# Patient Record
Sex: FEMALE | Race: BLACK OR AFRICAN AMERICAN | Employment: FULL TIME | URBAN - METROPOLITAN AREA
[De-identification: names, ages, dates, MRNs, and addresses within clinical notes are randomized per-mention and may not be internally consistent; named-entity substitution may affect disease eponyms.]

---

## 2017-06-22 ENCOUNTER — OFFICE VISIT (OUTPATIENT)
Dept: FAMILY MEDICINE CLINIC | Age: 52
End: 2017-06-22

## 2017-06-22 VITALS
OXYGEN SATURATION: 97 % | DIASTOLIC BLOOD PRESSURE: 80 MMHG | BODY MASS INDEX: 33.73 KG/M2 | RESPIRATION RATE: 16 BRPM | HEART RATE: 70 BPM | TEMPERATURE: 98.5 F | HEIGHT: 63 IN | WEIGHT: 190.4 LBS | SYSTOLIC BLOOD PRESSURE: 112 MMHG

## 2017-06-22 DIAGNOSIS — J42 CHRONIC BRONCHITIS, UNSPECIFIED CHRONIC BRONCHITIS TYPE (HCC): Primary | ICD-10-CM

## 2017-06-22 DIAGNOSIS — Z72.0 TOBACCO ABUSE: ICD-10-CM

## 2017-06-22 RX ORDER — IBUPROFEN 800 MG/1
800 TABLET ORAL
Qty: 90 TAB | Refills: 0 | Status: CANCELLED | OUTPATIENT
Start: 2017-06-22

## 2017-06-22 NOTE — PROGRESS NOTES
1. Have you been to the ER, urgent care clinic since your last visit? Hospitalized since your last visit? NO    2. Have you seen or consulted any other health care providers outside of the [facility]"" since your last visit? Include any pap smears or colon screening. NO    3. Vaccines? tdap    4.  Rx updated yes

## 2017-06-22 NOTE — PATIENT INSTRUCTIONS
Stopping Smoking: Care Instructions  Your Care Instructions  Cigarette smokers crave the nicotine in cigarettes. Giving it up is much harder than simply changing a habit. Your body has to stop craving the nicotine. It is hard to quit, but you can do it. There are many tools that people use to quit smoking. You may find that combining tools works best for you. There are several steps to quitting. First you get ready to quit. Then you get support to help you. After that, you learn new skills and behaviors to become a nonsmoker. For many people, a necessary step is getting and using medicine. Your doctor will help you set up the plan that best meets your needs. You may want to attend a smoking cessation program to help you quit smoking. When you choose a program, look for one that has proven success. Ask your doctor for ideas. You will greatly increase your chances of success if you take medicine as well as get counseling or join a cessation program.  Some of the changes you feel when you first quit tobacco are uncomfortable. Your body will miss the nicotine at first, and you may feel short-tempered and grumpy. You may have trouble sleeping or concentrating. Medicine can help you deal with these symptoms. You may struggle with changing your smoking habits and rituals. The last step is the tricky one: Be prepared for the smoking urge to continue for a time. This is a lot to deal with, but keep at it. You will feel better. Follow-up care is a key part of your treatment and safety. Be sure to make and go to all appointments, and call your doctor if you are having problems. Its also a good idea to know your test results and keep a list of the medicines you take. How can you care for yourself at home? · Ask your family, friends, and coworkers for support. You have a better chance of quitting if you have help and support.   · Join a support group, such as Nicotine Anonymous, for people who are trying to quit smoking. · Consider signing up for a smoking cessation program, such as the American Lung Association's Freedom from Smoking program.  · Set a quit date. Pick your date carefully so that it is not right in the middle of a big deadline or stressful time. Once you quit, do not even take a puff. Get rid of all ashtrays and lighters after your last cigarette. Clean your house and your clothes so that they do not smell of smoke. · Learn how to be a nonsmoker. Think about ways you can avoid those things that make you reach for a cigarette. ¨ Avoid situations that put you at greatest risk for smoking. For some people, it is hard to have a drink with friends without smoking. For others, they might skip a coffee break with coworkers who smoke. ¨ Change your daily routine. Take a different route to work or eat a meal in a different place. · Cut down on stress. Calm yourself or release tension by doing an activity you enjoy, such as reading a book, taking a hot bath, or gardening. · Talk to your doctor or pharmacist about nicotine replacement therapy, which replaces the nicotine in your body. You still get nicotine but you do not use tobacco. Nicotine replacement products help you slowly reduce the amount of nicotine you need. These products come in several forms, many of them available over-the-counter:  ¨ Nicotine patches  ¨ Nicotine gum and lozenges  ¨ Nicotine inhaler  · Ask your doctor about bupropion (Wellbutrin) or varenicline (Chantix), which are prescription medicines. They do not contain nicotine. They help you by reducing withdrawal symptoms, such as stress and anxiety. · Some people find hypnosis, acupuncture, and massage helpful for ending the smoking habit. · Eat a healthy diet and get regular exercise. Having healthy habits will help your body move past its craving for nicotine. · Be prepared to keep trying. Most people are not successful the first few times they try to quit.  Do not get mad at yourself if you smoke again. Make a list of things you learned and think about when you want to try again, such as next week, next month, or next year. Where can you learn more? Go to http://sylvie-angela.info/. Enter H457 in the search box to learn more about \"Stopping Smoking: Care Instructions. \"  Current as of: March 20, 2017  Content Version: 11.3  © 8898-0257 [employer]. Care instructions adapted under license by [employer] (which disclaims liability or warranty for this information). If you have questions about a medical condition or this instruction, always ask your healthcare professional. [address] any warranty or liability for your use of this information. Body Mass Index: Care Instructions  Your Care Instructions    Body mass index (BMI) can help you see if your weight is raising your risk for health problems. It uses a formula to compare how much you weigh with how tall you are. · A BMI lower than 18.5 is considered underweight. · A BMI between 18.5 and 24.9 is considered healthy. · A BMI between 25 and 29.9 is considered overweight. A BMI of 30 or higher is considered obese. If your BMI is in the normal range, it means that you have a lower risk for weight-related health problems. If your BMI is in the overweight or obese range, you may be at increased risk for weight-related health problems, such as high blood pressure, heart disease, stroke, arthritis or joint pain, and diabetes. If your BMI is in the underweight range, you may be at increased risk for health problems such as fatigue, lower protection (immunity) against illness, muscle loss, bone loss, hair loss, and hormone problems. BMI is just one measure of your risk for weight-related health problems. You may be at higher risk for health problems if you are not active, you eat an unhealthy diet, or you drink too much alcohol or use tobacco products.   Follow-up care is a key part of your treatment and safety. Be sure to make and go to all appointments, and call your doctor if you are having problems. It's also a good idea to know your test results and keep a list of the medicines you take. How can you care for yourself at home? · Practice healthy eating habits. This includes eating plenty of fruits, vegetables, whole grains, lean protein, and low-fat dairy. · If your doctor recommends it, get more exercise. Walking is a good choice. Bit by bit, increase the amount you walk every day. Try for at least 30 minutes on most days of the week. · Do not smoke. Smoking can increase your risk for health problems. If you need help quitting, talk to your doctor about stop-smoking programs and medicines. These can increase your chances of quitting for good. · Limit alcohol to 2 drinks a day for men and 1 drink a day for women. Too much alcohol can cause health problems. If you have a BMI higher than 25  · Your doctor may do other tests to check your risk for weight-related health problems. This may include measuring the distance around your waist. A waist measurement of more than 40 inches in men or 35 inches in women can increase the risk of weight-related health problems. · Talk with your doctor about steps you can take to stay healthy or improve your health. You may need to make lifestyle changes to lose weight and stay healthy, such as changing your diet and getting regular exercise. If you have a BMI lower than 18.5  · Your doctor may do other tests to check your risk for health problems. · Talk with your doctor about steps you can take to stay healthy or improve your health. You may need to make lifestyle changes to gain or maintain weight and stay healthy, such as getting more healthy foods in your diet and doing exercises to build muscle. Where can you learn more? Go to http://sylvie-angela.info/.   Enter S176 in the search box to learn more about \"Body Mass Index: Care Instructions. \"  Current as of: January 23, 2017  Content Version: 11.3  © 5730-3220 [employer], [place]. Care instructions adapted under license by [employer] (which disclaims liability or warranty for this information). If you have questions about a medical condition or this instruction, always ask your healthcare professional. [place] [address] any warranty or liability for your use of this information.

## 2017-06-22 NOTE — PROGRESS NOTES
Subjective:   Ken Martinez is a 46 y.o. female for refill for albuterol for chronic bronchitis. Patient is a smoker 20 year 1/2 PPD history. tobacco cessation counseling------ patient offers no commitment today  Patient reports that she has dyspnea when weather changes (humidity or cold weather). Requesting refill for albuterol HFA. She reports this is the only time she really uses albuterol. ROS: denies use of inhaled steroids, denies every having a spirometry for diagnosis, denies chest pain, denies coughing  Overdue for health maintenance and will schedule wellness visit. PMH: reviewed medications and allergy lists and medical and family history. OBJECTIVE:  Awake and alert in no acute distress  Lungs clear throughout  S1 S2 RRR without ectopy or murmur auscultated. Visit Vitals    /80 (BP 1 Location: Left arm, BP Patient Position: Sitting)    Pulse 70    Temp 98.5 °F (36.9 °C) (Oral)    Resp 16    Ht 5' 3\" (1.6 m)    Wt 190 lb 6.4 oz (86.4 kg)    SpO2 97%    BMI 33.73 kg/m2     Trevor Robertson was seen today for other. Diagnoses and all orders for this visit:    Chronic bronchitis, unspecified chronic bronchitis type ([facility] [address]  -     albuterol sulfate 90 mcg/actuation aepb; Take 2 Puffs by inhalation every four (4) hours as needed. -     REFERRAL TO PULMONARY DISEASE    Tobacco abuse    Other orders  -     Cancel: ibuprofen (MOTRIN) 800 mg tablet; Take 1 Tab by mouth every eight (8) hours as needed for Pain. Indications: Pain      tobacco cessation counseling------ patient offers no commitment today  Reviewed risks and benefits and common side effects of new medication  Portion control and exercise healthy eating general guidelines reviewed with patient to get closer to normal BMI  Patient verbalizes understanding. I have discussed the diagnosis with the patient and the intended plan as seen in the above orders.   The patient has received an after-visit summary and questions were answered concerning future plans. I have discussed medication side effects and warnings with the patient as well.     Follow-up Disposition:  Return for well woman exam.

## 2017-06-22 NOTE — MR AVS SNAPSHOT
Visit Information Date & Time Provider Department Dept. Phone Encounter #  
 6/22/2017 12:40 PM Yovanny Bartlett  [facility] [phone] [id number] Follow-up Instructions Return for well woman exam. Upcoming Health Maintenance Date Due Hepatitis C Screening 1965 DTaP/Tdap/Td series (1 - Tdap) 10/11/1984 BREAST CANCER SCRN MAMMOGRAM 10/26/2014 FOBT Q 1 YEAR AGE 50-75 8/16/2017 INFLUENZA AGE 9 TO ADULT 8/1/2017 Allergies as of 6/22/2017  Review Complete On: 6/22/2017 By: Yovanny Bartlett NP Severity Noted Reaction Type Reactions Cyclobenzaprine High 11/08/2016    Other (comments)  
 headache Etodolac  11/08/2016    Other (comments)  
 headache Current Immunizations  Reviewed on 8/16/2016 Name Date Pneumococcal Conjugate (PCV-13) 8/16/2016 Not reviewed this visit You Were Diagnosed With   
  
 Codes Comments Chronic bronchitis, unspecified chronic bronchitis type ([facility]    -  Primary ICD-10-CM: Z76 ICD-9-CM: 491.9 Tobacco abuse     ICD-10-CM: Z72.0 ICD-9-CM: 305.1 Vitals BP Pulse Temp Resp Height(growth percentile) Weight(growth percentile) 112/80 (BP 1 Location: Left arm, BP Patient Position: Sitting) 70 98.5 °F (36.9 °C) (Oral) 16 5' 3\" (1.6 m) 190 lb 6.4 oz (86.4 kg) SpO2 BMI OB Status Smoking Status 97% 33.73 kg/m2 Hysterectomy Current Every Day Smoker BMI and BSA Data Body Mass Index Body Surface Area  
 33.73 kg/m 2 1.96 m 2 Preferred Pharmacy Pharmacy Name Phone [facility] [address] [facility], [facility] [phone] Your Updated Medication List  
  
   
This list is accurate as of: 6/22/17  1:07 PM.  Always use your most recent med list.  
  
  
  
  
 albuterol sulfate 90 mcg/actuation Aepb Take 2 Puffs by inhalation every four (4) hours as needed. ibuprofen 800 mg tablet Commonly known as:  MOTRIN Take 1 Tab by mouth every eight (8) hours as needed for Pain. Indications: PAIN  
  
 TEGretol 200 mg tablet Generic drug:  carBAMazepine Take 400 mg by mouth two (2) times a day. triamcinolone acetonide 0.1 % topical cream  
Commonly known as:  KENALOG Apply  to affected area two (2) times a day. use thin layer Prescriptions Sent to Pharmacy Refills  
 albuterol sulfate 90 mcg/actuation aepb 0 Sig: Take 2 Puffs by inhalation every four (4) hours as needed. Class: Normal  
 Pharmacy: [id number] [address], [address] [place] [address] #: [phone] Route: Inhalation We Performed the Following REFERRAL TO PULMONARY DISEASE [RKM08 Custom] Comments:  
 Please evaluate patient for confirm diagnosis of chronic bronchitis. Follow-up Instructions Return for well woman exam.  
  
  
Referral Information Referral ID Referred By Referred To  
  
 [id number] Yohannes Ng, Maciej El MD   
   [address] [place] [address] [address] [facility] [place], [address] [place] [address] [address] Phone: [phone] Fax: [phone] Visits Status Start Date End Date 1 New Request 6/22/17 6/22/18 If your referral has a status of pending review or denied, additional information will be sent to support the outcome of this decision. Patient Instructions Stopping Smoking: Care Instructions Your Care Instructions Cigarette smokers crave the nicotine in cigarettes. Giving it up is much harder than simply changing a habit. Your body has to stop craving the nicotine. It is hard to quit, but you can do it. There are many tools that people use to quit smoking. You may find that combining tools works best for you. There are several steps to quitting. First you get ready to quit. Then you get support to help you.  After that, you learn new skills and behaviors to become a nonsmoker. For many people, a necessary step is getting and using medicine. Your doctor will help you set up the plan that best meets your needs. You may want to attend a smoking cessation program to help you quit smoking. When you choose a program, look for one that has proven success. Ask your doctor for ideas. You will greatly increase your chances of success if you take medicine as well as get counseling or join a cessation program. 
Some of the changes you feel when you first quit tobacco are uncomfortable. Your body will miss the nicotine at first, and you may feel short-tempered and grumpy. You may have trouble sleeping or concentrating. Medicine can help you deal with these symptoms. You may struggle with changing your smoking habits and rituals. The last step is the tricky one: Be prepared for the smoking urge to continue for a time. This is a lot to deal with, but keep at it. You will feel better. Follow-up care is a key part of your treatment and safety. Be sure to make and go to all appointments, and call your doctor if you are having problems. Its also a good idea to know your test results and keep a list of the medicines you take. How can you care for yourself at home? · Ask your family, friends, and coworkers for support. You have a better chance of quitting if you have help and support. · Join a support group, such as Nicotine Anonymous, for people who are trying to quit smoking. · Consider signing up for a smoking cessation program, such as the American Lung Association's Freedom from Smoking program. 
· Set a quit date. Pick your date carefully so that it is not right in the middle of a big deadline or stressful time. Once you quit, do not even take a puff. Get rid of all ashtrays and lighters after your last cigarette. Clean your house and your clothes so that they do not smell of smoke. · Learn how to be a nonsmoker.  Think about ways you can avoid those things that make you reach for a cigarette. ¨ Avoid situations that put you at greatest risk for smoking. For some people, it is hard to have a drink with friends without smoking. For others, they might skip a coffee break with coworkers who smoke. ¨ Change your daily routine. Take a different route to work or eat a meal in a different place. · Cut down on stress. Calm yourself or release tension by doing an activity you enjoy, such as reading a book, taking a hot bath, or gardening. · Talk to your doctor or pharmacist about nicotine replacement therapy, which replaces the nicotine in your body. You still get nicotine but you do not use tobacco. Nicotine replacement products help you slowly reduce the amount of nicotine you need. These products come in several forms, many of them available over-the-counter: ¨ Nicotine patches ¨ Nicotine gum and lozenges ¨ Nicotine inhaler · Ask your doctor about bupropion (Wellbutrin) or varenicline (Chantix), which are prescription medicines. They do not contain nicotine. They help you by reducing withdrawal symptoms, such as stress and anxiety. · Some people find hypnosis, acupuncture, and massage helpful for ending the smoking habit. · Eat a healthy diet and get regular exercise. Having healthy habits will help your body move past its craving for nicotine. · Be prepared to keep trying. Most people are not successful the first few times they try to quit. Do not get mad at yourself if you smoke again. Make a list of things you learned and think about when you want to try again, such as next week, next month, or next year. Where can you learn more? Go to http://sylvie-angela.info/. Enter H545 in the search box to learn more about \"Stopping Smoking: Care Instructions. \" Current as of: March 20, 2017 Content Version: 11.3 © 8258-6635 [employer].  Care instructions adapted under license by [address] (which disclaims liability or warranty for this information). If you have questions about a medical condition or this instruction, always ask your healthcare professional. [address] any warranty or liability for your use of this information. Body Mass Index: Care Instructions Your Care Instructions Body mass index (BMI) can help you see if your weight is raising your risk for health problems. It uses a formula to compare how much you weigh with how tall you are. · A BMI lower than 18.5 is considered underweight. · A BMI between 18.5 and 24.9 is considered healthy. · A BMI between 25 and 29.9 is considered overweight. A BMI of 30 or higher is considered obese. If your BMI is in the normal range, it means that you have a lower risk for weight-related health problems. If your BMI is in the overweight or obese range, you may be at increased risk for weight-related health problems, such as high blood pressure, heart disease, stroke, arthritis or joint pain, and diabetes. If your BMI is in the underweight range, you may be at increased risk for health problems such as fatigue, lower protection (immunity) against illness, muscle loss, bone loss, hair loss, and hormone problems. BMI is just one measure of your risk for weight-related health problems. You may be at higher risk for health problems if you are not active, you eat an unhealthy diet, or you drink too much alcohol or use tobacco products. Follow-up care is a key part of your treatment and safety. Be sure to make and go to all appointments, and call your doctor if you are having problems. It's also a good idea to know your test results and keep a list of the medicines you take. How can you care for yourself at home? · Practice healthy eating habits. This includes eating plenty of fruits, vegetables, whole grains, lean protein, and low-fat dairy. · If your doctor recommends it, get more exercise. Walking is a good choice. Bit by bit, increase the amount you walk every day. Try for at least 30 minutes on most days of the week. · Do not smoke. Smoking can increase your risk for health problems. If you need help quitting, talk to your doctor about stop-smoking programs and medicines. These can increase your chances of quitting for good. · Limit alcohol to 2 drinks a day for men and 1 drink a day for women. Too much alcohol can cause health problems. If you have a BMI higher than 25 · Your doctor may do other tests to check your risk for weight-related health problems. This may include measuring the distance around your waist. A waist measurement of more than 40 inches in men or 35 inches in women can increase the risk of weight-related health problems. · Talk with your doctor about steps you can take to stay healthy or improve your health. You may need to make lifestyle changes to lose weight and stay healthy, such as changing your diet and getting regular exercise. If you have a BMI lower than 18.5 · Your doctor may do other tests to check your risk for health problems. · Talk with your doctor about steps you can take to stay healthy or improve your health. You may need to make lifestyle changes to gain or maintain weight and stay healthy, such as getting more healthy foods in your diet and doing exercises to build muscle. Where can you learn more? Go to http://sylvie-angela.info/. Enter S176 in the search box to learn more about \"Body Mass Index: Care Instructions. \" Current as of: January 23, 2017 Content Version: 11.3 © 3596-1727 [employer]. Care instructions adapted under license by [employer] (which disclaims liability or warranty for this information).  If you have questions about a medical condition or this instruction, always ask your healthcare professional. [employer] disclaims any warranty or liability for your use of this information. Introducing \[facility]\"" & HEALTH SERVICES! Sergio Garrett introduces [employer] patient portal. Now you can access parts of your medical record, email your doctor's office, and request medication refills online. 1. In your internet browser, go to [email or web address] 2. Click on the First Time User? Click Here link in the Sign In box. You will see the New Member Sign Up page. 3. Enter your [employer] Access Code exactly as it appears below. You will not need to use this code after youve completed the sign-up process. If you do not sign up before the expiration date, you must request a new code. · [employer] Access Code: [email or web address] Expires: 9/20/2017  1:07 PM 
 
4. Enter the last four digits of your Social Security Number (xxxx) and Date of Birth (mm/dd/yyyy) as indicated and click Submit. You will be taken to the next sign-up page. 5. Create a [employer] ID. This will be your [employer] login ID and cannot be changed, so think of one that is secure and easy to remember. 6. Create a [employer] password. You can change your password at any time. 7. Enter your Password Reset Question and Answer. This can be used at a later time if you forget your password. 8. Enter your e-mail address. You will receive e-mail notification when new information is available in [address]. 9. Click Sign Up. You can now view and download portions of your medical record. 10. Click the Download Summary menu link to download a portable copy of your medical information. If you have questions, please visit the Frequently Asked Questions section of the [employer] website. Remember, [employer] is NOT to be used for urgent needs. For medical emergencies, dial 911. Now available from your iPhone and Android! Please provide this summary of care documentation to your next provider. Your primary care clinician is listed as [facility]. If you have any questions after today's visit, please call [phone].

## 2017-10-16 ENCOUNTER — HOSPITAL ENCOUNTER (EMERGENCY)
Age: 52
Discharge: HOME | End: 2017-10-16
Attending: EMERGENCY MEDICINE
Payer: COMMERCIAL

## 2017-10-16 ENCOUNTER — APPOINTMENT (OUTPATIENT)
Dept: GENERAL RADIOLOGY | Age: 52
End: 2017-10-16
Attending: EMERGENCY MEDICINE
Payer: COMMERCIAL

## 2017-10-16 ENCOUNTER — APPOINTMENT (OUTPATIENT)
Dept: CT IMAGING | Age: 52
End: 2017-10-16
Attending: EMERGENCY MEDICINE
Payer: COMMERCIAL

## 2017-10-16 VITALS
OXYGEN SATURATION: 97 % | HEIGHT: 64 IN | TEMPERATURE: 97.2 F | RESPIRATION RATE: 16 BRPM | BODY MASS INDEX: 32.44 KG/M2 | DIASTOLIC BLOOD PRESSURE: 60 MMHG | HEART RATE: 60 BPM | SYSTOLIC BLOOD PRESSURE: 108 MMHG | WEIGHT: 190 LBS

## 2017-10-16 DIAGNOSIS — K57.30 DIVERTICULOSIS OF LARGE INTESTINE WITHOUT HEMORRHAGE: Primary | ICD-10-CM

## 2017-10-16 DIAGNOSIS — M54.40 ACUTE RIGHT-SIDED LOW BACK PAIN WITH SCIATICA, SCIATICA LATERALITY UNSPECIFIED: ICD-10-CM

## 2017-10-16 LAB
ALBUMIN SERPL-MCNC: 3.6 G/DL (ref 3.4–5)
ALBUMIN/GLOB SERPL: 0.8 {RATIO} (ref 0.8–1.7)
ALP SERPL-CCNC: 93 U/L (ref 45–117)
ALT SERPL-CCNC: 24 U/L (ref 13–56)
ANION GAP SERPL CALC-SCNC: 6 MMOL/L (ref 3–18)
APPEARANCE UR: CLEAR
AST SERPL-CCNC: 13 U/L (ref 15–37)
BASOPHILS # BLD: 0 K/UL (ref 0–0.06)
BASOPHILS NFR BLD: 0 % (ref 0–2)
BILIRUB DIRECT SERPL-MCNC: <0.1 MG/DL (ref 0–0.2)
BILIRUB SERPL-MCNC: 0.2 MG/DL (ref 0.2–1)
BILIRUB UR QL: NEGATIVE
BUN SERPL-MCNC: 14 MG/DL (ref 7–18)
BUN/CREAT SERPL: 19 (ref 12–20)
CALCIUM SERPL-MCNC: 8.6 MG/DL (ref 8.5–10.1)
CHLORIDE SERPL-SCNC: 107 MMOL/L (ref 100–108)
CO2 SERPL-SCNC: 26 MMOL/L (ref 21–32)
COLOR UR: YELLOW
CREAT SERPL-MCNC: 0.72 MG/DL (ref 0.6–1.3)
DIFFERENTIAL METHOD BLD: NORMAL
EOSINOPHIL # BLD: 0.1 K/UL (ref 0–0.4)
EOSINOPHIL NFR BLD: 2 % (ref 0–5)
ERYTHROCYTE [DISTWIDTH] IN BLOOD BY AUTOMATED COUNT: 14 % (ref 11.6–14.5)
GLOBULIN SER CALC-MCNC: 4.3 G/DL (ref 2–4)
GLUCOSE SERPL-MCNC: 82 MG/DL (ref 74–99)
GLUCOSE UR STRIP.AUTO-MCNC: NEGATIVE MG/DL
HCT VFR BLD AUTO: 36.8 % (ref 35–45)
HGB BLD-MCNC: 12.2 G/DL (ref 12–16)
HGB UR QL STRIP: NEGATIVE
KETONES UR QL STRIP.AUTO: NEGATIVE MG/DL
LACTATE BLD-SCNC: 0.7 MMOL/L (ref 0.4–2)
LEUKOCYTE ESTERASE UR QL STRIP.AUTO: NEGATIVE
LIPASE SERPL-CCNC: 197 U/L (ref 73–393)
LYMPHOCYTES # BLD: 2.6 K/UL (ref 0.9–3.6)
LYMPHOCYTES NFR BLD: 44 % (ref 21–52)
MCH RBC QN AUTO: 27.1 PG (ref 24–34)
MCHC RBC AUTO-ENTMCNC: 33.2 G/DL (ref 31–37)
MCV RBC AUTO: 81.6 FL (ref 74–97)
MONOCYTES # BLD: 0.3 K/UL (ref 0.05–1.2)
MONOCYTES NFR BLD: 5 % (ref 3–10)
NEUTS SEG # BLD: 2.8 K/UL (ref 1.8–8)
NEUTS SEG NFR BLD: 49 % (ref 40–73)
NITRITE UR QL STRIP.AUTO: NEGATIVE
PH UR STRIP: 5 [PH] (ref 5–8)
PLATELET # BLD AUTO: 256 K/UL (ref 135–420)
PMV BLD AUTO: 9.4 FL (ref 9.2–11.8)
POTASSIUM SERPL-SCNC: 3.9 MMOL/L (ref 3.5–5.5)
PROT SERPL-MCNC: 7.9 G/DL (ref 6.4–8.2)
PROT UR STRIP-MCNC: NEGATIVE MG/DL
RBC # BLD AUTO: 4.51 M/UL (ref 4.2–5.3)
SODIUM SERPL-SCNC: 139 MMOL/L (ref 136–145)
SP GR UR REFRACTOMETRY: 1.03 (ref 1–1.03)
UROBILINOGEN UR QL STRIP.AUTO: 0.2 EU/DL (ref 0.2–1)
WBC # BLD AUTO: 5.9 K/UL (ref 4.6–13.2)

## 2017-10-16 PROCEDURE — 96375 TX/PRO/DX INJ NEW DRUG ADDON: CPT

## 2017-10-16 PROCEDURE — 81003 URINALYSIS AUTO W/O SCOPE: CPT | Performed by: EMERGENCY MEDICINE

## 2017-10-16 PROCEDURE — 99283 EMERGENCY DEPT VISIT LOW MDM: CPT

## 2017-10-16 PROCEDURE — 80076 HEPATIC FUNCTION PANEL: CPT | Performed by: EMERGENCY MEDICINE

## 2017-10-16 PROCEDURE — 80048 BASIC METABOLIC PNL TOTAL CA: CPT | Performed by: EMERGENCY MEDICINE

## 2017-10-16 PROCEDURE — 74011636320 HC RX REV CODE- 636/320: Performed by: EMERGENCY MEDICINE

## 2017-10-16 PROCEDURE — 74177 CT ABD & PELVIS W/CONTRAST: CPT

## 2017-10-16 PROCEDURE — 74011250636 HC RX REV CODE- 250/636: Performed by: EMERGENCY MEDICINE

## 2017-10-16 PROCEDURE — 85025 COMPLETE CBC W/AUTO DIFF WBC: CPT | Performed by: EMERGENCY MEDICINE

## 2017-10-16 PROCEDURE — 74022 RADEX COMPL AQT ABD SERIES: CPT

## 2017-10-16 PROCEDURE — 83690 ASSAY OF LIPASE: CPT | Performed by: EMERGENCY MEDICINE

## 2017-10-16 PROCEDURE — 96374 THER/PROPH/DIAG INJ IV PUSH: CPT

## 2017-10-16 PROCEDURE — 83605 ASSAY OF LACTIC ACID: CPT

## 2017-10-16 RX ORDER — MORPHINE SULFATE 4 MG/ML
4 INJECTION, SOLUTION INTRAMUSCULAR; INTRAVENOUS
Status: COMPLETED | OUTPATIENT
Start: 2017-10-16 | End: 2017-10-16

## 2017-10-16 RX ORDER — OXYCODONE AND ACETAMINOPHEN 5; 325 MG/1; MG/1
1 TABLET ORAL
Qty: 12 TAB | Refills: 0 | Status: SHIPPED | OUTPATIENT
Start: 2017-10-16 | End: 2018-02-27

## 2017-10-16 RX ORDER — LIDOCAINE 50 MG/G
PATCH TOPICAL
Qty: 15 EACH | Refills: 0 | Status: SHIPPED | OUTPATIENT
Start: 2017-10-16 | End: 2018-04-13

## 2017-10-16 RX ORDER — ONDANSETRON 2 MG/ML
4 INJECTION INTRAMUSCULAR; INTRAVENOUS
Status: COMPLETED | OUTPATIENT
Start: 2017-10-16 | End: 2017-10-16

## 2017-10-16 RX ORDER — ONDANSETRON 4 MG/1
4 TABLET, ORALLY DISINTEGRATING ORAL
Qty: 12 TAB | Refills: 0 | Status: SHIPPED | OUTPATIENT
Start: 2017-10-16 | End: 2017-10-20

## 2017-10-16 RX ADMIN — IOPAMIDOL 75 ML: 612 INJECTION, SOLUTION INTRAVENOUS at 19:02

## 2017-10-16 RX ADMIN — Medication 4 MG: at 18:56

## 2017-10-16 RX ADMIN — ONDANSETRON 4 MG: 2 INJECTION INTRAMUSCULAR; INTRAVENOUS at 18:57

## 2017-10-16 NOTE — ED PROVIDER NOTES
HPI Comments: 7:01 PM Leeanna Thomson is a 45 y.o. female with a h/o Seizures who presents to the ED c/o right flank pain that radiates to her RLQ x 2 days. She is also complaining of frequency and dark urine. The patient states that her pain is exacerbated w/ movement. 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 The patient additionally denies dysuria, malodorous urine, fever, NV, black or bloody stool, and additional complaints or concerns. PCP:  Lisy Tan NP      The history is provided by the patient.         Past Medical History:   Diagnosis Date    Bronchitis     Carpal tunnel syndrome on right     Chronic bronchitis (HCC)     De Quervain's syndrome (tenosynovitis)     Depression     resolved    Ectopic pregnancy     two times    Epilepsy ([address]     Sinus infection     Stenosing tenosynovitis of thumb     Right side    Trigger thumb of left hand        Past Surgical History:   Procedure Laterality Date    COLONOSCOPY N/A 10/21/2016    COLONOSCOPY w/ biopsy performed by Daron Jerry MD at [address] [place] [address] HX APPENDECTOMY  1/12/15    lysis of adhesions    HX CHOLECYSTECTOMY      HX HYSTERECTOMY      HX OTHER SURGICAL      scar tissue removed from intestines    HX PARTIAL HYSTERECTOMY  2009    HX TONSILLECTOMY           Family History:   Problem Relation Age of Onset    Heart Attack Brother       in 62s    Diabetes Father     Hypertension Father     Cancer Father      colon CA    Heart Disease Sister      stent placement    Hypertension Brother     Other Brother      Stomach problems/ulcers    Cancer Sister      Breast CA    Stroke Sister     Diabetes Mother     Hypertension Mother        Social History     Social History    Marital status: LEGALLY      Spouse name: N/A    Number of children: 1    Years of education: N/A     Occupational History    [employer] 15 years     Social History Main Topics    Smoking status: Current Every Day Smoker     Packs/day: 0.50     Types: Cigarettes    Smokeless tobacco: Never Used    Alcohol use No    Drug use: No    Sexual activity: Not Currently     Other Topics Concern    Not on file     Social History Narrative         ALLERGIES: Cyclobenzaprine and Etodolac    Review of Systems   Gastrointestinal: Positive for abdominal pain (RLQ). Genitourinary: Positive for flank pain (right). All other systems reviewed and are negative. Vitals:    10/16/17 1740   BP: 133/78   Pulse: 61   Resp: 20   Temp: 98 °F (36.7 °C)   SpO2: 98%   Weight: 86.2 kg (190 lb)   Height: 5' 4\" (1.626 m)            Physical Exam   Constitutional: She is oriented to person, place, and time. She appears well-developed. HENT:   Head: Normocephalic and atraumatic. Eyes: Conjunctivae and EOM are normal.   Neck: Normal range of motion. Cardiovascular: Normal heart sounds. Exam reveals no gallop and no friction rub. No murmur heard. Pulmonary/Chest: Effort normal and breath sounds normal. No stridor. Abdominal: Soft. There is no tenderness. There is CVA tenderness (right). Musculoskeletal: Normal range of motion. She exhibits tenderness. Right paraspinal tenderness extending across right flank to groin   Neurological: She is alert and oriented to person, place, and time. Skin: Skin is warm and dry. She is not diaphoretic. Psychiatric: She has a normal mood and affect. Her behavior is normal.   Nursing note and vitals reviewed. MDM  Number of Diagnoses or Management Options  Acute right-sided low back pain with sciatica, sciatica laterality unspecified:   Diverticulosis of large intestine without hemorrhage:   Diagnosis management comments: Pt w/ a h/o COPD and is a smoker here with with right back vs right flank vs right abd pain w/ a h/o partial hysterectomy and SBO. Patient's urine negative for blood so less likely kidney stone. Although the patient could have a complete blockage. I will check for hydro on CT, appendicitis and colitis. Will evaluate for pyelonephritis. Will also check for spinal stenosis and disc bulging. I will medicate, check labs, and reassess. ED Course       Procedures    Vitals:  Patient Vitals for the past 12 hrs:   Temp Pulse Resp BP SpO2   10/16/17 1740 98 °F (36.7 °C) 61 20 133/78 98 %         Medications ordered:   Medications   iopamidol (ISOVUE 300) 61 % contrast injection  mL (not administered)   ondansetron (ZOFRAN) injection 4 mg (4 mg IntraVENous Given 10/16/17 1857)   morphine injection 4 mg (4 mg IntraVENous Given 10/16/17 1856)         Lab findings:  Recent Results (from the past 12 hour(s))   CBC WITH AUTOMATED DIFF    Collection Time: 10/16/17  6:25 PM   Result Value Ref Range    WBC 5.9 4.6 - 13.2 K/uL    RBC 4.51 4.20 - 5.30 M/uL    HGB 12.2 12.0 - 16.0 g/dL    HCT 36.8 35.0 - 45.0 %    MCV 81.6 74.0 - 97.0 FL    MCH 27.1 24.0 - 34.0 PG    MCHC 33.2 31.0 - 37.0 g/dL    RDW 14.0 11.6 - 14.5 %    PLATELET 688 020 - 076 K/uL    MPV 9.4 9.2 - 11.8 FL    NEUTROPHILS 49 40 - 73 %    LYMPHOCYTES 44 21 - 52 %    MONOCYTES 5 3 - 10 %    EOSINOPHILS 2 0 - 5 %    BASOPHILS 0 0 - 2 %    ABS. NEUTROPHILS 2.8 1.8 - 8.0 K/UL    ABS. LYMPHOCYTES 2.6 0.9 - 3.6 K/UL    ABS. MONOCYTES 0.3 0.05 - 1.2 K/UL    ABS. EOSINOPHILS 0.1 0.0 - 0.4 K/UL    ABS.  BASOPHILS 0.0 0.0 - 0.06 K/UL    DF AUTOMATED     METABOLIC PANEL, BASIC    Collection Time: 10/16/17  6:25 PM   Result Value Ref Range    Sodium 139 136 - 145 mmol/L    Potassium 3.9 3.5 - 5.5 mmol/L    Chloride 107 100 - 108 mmol/L    CO2 26 21 - 32 mmol/L    Anion gap 6 3.0 - 18 mmol/L    Glucose 82 74 - 99 mg/dL    BUN 14 7.0 - 18 MG/DL    Creatinine 0.72 0.6 - 1.3 MG/DL    BUN/Creatinine ratio 19 12 - 20      GFR est AA >60 >60 ml/min/1.73m2    GFR est non-AA >60 >60 ml/min/1.73m2    Calcium 8.6 8.5 - 10.1 MG/DL   LIPASE    Collection Time: 10/16/17  6:25 PM Result Value Ref Range    Lipase 197 73 - 393 U/L   HEPATIC FUNCTION PANEL    Collection Time: 10/16/17  6:25 PM   Result Value Ref Range    Protein, total 7.9 6.4 - 8.2 g/dL    Albumin 3.6 3.4 - 5.0 g/dL    Globulin 4.3 (H) 2.0 - 4.0 g/dL    A-G Ratio 0.8 0.8 - 1.7      Bilirubin, total 0.2 0.2 - 1.0 MG/DL    Bilirubin, direct <0.1 0.0 - 0.2 MG/DL    Alk. phosphatase 93 45 - 117 U/L    AST (SGOT) 13 (L) 15 - 37 U/L    ALT (SGPT) 24 13 - 56 U/L   URINALYSIS W/ RFLX MICROSCOPIC    Collection Time: 10/16/17  6:25 PM   Result Value Ref Range    Color YELLOW      Appearance CLEAR      Specific gravity 1.029 1.005 - 1.030      pH (UA) 5.0 5.0 - 8.0      Protein NEGATIVE  NEG mg/dL    Glucose NEGATIVE  NEG mg/dL    Ketone NEGATIVE  NEG mg/dL    Bilirubin NEGATIVE  NEG      Blood NEGATIVE  NEG      Urobilinogen 0.2 0.2 - 1.0 EU/dL    Nitrites NEGATIVE  NEG      Leukocyte Esterase NEGATIVE  NEG         EKG interpretation by ED Physician:      X-Ray, CT or other radiology findings or impressions:  XR ABD ACUTE W 1 V CHEST    (Results Pending)   CT ABD PELV W CONT    (Results Pending)       -Re-evaluted the patient - sx much better controlled after pain meds  -Results including CT with diverticulosis, UA without UTI  were discussed and reviewed with pt who understood the implications. Otherwise reassuring results     -We discussed the diagnosis, treatment, and plan. Next steps in close outpt care include: close PMD follow up    -They verbally convey understanding and agreement of the signs, symptoms, diagnosis, treatment and prognosis and additionally agree to follow up as discussed. All questions were answered, and we reviewed pertinent return precautions as seen in the discharge paperwork. Pt understood follow up instructions, and would return to the ED if any worsening or concerns. Disposition:  Diagnosis:   1. Diverticulosis of large intestine without hemorrhage    2.  Acute right-sided low back pain with sciatica, sciatica laterality unspecified        Disposition: Discharge    Follow-up Information     None           Patient's Medications   Start Taking    No medications on file   Continue Taking    ALBUTEROL SULFATE 90 MCG/ACTUATION AEPB    Take 2 Puffs by inhalation every four (4) hours as needed. CARBAMAZEPINE (TEGRETOL) 200 MG TABLET    Take 400 mg by mouth two (2) times a day. IBUPROFEN (MOTRIN) 800 MG TABLET    Take 1 Tab by mouth every eight (8) hours as needed for Pain. Indications: PAIN    TRIAMCINOLONE ACETONIDE (KENALOG) 0.1 % TOPICAL CREAM    Apply  to affected area two (2) times a day. use thin layer   These Medications have changed    No medications on file   Stop Taking    No medications on file         Lionel Ruiz acting as a scribe for and in the presence of Kala Wood MD      October 16, 2017 at 7:06 PM       Provider Attestation:      I personally performed the services described in the documentation, reviewed the documentation, as recorded by the scribe in my presence, and it accurately and completely records my words and actions.  October 16, 2017 at 7:06 PM - Kala Wood MD

## 2017-10-16 NOTE — ED NOTES
[facility] EMERGENCY DEPT      45 y.o. female with noted past medical history who presents to the emergency department with right flank pain with radiation to RLQ abdomen. I performed a brief evaluation, including history and physical, of the patient here in triage and I have determined that pt will need further treatment and evaluation from the main side ER physician. I have placed initial orders to help in expediting patients care. Stephanie Thurman M.D.

## 2017-10-17 NOTE — DISCHARGE INSTRUCTIONS
Learning About How to Have a Healthy Back  What causes back pain? Back pain is often caused by overuse, strain, or injury. For example, people often hurt their backs playing sports or working in the yard, being jolted in a car accident, or lifting something too heavy. Aging plays a part too. Your bones and muscles tend to lose strength as you age, which makes injury more likely. The spongy discs between the bones of the spine (vertebrae) may suffer from wear and tear and no longer provide enough cushion between the bones. A disc that bulges or breaks open (herniated disc) can press on nerves, causing back pain. In some people, back pain is the result of arthritis, broken vertebrae caused by bone loss (osteoporosis), illness, or a spine problem. Although most people have back pain at one time or another, there are steps you can take to make it less likely. How can you have a healthy back? Reduce stress on your back through good posture  Slumping or slouching alone may not cause low back pain. But after the back has been strained or injured, bad posture can make pain worse. · Sleep in a position that maintains your back's normal curves and on a mattress that feels comfortable. Sleep on your side with a pillow between your knees, or sleep on your back with a pillow under your knees. These positions can reduce strain on your back. · Stand and sit up straight. \"Good posture\" generally means your ears, shoulders, and hips are in a straight line. · If you must stand for a long time, put one foot on a stool, ledge, or box. Switch feet every now and then. · Sit in a chair that is low enough to let you place both feet flat on the floor with both knees nearly level with your hips. If your chair or desk is too high, use a footrest to raise your knees. Place a small pillow, a rolled-up towel, or a lumbar roll in the curve of your back if you need extra support.   · Try a kneeling chair, which helps tilt your hips forward. This takes pressure off your lower back. · Try sitting on an exercise ball. It can rock from side to side, which helps keep your back loose. · When driving, keep your knees nearly level with your hips. Sit straight, and drive with both hands on the steering wheel. Your arms should be in a slightly bent position. Reduce stress on your back through careful lifting  · Squat down, bending at the hips and knees only. If you need to, put one knee to the floor and extend your other knee in front of you, bent at a right angle (half kneeling). · Press your chest straight forward. This helps keep your upper back straight while keeping a slight arch in your low back. · Hold the load as close to your body as possible, at the level of your belly button (navel). · Use your feet to change direction, taking small steps. · Lead with your hips as you change direction. Keep your shoulders in line with your hips as you move. · Set down your load carefully, squatting with your knees and hips only. Exercise and stretch your back  · Do some exercise on most days of the week, if your doctor says it is okay. You can walk, run, swim, or cycle. · Stretch your back muscles. Here are a few exercises to try:  Mark Anthony Maidens on your back, and gently pull one bent knee to your chest. Put that foot back on the floor, and then pull the other knee to your chest.  ¨ Do pelvic tilts. Lie on your back with your knees bent. Tighten your stomach muscles. Pull your belly button (navel) in and up toward your ribs. You should feel like your back is pressing to the floor and your hips and pelvis are slightly lifting off the floor. Hold for 6 seconds while breathing smoothly. ¨ Sit with your back flat against a wall. · Keep your core muscles strong. The muscles of your back, belly (abdomen), and buttocks support your spine. ¨ Pull in your belly and imagine pulling your navel toward your spine. Hold this for 6 seconds, then relax.  Remember to keep breathing normally as you tense your muscles. ¨ Do curl-ups. Always do them with your knees bent. Keep your low back on the floor, and curl your shoulders toward your knees using a smooth, slow motion. Keep your arms folded across your chest. If this bothers your neck, try putting your hands behind your neck (not your head), with your elbows spread apart. ¨ Lie on your back with your knees bent and your feet flat on the floor. Tighten your belly muscles, and then push with your feet and raise your buttocks up a few inches. Hold this position 6 seconds as you continue to breathe normally, then lower yourself slowly to the floor. Repeat 8 to 12 times. ¨ If you like group exercise, try Pilates or yoga. These classes have poses that strengthen the core muscles. Lead a healthy lifestyle  · Stay at a healthy weight to avoid strain on your back. · Do not smoke. Smoking increases the risk of osteoporosis, which weakens the spine. If you need help quitting, talk to your doctor about stop-smoking programs and medicines. These can increase your chances of quitting for good. Where can you learn more? Go to http://sylvie-angela.info/. Enter L315 in the search box to learn more about \"Learning About How to Have a Healthy Back. \"  Current as of: March 21, 2017  Content Version: 11.3  © 5664-6130 [employer]. Care instructions adapted under license by [employer] (which disclaims liability or warranty for this information). If you have questions about a medical condition or this instruction, always ask your healthcare professional. [place] [address] any warranty or liability for your use of this information.

## 2018-01-11 ENCOUNTER — HOSPITAL ENCOUNTER (EMERGENCY)
Age: 53
Discharge: LEFT BEFORE DISCHARGE | End: 2018-01-11
Attending: EMERGENCY MEDICINE
Payer: COMMERCIAL

## 2018-01-11 PROCEDURE — 75810000275 HC EMERGENCY DEPT VISIT NO LEVEL OF CARE

## 2018-01-12 ENCOUNTER — HOSPITAL ENCOUNTER (EMERGENCY)
Age: 53
Discharge: HOME | End: 2018-01-12
Attending: EMERGENCY MEDICINE
Payer: COMMERCIAL

## 2018-01-12 ENCOUNTER — APPOINTMENT (OUTPATIENT)
Dept: GENERAL RADIOLOGY | Age: 53
End: 2018-01-12
Attending: EMERGENCY MEDICINE
Payer: COMMERCIAL

## 2018-01-12 VITALS
HEIGHT: 63 IN | HEART RATE: 82 BPM | SYSTOLIC BLOOD PRESSURE: 171 MMHG | TEMPERATURE: 98.4 F | RESPIRATION RATE: 16 BRPM | BODY MASS INDEX: 31.89 KG/M2 | DIASTOLIC BLOOD PRESSURE: 65 MMHG | OXYGEN SATURATION: 98 % | WEIGHT: 180 LBS

## 2018-01-12 DIAGNOSIS — J06.9 ACUTE UPPER RESPIRATORY INFECTION: Primary | ICD-10-CM

## 2018-01-12 PROCEDURE — 74011250637 HC RX REV CODE- 250/637: Performed by: EMERGENCY MEDICINE

## 2018-01-12 PROCEDURE — 71046 X-RAY EXAM CHEST 2 VIEWS: CPT

## 2018-01-12 PROCEDURE — 99282 EMERGENCY DEPT VISIT SF MDM: CPT

## 2018-01-12 RX ORDER — CODEINE PHOSPHATE AND GUAIFENESIN 10; 100 MG/5ML; MG/5ML
10 SOLUTION ORAL
Status: COMPLETED | OUTPATIENT
Start: 2018-01-12 | End: 2018-01-12

## 2018-01-12 RX ORDER — PROMETHAZINE HYDROCHLORIDE, PHENYLEPHRINE HYDROCHLORIDE AND CODEINE PHOSPHATE 6.25; 5; 1 MG/5ML; MG/5ML; MG/5ML
5 SOLUTION ORAL
Qty: 118 ML | Refills: 0 | Status: SHIPPED | OUTPATIENT
Start: 2018-01-12 | End: 2018-02-27

## 2018-01-12 RX ADMIN — GUAIFENESIN AND CODEINE PHOSPHATE 10 ML: 100; 10 SOLUTION ORAL at 09:31

## 2018-01-12 NOTE — DISCHARGE INSTRUCTIONS
Upper Respiratory Infection (Cold): Care Instructions  Your Care Instructions    An upper respiratory infection, or URI, is an infection of the nose, sinuses, or throat. URIs are spread by coughs, sneezes, and direct contact. The common cold is the most frequent kind of URI. The flu and sinus infections are other kinds of URIs. Almost all URIs are caused by viruses. Antibiotics won't cure them. But you can treat most infections with home care. This may include drinking lots of fluids and taking over-the-counter pain medicine. You will probably feel better in 4 to 10 days. The doctor has checked you carefully, but problems can develop later. If you notice any problems or new symptoms, get medical treatment right away. Follow-up care is a key part of your treatment and safety. Be sure to make and go to all appointments, and call your doctor if you are having problems. It's also a good idea to know your test results and keep a list of the medicines you take. How can you care for yourself at home? · To prevent dehydration, drink plenty of fluids, enough so that your urine is light yellow or clear like water. Choose water and other caffeine-free clear liquids until you feel better. If you have kidney, heart, or liver disease and have to limit fluids, talk with your doctor before you increase the amount of fluids you drink. · Take an over-the-counter pain medicine, such as acetaminophen (Tylenol), ibuprofen (Advil, Motrin), or naproxen (Aleve). Read and follow all instructions on the label. · Before you use cough and cold medicines, check the label. These medicines may not be safe for young children or for people with certain health problems. · Be careful when taking over-the-counter cold or flu medicines and Tylenol at the same time. Many of these medicines have acetaminophen, which is Tylenol. Read the labels to make sure that you are not taking more than the recommended dose.  Too much acetaminophen (Tylenol) can be harmful. · Get plenty of rest.  · Do not smoke or allow others to smoke around you. If you need help quitting, talk to your doctor about stop-smoking programs and medicines. These can increase your chances of quitting for good. When should you call for help? Call 911 anytime you think you may need emergency care. For example, call if:  ? · You have severe trouble breathing. ?Call your doctor now or seek immediate medical care if:  ? · You seem to be getting much sicker. ? · You have new or worse trouble breathing. ? · You have a new or higher fever. ? · You have a new rash. ? Watch closely for changes in your health, and be sure to contact your doctor if:  ? · You have a new symptom, such as a sore throat, an earache, or sinus pain. ? · You cough more deeply or more often, especially if you notice more mucus or a change in the color of your mucus. ? · You do not get better as expected. Where can you learn more? Go to http://sylvie-angela.info/. Enter M562 in the search box to learn more about \"Upper Respiratory Infection (Cold): Care Instructions. \"  Current as of: May 12, 2017  Content Version: 11.4  © 6957-0166 [employer] Incorporated. Care instructions adapted under license by [employer] (which disclaims liability or warranty for this information). If you have questions about a medical condition or this instruction, always ask your healthcare professional. [place] [address] any warranty or liability for your use of this information.

## 2018-01-12 NOTE — ED TRIAGE NOTES
Pt presents to the ED with cough, chest congestion, nasal congestion, generalized body aches, and bilateral rib cage pain onset x2 weeks.

## 2018-01-12 NOTE — ED PROVIDER NOTES
EMERGENCY DEPARTMENT HISTORY AND PHYSICAL EXAM    8:52 AM      Date: 1/12/2018  Patient Name: Gita Wells    History of Presenting Illness     Chief Complaint   Patient presents with    Cough    Chest Congestion    Nasal Congestion    Generalized Body Aches         History Provided By: Patient    Chief Complaint: Cough  Duration:  Weeks  Timing:  Acute  Location: Head, Chest, Sinus  Quality: Pressure  Severity: Moderate  Modifying Factors: None  Associated Symptoms: Headache, Sinus Congestion, Body Aches      Additional History (Context): Gita Wells is a 45 y.o. female with history of chronic bronchitis who presents to the ED c/o Cough. Pt reports that sx began 2 weeks ago. Pt says she works in [address] [place] [address] [employer] and has been around multiple pt's with Pneumonia recently. Pt notes that she has been having productive coughing, body aches, headache and neck pressure. Pt has been taking [place], Benadryl and Mucinex with minimal improvement in sx. Pt is current everyday smoker. Pt denies any other acute sx at this time. PCP: Merlin Kee NP    Current Outpatient Prescriptions   Medication Sig Dispense Refill    albuterol sulfate 90 mcg/actuation aepb Take 2 Puffs by inhalation every four (4) hours as needed. 1 Inhaler 0    carBAMazepine (TEGRETOL) 200 mg tablet Take 400 mg by mouth two (2) times a day.  oxyCODONE-acetaminophen (PERCOCET) 5-325 mg per tablet Take 1 Tab by mouth every four (4) hours as needed for Pain. Max Daily Amount: 6 Tabs. 12 Tab 0    lidocaine (LIDODERM) 5 % Apply patch to the affected area for 12 hours a day and remove for 12 hours a day. 15 Each 0    ibuprofen (MOTRIN) 800 mg tablet Take 1 Tab by mouth every eight (8) hours as needed for Pain. Indications: PAIN 90 Tab 0    triamcinolone acetonide (KENALOG) 0.1 % topical cream Apply  to affected area two (2) times a day.  use thin layer 15 g 0       Past History     Past Medical History:  Past Medical History:   Diagnosis Date    Bronchitis     Carpal tunnel syndrome on right     Chronic bronchitis (HCC)     De Quervain's syndrome (tenosynovitis)     Depression     resolved    Ectopic pregnancy     two times    Epilepsy (HCC)     Seizure ([address]     Sinus infection     Stenosing tenosynovitis of thumb     Right side    Trigger thumb of left hand        Past Surgical History:  Past Surgical History:   Procedure Laterality Date    COLONOSCOPY N/A 10/21/2016    COLONOSCOPY w/ biopsy performed by Joanne Mccullough MD at [address] HX APPENDECTOMY  1/12/15    lysis of adhesions    HX CHOLECYSTECTOMY      HX HYSTERECTOMY      HX OTHER SURGICAL      scar tissue removed from intestines    HX PARTIAL HYSTERECTOMY  2009    HX TONSILLECTOMY         Family History:  Family History   Problem Relation Age of Onset    Heart Attack Brother       in 62s    Diabetes Father     Hypertension Father     Cancer Father      colon CA    Heart Disease Sister      stent placement    Hypertension Brother     Other Brother      Stomach problems/ulcers    Cancer Sister      Breast CA    Stroke Sister     Diabetes Mother     Hypertension Mother        Social History:  Social History   Substance Use Topics    Smoking status: Current Every Day Smoker     Packs/day: 0.50     Types: Cigarettes    Smokeless tobacco: Never Used    Alcohol use No       Allergies: Allergies   Allergen Reactions    Cyclobenzaprine Other (comments)     headache    Etodolac Other (comments)     headache         Review of Systems     Review of Systems   Constitutional: Negative for fever. HENT: Positive for sinus pain and sinus pressure. Negative for congestion. Respiratory: Positive for cough. Negative for shortness of breath. Cardiovascular: Negative for chest pain. Gastrointestinal: Negative for abdominal pain and vomiting. Musculoskeletal: Positive for myalgias. Negative for back pain.    Skin: Negative for rash.   Neurological: Positive for headaches. Negative for light-headedness. All other systems reviewed and are negative. Physical Exam     Visit Vitals    /65 (BP 1 Location: Left arm, BP Patient Position: Sitting)    Pulse 82    Temp 98.4 °F (36.9 °C)    Resp 16    Ht 5' 3\" (1.6 m)    Wt 81.6 kg (180 lb)    SpO2 98%    BMI 31.89 kg/m2       Physical Exam   Constitutional: She is oriented to person, place, and time. She appears well-developed. HENT:   Head: Normocephalic. Mouth/Throat: Oropharynx is clear and moist.   Eyes: Pupils are equal, round, and reactive to light. Neck: Normal range of motion. Cardiovascular: Normal rate and normal heart sounds. No murmur heard. Pulmonary/Chest: Effort normal. She has no wheezes. She has no rales. Abdominal: Soft. There is no tenderness. Musculoskeletal: Normal range of motion. She exhibits no edema. Neurological: She is alert and oriented to person, place, and time. Skin: Skin is warm and dry. Nursing note and vitals reviewed. Diagnostic Study Results     Labs -  No results found for this or any previous visit (from the past 12 hour(s)). Radiologic Studies -   XR CHEST AP LAT    (Results Pending)   Preliminary Reading by Dr. Yudi Matos - No acute changes. Medical Decision Making   I am the first provider for this patient. I reviewed the vital signs, available nursing notes, past medical history, past surgical history, family history and social history. Vital Signs-Reviewed the patient's vital signs. Pulse Oximetry Analysis -  98% on room air (Normal)    Records Reviewed: Nursing Notes and Old Medical Records (Time of Review: 8:52 AM)    ED Course: Progress Notes, Reevaluation, and Consults: N/A      Diagnosis     Clinical Impression: No diagnosis found.     Disposition: DISCHARGE    Follow-up Information     None           Patient's Medications   Start Taking    No medications on file   Continue Taking ALBUTEROL SULFATE 90 MCG/ACTUATION AEPB    Take 2 Puffs by inhalation every four (4) hours as needed. CARBAMAZEPINE (TEGRETOL) 200 MG TABLET    Take 400 mg by mouth two (2) times a day. IBUPROFEN (MOTRIN) 800 MG TABLET    Take 1 Tab by mouth every eight (8) hours as needed for Pain. Indications: PAIN    LIDOCAINE (LIDODERM) 5 %    Apply patch to the affected area for 12 hours a day and remove for 12 hours a day. OXYCODONE-ACETAMINOPHEN (PERCOCET) 5-325 MG PER TABLET    Take 1 Tab by mouth every four (4) hours as needed for Pain. Max Daily Amount: 6 Tabs. TRIAMCINOLONE ACETONIDE (KENALOG) 0.1 % TOPICAL CREAM    Apply  to affected area two (2) times a day. use thin layer   These Medications have changed    No medications on file   Stop Taking    No medications on file     _______________________________    Attestations:  [address] [facility] acting as a scribe for and in the presence of Leonardo Torres MD      January 12, 2018 at 8:52 AM       Provider Attestation:      I personally performed the services described in the documentation, reviewed the documentation, as recorded by the scribe in my presence, and it accurately and completely records my words and actions. January 12, 2018 at 8:52 AM - Leonardo Torres MD    _______________________________  Results reviwed with pt, she agrees with dispo and F/U plan.   Leonardo Torres MD  9:18 AM

## 2018-01-12 NOTE — ED NOTES
Pt states ready for discharge. Pt states she will follow up with PCP as instructed by provider. Pt appears in NOAD. I have reviewed discharge instructions with the patient. Prescriptions were reviewed with patient instructed not to drink alcohol, drive a car, or operate heavy machinery while taking this medicine. The patient verbalized understanding. Patient seen leaving ED ambulatory without difficulty or need for assistance, with S/O in no sign of distress. Patient armband removed and shredded    Current Discharge Medication List      START taking these medications    Details   promethazine-phenyleph-codeine (PHENERGAN VC WITH CODEINE) 6.25-5-10 mg/5 mL syrp Take 5 mL by mouth every six (6) hours as needed. Max Daily Amount: 20 mL.   Qty: 118 mL, Refills: 0    Associated Diagnoses: Acute upper respiratory infection

## 2018-01-12 NOTE — LETTER
NOTIFICATION RETURN TO WORK / SCHOOL 
 
1/12/2018 9:18 AM 
 
Ms. Kristen Pratt [address] [facility] [id number] To Whom It May Concern: 
 
Kristen Pratt is currently under the care of [id number] [facility] EMERGENCY DEPT. She will return to work on: 1/15/2018 If there are questions or concerns please have the patient contact our office.  
 
 
 
Sincerely, 
 
 
Romel Read MD

## 2018-02-27 ENCOUNTER — HOSPITAL ENCOUNTER (EMERGENCY)
Age: 53
Discharge: HOME | End: 2018-02-27
Attending: EMERGENCY MEDICINE | Admitting: EMERGENCY MEDICINE
Payer: COMMERCIAL

## 2018-02-27 ENCOUNTER — APPOINTMENT (OUTPATIENT)
Dept: CT IMAGING | Age: 53
End: 2018-02-27
Attending: PHYSICIAN ASSISTANT
Payer: COMMERCIAL

## 2018-02-27 VITALS
BODY MASS INDEX: 33.66 KG/M2 | DIASTOLIC BLOOD PRESSURE: 79 MMHG | WEIGHT: 190 LBS | HEART RATE: 89 BPM | TEMPERATURE: 99.2 F | SYSTOLIC BLOOD PRESSURE: 137 MMHG | OXYGEN SATURATION: 98 % | RESPIRATION RATE: 19 BRPM

## 2018-02-27 DIAGNOSIS — S10.93XA CONTUSION OF NECK, INITIAL ENCOUNTER: ICD-10-CM

## 2018-02-27 DIAGNOSIS — G40.909 EPILEPSY (HCC): ICD-10-CM

## 2018-02-27 DIAGNOSIS — G40.909 RECURRENT SEIZURES (HCC): Primary | ICD-10-CM

## 2018-02-27 DIAGNOSIS — S00.83XA CONTUSION OF FACE, INITIAL ENCOUNTER: ICD-10-CM

## 2018-02-27 LAB
ANION GAP SERPL CALC-SCNC: 7 MMOL/L (ref 3–18)
APPEARANCE UR: CLEAR
ATRIAL RATE: 78 BPM
BASOPHILS # BLD: 0 K/UL (ref 0–0.06)
BASOPHILS NFR BLD: 0 % (ref 0–2)
BILIRUB UR QL: NEGATIVE
BUN SERPL-MCNC: 13 MG/DL (ref 7–18)
BUN/CREAT SERPL: 19 (ref 12–20)
CALCIUM SERPL-MCNC: 8.8 MG/DL (ref 8.5–10.1)
CALCULATED P AXIS, ECG09: 62 DEGREES
CALCULATED R AXIS, ECG10: 32 DEGREES
CALCULATED T AXIS, ECG11: 36 DEGREES
CARBAMAZEPINE SERPL-MCNC: 7.1 UG/ML (ref 4–12)
CHLORIDE SERPL-SCNC: 108 MMOL/L (ref 100–108)
CO2 SERPL-SCNC: 23 MMOL/L (ref 21–32)
COLOR UR: YELLOW
CREAT SERPL-MCNC: 0.67 MG/DL (ref 0.6–1.3)
DIAGNOSIS, 93000: NORMAL
DIFFERENTIAL METHOD BLD: ABNORMAL
EOSINOPHIL # BLD: 0 K/UL (ref 0–0.4)
EOSINOPHIL NFR BLD: 0 % (ref 0–5)
ERYTHROCYTE [DISTWIDTH] IN BLOOD BY AUTOMATED COUNT: 14.5 % (ref 11.6–14.5)
GLUCOSE SERPL-MCNC: 94 MG/DL (ref 74–99)
GLUCOSE UR STRIP.AUTO-MCNC: NEGATIVE MG/DL
HCT VFR BLD AUTO: 35.3 % (ref 35–45)
HGB BLD-MCNC: 12.2 G/DL (ref 12–16)
HGB UR QL STRIP: NEGATIVE
KETONES UR QL STRIP.AUTO: NEGATIVE MG/DL
LEUKOCYTE ESTERASE UR QL STRIP.AUTO: NEGATIVE
LYMPHOCYTES # BLD: 1.4 K/UL (ref 0.9–3.6)
LYMPHOCYTES NFR BLD: 13 % (ref 21–52)
MAGNESIUM SERPL-MCNC: 2.4 MG/DL (ref 1.6–2.6)
MCH RBC QN AUTO: 27.4 PG (ref 24–34)
MCHC RBC AUTO-ENTMCNC: 34.6 G/DL (ref 31–37)
MCV RBC AUTO: 79.3 FL (ref 74–97)
MONOCYTES # BLD: 0.6 K/UL (ref 0.05–1.2)
MONOCYTES NFR BLD: 5 % (ref 3–10)
NEUTS SEG # BLD: 8.7 K/UL (ref 1.8–8)
NEUTS SEG NFR BLD: 82 % (ref 40–73)
NITRITE UR QL STRIP.AUTO: NEGATIVE
P-R INTERVAL, ECG05: 138 MS
PH UR STRIP: 8 [PH] (ref 5–8)
PLATELET # BLD AUTO: 262 K/UL (ref 135–420)
PMV BLD AUTO: 9.4 FL (ref 9.2–11.8)
POTASSIUM SERPL-SCNC: 3.8 MMOL/L (ref 3.5–5.5)
PROT UR STRIP-MCNC: NEGATIVE MG/DL
Q-T INTERVAL, ECG07: 382 MS
QRS DURATION, ECG06: 76 MS
QTC CALCULATION (BEZET), ECG08: 435 MS
RBC # BLD AUTO: 4.45 M/UL (ref 4.2–5.3)
SODIUM SERPL-SCNC: 138 MMOL/L (ref 136–145)
SP GR UR REFRACTOMETRY: 1.02 (ref 1–1.03)
UROBILINOGEN UR QL STRIP.AUTO: 0.2 EU/DL (ref 0.2–1)
VENTRICULAR RATE, ECG03: 78 BPM
WBC # BLD AUTO: 10.7 K/UL (ref 4.6–13.2)

## 2018-02-27 PROCEDURE — 83735 ASSAY OF MAGNESIUM: CPT | Performed by: EMERGENCY MEDICINE

## 2018-02-27 PROCEDURE — 80156 ASSAY CARBAMAZEPINE TOTAL: CPT | Performed by: PHYSICIAN ASSISTANT

## 2018-02-27 PROCEDURE — 81003 URINALYSIS AUTO W/O SCOPE: CPT | Performed by: PHYSICIAN ASSISTANT

## 2018-02-27 PROCEDURE — 80048 BASIC METABOLIC PNL TOTAL CA: CPT | Performed by: EMERGENCY MEDICINE

## 2018-02-27 PROCEDURE — 99284 EMERGENCY DEPT VISIT MOD MDM: CPT

## 2018-02-27 PROCEDURE — 93005 ELECTROCARDIOGRAM TRACING: CPT

## 2018-02-27 PROCEDURE — 70450 CT HEAD/BRAIN W/O DYE: CPT

## 2018-02-27 PROCEDURE — 72125 CT NECK SPINE W/O DYE: CPT

## 2018-02-27 PROCEDURE — 85025 COMPLETE CBC W/AUTO DIFF WBC: CPT | Performed by: PHYSICIAN ASSISTANT

## 2018-02-27 PROCEDURE — 70486 CT MAXILLOFACIAL W/O DYE: CPT

## 2018-02-27 PROCEDURE — 74011250637 HC RX REV CODE- 250/637: Performed by: EMERGENCY MEDICINE

## 2018-02-27 RX ORDER — CARBAMAZEPINE 200 MG/1
TABLET ORAL
Qty: 90 TAB | Refills: 0 | Status: SHIPPED
Start: 2018-02-27

## 2018-02-27 RX ORDER — ACETAMINOPHEN AND CODEINE PHOSPHATE 300; 30 MG/1; MG/1
1 TABLET ORAL
Status: COMPLETED | OUTPATIENT
Start: 2018-02-27 | End: 2018-02-27

## 2018-02-27 RX ORDER — ACETAMINOPHEN AND CODEINE PHOSPHATE 300; 30 MG/1; MG/1
1 TABLET ORAL
Qty: 6 TAB | Refills: 0 | Status: SHIPPED | OUTPATIENT
Start: 2018-02-27 | End: 2018-04-13

## 2018-02-27 RX ADMIN — ACETAMINOPHEN AND CODEINE PHOSPHATE 1 TABLET: 300; 30 TABLET ORAL at 12:46

## 2018-02-27 NOTE — DISCHARGE INSTRUCTIONS
Epilepsy: Care Instructions  Your Care Instructions    Epilepsy is a common condition that causes repeated seizures. The seizures are caused by bursts of electrical activity in the brain that aren't normal. Seizures may cause problems with muscle control, movement, speech, vision, or awareness. They can be scary. Epilepsy affects each person differently. Some people have only a few seizures. Others get them more often. If you know what triggers a seizure, you may be able to avoid having one. You can take medicines to control and reduce seizures. You and your doctor will need to find the right combination, schedule, and dose of medicine. This may take time and careful changes. Seizures may get worse and happen more often over time. Follow-up care is a key part of your treatment and safety. Be sure to make and go to all appointments, and call your doctor if you are having problems. It's also a good idea to know your test results and keep a list of the medicines you take. How can you care for yourself at home? · Be safe with medicines. Take your medicines exactly as prescribed. Call your doctor if you think you are having a problem with your medicine. · Make a treatment plan with your doctor. Be sure to follow your plan. · Try to identify and avoid things that may make you more likely to have a seizure. These may include:  ¨ Not getting enough sleep. ¨ Using drugs or alcohol. ¨ Being emotionally stressed. ¨ Skipping meals. · Keep a record of any seizures you have. Note the date, time of day, and any details about the seizure that you can remember. Your doctor can use this information to plan or adjust your medicine or other treatment. · Be sure that any doctor treating you for another condition knows that you have epilepsy. Each doctor should know what medicines you are taking, if any. · Wear a medical ID bracelet. You can buy this at most [employer].  If you have a seizure that leaves you unconscious or unable to speak for yourself, this bracelet will let those who are treating you know that you have epilepsy. · Talk to your doctor about whether it is safe for you to do certain activities, such as drive or swim. When should you call for help? Call 911 anytime you think you may need emergency care. For example, call if:  ? · A seizure does not stop as it normally does. ? · You have new symptoms such as:  ¨ Numbness, tingling, or weakness on one side of your body or face. ¨ Vision changes. ¨ Trouble speaking or thinking clearly. ?Call your doctor now or seek immediate medical care if:  ? · You have a fever. ? · You have a severe headache. ? Watch closely for changes in your health, and be sure to contact your doctor if:  ? · The normal pattern or features of your seizures change. Where can you learn more? Go to http://sylvie-angela.info/. Jordan Curling in the search box to learn more about \"Epilepsy: Care Instructions. \"  Current as of: October 14, 2016  Content Version: 11.4  © 1322-6811 [employer]wise, Incorporated. Care instructions adapted under license by [employer] (which disclaims liability or warranty for this information). If you have questions about a medical condition or this instruction, always ask your healthcare professional. [place] [address] any warranty or liability for your use of this information.

## 2018-02-27 NOTE — ED NOTES
46 yo F with hx of epilepsy who presents due to seizure that occurred this morning around 9am. Pt notes she was asleep when the seizure occurred. She fell off of the bed, hitting her head on the night stand. Pt denies blood thinner use or emesis. Notes she missed 2 doses of her Tergetol. I performed a brief evaluation, including history and physical, of the patient here in triage and I have determined that pt will need further treatment and evaluation from the main side ER physician. I have placed initial orders to help in expediting patients care.      February 27, 2018 at 11:31 AM - Jimmy Granados        Visit Vitals    /79    Pulse 89    Temp 99.2 °F (37.3 °C)    Resp 19    Wt 86.2 kg (190 lb)    SpO2 98%    BMI 33.66 kg/m2

## 2018-02-27 NOTE — LETTER
NOTIFICATION RETURN TO WORK / SCHOOL 
 
2/27/2018 5:21 PM 
 
Ms. Gita Wells [address] [facility] [id number] To Whom It May Concern: 
 
Gita Wells is currently under the care of [facility] EMERGENCY DEPT. She will return to work/school on: 3/2/2018 If there are questions or concerns please have the patient contact our office.  
 
 
 
Sincerely, 
 
 
Shameka Amor MD

## 2018-02-27 NOTE — ED PROVIDER NOTES
EMERGENCY DEPARTMENT HISTORY AND PHYSICAL EXAM    11:34 AM      Date: 2/27/2018  Patient Name: Ayaka Mason    History of Presenting Illness     Chief Complaint   Patient presents with    Seizure         History Provided By: Patient    Chief Complaint: Seizure   Duration: Earlier this morning  Location: Head   Quality: N/A  Severity: Moderate  Modifying Factors: None   Associated Symptoms: headache, left eye swelling, neck pain, generalized body aches and tongue pain      Additional History (Context): Ayaka Mason is a 45 y.o. female with hx of epilepsy presenting to the ED with c/o an acute seizure episode that occurred earlier this morning. Pt reports she woke up on floor after she had a seizure in her sleep. States she has been told, her seizures are triggered when she has not had enough sleep. Pt works graveyards shifts, however denies any changes in her work schedule, she has been working last shifts for some time now. Last seizure was about a year ago. Pt takes Tegretol as instructed, last dose was this morning. Pt is c/o neck pain, generalized body aches, headache, left eye swelling and tongue pain. States she fell off her bed, hit her head and left side on a night stand as well as bit her tongue. Denies any CP, SOB, fever, chills, cough, congestion, rhinorrhea, double vision, abdominal pain, nausea, vomiting, diarrhea or urinary sx. Severity is moderate. Admits to smoking and denies any ETOH or drug use. Pt is followed by Dr. Zackary Javed, neurology. No other sx or complaints given at this time. PCP: Vinny Hare NP    Current Outpatient Prescriptions   Medication Sig Dispense Refill    carBAMazepine (TEGRETOL) 200 mg tablet One tab in AM, one tab at lunch, and two tabs before bed 90 Tab 0    lidocaine (LIDODERM) 5 % Apply patch to the affected area for 12 hours a day and remove for 12 hours a day.  15 Each 0    albuterol sulfate 90 mcg/actuation aepb Take 2 Puffs by inhalation every four (4) hours as needed. 1 Inhaler 0    ibuprofen (MOTRIN) 800 mg tablet Take 1 Tab by mouth every eight (8) hours as needed for Pain. Indications: PAIN 90 Tab 0    triamcinolone acetonide (KENALOG) 0.1 % topical cream Apply  to affected area two (2) times a day. use thin layer 15 g 0       Past History     Past Medical History:  Past Medical History:   Diagnosis Date    Bronchitis     Carpal tunnel syndrome on right     Chronic bronchitis (HCC)     De Quervain's syndrome (tenosynovitis)     Depression     resolved    Ectopic pregnancy     two times    Epilepsy ([address]     Se[facility] ([facility]     Sinus infection     Stenosing tenosynovitis of thumb     Right side    Trigger thumb of left hand        Past Surgical History:  Past Surgical History:   Procedure Laterality Date    COLONOSCOPY N/A 10/21/2016    COLONOSCOPY w/ biopsy performed by Tree Mcfarlane MD at [address] HX APPENDECTOMY  1/12/15    lysis of adhesions    HX CHOLECYSTECTOMY      HX HYSTERECTOMY      HX OTHER SURGICAL      scar tissue removed from intestines    HX PARTIAL HYSTERECTOMY  2009    HX TONSILLECTOMY         Family History:  Family History   Problem Relation Age of Onset    Heart Attack Brother       in 62s    Diabetes Father     Hypertension Father     Cancer Father      colon CA    Heart Disease Sister      stent placement    Hypertension Brother     Other Brother      Stomach problems/ulcers    Cancer Sister      Breast CA    Stroke Sister     Diabetes Mother     Hypertension Mother        Social History:  Social History   Substance Use Topics    Smoking status: Current Every Day Smoker     Packs/day: 0.50     Types: Cigarettes    Smokeless tobacco: Never Used    Alcohol use No       Allergies:   Allergies   Allergen Reactions    Cyclobenzaprine Other (comments)     headache    Etodolac Other (comments)     headache         Review of Systems       Review of Systems   Constitutional: Negative for activity change, fatigue and fever. Generalized body aches   HENT: Negative for congestion and rhinorrhea. Left eye swelling  Tongue pain   Eyes: Negative for visual disturbance. Respiratory: Negative for shortness of breath. Cardiovascular: Negative for chest pain and palpitations. Gastrointestinal: Negative for abdominal pain, diarrhea, nausea and vomiting. Genitourinary: Negative for dysuria and hematuria. Musculoskeletal: Positive for neck pain. Negative for back pain. Skin: Negative for rash. Neurological: Positive for seizures and headaches. Negative for dizziness, weakness and light-headedness. All other systems reviewed and are negative. Physical Exam     Visit Vitals    /79    Pulse 89    Temp 99.2 °F (37.3 °C)    Resp 19    Wt 86.2 kg (190 lb)    SpO2 98%    BMI 33.66 kg/m2         Physical Exam   Constitutional: She is oriented to person, place, and time. She appears well-developed and well-nourished. No distress. HENT:   Head: Normocephalic. Right Ear: External ear normal.   Left Ear: External ear normal.   Nose: Nose normal.   Mouth/Throat: Oropharynx is clear and moist.   L periorbital swelling, EOM intact, L mandibular and neck pain   Eyes: Conjunctivae and EOM are normal. Pupils are equal, round, and reactive to light. No scleral icterus. Conjunctival injection noted    Neck: Neck supple. No JVD present. No tracheal deviation present. No thyromegaly present. Poorly localized pain, L lateral neck hematoma    Cardiovascular: Normal rate, regular rhythm, normal heart sounds and intact distal pulses. Exam reveals no gallop and no friction rub. No murmur heard. Pulmonary/Chest: Effort normal and breath sounds normal. She exhibits no tenderness. Abdominal: Soft. Bowel sounds are normal. She exhibits no distension. There is no tenderness. There is no rebound and no guarding. Musculoskeletal: Normal range of motion.  She exhibits no edema or tenderness. Diffuse myalgia, no arm or leg deformity    Lymphadenopathy:     She has no cervical adenopathy. Neurological: She is alert and oriented to person, place, and time. No cranial nerve deficit. Coordination normal.   No sensory loss, Gait normal, Motor 5/5   Skin: Skin is warm and dry. Psychiatric: She has a normal mood and affect. Her behavior is normal. Judgment and thought content normal.   Supportive family at the bedside    Nursing note and vitals reviewed. Diagnostic Study Results     Labs -  Recent Results (from the past 12 hour(s))   CBC WITH AUTOMATED DIFF    Collection Time: 02/27/18 12:50 PM   Result Value Ref Range    WBC 10.7 4.6 - 13.2 K/uL    RBC 4.45 4.20 - 5.30 M/uL    HGB 12.2 12.0 - 16.0 g/dL    HCT 35.3 35.0 - 45.0 %    MCV 79.3 74.0 - 97.0 FL    MCH 27.4 24.0 - 34.0 PG    MCHC 34.6 31.0 - 37.0 g/dL    RDW 14.5 11.6 - 14.5 %    PLATELET 869 874 - 200 K/uL    MPV 9.4 9.2 - 11.8 FL    NEUTROPHILS 82 (H) 40 - 73 %    LYMPHOCYTES 13 (L) 21 - 52 %    MONOCYTES 5 3 - 10 %    EOSINOPHILS 0 0 - 5 %    BASOPHILS 0 0 - 2 %    ABS. NEUTROPHILS 8.7 (H) 1.8 - 8.0 K/UL    ABS. LYMPHOCYTES 1.4 0.9 - 3.6 K/UL    ABS. MONOCYTES 0.6 0.05 - 1.2 K/UL    ABS. EOSINOPHILS 0.0 0.0 - 0.4 K/UL    ABS.  BASOPHILS 0.0 0.0 - 0.06 K/UL    DF AUTOMATED     CARBAMAZEPINE    Collection Time: 02/27/18 12:50 PM   Result Value Ref Range    Carbamazepine 7.1 4.0 - 12.0 ug/mL   EKG, 12 LEAD, INITIAL    Collection Time: 02/27/18 12:56 PM   Result Value Ref Range    Ventricular Rate 78 BPM    Atrial Rate 78 BPM    P-R Interval 138 ms    QRS Duration 76 ms    Q-T Interval 382 ms    QTC Calculation (Bezet) 435 ms    Calculated P Axis 62 degrees    Calculated R Axis 32 degrees    Calculated T Axis 36 degrees    Diagnosis       Normal sinus rhythm  Septal infarct (cited on or before 27-FEB-2018)  Abnormal ECG  When compared with ECG of 11-JUL-2016 10:47,  No significant change was found  Confirmed by Lorena, MD, [place] ([id number]) on 2/27/2018 2:08:16 PM     URINALYSIS W/ RFLX MICROSCOPIC    Collection Time: 02/27/18  3:45 PM   Result Value Ref Range    Color YELLOW      Appearance CLEAR      Specific gravity 1.022 1.005 - 1.030      pH (UA) 8.0 5.0 - 8.0      Protein NEGATIVE  NEG mg/dL    Glucose NEGATIVE  NEG mg/dL    Ketone NEGATIVE  NEG mg/dL    Bilirubin NEGATIVE  NEG      Blood NEGATIVE  NEG      Urobilinogen 0.2 0.2 - 1.0 EU/dL    Nitrites NEGATIVE  NEG      Leukocyte Esterase NEGATIVE  NEG     METABOLIC PANEL, BASIC    Collection Time: 02/27/18  3:45 PM   Result Value Ref Range    Sodium 138 136 - 145 mmol/L    Potassium 3.8 3.5 - 5.5 mmol/L    Chloride 108 100 - 108 mmol/L    CO2 23 21 - 32 mmol/L    Anion gap 7 3.0 - 18 mmol/L    Glucose 94 74 - 99 mg/dL    BUN 13 7.0 - 18 MG/DL    Creatinine 0.67 0.6 - 1.3 MG/DL    BUN/Creatinine ratio 19 12 - 20      GFR est AA >60 >60 ml/min/1.73m2    GFR est non-AA >60 >60 ml/min/1.73m2    Calcium 8.8 8.5 - 10.1 MG/DL   MAGNESIUM    Collection Time: 02/27/18  3:45 PM   Result Value Ref Range    Magnesium 2.4 1.6 - 2.6 mg/dL       Radiologic Studies -   CT SPINE CERV WO CONT   IMPRESSIONS:  No evidence of acute fracture or malalignment of bones in cervical spine. Multilevel moderate-to-severe spondylosis in cervical spine, as described. Straightening of cervical spine may be due to muscle spasm, and/or positional.      CT MAXILLOFACIAL WO CONT   IMPRESSION:  Soft tissue swelling with soft tissue edema and probable hemorrhage involving left eyelids and small area of left infraorbital soft tissues, and over left Zygoma. In rest of left orbit there is no other abnormality. Normal right orbit. Small displaced fracture at the tip of  nasal bones. No other fracture in maxillofacial bones, orbital bones or in mandible. Bilateral all paranasal sinuses are clear except for minimal retention cyst on the floor of right maxillary sinus.       CT HEAD WO CONT   IMPRESSION:  No evidence of intracranial hemorrhage. Old or subacute lacunar infarction in the upper posterior portion of left lentiform nucleus, and encroaching part of left internal capsule. No evidence of fracture in calvarium or in basal scarring. Soft tissue swelling involving left lower eyelid and infraorbital soft tissues.                   Medical Decision Making   I am the first provider for this patient. I reviewed the vital signs, available nursing notes, past medical history, past surgical history, family history and social history. Vital Signs-Reviewed the patient's vital signs. Pulse Oximetry Analysis -  98% on room air, stable     Cardiac Monitor:  Rate: 89  Rhythm:  Normal Sinus Rhythm     EKG: Interpreted by the EP. Time Interpreted: 13:00   Rate: 78   Rhythm: Normal Sinus Rhythm    Interpretation: No STEMI. Records Reviewed: Nursing Notes and Old Medical Records (Time of Review: 11:34 AM)    ED Course: Progress Notes, Reevaluation, and Consults:    5:35 PM Consult: I discussed care with Dr. Pastor Jack (Neurology). It was a standard discussion including patient history, chief complaint, available diagnostic results, and predicted treatment course. Would like to have pt increase Tegretol to 200 mg in the morning, 200 mg at lunchtime and 400 mg at bedtime and for pt to f/u with her neurologist within the next week. Discussed medication changes and f/u with pt and she states that she has enough Tegretol left for the new doses. Instructed pt to f/u with PCP if left eye swelling worsened, currently pt's vision is intact and EOMI. Provider Notes (Medical Decision Making): Pt is a 46yo female with a hx of seizure disorder, recurrent bronchitis, depression, presents after a seizure in her sleep. Pt fell out of bed and hit the L side of her head. Pt has tongue, neck, L eye trauma. Pt has been seizure free for about a year.   Will follow her tegretol level, CT head, neck, MF, VA, pain control, lytes, UA then reevaluate. Nayana Abdul  12:06 PM      Diagnosis     Clinical Impression:   1. Recurrent seizures ([address]    2. Epilepsy ([address]        Disposition: Discharge     Follow-up Information     Follow up With Details Comments Contact Jamas Bloch, MD Call in 2 days  [address] [facility] EMERGENCY DEPT  As needed, If symptoms worsen [address] [place] [id number]  [phone]           Patient's Medications   Start Taking    No medications on file   Continue Taking    ALBUTEROL SULFATE 90 MCG/ACTUATION AEPB    Take 2 Puffs by inhalation every four (4) hours as needed. IBUPROFEN (MOTRIN) 800 MG TABLET    Take 1 Tab by mouth every eight (8) hours as needed for Pain. Indications: PAIN    LIDOCAINE (LIDODERM) 5 %    Apply patch to the affected area for 12 hours a day and remove for 12 hours a day. TRIAMCINOLONE ACETONIDE (KENALOG) 0.1 % TOPICAL CREAM    Apply  to affected area two (2) times a day. use thin layer   These Medications have changed    Modified Medication Previous Medication    CARBAMAZEPINE (TEGRETOL) 200 MG TABLET carBAMazepine (TEGRETOL) 200 mg tablet       One tab in AM, one tab at lunch, and two tabs before bed    Take 200 mg by mouth three (3) times daily. Stop Taking    OXYCODONE-ACETAMINOPHEN (PERCOCET) 5-325 MG PER TABLET    Take 1 Tab by mouth every four (4) hours as needed for Pain. Max Daily Amount: 6 Tabs. PROMETHAZINE-PHENYLEPH-CODEINE (PHENERGAN VC WITH CODEINE) 6.25-5-10 MG/5 ML SYRP    Take 5 mL by mouth every six (6) hours as needed.  Max Daily Amount: 20 mL.     _______________________________    Attestations:  Srinivasa [address] [place] [address] acting as a scribe for and in the presence of Fermin Avila MD      February 27, 2018 at 11:34 AM       Provider Attestation:      I personally performed the services described in the documentation, reviewed the documentation, as recorded by the scribe in my presence, and it accurately and completely records my words and actions.  February 27, 2018 at 11:34 AM - Bev Arora MD    _______________________________

## 2018-04-13 ENCOUNTER — OFFICE VISIT (OUTPATIENT)
Dept: FAMILY MEDICINE CLINIC | Age: 53
End: 2018-04-13

## 2018-04-13 ENCOUNTER — HOSPITAL ENCOUNTER (OUTPATIENT)
Dept: LAB | Age: 53
Discharge: HOME | End: 2018-04-13
Payer: COMMERCIAL

## 2018-04-13 VITALS
OXYGEN SATURATION: 98 % | TEMPERATURE: 98.2 F | RESPIRATION RATE: 18 BRPM | BODY MASS INDEX: 35.97 KG/M2 | HEART RATE: 76 BPM | WEIGHT: 203 LBS | DIASTOLIC BLOOD PRESSURE: 76 MMHG | HEIGHT: 63 IN | SYSTOLIC BLOOD PRESSURE: 128 MMHG

## 2018-04-13 DIAGNOSIS — Z01.419 WELL WOMAN EXAM WITH ROUTINE GYNECOLOGICAL EXAM: Primary | ICD-10-CM

## 2018-04-13 DIAGNOSIS — Z13.6 SCREENING FOR CARDIOVASCULAR CONDITION: ICD-10-CM

## 2018-04-13 DIAGNOSIS — B37.31 YEAST VAGINITIS: ICD-10-CM

## 2018-04-13 DIAGNOSIS — Z12.39 SCREENING FOR BREAST CANCER: ICD-10-CM

## 2018-04-13 DIAGNOSIS — G40.919 INTRACTABLE EPILEPSY WITHOUT STATUS EPILEPTICUS, UNSPECIFIED EPILEPSY TYPE (HCC): ICD-10-CM

## 2018-04-13 LAB
ALBUMIN SERPL-MCNC: 4.1 G/DL (ref 3.4–5)
ALBUMIN/GLOB SERPL: 1.2 {RATIO} (ref 0.8–1.7)
ALP SERPL-CCNC: 107 U/L (ref 45–117)
ALT SERPL-CCNC: 21 U/L (ref 13–56)
ANION GAP SERPL CALC-SCNC: 9 MMOL/L (ref 3–18)
AST SERPL-CCNC: 14 U/L (ref 15–37)
BILIRUB SERPL-MCNC: 0.3 MG/DL (ref 0.2–1)
BUN SERPL-MCNC: 12 MG/DL (ref 7–18)
BUN/CREAT SERPL: 15 (ref 12–20)
CALCIUM SERPL-MCNC: 8.7 MG/DL (ref 8.5–10.1)
CHLORIDE SERPL-SCNC: 106 MMOL/L (ref 100–108)
CHOLEST SERPL-MCNC: 207 MG/DL
CO2 SERPL-SCNC: 25 MMOL/L (ref 21–32)
CREAT SERPL-MCNC: 0.82 MG/DL (ref 0.6–1.3)
ERYTHROCYTE [DISTWIDTH] IN BLOOD BY AUTOMATED COUNT: 14.7 % (ref 11.6–14.5)
GLOBULIN SER CALC-MCNC: 3.5 G/DL (ref 2–4)
GLUCOSE SERPL-MCNC: 87 MG/DL (ref 74–99)
HCT VFR BLD AUTO: 38.3 % (ref 35–45)
HDLC SERPL-MCNC: 64 MG/DL (ref 40–60)
HDLC SERPL: 3.2 {RATIO} (ref 0–5)
HGB BLD-MCNC: 12.5 G/DL (ref 12–16)
LDLC SERPL CALC-MCNC: 123.8 MG/DL (ref 0–100)
LIPID PROFILE,FLP: ABNORMAL
MCH RBC QN AUTO: 27.3 PG (ref 24–34)
MCHC RBC AUTO-ENTMCNC: 32.6 G/DL (ref 31–37)
MCV RBC AUTO: 83.6 FL (ref 74–97)
PLATELET # BLD AUTO: 275 K/UL (ref 135–420)
PMV BLD AUTO: 10 FL (ref 9.2–11.8)
POTASSIUM SERPL-SCNC: 3.7 MMOL/L (ref 3.5–5.5)
PROT SERPL-MCNC: 7.6 G/DL (ref 6.4–8.2)
RBC # BLD AUTO: 4.58 M/UL (ref 4.2–5.3)
SODIUM SERPL-SCNC: 140 MMOL/L (ref 136–145)
TRIGL SERPL-MCNC: 96 MG/DL (ref ?–150)
VLDLC SERPL CALC-MCNC: 19.2 MG/DL
WBC # BLD AUTO: 6.5 K/UL (ref 4.6–13.2)

## 2018-04-13 PROCEDURE — 80053 COMPREHEN METABOLIC PANEL: CPT | Performed by: NURSE PRACTITIONER

## 2018-04-13 PROCEDURE — 85027 COMPLETE CBC AUTOMATED: CPT | Performed by: NURSE PRACTITIONER

## 2018-04-13 PROCEDURE — 36415 COLL VENOUS BLD VENIPUNCTURE: CPT | Performed by: NURSE PRACTITIONER

## 2018-04-13 PROCEDURE — 80061 LIPID PANEL: CPT | Performed by: NURSE PRACTITIONER

## 2018-04-13 RX ORDER — TERCONAZOLE 4 MG/G
1 CREAM VAGINAL
Qty: 45 G | Refills: 0 | Status: SHIPPED | OUTPATIENT
Start: 2018-04-13 | End: 2018-04-13

## 2018-04-13 RX ORDER — TERCONAZOLE 4 MG/G
1 CREAM VAGINAL
Qty: 45 G | Refills: 0 | Status: SHIPPED | OUTPATIENT
Start: 2018-04-13 | End: 2018-04-18

## 2018-04-13 NOTE — MR AVS SNAPSHOT
[facility] 
 
 
 [address] [address] [address] [address] 
[phone] Patient: Rebekah Bernabe MRN: [id number] :1965 Visit Information Date & Time Provider Department Dept. Phone Encounter #  
 2018 11:20 AM Fawn Morgan NP [place] [place] [facility] [id number] Follow-up Instructions Return in about 2 months (around 2018) for follow up bmi. Upcoming Health Maintenance Date Due Influenza Age 5 to Adult 2017 MEDICARE YEARLY EXAM 2018 BREAST CANCER SCRN MAMMOGRAM 2019 COLONOSCOPY 10/21/2026 DTaP/Tdap/Td series (2 - Td) 2027 Allergies as of 2018  Review Complete On: 2018 By: Fawn Morgan NP Severity Noted Reaction Type Reactions Cyclobenzaprine High 2016    Other (comments)  
 headache Etodolac  2016    Other (comments)  
 headache Current Immunizations  Reviewed on 2016 Name Date Pneumococcal Conjugate (PCV-13) 2016 Not reviewed this visit You Were Diagnosed With   
  
 Codes Comments Well woman exam with routine gynecological exam    -  Primary ICD-10-CM: S22.033 ICD-9-CM: V72.31 [V72.31] Screening for cardiovascular condition     ICD-10-CM: Z13.6 ICD-9-CM: V81.2 Screening for breast cancer     ICD-10-CM: Z12.31 
ICD-9-CM: V76.10 Yeast vaginitis     ICD-10-CM: B37.3 ICD-9-CM: 112.1 BMI 35.0-35.9,adult     ICD-10-CM: U68.53 ICD-9-CM: V85.35 Vitals BP Pulse Temp Resp Height(growth percentile) Weight(growth percentile) 128/76 (BP 1 Location: Left arm, BP Patient Position: Sitting) 76 98.2 °F (36.8 °C) (Oral) 18 5' 3\" (1.6 m) 203 lb (92.1 kg) SpO2 BMI OB Status Smoking Status 98% 35.96 kg/m2 Hysterectomy Current Every Day Smoker BMI and BSA Data Body Mass Index Body Surface Area 35.96 kg/m 2 2.02 m 2 Preferred Pharmacy Pharmacy Name Phone [facility] [address], [address] [phone] Your Updated Medication List  
  
   
This list is accurate as of 4/13/18 12:04 PM.  Always use your most recent med list.  
  
  
  
  
 carBAMazepine 200 mg tablet Commonly known as:  TEGretol One tab in AM, one tab at lunch, and two tabs before bed  
  
 terconazole 0.4 % vaginal cream  
Commonly known as:  TERAZOL 7 Insert 1 Applicator into vagina nightly for 5 days. Prescriptions Sent to Pharmacy Refills  
 terconazole (TERAZOL 7) 0.4 % vaginal cream 0 Sig: Insert 1 Applicator into vagina nightly for 5 days. Class: Normal  
 Pharm[place] [facility] [place] [id number] [facility], [address]  #: [phone] Route: Vaginal  
  
Follow-up Instructions Return in about 2 months (around 6/13/2018) for follow up bmi. To-Do List   
 04/13/2018 Lab:  CBC W/O DIFF   
  
 04/13/2018 Lab:  LIPID PANEL   
  
 04/13/2018 Imaging:  POLA MAMMO BI SCREENING INCL CAD   
  
 04/13/2018 Lab:  METABOLIC PANEL, COMPREHENSIVE Patient Instructions Well Visit, Women 48 to 72: Care Instructions Your Care Instructions Physical exams can help you stay healthy. Your doctor has checked your overall health and may have suggested ways to take good care of yourself. He or she also may have recommended tests. At home, you can help prevent illness with healthy eating, regular exercise, and other steps. Follow-up care is a key part of your treatment and safety. Be sure to make and go to all appointments, and call your doctor if you are having problems. It's also a good idea to know your test results and keep a list of the medicines you take. How can you care for yourself at home? · Reach and stay at a healthy weight. This will lower your risk for many problems, such as obesity, diabetes, heart disease, and high blood pressure. · Get at least 30 minutes of exercise on most days of the week. Walking is a good choice. You also may want to do other activities, such as running, swimming, cycling, or playing tennis or team sports. · Do not smoke. Smoking can make health problems worse. If you need help quitting, talk to your doctor about stop-smoking programs and medicines. These can increase your chances of quitting for good. · Protect your skin from too much sun. When you're outdoors from 10 a.m. to 4 p.m., stay in the shade or cover up with clothing and a hat with a wide brim. Wear sunglasses that block UV rays. Even when it's cloudy, put broad-spectrum sunscreen (SPF 30 or higher) on any exposed skin. · See a dentist one or two times a year for checkups and to have your teeth cleaned. · Wear a seat belt in the car. · Limit alcohol to 1 drink a day. Too much alcohol can cause health problems. Follow your doctor's advice about when to have certain tests. These tests can spot problems early. · Cholesterol. Your doctor will tell you how often to have this done based on your age, family history, or other things that can increase your risk for heart attack and stroke. · Blood pressure. Have your blood pressure checked during a routine doctor visit. Your doctor will tell you how often to check your blood pressure based on your age, your blood pressure results, and other factors. · Mammogram. Ask your doctor how often you should have a mammogram, which is an X-ray of your breasts. A mammogram can spot breast cancer before it can be felt and when it is easiest to treat. · Pap test and pelvic exam. Ask your doctor how often you should have a Pap test. You may not need to have a Pap test as often as you used to. · Vision. Have your eyes checked every year or two or as often as your doctor suggests. Some experts recommend that you have yearly exams for glaucoma and other age-related eye problems starting at age 48. · Hearing. Tell your doctor if you notice any change in your hearing. You can have tests to find out how well you hear. · Diabetes. Ask your doctor whether you should have tests for diabetes. · Colon cancer. You should begin tests for colon cancer at age 48. You may have one of several tests. Your doctor will tell you how often to have tests based on your age and risk. Risks include whether you already had a precancerous polyp removed from your colon or whether your parents, sisters and brothers, or children have had colon cancer. · Thyroid disease. Talk to your doctor about whether to have your thyroid checked as part of a regular physical exam. Women have an increased chance of a thyroid problem. · Osteoporosis. You should begin tests for bone density at age 72. If you are younger than 72, ask your doctor whether you have factors that may increase your risk for this disease. You may want to have this test before age 72. · Heart attack and stroke risk. At least every 4 to 6 years, you should have your risk for heart attack and stroke assessed. Your doctor uses factors such as your age, blood pressure, cholesterol, and whether you smoke or have diabetes to show what your risk for a heart attack or stroke is over the next 10 years. When should you call for help? Watch closely for changes in your health, and be sure to contact your doctor if you have any problems or symptoms that concern you. Where can you learn more? Go to http://sylvie-angela.info/. Enter Q358 in the search box to learn more about \"Well Visit, Women 50 to 72: Care Instructions. \" Current as of: May 12, 2017 Content Version: 11.4 © 3657-6583 [employer]. Care instructions adapted under license by [employer] (which disclaims liability or warranty for this information).  If you have questions about a medical condition or this instruction, always ask your healthcare professional. Makayla Thomas[employer] disclaims any warranty or liability for your use of this information. Body Mass Index: Care Instructions Your Care Instructions Body mass index (BMI) can help you see if your weight is raising your risk for health problems. It uses a formula to compare how much you weigh with how tall you are. · A BMI lower than 18.5 is considered underweight. · A BMI between 18.5 and 24.9 is considered healthy. · A BMI between 25 and 29.9 is considered overweight. A BMI of 30 or higher is considered obese. If your BMI is in the normal range, it means that you have a lower risk for weight-related health problems. If your BMI is in the overweight or obese range, you may be at increased risk for weight-related health problems, such as high blood pressure, heart disease, stroke, arthritis or joint pain, and diabetes. If your BMI is in the underweight range, you may be at increased risk for health problems such as fatigue, lower protection (immunity) against illness, muscle loss, bone loss, hair loss, and hormone problems. BMI is just one measure of your risk for weight-related health problems. You may be at higher risk for health problems if you are not active, you eat an unhealthy diet, or you drink too much alcohol or use tobacco products. Follow-up care is a key part of your treatment and safety. Be sure to make and go to all appointments, and call your doctor if you are having problems. It's also a good idea to know your test results and keep a list of the medicines you take. How can you care for yourself at home? · Practice healthy eating habits. This includes eating plenty of fruits, vegetables, whole grains, lean protein, and low-fat dairy. · If your doctor recommends it, get more exercise. Walking is a good choice. Bit by bit, increase the amount you walk every day. Try for at least 30 minutes on most days of the week. · Do not smoke. Smoking can increase your risk for health problems.  If you need help quitting, talk to your doctor about stop-smoking programs and medicines. These can increase your chances of quitting for good. · Limit alcohol to 2 drinks a day for men and 1 drink a day for women. Too much alcohol can cause health problems. If you have a BMI higher than 25 · Your doctor may do other tests to check your risk for weight-related health problems. This may include measuring the distance around your waist. A waist measurement of more than 40 inches in men or 35 inches in women can increase the risk of weight-related health problems. · Talk with your doctor about steps you can take to stay healthy or improve your health. You may need to make lifestyle changes to lose weight and stay healthy, such as changing your diet and getting regular exercise. If you have a BMI lower than 18.5 · Your doctor may do other tests to check your risk for health problems. · Talk with your doctor about steps you can take to stay healthy or improve your health. You may need to make lifestyle changes to gain or maintain weight and stay healthy, such as getting more healthy foods in your diet and doing exercises to build muscle. Where can you learn more? Go to http://sylvie-angela.info/. Enter S176 in the search box to learn more about \"Body Mass Index: Care Instructions. \" Current as of: October 13, 2016 Content Version: 11.4 © 3519-9276 [employer], [employer]orporated. Care instructions adapted under license by [employer] (which disclaims liability or warranty for this information). If you have questions about a medical condition or this instruction, always ask your healthcare professional. [place] [address] any warranty or liability for your use of this information. Learning About the [address] Diet What is the Mediterranean diet? The Mediterranean diet is a style of eating rather than a diet plan.  It features foods eaten in [place], [place], [place], and other countries along the [facility]. It emphasizes eating foods like fish, fruits, vegetables, beans, high-fiber breads and whole grains, nuts, and olive oil. This style of eating includes limited red meat, cheese, and sweets. Why choose the Mediterranean diet? A Mediterranean-style diet may improve heart health. It contains more fat than other heart-healthy diets. But the fats are mainly from nuts, unsaturated oils (such as fish oils and olive oil), and certain nut or seed oils (such as canola, soybean, or flaxseed oil). These fats may help protect the heart and blood vessels. How can you get started on the Mediterranean diet? Here are some things you can do to switch to a more Mediterranean way of eating. What to eat · Eat a variety of fruits and vegetables each day, such as grapes, blueberries, tomatoes, broccoli, peppers, figs, olives, spinach, eggplant, beans, lentils, and chickpeas. · Eat a variety of whole-grain foods each day, such as oats, brown rice, and whole wheat bread, pasta, and couscous. · Eat fish at least 2 times a week. Try tuna, salmon, mackerel, lake trout, herring, or sardines. · Eat moderate amounts of low-fat dairy products, such as milk, cheese, or yogurt. · Eat moderate amounts of poultry and eggs. · Choose healthy (unsaturated) fats, such as nuts, olive oil, and certain nut or seed oils like canola, soybean, and flaxseed. · Limit unhealthy (saturated) fats, such as butter, palm oil, and coconut oil. And limit fats found in animal products, such as meat and dairy products made with whole milk. Try to eat red meat only a few times a month in very small amounts. · Limit sweets and desserts to only a few times a week. This includes sugar-sweetened drinks like soda. The Mediterranean diet may also include red wine with your meal-1 glass each day for women and up to 2 glasses a day for men. Tips for eating at home · Use herbs, spices, garlic, lemon zest, and citrus juice instead of salt to add flavor to foods. · Add avocado slices to your sandwich instead of west. · Have fish for lunch or dinner instead of red meat. Brush the fish with olive oil, and broil or grill it. · Sprinkle your salad with seeds or nuts instead of cheese. · Cook with olive or canola oil instead of butter or oils that are high in saturated fat. · Switch from 2% milk or whole milk to 1% or fat-free milk. · Dip raw vegetables in a vinaigrette dressing or hummus instead of dips made from mayonnaise or sour cream. 
· Have a piece of fruit for dessert instead of a piece of cake. Try baked apples, or have some dried fruit. Tips for eating out · Try broiled, grilled, baked, or poached fish instead of having it fried or breaded. · Ask your  to have your meals prepared with olive oil instead of butter. · Order dishes made with marinara sauce or sauces made from olive oil. Avoid sauces made from cream or mayonnaise. · Choose whole-grain breads, whole wheat pasta and pizza crust, brown rice, beans, and lentils. · Cut back on butter or margarine on bread. Instead, you can dip your bread in a small amount of olive oil. · Ask for a side salad or grilled vegetables instead of french fries or chips. Where can you learn more? Go to http://sylvie-angela.info/. Enter [phone] in the search box to learn more about \"Learning About the Mediterranean Diet. \" Current as of: May 12, 2017 Content Version: 11.4 © 6143-6206 Healthwise, [employer]. Care instructions adapted under license by [employer] (which disclaims liability or warranty for this information). If you have questions about a medical condition or this instruction, always ask your healthcare professional. Micah Lofton any warranty or liability for your use of this information. Introducing [facility]! [facility] introduces Newtricious patient portal. Now you can access parts of your medical record, email your doctor's office, and request medication refills online. 1. In your internet browser, go to [email or web address] [email or web address] [email or web address] 2. Click on the First Time User? Click Here link in the Sign In box. You will see the New Member Sign Up page. 3. Enter your Newtricious Access Code exactly as it appears below. You will not need to use this code after youve completed the sign-up process. If you do not sign up before the expiration date, you must request a new code. · Newtricious Access Code: [email or web address] Expires: 5/5/2018  2:53 PM 
 
4. Enter the last four digits of your Social Security Number (xxxx) and Date of Birth (mm/dd/yyyy) as indicated and click Submit. You will be taken to the next sign-up page. 5. Create a Newtricious ID. This will be your Newtricious login ID and cannot be changed, so think of one that is secure and easy to remember. 6. Create a Newtricious password. You can change your password at any time. 7. Enter your Password Reset Question and Answer. This can be used at a later time if you forget your password. 8. Enter your e-mail address. You will receive e-mail notification when new information is available in [address] 9. Click Sign Up. You can now view and download portions of your medical record. 10. Click the Download Summary menu link to download a portable copy of your medical information. If you have questions, please visit the Frequently Asked Questions section of the Newtricious website. Remember, Newtricious is NOT to be used for urgent needs. For medical emergencies, dial 911. Now available from your iPhone and Android! Please provide this summary of care documentation to your next provider. Your primary care clinician is listed as Sena Ng. If you have any questions after today's visit, please call [phone].

## 2018-04-13 NOTE — PATIENT INSTRUCTIONS
Well Visit, Women 48 to 72: Care Instructions  Your Care Instructions    Physical exams can help you stay healthy. Your doctor has checked your overall health and may have suggested ways to take good care of yourself. He or she also may have recommended tests. At home, you can help prevent illness with healthy eating, regular exercise, and other steps. Follow-up care is a key part of your treatment and safety. Be sure to make and go to all appointments, and call your doctor if you are having problems. It's also a good idea to know your test results and keep a list of the medicines you take. How can you care for yourself at home? · Reach and stay at a healthy weight. This will lower your risk for many problems, such as obesity, diabetes, heart disease, and high blood pressure. · Get at least 30 minutes of exercise on most days of the week. Walking is a good choice. You also may want to do other activities, such as running, swimming, cycling, or playing tennis or team sports. · Do not smoke. Smoking can make health problems worse. If you need help quitting, talk to your doctor about stop-smoking programs and medicines. These can increase your chances of quitting for good. · Protect your skin from too much sun. When you're outdoors from 10 a.m. to 4 p.m., stay in the shade or cover up with clothing and a hat with a wide brim. Wear sunglasses that block UV rays. Even when it's cloudy, put broad-spectrum sunscreen (SPF 30 or higher) on any exposed skin. · See a dentist one or two times a year for checkups and to have your teeth cleaned. · Wear a seat belt in the car. · Limit alcohol to 1 drink a day. Too much alcohol can cause health problems. Follow your doctor's advice about when to have certain tests. These tests can spot problems early. · Cholesterol.  Your doctor will tell you how often to have this done based on your age, family history, or other things that can increase your risk for heart attack and stroke. · Blood pressure. Have your blood pressure checked during a routine doctor visit. Your doctor will tell you how often to check your blood pressure based on your age, your blood pressure results, and other factors. · Mammogram. Ask your doctor how often you should have a mammogram, which is an X-ray of your breasts. A mammogram can spot breast cancer before it can be felt and when it is easiest to treat. · Pap test and pelvic exam. Ask your doctor how often you should have a Pap test. You may not need to have a Pap test as often as you used to. · Vision. Have your eyes checked every year or two or as often as your doctor suggests. Some experts recommend that you have yearly exams for glaucoma and other age-related eye problems starting at age 48. · Hearing. Tell your doctor if you notice any change in your hearing. You can have tests to find out how well you hear. · Diabetes. Ask your doctor whether you should have tests for diabetes. · Colon cancer. You should begin tests for colon cancer at age 48. You may have one of several tests. Your doctor will tell you how often to have tests based on your age and risk. Risks include whether you already had a precancerous polyp removed from your colon or whether your parents, sisters and brothers, or children have had colon cancer. · Thyroid disease. Talk to your doctor about whether to have your thyroid checked as part of a regular physical exam. Women have an increased chance of a thyroid problem. · Osteoporosis. You should begin tests for bone density at age 72. If you are younger than 72, ask your doctor whether you have factors that may increase your risk for this disease. You may want to have this test before age 72. · Heart attack and stroke risk. At least every 4 to 6 years, you should have your risk for heart attack and stroke assessed.  Your doctor uses factors such as your age, blood pressure, cholesterol, and whether you smoke or have diabetes to show what your risk for a heart attack or stroke is over the next 10 years. When should you call for help? Watch closely for changes in your health, and be sure to contact your doctor if you have any problems or symptoms that concern you. Where can you learn more? Go to http://sylvie-angela.info/. Enter T157 in the search box to learn more about \"Well Visit, Women 50 to 72: Care Instructions. \"  Current as of: May 12, 2017  Content Version: 11.4  © 1421-0090 [facility]. Care instructions adapted under license by [employer] (which disclaims liability or warranty for this information). If you have questions about a medical condition or this instruction, always ask your healthcare professional. [address] any warranty or liability for your use of this information. Body Mass Index: Care Instructions  Your Care Instructions    Body mass index (BMI) can help you see if your weight is raising your risk for health problems. It uses a formula to compare how much you weigh with how tall you are. · A BMI lower than 18.5 is considered underweight. · A BMI between 18.5 and 24.9 is considered healthy. · A BMI between 25 and 29.9 is considered overweight. A BMI of 30 or higher is considered obese. If your BMI is in the normal range, it means that you have a lower risk for weight-related health problems. If your BMI is in the overweight or obese range, you may be at increased risk for weight-related health problems, such as high blood pressure, heart disease, stroke, arthritis or joint pain, and diabetes. If your BMI is in the underweight range, you may be at increased risk for health problems such as fatigue, lower protection (immunity) against illness, muscle loss, bone loss, hair loss, and hormone problems. BMI is just one measure of your risk for weight-related health problems.  You may be at higher risk for health problems if you are not active, you eat an unhealthy diet, or you drink too much alcohol or use tobacco products. Follow-up care is a key part of your treatment and safety. Be sure to make and go to all appointments, and call your doctor if you are having problems. It's also a good idea to know your test results and keep a list of the medicines you take. How can you care for yourself at home? · Practice healthy eating habits. This includes eating plenty of fruits, vegetables, whole grains, lean protein, and low-fat dairy. · If your doctor recommends it, get more exercise. Walking is a good choice. Bit by bit, increase the amount you walk every day. Try for at least 30 minutes on most days of the week. · Do not smoke. Smoking can increase your risk for health problems. If you need help quitting, talk to your doctor about stop-smoking programs and medicines. These can increase your chances of quitting for good. · Limit alcohol to 2 drinks a day for men and 1 drink a day for women. Too much alcohol can cause health problems. If you have a BMI higher than 25  · Your doctor may do other tests to check your risk for weight-related health problems. This may include measuring the distance around your waist. A waist measurement of more than 40 inches in men or 35 inches in women can increase the risk of weight-related health problems. · Talk with your doctor about steps you can take to stay healthy or improve your health. You may need to make lifestyle changes to lose weight and stay healthy, such as changing your diet and getting regular exercise. If you have a BMI lower than 18.5  · Your doctor may do other tests to check your risk for health problems. · Talk with your doctor about steps you can take to stay healthy or improve your health. You may need to make lifestyle changes to gain or maintain weight and stay healthy, such as getting more healthy foods in your diet and doing exercises to build muscle. Where can you learn more?   Go to http://sylvie-angela.info/. Enter S176 in the search box to learn more about \"Body Mass Index: Care Instructions. \"  Current as of: October 13, 2016  Content Version: 11.4  © 9392-5826 [employer]. Care instructions adapted under license by [employer] (which disclaims liability or warranty for this information). If you have questions about a medical condition or this instruction, always ask your healthcare professional. [address] any warranty or liability for your use of this information. Learning About the [address] Diet  What is the Mediterranean diet? The Mediterranean diet is a style of eating rather than a diet plan. It features foods eaten in [place], [place], [place], and other countries along the [facility]. It emphasizes eating foods like fish, fruits, vegetables, beans, high-fiber breads and whole grains, nuts, and olive oil. This style of eating includes limited red meat, cheese, and sweets. Why choose the Mediterranean diet? A Mediterranean-style diet may improve heart health. It contains more fat than other heart-healthy diets. But the fats are mainly from nuts, unsaturated oils (such as fish oils and olive oil), and certain nut or seed oils (such as canola, soybean, or flaxseed oil). These fats may help protect the heart and blood vessels. How can you get started on the Mediterranean diet? Here are some things you can do to switch to a more Mediterranean way of eating. What to eat  · Eat a variety of fruits and vegetables each day, such as grapes, blueberries, tomatoes, broccoli, peppers, figs, olives, spinach, eggplant, beans, lentils, and chickpeas. · Eat a variety of whole-grain foods each day, such as oats, brown rice, and whole wheat bread, pasta, and couscous. · Eat fish at least 2 times a week. Try tuna, salmon, mackerel, lake trout, herring, or sardines.   · Eat moderate amounts of low-fat dairy products, such as milk, cheese, or yogurt. · Eat moderate amounts of poultry and eggs. · Choose healthy (unsaturated) fats, such as nuts, olive oil, and certain nut or seed oils like canola, soybean, and flaxseed. · Limit unhealthy (saturated) fats, such as butter, palm oil, and coconut oil. And limit fats found in animal products, such as meat and dairy products made with whole milk. Try to eat red meat only a few times a month in very small amounts. · Limit sweets and desserts to only a few times a week. This includes sugar-sweetened drinks like soda. The Mediterranean diet may also include red wine with your meal-1 glass each day for women and up to 2 glasses a day for men. Tips for eating at home  · Use herbs, spices, garlic, lemon zest, and citrus juice instead of salt to add flavor to foods. · Add avocado slices to your sandwich instead of west. · Have fish for lunch or dinner instead of red meat. Brush the fish with olive oil, and broil or grill it. · Sprinkle your salad with seeds or nuts instead of cheese. · Cook with olive or canola oil instead of butter or oils that are high in saturated fat. · Switch from 2% milk or whole milk to 1% or fat-free milk. · Dip raw vegetables in a vinaigrette dressing or hummus instead of dips made from mayonnaise or sour cream.  · Have a piece of fruit for dessert instead of a piece of cake. Try baked apples, or have some dried fruit. Tips for eating out  · Try broiled, grilled, baked, or poached fish instead of having it fried or breaded. · Ask your  to have your meals prepared with olive oil instead of butter. · Order dishes made with marinara sauce or sauces made from olive oil. Avoid sauces made from cream or mayonnaise. · Choose whole-grain breads, whole wheat pasta and pizza crust, brown rice, beans, and lentils. · Cut back on butter or margarine on bread. Instead, you can dip your bread in a small amount of olive oil.   · Ask for a side salad or grilled vegetables instead of french fries or chips. Where can you learn more? Go to http://sylvie-angela.info/. Enter [phone] in the search box to learn more about \"Learning About the Mediterranean Diet. \"  Current as of: May 12, 2017  Content Version: 11.4  © 2538-3671 [employer]. Care instructions adapted under license by [employer] (which disclaims liability or warranty for this information). If you have questions about a medical condition or this instruction, always ask your healthcare professional. [address] any warranty or liability for your use of this information.

## 2018-04-13 NOTE — PROGRESS NOTES
1. Have you been to the ER, urgent care clinic since your last visit? Hospitalized since your last visit yes for seizure is followed by neurology    2. Have you seen or consulted any other health care providers outside of the [address] [place] [address] since your last visit? Include any pap smears or colon screening.  No

## 2018-04-13 NOTE — ACP (ADVANCE CARE PLANNING)
Advance Care Planning (ACP) Provider Conversation Snapshot    Date of ACP Conversation: 04/13/18  Persons included in Conversation:  patient  Length of ACP Conversation in minutes:  <16 minutes (Non-Billable)    Authorized Decision Maker (if patient is incapable of making informed decisions): This person is:   Healthcare Agent/Medical Power of  under Advance Directive          For Patients with Decision Making Capacity:   Values/Goals: Exploration of values, goals, and preferences if recovery is not expected, even with continued medical treatment in the event of:  Imminent death  Severe, permanent brain injury  \"In these circumstances, what matters most to you? \" patient will discuss with family   Conversation Outcomes / Follow-Up Plan:   Recommended completion of Advance Directive form after review of ACP materials and conversation with prospective healthcare agent

## 2018-04-13 NOTE — PROGRESS NOTES
Subjective:   45 y.o. female for Well Woman Check. She is postmenopausal.  Social History: single partner, contraception - post menopausal status.   .    Patient Active Problem List   Diagnosis Code    Epilepsy ([address] G40.909    Depression F32.9    Chronic bronchitis ([address] J42    SBO (small bowel obstruction) ([address] K56.609    Accidental overdose T50.901A    De Quervain's tenosynovitis, right M65.4     Patient Active Problem List    Diagnosis Date Noted    De Quervain's tenosynovitis, right 2016    Accidental overdose 2015    SBO (small bowel obstruction) ([address] 2015    Chronic bronchitis ([address] 2013    Epilepsy ([address] 10/12/2012    Depression 10/12/2012     Current Outpatient Prescriptions   Medication Sig Dispense Refill    carBAMazepine (TEGRETOL) 200 mg tablet One tab in AM, one tab at lunch, and two tabs before bed 90 Tab 0     Allergies   Allergen Reactions    Cyclobenzaprine Other (comments)     headache    Etodolac Other (comments)     headache     Past Medical History:   Diagnosis Date    Bronchitis     Carpal tunnel syndrome on right     Chronic bronchitis (HCC)     De Quervain's syndrome (tenosynovitis)     Depression     resolved    Ectopic pregnancy     two times    Epilepsy ([address]     Seizure ([address]     Sinus infection     Stenosing tenosynovitis of thumb     Right side    Trigger thumb of left hand      Past Surgical History:   Procedure Laterality Date    COLONOSCOPY N/A 10/21/2016    COLONOSCOPY w/ biopsy performed by Esha Estrella MD at [facility]    HX APPENDECTOMY  1/12/15    lysis of adhesions    HX CHOLECYSTECTOMY      HX HYSTERECTOMY      HX OTHER SURGICAL      scar tissue removed from intestines    HX PARTIAL HYSTERECTOMY  2009    HX TONSILLECTOMY       Family History   Problem Relation Age of Onset    Heart Attack Brother       in 62s    Diabetes Father     Hypertension Father     Cancer Father      colon CA    Heart Disease Sister      stent placement    Hypertension Brother     Other Brother      Stomach problems/ulcers    Cancer Sister      Breast CA    Stroke Sister     Diabetes Mother     Hypertension Mother      Social History   Substance Use Topics    Smoking status: Current Every Day Smoker     Packs/day: 0.50     Types: Cigarettes    Smokeless tobacco: Never Used    Alcohol use No        ROS:  Feeling well. No dyspnea or chest pain on exertion. No abdominal pain, change in bowel habits, black or bloody stools. No urinary tract symptoms. GYN ROS: no breast pain or new or enlarging lumps on self exam, no vaginal bleeding, no discharge or pelvic pain. Menopausal symptoms: none. No neurological complaints. Epilepsy controlled with use of carbamazepine. which patient reports that she is taking no seizure activity. Objective:     Visit Vitals    /76 (BP 1 Location: Left arm, BP Patient Position: Sitting)    Pulse 76    Temp 98.2 °F (36.8 °C) (Oral)    Resp 18    Ht 5' 3\" (1.6 m)    Wt 203 lb (92.1 kg)    SpO2 98%    BMI 35.96 kg/m2     The patient appears well, alert, oriented x 3, in no distress. ENT normal.  Neck supple. No adenopathy or thyromegaly. LORIE. Lungs are clear, good air entry, no wheezes, rhonchi or rales. S1 and S2 normal, no murmurs, regular rate and rhythm. Abdomen soft without tenderness, guarding, mass or organomegaly. Extremities show no edema, normal peripheral pulses. Neurological is normal, no focal findings. BREAST EXAM: breasts appear normal, no suspicious masses, no skin or nipple changes or axillary nodes, risk and benefit of breast self-exam was discussed    PELVIC EXAM: normal external genitalia, vulva, vagina with moderate amount of white curd like vaginal discharge wet mount with KOH negative numerous hyphae cervix and uterus surgically absent no palpable ovaries bilaterally and no adnexal TTP        ,Diagnoses and all orders for this visit:    1.  Well woman exam with routine gynecological exam  Comments:  [V72.31]    2. Screening for cardiovascular condition  -     CBC W/O DIFF; Future  -     METABOLIC PANEL, COMPREHENSIVE; Future  -     LIPID PANEL; Future    3. Screening for breast cancer  -     POLA SCREENING DIGITAL BILATERAL; Future    4. Yeast vaginitis  -     terconazole (TERAZOL 7) 0.4 % vaginal cream; Insert 1 Applicator into vagina nightly for 5 days. 5. BMI 35.0-35.9,adult        Anticipatory guidance regarding health promotion for this age range and patient verbalizes understanding. mediterranean diet introduced patient verbalizes understanding  Portion control and exercise healthy eating general guidelines reviewed with patient to get closer to normal BMI  Reviewed risks and benefits and common side effects of new medication  Patient verbalizes understanding. I have discussed the diagnosis with the patient and the intended plan as seen in the above orders. The patient has received an after-visit summary and questions were answered concerning future plans. I have discussed medication side effects and warnings with the patient as well. Follow-up Disposition:  Return in about 2 months (around 6/13/2018) for follow up bmi.       Patient does not have Medicare insurance  Patient denies all immunizations today at this visit

## 2018-06-12 ENCOUNTER — OFFICE VISIT (OUTPATIENT)
Dept: FAMILY MEDICINE CLINIC | Age: 53
End: 2018-06-12

## 2018-06-12 VITALS
RESPIRATION RATE: 18 BRPM | OXYGEN SATURATION: 98 % | BODY MASS INDEX: 33.66 KG/M2 | HEART RATE: 68 BPM | DIASTOLIC BLOOD PRESSURE: 71 MMHG | HEIGHT: 63 IN | TEMPERATURE: 98.6 F | WEIGHT: 190 LBS | SYSTOLIC BLOOD PRESSURE: 137 MMHG

## 2018-06-12 DIAGNOSIS — R63.2 APPETITE INCREASE: Primary | ICD-10-CM

## 2018-06-12 DIAGNOSIS — M25.512 CHRONIC LEFT SHOULDER PAIN: ICD-10-CM

## 2018-06-12 DIAGNOSIS — G89.29 CHRONIC LEFT SHOULDER PAIN: ICD-10-CM

## 2018-06-12 RX ORDER — NAPROXEN 500 MG/1
500 TABLET ORAL
Qty: 60 TAB | Refills: 0 | Status: SHIPPED | OUTPATIENT
Start: 2018-06-12 | End: 2018-09-12

## 2018-06-12 RX ORDER — PHENTERMINE HYDROCHLORIDE 37.5 MG/1
TABLET ORAL
Qty: 30 TAB | Refills: 0 | Status: SHIPPED | OUTPATIENT
Start: 2018-06-12 | End: 2018-08-16 | Stop reason: SDUPTHER

## 2018-06-12 NOTE — MR AVS SNAPSHOT
[facility] 
 
 
 [address] [address] [address] [facility] [id number] [phone] Patient: Lulú Mallory MRN: [id number] :1965 Visit Information Date & Time Provider Department Dept. Phone Encounter #  
 2018 11:20 AM Dinesh Vargas NP Denis Rahman [address] [id number] Follow-up Instructions Return in about 2 months (around 2018) for follow up BMI. Upcoming Health Maintenance Date Due Influenza Age 5 to Adult 2018 BREAST CANCER SCRN MAMMOGRAM 2019 COLONOSCOPY 10/21/2026 DTaP/Tdap/Td series (2 - Td) 2027 Allergies as of 2018  Review Complete On: 2018 By: Dinesh Vargas NP Severity Noted Reaction Type Reactions Cyclobenzaprine High 2016    Other (comments)  
 headache Etodolac  2016    Other (comments)  
 headache Current Immunizations  Reviewed on 2016 Name Date Pneumococcal Conjugate (PCV-13) 2016 Not reviewed this visit You Were Diagnosed With   
  
 Codes Comments Appetite increase    -  Primary ICD-10-CM: R63.2 ICD-9-CM: 442. 6 Chronic left shoulder pain     ICD-10-CM: M25.512, G89.29 ICD-9-CM: 719.41, 338.29 Vitals BP Pulse Temp Resp Height(growth percentile) Weight(growth percentile)  
 137/71 (BP 1 Location: Left arm, BP Patient Position: Sitting) 68 98.6 °F (37 °C) (Oral) 18 5' 3\" (1.6 m) 190 lb (86.2 kg) SpO2 BMI OB Status Smoking Status 98% 33.66 kg/m2 Hysterectomy Current Every Day Smoker BMI and BSA Data Body Mass Index Body Surface Area  
 33.66 kg/m 2 1.96 m 2 Preferred Pharmacy Pharmacy Name Phone [facility] located within [facility], [address] [facility] [phone] Your Updated Medication List  
  
   
This list is accurate as of 18 12:01 PM.  Always use your most recent med list.  
  
  
  
  
 carBAMazepine 200 mg tablet Commonly known as:  TEGretol One tab in AM, one tab at lunch, and two tabs before bed  
  
 naproxen 500 mg tablet Commonly known as:  NAPROSYN Take 1 Tab by mouth two (2) times daily as needed. phentermine 37.5 mg tablet Commonly known as:  ADIPEX-P May take 1/2 to 1 po daily prn appetite difficulties Prescriptions Printed Refills  
 phentermine (ADIPEX-P) 37.5 mg tablet 0 Sig: May take 1/2 to 1 po daily prn appetite difficulties Class: Print Prescriptions Sent to Pharmacy Refills  
 naproxen (NAPROSYN) 500 mg tablet 0 Sig: Take 1 Tab by mouth two (2) times daily as needed. Class: Normal  
 Pharmacy: [id number] [place] [address], [address] [place] [address] #: [phone] Route: Oral  
  
We Performed the Following REFERRAL TO ORTHOPEDICS [VGN802 Custom] Comments:  
 Left shoulder pain chronic with impingement Follow-up Instructions Return in about 2 months (around 8/13/2018) for follow up BMI. Referral Information Referral ID Referred By Referred To  
  
 [id number] [address], Cash Miguel MD   
   [address] [facility] [address] [facility] [place], [address] Phone: [phone] Fax: [phone] Visits Status Start Date End Date 1 New Request 6/12/18 6/12/19 If your referral has a status of pending review or denied, additional information will be sent to support the outcome of this decision. Patient Instructions Shoulder Pain: Care Instructions Your Care Instructions You can hurt your shoulder by using it too much during an activity, such as fishing or baseball. It can also happen as part of the everyday wear and tear of getting older. Shoulder injuries can be slow to heal, but your shoulder should get better with time. Your doctor may recommend a sling to rest your shoulder. If you have injured your shoulder, you may need testing and treatment. Follow-up care is a key part of your treatment and safety. Be sure to make and go to all appointments, and call your doctor if you are having problems. It's also a good idea to know your test results and keep a list of the medicines you take. How can you care for yourself at home? · Take pain medicines exactly as directed. ¨ If the doctor gave you a prescription medicine for pain, take it as prescribed. ¨ If you are not taking a prescription pain medicine, ask your doctor if you can take an over-the-counter medicine. ¨ Do not take two or more pain medicines at the same time unless the doctor told you to. Many pain medicines contain acetaminophen, which is Tylenol. Too much acetaminophen (Tylenol) can be harmful. · If your doctor recommends that you wear a sling, use it as directed. Do not take it off before your doctor tells you to. · Put ice or a cold pack on the sore area for 10 to 20 minutes at a time. Put a thin cloth between the ice and your skin. · If there is no swelling, you can put moist heat, a heating pad, or a warm cloth on your shoulder. Some doctors suggest alternating between hot and cold. · Rest your shoulder for a few days. If your doctor recommends it, you can then begin gentle exercise of the shoulder, but do not lift anything heavy. When should you call for help? Call 911 anytime you think you may need emergency care. For example, call if: 
? · You have chest pain or pressure. This may occur with: ¨ Sweating. ¨ Shortness of breath. ¨ Nausea or vomiting. ¨ Pain that spreads from the chest to the neck, jaw, or one or both shoulders or arms. ¨ Dizziness or lightheadedness. ¨ A fast or uneven pulse. After calling 911, chew 1 adult-strength aspirin. Wait for an ambulance. Do not try to drive yourself. ? · Your arm or hand is cool or pale or changes color. ?Call your doctor now or seek immediate medical care if: 
? · You have signs of infection, such as: 
¨ Increased pain, swelling, warmth, or redness in your shoulder. ¨ Red streaks leading from a place on your shoulder. ¨ Pus draining from an area of your shoulder. ¨ Swollen lymph nodes in your neck, armpits, or groin. ¨ A fever. ? Watch closely for changes in your health, and be sure to contact your doctor if: 
? · You cannot use your shoulder. ? · Your shoulder does not get better as expected. Where can you learn more? Go to http://sylvie-angela.info/. Enter R184 in the search box to learn more about \"Shoulder Pain: Care Instructions. \" Current as of: March 21, 2017 Content Version: 11.4 © 0682-8366 [employer]. Care instructions adapted under license by [employer] (which disclaims liability or warranty for this information). If you have questions about a medical condition or this instruction, always ask your healthcare professional. [address] [place] [address] any warranty or liability for your use of this information. Phentermine (By mouth) Phentermine (FEN-ter-meen) Helps you lose weight when used for a short time. Brand Name(s): Will Head There may be other brand names for this medicine. When This Medicine Should Not Be Used: This medicine is not right for everyone. Do not use it if you had an allergic reaction to phentermine or similar medicines, or if you are pregnant. How to Use This Medicine:  
Dissolving Tablet, Capsule, Long Acting Capsule, Tablet, Dissolving Tablet · Your doctor will tell you how much medicine to use. Do not use more than directed. · This medicine is not for long-term use. · To avoid trouble sleeping, always take this medicine in the morning and never at bedtime or late in the evening.   
¨ Take the capsule 2 hours after breakfast. 
 ¨ Take the extended-release capsule before breakfast. 
¨ Take the disintegrating tablet in the morning, with or without food. ¨ Take the phentermine tablet before breakfast or 1 to 2 hours after breakfast. 
¨ Take Lomaira tablet 30 minutes before meals. · Swallow the extended-release capsule whole. Do not crush, break, or chew it. · If you are using the disintegrating tablet, make sure your hands are dry before you handle the tablet. Place the tablet on your tongue. It should melt quickly. After the tablet has melted, swallow or take a drink of water. · Tablet: Swallow whole. Do not crush, break, or chew it. · Carefully follow your doctor's instructions about any special diet. · Missed dose: Take a dose as soon as you remember. If it is almost time for your next dose, wait until then and take a regular dose. Do not take extra medicine to make up for a missed dose. · Store the medicine in a closed container at room temperature, away from heat, moisture, and direct light. Drugs and Foods to Avoid: Ask your doctor or pharmacist before using any other medicine, including over-the-counter medicines, vitamins, and herbal products. · Do not use this medicine and an MAO inhibitor (MAOI) within 14 days of each other. · Some medicines can affect how phentermine works. Tell your doctor if you are using any of the following: ¨ Amphetamine medicine (including dextroamphetamine, methamphetamine) ¨ Diet pills ¨ Insulin or diabetes medicine ¨ Medicine to treat depression (including fluoxetine, fluvoxamine, paroxetine, sertraline) · Do not drink alcohol while you are using this medicine. Warnings While Using This Medicine: · It is not safe to take this medicine during pregnancy. It could harm an unborn baby. Tell your doctor right away if you become pregnant.  
· Tell your doctor if you are breastfeeding, or if you have kidney disease, diabetes, glaucoma, congestive heart failure, heart or blood vessel disease, high blood pressure, overactive thyroid, or a history of stroke or drug abuse. Tell your doctor if have allergies to aspirin or tartrazine. · This medicine may cause the following problems: 
¨ Primary pulmonary hypertension (a serious lung problem) ¨ Heart valve disease ¨ Changes in blood sugar levels · This medicine may make you dizzy or drowsy. Do not drive or do anything else that could be dangerous until you know how this medicine affects you. · This medicine can be habit-forming. Do not use more than your prescribed dose. Call your doctor if you think your medicine is not working. · Do not stop using this medicine suddenly. Your doctor will need to slowly decrease your dose before you stop it completely. · Your doctor will check your progress and the effects of this medicine at regular visits. Keep all appointments. · Keep all medicine out of the reach of children. Never share your medicine with anyone. Possible Side Effects While Using This Medicine:  
Call your doctor right away if you notice any of these side effects: · Allergic reaction: Itching or hives, swelling in your face or hands, swelling or tingling in your mouth or throat, chest tightness, trouble breathing · Chest pain, fainting, trouble breathing · Fast, slow, pounding, or uneven heartbeat · Seizures or tremors · Severe headache · Swelling of your feet or lower legs If you notice these less serious side effects, talk with your doctor: · Changes in sex drive · Dizziness, drowsiness, mild headache · Dry mouth or a bad taste in your mouth · Nausea, vomiting, diarrhea, constipation, stomach cramps · Restlessness or nervousness, trouble sleeping If you notice other side effects that you think are caused by this medicine, tell your doctor. Call your doctor for medical advice about side effects. You may report side effects to FDA at 4-491-XOI-3305 © 2017 [facility] Information is for End User's use only and may not be sold, redistributed or otherwise used for commercial purposes. The above information is an  only. It is not intended as medical advice for individual conditions or treatments. Talk to your doctor, nurse or pharmacist before following any medical regimen to see if it is safe and effective for you. Body Mass Index: Care Instructions Your Care Instructions Body mass index (BMI) can help you see if your weight is raising your risk for health problems. It uses a formula to compare how much you weigh with how tall you are. · A BMI lower than 18.5 is considered underweight. · A BMI between 18.5 and 24.9 is considered healthy. · A BMI between 25 and 29.9 is considered overweight. A BMI of 30 or higher is considered obese. If your BMI is in the normal range, it means that you have a lower risk for weight-related health problems. If your BMI is in the overweight or obese range, you may be at increased risk for weight-related health problems, such as high blood pressure, heart disease, stroke, arthritis or joint pain, and diabetes. If your BMI is in the underweight range, you may be at increased risk for health problems such as fatigue, lower protection (immunity) against illness, muscle loss, bone loss, hair loss, and hormone problems. BMI is just one measure of your risk for weight-related health problems. You may be at higher risk for health problems if you are not active, you eat an unhealthy diet, or you drink too much alcohol or use tobacco products. Follow-up care is a key part of your treatment and safety. Be sure to make and go to all appointments, and call your doctor if you are having problems. It's also a good idea to know your test results and keep a list of the medicines you take. How can you care for yourself at home? · Practice healthy eating habits.  This includes eating plenty of fruits, vegetables, whole grains, lean protein, and low-fat dairy. · If your doctor recommends it, get more exercise. Walking is a good choice. Bit by bit, increase the amount you walk every day. Try for at least 30 minutes on most days of the week. · Do not smoke. Smoking can increase your risk for health problems. If you need help quitting, talk to your doctor about stop-smoking programs and medicines. These can increase your chances of quitting for good. · Limit alcohol to 2 drinks a day for men and 1 drink a day for women. Too much alcohol can cause health problems. If you have a BMI higher than 25 · Your doctor may do other tests to check your risk for weight-related health problems. This may include measuring the distance around your waist. A waist measurement of more than 40 inches in men or 35 inches in women can increase the risk of weight-related health problems. · Talk with your doctor about steps you can take to stay healthy or improve your health. You may need to make lifestyle changes to lose weight and stay healthy, such as changing your diet and getting regular exercise. If you have a BMI lower than 18.5 · Your doctor may do other tests to check your risk for health problems. · Talk with your doctor about steps you can take to stay healthy or improve your health. You may need to make lifestyle changes to gain or maintain weight and stay healthy, such as getting more healthy foods in your diet and doing exercises to build muscle. Where can you learn more? Go to http://sylvie-angela.info/. Enter S176 in the search box to learn more about \"Body Mass Index: Care Instructions. \" Current as of: October 13, 2016 Content Version: 11.4 © 3963-9356 [employer]. Care instructions adapted under license by [employer] (which disclaims liability or warranty for this information).  If you have questions about a medical condition or this instruction, always ask your healthcare professional. [address] [address] any warranty or liability for your use of this information. Introducing [facility]! [employer] introduces [employer] patient portal. Now you can access parts of your medical record, email your doctor's office, and request medication refills online. 1. In your internet browser, go to [email or web address] 2. Click on the First Time User? Click Here link in the Sign In box. You will see the New Member Sign Up page. 3. Enter your [employer] Access Code exactly as it appears below. You will not need to use this code after youve completed the sign-up process. If you do not sign up before the expiration date, you must request a new code. · [employer] Access Code: [address] Expires: 9/10/2018 11:29 AM 
 
4. Enter the last four digits of your Social Security Number (xxxx) and Date of Birth (mm/dd/yyyy) as indicated and click Submit. You will be taken to the next sign-up page. 5. Create a [employer] ID. This will be your [employer] login ID and cannot be changed, so think of one that is secure and easy to remember. 6. Create a [employer] password. You can change your password at any time. 7. Enter your Password Reset Question and Answer. This can be used at a later time if you forget your password. 8. Enter your e-mail address. You will receive e-mail notification when new information is available in [address] 9. Click Sign Up. You can now view and download portions of your medical record. 10. Click the Download Summary menu link to download a portable copy of your medical information. If you have questions, please visit the Frequently Asked Questions section of the [employer] website. Remember, [employer] is NOT to be used for urgent needs. For medical emergencies, dial 911. Now available from your iPhone and Android! Please provide this summary of care documentation to your next provider. Your primary care clinician is listed as Lokesh Robertson. If you have any questions after today's visit, please call [phone].

## 2018-06-12 NOTE — PROGRESS NOTES
1. Have you been to the ER, urgent care clinic since your last visit? Hospitalized since your last visit? No    2. Have you seen or consulted any other health care providers outside of the [facility] since your last visit?   Include any pap smears or colon screening No

## 2018-06-12 NOTE — PROGRESS NOTES
Subjective:   Arnoldo Braun is a 45 y.o. female here today to follow up with weight gain. She has been adhering to a mediterranean diet. Wt Readings from Last 3 Encounters:   06/12/18 190 lb (86.2 kg)   04/13/18 203 lb (92.1 kg)   02/27/18 190 lb (86.2 kg)     She reports that she trying very hard but having a difficult time with appetite and she is requesting a appetite suppressant   New concern:   Left shoulder pain chronic over three months  Anterior aspect   Noticed limited movement   Works in a nursing home  Pain with movement up to 10/10  Has taken intermittent Aleve with only minimal relief. ROS: denies recent or remote injury,Denies associated neck or back pain, denies numbness or tingling to LUE   OBJECTIVE:  Awake and alert in no acute distress  Lungs clear throughout  S1 S2 RRR without ectopy or murmur auscultated. Shoulder exam:  Left shoulder: No erythema, edema, or bruising. Tender to acromioclavicular joint Tender to subacromial bursa. POS impingment signs   Visit Vitals    /71 (BP 1 Location: Left arm, BP Patient Position: Sitting)    Pulse 68    Temp 98.6 °F (37 °C) (Oral)    Resp 18    Ht 5' 3\" (1.6 m)    Wt 190 lb (86.2 kg)    SpO2 98%    BMI 33.66 kg/m2     Diagnoses and all orders for this visit:    1. Appetite increase  -     phentermine (ADIPEX-P) 37.5 mg tablet; May take 1/2 to 1 po daily prn appetite difficulties    2. Chronic left shoulder pain  -     [facility] [place] [facility]  -     naproxen (NAPROSYN) 500 mg tablet; Take 1 Tab by mouth two (2) times daily as needed. 3. BMI 33.0-33.9,adult      Praised patient for diet and exercise behavior changes and encouraged to continue.   Portion control and exercise healthy eating general guidelines reviewed with patient to get closer to normal BMI   Discussed with patient that phentermine is intended only as a short term adjunct for weight loss coupled with long term sound nutrition portion control and exercise. Reviewed risks and benefits and common side effects of new medication  General comfort measures    Patient agrees and verbalizes understanding. I have discussed the diagnosis with the patient and the intended plan as seen in the above orders. The patient has received an after-visit summary and questions were answered concerning future plans. I have discussed medication side effects and warnings with the patient as well. Follow-up Disposition:  Return in about 2 months (around 8/13/2018), or if symptoms worsen or fail to improve, for follow up BMI.

## 2018-06-12 NOTE — PATIENT INSTRUCTIONS
Shoulder Pain: Care Instructions  Your Care Instructions    You can hurt your shoulder by using it too much during an activity, such as fishing or baseball. It can also happen as part of the everyday wear and tear of getting older. Shoulder injuries can be slow to heal, but your shoulder should get better with time. Your doctor may recommend a sling to rest your shoulder. If you have injured your shoulder, you may need testing and treatment. Follow-up care is a key part of your treatment and safety. Be sure to make and go to all appointments, and call your doctor if you are having problems. It's also a good idea to know your test results and keep a list of the medicines you take. How can you care for yourself at home? · Take pain medicines exactly as directed. ¨ If the doctor gave you a prescription medicine for pain, take it as prescribed. ¨ If you are not taking a prescription pain medicine, ask your doctor if you can take an over-the-counter medicine. ¨ Do not take two or more pain medicines at the same time unless the doctor told you to. Many pain medicines contain acetaminophen, which is Tylenol. Too much acetaminophen (Tylenol) can be harmful. · If your doctor recommends that you wear a sling, use it as directed. Do not take it off before your doctor tells you to. · Put ice or a cold pack on the sore area for 10 to 20 minutes at a time. Put a thin cloth between the ice and your skin. · If there is no swelling, you can put moist heat, a heating pad, or a warm cloth on your shoulder. Some doctors suggest alternating between hot and cold. · Rest your shoulder for a few days. If your doctor recommends it, you can then begin gentle exercise of the shoulder, but do not lift anything heavy. When should you call for help? Call 911 anytime you think you may need emergency care. For example, call if:  ? · You have chest pain or pressure. This may occur with:  ¨ Sweating.   ¨ Shortness of breath. ¨ Nausea or vomiting. ¨ Pain that spreads from the chest to the neck, jaw, or one or both shoulders or arms. ¨ Dizziness or lightheadedness. ¨ A fast or uneven pulse. After calling 911, chew 1 adult-strength aspirin. Wait for an ambulance. Do not try to drive yourself. ? · Your arm or hand is cool or pale or changes color. ?Call your doctor now or seek immediate medical care if:  ? · You have signs of infection, such as:  ¨ Increased pain, swelling, warmth, or redness in your shoulder. ¨ Red streaks leading from a place on your shoulder. ¨ Pus draining from an area of your shoulder. ¨ Swollen lymph nodes in your neck, armpits, or groin. ¨ A fever. ? Watch closely for changes in your health, and be sure to contact your doctor if:  ? · You cannot use your shoulder. ? · Your shoulder does not get better as expected. Where can you learn more? Go to http://sylvie-angela.info/. Enter L444 in the search box to learn more about \"Shoulder Pain: Care Instructions. \"  Current as of: March 21, 2017  Content Version: 11.4  © 0153-9167 [employer]. Care instructions adapted under license by [employer] (which disclaims liability or warranty for this information). If you have questions about a medical condition or this instruction, always ask your healthcare professional. [place] [address] any warranty or liability for your use of this information. Phentermine (By mouth)   Phentermine (FEN-ter-meen)  Helps you lose weight when used for a short time. Brand Name(s): Adipex-P, Lomaira   There may be other brand names for this medicine. When This Medicine Should Not Be Used: This medicine is not right for everyone. Do not use it if you had an allergic reaction to phentermine or similar medicines, or if you are pregnant.   How to Use This Medicine:   Dissolving Tablet, Capsule, Long Acting Capsule, Tablet, Dissolving Tablet  · Your doctor will tell you how much medicine to use. Do not use more than directed. · This medicine is not for long-term use. · To avoid trouble sleeping, always take this medicine in the morning and never at bedtime or late in the evening. ¨ Take the capsule 2 hours after breakfast.  ¨ Take the extended-release capsule before breakfast.  ¨ Take the disintegrating tablet in the morning, with or without food. ¨ Take the phentermine tablet before breakfast or 1 to 2 hours after breakfast.  ¨ Take Lomaira tablet 30 minutes before meals. · Swallow the extended-release capsule whole. Do not crush, break, or chew it. · If you are using the disintegrating tablet, make sure your hands are dry before you handle the tablet. Place the tablet on your tongue. It should melt quickly. After the tablet has melted, swallow or take a drink of water. · Tablet: Swallow whole. Do not crush, break, or chew it. · Carefully follow your doctor's instructions about any special diet. · Missed dose: Take a dose as soon as you remember. If it is almost time for your next dose, wait until then and take a regular dose. Do not take extra medicine to make up for a missed dose. · Store the medicine in a closed container at room temperature, away from heat, moisture, and direct light. Drugs and Foods to Avoid:   Ask your doctor or pharmacist before using any other medicine, including over-the-counter medicines, vitamins, and herbal products. · Do not use this medicine and an MAO inhibitor (MAOI) within 14 days of each other. · Some medicines can affect how phentermine works. Tell your doctor if you are using any of the following:  ¨ Amphetamine medicine (including dextroamphetamine, methamphetamine)  ¨ Diet pills  ¨ Insulin or diabetes medicine  ¨ Medicine to treat depression (including fluoxetine, fluvoxamine, paroxetine, sertraline)  · Do not drink alcohol while you are using this medicine.   Warnings While Using This Medicine:   · It is not safe to take this medicine during pregnancy. It could harm an unborn baby. Tell your doctor right away if you become pregnant. · Tell your doctor if you are breastfeeding, or if you have kidney disease, diabetes, glaucoma, congestive heart failure, heart or blood vessel disease, high blood pressure, overactive thyroid, or a history of stroke or drug abuse. Tell your doctor if have allergies to aspirin or tartrazine. · This medicine may cause the following problems:  ¨ Primary pulmonary hypertension (a serious lung problem)  ¨ Heart valve disease  ¨ Changes in blood sugar levels  · This medicine may make you dizzy or drowsy. Do not drive or do anything else that could be dangerous until you know how this medicine affects you. · This medicine can be habit-forming. Do not use more than your prescribed dose. Call your doctor if you think your medicine is not working. · Do not stop using this medicine suddenly. Your doctor will need to slowly decrease your dose before you stop it completely. · Your doctor will check your progress and the effects of this medicine at regular visits. Keep all appointments. · Keep all medicine out of the reach of children. Never share your medicine with anyone.   Possible Side Effects While Using This Medicine:   Call your doctor right away if you notice any of these side effects:  · Allergic reaction: Itching or hives, swelling in your face or hands, swelling or tingling in your mouth or throat, chest tightness, trouble breathing  · Chest pain, fainting, trouble breathing  · Fast, slow, pounding, or uneven heartbeat  · Seizures or tremors  · Severe headache  · Swelling of your feet or lower legs  If you notice these less serious side effects, talk with your doctor:   · Changes in sex drive  · Dizziness, drowsiness, mild headache  · Dry mouth or a bad taste in your mouth  · Nausea, vomiting, diarrhea, constipation, stomach cramps  · Restlessness or nervousness, trouble sleeping  If you notice other side effects that you think are caused by this medicine, tell your doctor. Call your doctor for medical advice about side effects. You may report side effects to FDA at 4-218-OKL-3851  © 2017 [address] Information is for End User's use only and may not be sold, redistributed or otherwise used for commercial purposes. The above information is an  only. It is not intended as medical advice for individual conditions or treatments. Talk to your doctor, nurse or pharmacist before following any medical regimen to see if it is safe and effective for you. Body Mass Index: Care Instructions  Your Care Instructions    Body mass index (BMI) can help you see if your weight is raising your risk for health problems. It uses a formula to compare how much you weigh with how tall you are. · A BMI lower than 18.5 is considered underweight. · A BMI between 18.5 and 24.9 is considered healthy. · A BMI between 25 and 29.9 is considered overweight. A BMI of 30 or higher is considered obese. If your BMI is in the normal range, it means that you have a lower risk for weight-related health problems. If your BMI is in the overweight or obese range, you may be at increased risk for weight-related health problems, such as high blood pressure, heart disease, stroke, arthritis or joint pain, and diabetes. If your BMI is in the underweight range, you may be at increased risk for health problems such as fatigue, lower protection (immunity) against illness, muscle loss, bone loss, hair loss, and hormone problems. BMI is just one measure of your risk for weight-related health problems. You may be at higher risk for health problems if you are not active, you eat an unhealthy diet, or you drink too much alcohol or use tobacco products. Follow-up care is a key part of your treatment and safety. Be sure to make and go to all appointments, and call your doctor if you are having problems.  It's also a good idea to know your test results and keep a list of the medicines you take. How can you care for yourself at home? · Practice healthy eating habits. This includes eating plenty of fruits, vegetables, whole grains, lean protein, and low-fat dairy. · If your doctor recommends it, get more exercise. Walking is a good choice. Bit by bit, increase the amount you walk every day. Try for at least 30 minutes on most days of the week. · Do not smoke. Smoking can increase your risk for health problems. If you need help quitting, talk to your doctor about stop-smoking programs and medicines. These can increase your chances of quitting for good. · Limit alcohol to 2 drinks a day for men and 1 drink a day for women. Too much alcohol can cause health problems. If you have a BMI higher than 25  · Your doctor may do other tests to check your risk for weight-related health problems. This may include measuring the distance around your waist. A waist measurement of more than 40 inches in men or 35 inches in women can increase the risk of weight-related health problems. · Talk with your doctor about steps you can take to stay healthy or improve your health. You may need to make lifestyle changes to lose weight and stay healthy, such as changing your diet and getting regular exercise. If you have a BMI lower than 18.5  · Your doctor may do other tests to check your risk for health problems. · Talk with your doctor about steps you can take to stay healthy or improve your health. You may need to make lifestyle changes to gain or maintain weight and stay healthy, such as getting more healthy foods in your diet and doing exercises to build muscle. Where can you learn more? Go to http://sylvie-angela.info/. Enter S176 in the search box to learn more about \"Body Mass Index: Care Instructions. \"  Current as of: October 13, 2016  Content Version: 11.4  © 8117-2324 [employer].  Care instructions adapted under license by [employer] (which disclaims liability or warranty for this information). If you have questions about a medical condition or this instruction, always ask your healthcare professional. [address] any warranty or liability for your use of this information.

## 2018-06-22 ENCOUNTER — OFFICE VISIT (OUTPATIENT)
Dept: ORTHOPEDIC SURGERY | Facility: CLINIC | Age: 53
End: 2018-06-22

## 2018-06-22 VITALS
HEIGHT: 63 IN | DIASTOLIC BLOOD PRESSURE: 76 MMHG | OXYGEN SATURATION: 97 % | RESPIRATION RATE: 16 BRPM | HEART RATE: 64 BPM | SYSTOLIC BLOOD PRESSURE: 132 MMHG | TEMPERATURE: 98.3 F | WEIGHT: 180 LBS | BODY MASS INDEX: 31.89 KG/M2

## 2018-06-22 DIAGNOSIS — M25.512 CHRONIC LEFT SHOULDER PAIN: ICD-10-CM

## 2018-06-22 DIAGNOSIS — M19.012 PRIMARY OSTEOARTHRITIS OF LEFT SHOULDER: Primary | ICD-10-CM

## 2018-06-22 DIAGNOSIS — G89.29 CHRONIC LEFT SHOULDER PAIN: ICD-10-CM

## 2018-06-22 DIAGNOSIS — M75.52 BURSITIS OF LEFT SHOULDER: ICD-10-CM

## 2018-06-22 RX ORDER — BETAMETHASONE SODIUM PHOSPHATE AND BETAMETHASONE ACETATE 3; 3 MG/ML; MG/ML
6 INJECTION, SUSPENSION INTRA-ARTICULAR; INTRALESIONAL; INTRAMUSCULAR; SOFT TISSUE ONCE
Qty: 0.5 ML | Refills: 0
Start: 2018-06-22 | End: 2018-06-22

## 2018-06-22 RX ORDER — BUPIVACAINE HYDROCHLORIDE 2.5 MG/ML
4 INJECTION, SOLUTION EPIDURAL; INFILTRATION; INTRACAUDAL ONCE
Qty: 4 ML | Refills: 0
Start: 2018-06-22 | End: 2018-06-22

## 2018-06-22 RX ORDER — IBUPROFEN 600 MG/1
TABLET ORAL
COMMUNITY
End: 2018-09-21 | Stop reason: SDUPTHER

## 2018-06-22 NOTE — PROGRESS NOTES
Patient: Brittany Peguero                MRN: [id number]       SSN: [id number]  YOB: 1965        AGE: 45 y.o. SEX: female    PCP: Renetta Suarez NP  06/22/18    Chief Complaint   Patient presents with    Shoulder Pain     Left     HISTORY:  Brittany Peguero is a 45 y.o. female who sustained a work related injury to her left shoulder 4 years ago. 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 She reports increased pain x1 month. She previously was seen for her left shoulder pain 4 years ago. 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 She changed from the 11PM-7AM shift to the 3PM-11PM shift which was a lot calmer. She reports pain with extension and internal rotation in her left shoulder. She reports difficulty sleeping on her shoulder. 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 She lives alone in [place]. She has a 26 yo daughter. She has 3 grandchildren, two of which are fraternal twins. .  Current weight is 180 pounds. She is 5'3\" tall.   She is right handed    No results found for: HBA1C, HGBE8, JDL1EFYV, QYG6ROEO, JVB6SXFT  Weight Metrics 6/22/2018 6/12/2018 4/13/2018 2/27/2018 1/12/2018 10/16/2017 6/22/2017   Weight 180 lb 190 lb 203 lb 190 lb 180 lb 190 lb 190 lb 6.4 oz   BMI 31.89 kg/m2 33.66 kg/m2 35.96 kg/m2 33.66 kg/m2 31.89 kg/m2 32.61 kg/m2 33.73 kg/m2       Patient Active Problem List   Diagnosis Code    Chronic bronchitis ([facility] [address] J42    SBO (small bowel obstruction) ([facility] [address] K56.609    Intractable epilepsy without status epilepticus ([facility] [address] G40.919 REVIEW OF SYSTEMS: All Below are Negative except: See HPI   Constitutional: negative for fever, chills, and weight loss. Cardiovascular: negative for chest pain, claudication, leg swelling, SOB, BYRD   Gastrointestinal: Negative for pain, N/V/C/D, Blood in stool or urine, dysuria,  hematuria, incontinence, pelvic pain. Musculoskeletal: See HPI   Neurological: Negative for dizziness and weakness. Negative for headaches, Visual changes, confusion, seizures   Phychiatric/Behavioral: Negative for depression, memory loss, substance  abuse. Extremities: Negative for hair changes, rash, or skin lesion changes. Hematologic: Negative for bleeding problems, bruising, pallor or swollen lymph  nodes   Peripheral Vascular: No calf pain, no circulation deficits. Social History     Social History    Marital status: LEGALLY      Spouse name: N/A    Number of children: 1    Years of education: N/A     Occupational History    [employer]     15 years     Social History Main Topics    Smoking status: Current Every Day Smoker     Packs/day: 0.50     Types: Cigarettes    Smokeless tobacco: Never Used    Alcohol use No    Drug use: No    Sexual activity: Not Currently     Other Topics Concern    Not on file     Social History Narrative      Allergies   Allergen Reactions    Cyclobenzaprine Other (comments)     headache    Etodolac Other (comments)     headache      Current Outpatient Prescriptions   Medication Sig    ibuprofen (MOTRIN) 600 mg tablet Take  by mouth every six (6) hours as needed for Pain.  phentermine (ADIPEX-P) 37.5 mg tablet May take 1/2 to 1 po daily prn appetite difficulties    carBAMazepine (TEGRETOL) 200 mg tablet One tab in AM, one tab at lunch, and two tabs before bed    naproxen (NAPROSYN) 500 mg tablet Take 1 Tab by mouth two (2) times daily as needed. No current facility-administered medications for this visit.        PHYSICAL EXAMINATION:  Visit Vitals    /76 (BP 1 Location: Left arm, BP Patient Position: Sitting)    Pulse 64    Temp 98.3 °F (36.8 °C) (Oral)    Resp 16    Ht 5' 3\" (1.6 m)    Wt 180 lb (81.6 kg)    SpO2 97%    BMI 31.89 kg/m2      ORTHO EXAMINATION:  Examination Right shoulder Left shoulder   Skin Intact Intact   Effusion - -   Biceps deformity - -   Atrophy - -   AC joint tenderness - +   Acromial tenderness - +   Biceps tenderness - -   Forward flexion/Elevation  155   Active abduction  155   External rotation ROM 30 50   Internal rotation ROM 90 95   Apprehension - -   Impingement - -   Drop Arm Test - -   Neurovascular Intact Intact     PROCEDURE:  After discussing treatment options, patient's left shoulder was injected with 4 cc Marcaine and 1/2 cc Celestone. Chart reviewed for the following:   Titus Barkley MD, have reviewed the History, Physical and updated the Allergic reactions for Devanriri Soliznds     TIME OUT performed immediately prior to start of procedure:  Titus Barkley MD, have performed the following reviews on Devanriri Soliznds prior to the start of the procedure:            * Patient was identified by name and date of birth   * Agreement on procedure being performed was verified  * Risks and Benefits explained to the patient  * Procedure site verified and marked as necessary  * Patient was positioned for comfort  * Consent was obtained     Time: 12:10 PM     Date of procedure: 6/22/2018    Procedure performed by:  Gavi Singleton MD    Ms. Hopson tolerated the procedure well with no complications. RADIOGRAPHS:  XR SHOULDER 6/22/2108  IMPRESSION:  Three views - No fractures, mild acromioclavicular narrowing, mild glenohumeral narrowing, no calcific densities. IMPRESSION:      ICD-10-CM ICD-9-CM    1.  Primary osteoarthritis of left shoulder M19.012 715.11 betamethasone (CELESTONE SOLUSPAN) 6 mg/mL injection      BETAMETHASONE ACETATE & SODIUM PHOSPHATE INJECTION 3 MG EA.      DRAIN/INJECT LARGE JOINT/BURSA      bupivacaine, PF, (MARCAINE, PF,) 0.25 % (2.5 mg/mL) injection   2. Chronic left shoulder pain M25.512 719.41 AMB POC XRAY, SHOULDER; COMPLETE, 2+    G89.29 338.29 betamethasone (CELESTONE SOLUSPAN) 6 mg/mL injection      BETAMETHASONE ACETATE & SODIUM PHOSPHATE INJECTION 3 MG EA.      DRAIN/INJECT LARGE JOINT/BURSA      bupivacaine, PF, (MARCAINE, PF,) 0.25 % (2.5 mg/mL) injection   3. Bursitis of left shoulder M75.52 726.10 betamethasone (CELESTONE SOLUSPAN) 6 mg/mL injection      BETAMETHASONE ACETATE & SODIUM PHOSPHATE INJECTION 3 MG EA.      DRAIN/INJECT LARGE JOINT/BURSA      bupivacaine, PF, (MARCAINE, PF,) 0.25 % (2.5 mg/mL) injection     PLAN:  After discussing treatment options, patient's left shoulder was injected with 4 cc Marcaine and 1/2 cc Celestone. We discussed a possible left shoulder MRI or outpatient PT in the future if pain continues. She will follow up as needed.       Scribed by Janet Lyon[address]) as dictated by Deep Cho MD

## 2018-06-22 NOTE — MR AVS SNAPSHOT
[address] [facility] 
 
 
 [address] [facility], [address] [facility] [address] 
[phone] Patient: Christina Herrera MRN: [id number] :1965 Visit Information Date & Time Provider Department Dept. Phone Encounter #  
 2018 10:50 AM Anne Sheomaker MD [facility] [phone] Your Appointments 2018 11:20 AM  
Office Visit with Elmer Goss NP [facility] ([facility]) Appt Note: fu  on bmi  
 [address], [place] [address] [address]  
[phone]  
  
   
 [address] Upcoming Health Maintenance Date Due Influenza Age 5 to Adult 2018 BREAST CANCER SCRN MAMMOGRAM 2019 COLONOSCOPY 10/21/2026 DTaP/Tdap/Td series (2 - Td) 2027 Allergies as of 2018  Review Complete On: 2018 By: Ricarda Hopkins Severity Noted Reaction Type Reactions Cyclobenzaprine High 2016    Other (comments)  
 headache Etodolac  2016    Other (comments)  
 headache Current Immunizations  Reviewed on 2016 Name Date Pneumococcal Conjugate (PCV-13) 2016 Not reviewed this visit You Were Diagnosed With   
  
 Codes Comments Primary osteoarthritis of left shoulder    -  Primary ICD-10-CM: M19.012 
ICD-9-CM: 715.11 Chronic left shoulder pain     ICD-10-CM: M25.512, G89.29 ICD-9-CM: 719.41, 338.29 Bursitis of left shoulder     ICD-10-CM: M75.52 
ICD-9-CM: 726.10 Vitals BP Pulse Temp Resp Height(growth percentile) Weight(growth percentile) 132/76 (BP 1 Location: Left arm, BP Patient Position: Sitting) 64 98.3 °F (36.8 °C) (Oral) 16 5' 3\" (1.6 m) 180 lb (81.6 kg) SpO2 BMI OB Status Smoking Status 97% 31.89 kg/m2 Hysterectomy Current Every Day Smoker BMI and BSA Data  Body Mass Index Body Surface Area  
 31.89 kg/m 2 1.9 m 2  
  
 Preferred Pharmacy Pharmacy Name Phone [facility] [address] [facility] [phone] Your Updated Medication List  
  
   
This list is accurate as of 6/22/18 12:13 PM.  Always use your most recent med list.  
  
  
  
  
 carBAMazepine 200 mg tablet Commonly known as:  TEGretol One tab in AM, one tab at lunch, and two tabs before bed  
  
 ibuprofen 600 mg tablet Commonly known as:  MOTRIN Take  by mouth every six (6) hours as needed for Pain. naproxen 500 mg tablet Commonly known as:  NAPROSYN Take 1 Tab by mouth two (2) times daily as needed. phentermine 37.5 mg tablet Commonly known as:  ADIPEX-P May take 1/2 to 1 po daily prn appetite difficulties We Performed the Following AMB POC XRAY, SHOULDER; COMPLETE, 2+ [75746 CPT(R)] Patient Instructions Shoulder Bursitis: Exercises Your Care Instructions Here are some examples of typical rehabilitation exercises for your condition. Start each exercise slowly. Ease off the exercise if you start to have pain. Your doctor or physical therapist will tell you when you can start these exercises and which ones will work best for you. How to do the exercises Posterior stretching exercise 1. Hold the elbow of your injured arm with your other hand. 2. Use your hand to pull your injured arm gently up and across your body. You will feel a gentle stretch across the back of your injured shoulder. 3. Hold for at least 15 to 30 seconds. Then slowly lower your arm. 4. Repeat 2 to 4 times. Up-the-back stretch Your doctor or physical therapist may want you to wait to do this stretch until you have regained most of your range of motion and strength. You can do this stretch in different ways. Hold any of these stretches for at least 15 to 30 seconds. Repeat them 2 to 4 times. 1. Put your hand in your back pocket. Let it rest there to stretch your shoulder. 2. With your other hand, hold your injured arm (palm outward) behind your back by the wrist. Pull your arm up gently to stretch your shoulder. 3. Next, put a towel over your other shoulder. Put the hand of your injured arm behind your back. Now hold the back end of the towel. With the other hand, hold the front end of the towel in front of your body. Pull gently on the front end of the towel. This will bring your hand farther up your back to stretch your shoulder. Overhead stretch 1. Standing about an arm's length away, grasp onto a solid surface. You could use a countertop, a doorknob, or the back of a sturdy chair. 2. With your knees slightly bent, bend forward with your arms straight. Lower your upper body, and let your shoulders stretch. 3. As your shoulders are able to stretch farther, you may need to take a step or two backward. 4. Hold for at least 15 to 30 seconds. Then stand up and relax. If you had stepped back during your stretch, step forward so you can keep your hands on the solid surface. 5. Repeat 2 to 4 times. Shoulder flexion (lying down) To make a wand for this exercise, use a piece of PVC pipe or a broom handle with the broom removed. Make the wand about a foot wider than your shoulders. 1. Lie on your back, holding a wand with both hands. Your palms should face down as you hold the wand. 2. Keeping your elbows straight, slowly raise your arms over your head. Raise them until you feel a stretch in your shoulders, upper back, and chest. 
3. Hold for 15 to 30 seconds. 4. Repeat 2 to 4 times. Shoulder rotation (lying down) To make a wand for this exercise, use a piece of PVC pipe or a broom handle with the broom removed. Make the wand about a foot wider than your shoulders. 1. Lie on your back. Hold a wand with both hands with your elbows bent and palms up. 2. Keep your elbows close to your body, and move the wand across your body toward the sore arm. 3. Hold for 8 to 12 seconds. 4. Repeat 2 to 4 times. Shoulder blade squeeze 1. Stand with your arms at your sides, and squeeze your shoulder blades together. Do not raise your shoulders up as you squeeze. 2. Hold 6 seconds. 3. Repeat 8 to 12 times. Shoulder flexor and extensor exercise These are isometric exercises. That means you contract your muscles without actually moving. 1. Push forward (flex): Stand facing a wall or doorjamb, about 6 inches or less back. Hold your injured arm against your body. Make a closed fist with your thumb on top. Then gently push your hand forward into the wall with about 25% to 50% of your strength. Don't let your body move backward as you push. Hold for about 6 seconds. Relax for a few seconds. Repeat 8 to 12 times. 2. Push backward (extend): Stand with your back flat against a wall. Your upper arm should be against the wall, with your elbow bent 90 degrees (your hand straight ahead). Push your elbow gently back against the wall with about 25% to 50% of your strength. Don't let your body move forward as you push. Hold for about 6 seconds. Relax for a few seconds. Repeat 8 to 12 times. Scapular exercise: Wall push-ups This exercise is best done with your fingers somewhat turned out, rather than straight up and down. 1. Stand facing a wall, about 12 inches to 18 inches away. 2. Place your hands on the wall at shoulder height. 3. Slowly bend your elbows and bring your face to the wall. Keep your back and hips straight. 4. Push back to where you started. 5. Repeat 8 to 12 times. 6. When you can do this exercise against a wall comfortably, you can try it against a counter. You can then slowly progress to the end of a couch, then to a sturdy chair, and finally to the floor. Scapular exercise: Retraction For this exercise, you will need elastic exercise material, such as surgical tubing or Thera-Band. 1. Put the band around a solid object at about waist level. (A bedpost will work well.) Each hand should hold an end of the band. 2. With your elbows at your sides and bent to 90 degrees, pull the band back. Your shoulder blades should move toward each other. Then move your arms back where you started. 3. Repeat 8 to 12 times. 4. If you have good range of motion in your shoulders, try this exercise with your arms lifted out to the sides. Keep your elbows at a 90-degree angle. Raise the elastic band up to about shoulder level. Pull the band back to move your shoulder blades toward each other. Then move your arms back where you started. Internal rotator strengthening exercise 1. Start by tying a piece of elastic exercise material to a doorknob. You can use surgical tubing or Thera-Band. 2. Stand or sit with your shoulder relaxed and your elbow bent 90 degrees. Your upper arm should rest comfortably against your side. Squeeze a rolled towel between your elbow and your body for comfort. This will help keep your arm at your side. 3. Hold one end of the elastic band in the hand of the painful arm. 4. Slowly rotate your forearm toward your body until it touches your belly. Slowly move it back to where you started. 5. Keep your elbow and upper arm firmly tucked against the towel roll or at your side. 6. Repeat 8 to 12 times. External rotator strengthening exercise 1. Start by tying a piece of elastic exercise material to a doorknob. You can use surgical tubing or Thera-Band. (You may also hold one end of the band in each hand.) 2. Stand or sit with your shoulder relaxed and your elbow bent 90 degrees. Your upper arm should rest comfortably against your side. Squeeze a rolled towel between your elbow and your body for comfort. This will help keep your arm at your side. 3. Hold one end of the elastic band with the hand of the painful arm. 4. Start with your forearm across your belly. Slowly rotate the forearm out away from your body. Keep your elbow and upper arm tucked against the towel roll or the side of your body until you begin to feel tightness in your shoulder. Slowly move your arm back to where you started. 5. Repeat 8 to 12 times. Follow-up care is a key part of your treatment and safety. Be sure to make and go to all appointments, and call your doctor if you are having problems. It's also a good idea to know your test results and keep a list of the medicines you take. Where can you learn more? Go to http://sylvie-angela.info/. Enter O723 in the search box to learn more about \"Shoulder Bursitis: Exercises. \" Current as of: March 21, 2017 Content Version: 11.4 © 3771-0385 [employer]. Care instructions adapted under license by [employer] (which disclaims liability or warranty for this information). If you have questions about a medical condition or this instruction, always ask your healthcare professional. [place] [address] any warranty or liability for your use of this information. Shoulder Arthritis: Exercises Your Care Instructions Here are some examples of typical rehabilitation exercises for your condition. Start each exercise slowly. Ease off the exercise if you start to have pain. Your doctor or physical therapist will tell you when you can start these exercises and which ones will work best for you. How to do the exercises Shoulder flexion (lying down) To make a wand for this exercise, use a piece of PVC pipe or a broom handle with the broom removed. Make the wand about a foot wider than your shoulders. 5. Lie on your back, holding a wand with both hands. Your palms should face down as you hold the wand. 6. Keeping your elbows straight, slowly raise your arms over your head.  Raise them until you feel a stretch in your shoulders, upper back, and chest. 
 7. Hold for 15 to 30 seconds. 8. Repeat 2 to 4 times. Shoulder rotation (lying down) To make a wand for this exercise, use a piece of PVC pipe or a broom handle with the broom removed. Make the wand about a foot wider than your shoulders. 4. Lie on your back. Hold a wand with both hands with your elbows bent and palms up. 5. Keep your elbows close to your body, and move the wand across your body toward the sore arm. 6. Hold for 8 to 12 seconds. 7. Repeat 2 to 4 times. Shoulder internal rotation with towel 6. Hold a towel above and behind your head with the arm that is not sore. 7. With your sore arm, reach behind your back and grasp the towel. 8. With the arm above your head, pull the towel upward. Do this until you feel a stretch on the front and outside of your sore shoulder. 9. Hold 15 to 30 seconds. 10. Repeat 2 to 4 times. Shoulder blade squeeze 5. Stand with your arms at your sides, and squeeze your shoulder blades together. Do not raise your shoulders up as you squeeze. 6. Hold 6 seconds. 7. Repeat 8 to 12 times. Resisted rows For this exercise, you will need elastic exercise material, such as surgical tubing or Thera-Band. 5. Put the band around a solid object at about waist level. (A bedpost will work well.) Each hand should hold an end of the band. 6. With your elbows at your sides and bent to 90 degrees, pull the band back. Your shoulder blades should move toward each other. Return to the starting position. 7. Repeat 8 to 12 times. External rotator strengthening exercise 4. Start by tying a piece of elastic exercise material to a doorknob. You can use surgical tubing or Thera-Band. (You may also hold one end of the band in each hand.) 5. Stand or sit with your shoulder relaxed and your elbow bent 90 degrees. Your upper arm should rest comfortably against your side. Squeeze a rolled towel between your elbow and your body for comfort.  This will help keep your arm at your side. 6. Hold one end of the elastic band with the hand of the painful arm. 7. Start with your forearm across your belly. Slowly rotate the forearm out away from your body. Keep your elbow and upper arm tucked against the towel roll or the side of your body until you begin to feel tightness in your shoulder. Slowly move your arm back to where you started. 8. Repeat 8 to 12 times. Internal rotator strengthening exercise 3. Start by tying a piece of elastic exercise material to a doorknob. You can use surgical tubing or Thera-Band. 4. Stand or sit with your shoulder relaxed and your elbow bent 90 degrees. Your upper arm should rest comfortably against your side. Squeeze a rolled towel between your elbow and your body for comfort. This will help keep your arm at your side. 5. Hold one end of the elastic band in the hand of the painful arm. 6. Slowly rotate your forearm toward your body until it touches your belly. Slowly move it back to where you started. 7. Keep your elbow and upper arm firmly tucked against the towel roll or at your side. 8. Repeat 8 to 12 times. Pendulum swing If you have pain in your back, do not do this exercise. 7. Hold on to a table or the back of a chair with your good arm. Then bend forward a little and let your sore arm hang straight down. This exercise does not use the arm muscles. Rather, use your legs and your hips to create movement that makes your arm swing freely. 8. Use the movement from your hips and legs to guide the slightly swinging arm back and forth like a pendulum (or elephant trunk). Then guide it in circles that start small (about the size of a dinner plate). Make the circles a bit larger each day, as your pain allows. 9. Do this exercise for 5 minutes, 5 to 7 times each day. 10. As you have less pain, try bending over a little farther to do this exercise. This will increase the amount of movement at your shoulder. Follow-up care is a key part of your treatment and safety. Be sure to make and go to all appointments, and call your doctor if you are having problems. It's also a good idea to know your test results and keep a list of the medicines you take. Where can you learn more? Go to http://sylvie-angela.info/. Enter H562 in the search box to learn more about \"Shoulder Arthritis: Exercises. \" Current as of: March 21, 2017 Content Version: 11.4 © 0376-5731 [employer]. Care instructions adapted under license by [employer] (which disclaims liability or warranty for this information). If you have questions about a medical condition or this instruction, always ask your healthcare professional. [address] any warranty or liability for your use of this information. Introducing [facility]! John De La Cruz introduces [employer] patient portal. Now you can access parts of your medical record, email your doctor's office, and request medication refills online. 1. In your internet browser, go to [email or web address] 2. Click on the First Time User? Click Here link in the Sign In box. You will see the New Member Sign Up page. 3. Enter your [employer] Access Code exactly as it appears below. You will not need to use this code after youve completed the sign-up process. If you do not sign up before the expiration date, you must request a new code. · [employer] Access Code: [address] Expires: 9/10/2018 11:29 AM 
 
4. Enter the last four digits of your Social Security Number (xxxx) and Date of Birth (mm/dd/yyyy) as indicated and click Submit. You will be taken to the next sign-up page. 5. Create a [employer] ID. This will be your [employer] login ID and cannot be changed, so think of one that is secure and easy to remember. 6. Create a [employer] password. You can change your password at any time. 7. Enter your Password Reset Question and Answer. This can be used at a later time if you forget your password. 8. Enter your e-mail address. You will receive e-mail notification when new information is available in [address] 9. Click Sign Up. You can now view and download portions of your medical record. 10. Click the Download Summary menu link to download a portable copy of your medical information. If you have questions, please visit the Frequently Asked Questions section of the [email or web address] website. Remember, [email or web address] is NOT to be used for urgent needs. For medical emergencies, dial 911. Now available from your iPhone and Android! Please provide this summary of care documentation to your next provider. Your primary care clinician is listed as Radha Ng. If you have any questions after today's visit, please call [phone].

## 2018-06-22 NOTE — PATIENT INSTRUCTIONS
Shoulder Bursitis: Exercises  Your Care Instructions  Here are some examples of typical rehabilitation exercises for your condition. Start each exercise slowly. Ease off the exercise if you start to have pain. Your doctor or physical therapist will tell you when you can start these exercises and which ones will work best for you. How to do the exercises  Posterior stretching exercise    1. Hold the elbow of your injured arm with your other hand. 2. Use your hand to pull your injured arm gently up and across your body. You will feel a gentle stretch across the back of your injured shoulder. 3. Hold for at least 15 to 30 seconds. Then slowly lower your arm. 4. Repeat 2 to 4 times. Up-the-back stretch    Your doctor or physical therapist may want you to wait to do this stretch until you have regained most of your range of motion and strength. You can do this stretch in different ways. Hold any of these stretches for at least 15 to 30 seconds. Repeat them 2 to 4 times. 1. Put your hand in your back pocket. Let it rest there to stretch your shoulder. 2. With your other hand, hold your injured arm (palm outward) behind your back by the wrist. Pull your arm up gently to stretch your shoulder. 3. Next, put a towel over your other shoulder. Put the hand of your injured arm behind your back. Now hold the back end of the towel. With the other hand, hold the front end of the towel in front of your body. Pull gently on the front end of the towel. This will bring your hand farther up your back to stretch your shoulder. Overhead stretch    1. Standing about an arm's length away, grasp onto a solid surface. You could use a countertop, a doorknob, or the back of a sturdy chair. 2. With your knees slightly bent, bend forward with your arms straight. Lower your upper body, and let your shoulders stretch. 3. As your shoulders are able to stretch farther, you may need to take a step or two backward.   4. Hold for at least 15 to 30 seconds. Then stand up and relax. If you had stepped back during your stretch, step forward so you can keep your hands on the solid surface. 5. Repeat 2 to 4 times. Shoulder flexion (lying down)    To make a wand for this exercise, use a piece of PVC pipe or a broom handle with the broom removed. Make the wand about a foot wider than your shoulders. 1. Lie on your back, holding a wand with both hands. Your palms should face down as you hold the wand. 2. Keeping your elbows straight, slowly raise your arms over your head. Raise them until you feel a stretch in your shoulders, upper back, and chest.  3. Hold for 15 to 30 seconds. 4. Repeat 2 to 4 times. Shoulder rotation (lying down)    To make a wand for this exercise, use a piece of PVC pipe or a broom handle with the broom removed. Make the wand about a foot wider than your shoulders. 1. Lie on your back. Hold a wand with both hands with your elbows bent and palms up. 2. Keep your elbows close to your body, and move the wand across your body toward the sore arm. 3. Hold for 8 to 12 seconds. 4. Repeat 2 to 4 times. Shoulder blade squeeze    1. Stand with your arms at your sides, and squeeze your shoulder blades together. Do not raise your shoulders up as you squeeze. 2. Hold 6 seconds. 3. Repeat 8 to 12 times. Shoulder flexor and extensor exercise    These are isometric exercises. That means you contract your muscles without actually moving. 1. Push forward (flex): Stand facing a wall or doorjamb, about 6 inches or less back. Hold your injured arm against your body. Make a closed fist with your thumb on top. Then gently push your hand forward into the wall with about 25% to 50% of your strength. Don't let your body move backward as you push. Hold for about 6 seconds. Relax for a few seconds. Repeat 8 to 12 times. 2. Push backward (extend): Stand with your back flat against a wall.  Your upper arm should be against the wall, with your elbow bent 90 degrees (your hand straight ahead). Push your elbow gently back against the wall with about 25% to 50% of your strength. Don't let your body move forward as you push. Hold for about 6 seconds. Relax for a few seconds. Repeat 8 to 12 times. Scapular exercise: Wall push-ups    This exercise is best done with your fingers somewhat turned out, rather than straight up and down. 1. Stand facing a wall, about 12 inches to 18 inches away. 2. Place your hands on the wall at shoulder height. 3. Slowly bend your elbows and bring your face to the wall. Keep your back and hips straight. 4. Push back to where you started. 5. Repeat 8 to 12 times. 6. When you can do this exercise against a wall comfortably, you can try it against a counter. You can then slowly progress to the end of a couch, then to a sturdy chair, and finally to the floor. Scapular exercise: Retraction    For this exercise, you will need elastic exercise material, such as surgical tubing or Thera-Band. 1. Put the band around a solid object at about waist level. (A bedpost will work well.) Each hand should hold an end of the band. 2. With your elbows at your sides and bent to 90 degrees, pull the band back. Your shoulder blades should move toward each other. Then move your arms back where you started. 3. Repeat 8 to 12 times. 4. If you have good range of motion in your shoulders, try this exercise with your arms lifted out to the sides. Keep your elbows at a 90-degree angle. Raise the elastic band up to about shoulder level. Pull the band back to move your shoulder blades toward each other. Then move your arms back where you started. Internal rotator strengthening exercise    1. Start by tying a piece of elastic exercise material to a doorknob. You can use surgical tubing or Thera-Band. 2. Stand or sit with your shoulder relaxed and your elbow bent 90 degrees. Your upper arm should rest comfortably against your side.  Squeeze a rolled towel between your elbow and your body for comfort. This will help keep your arm at your side. 3. Hold one end of the elastic band in the hand of the painful arm. 4. Slowly rotate your forearm toward your body until it touches your belly. Slowly move it back to where you started. 5. Keep your elbow and upper arm firmly tucked against the towel roll or at your side. 6. Repeat 8 to 12 times. External rotator strengthening exercise    1. Start by tying a piece of elastic exercise material to a doorknob. You can use surgical tubing or Thera-Band. (You may also hold one end of the band in each hand.)  2. Stand or sit with your shoulder relaxed and your elbow bent 90 degrees. Your upper arm should rest comfortably against your side. Squeeze a rolled towel between your elbow and your body for comfort. This will help keep your arm at your side. 3. Hold one end of the elastic band with the hand of the painful arm. 4. Start with your forearm across your belly. Slowly rotate the forearm out away from your body. Keep your elbow and upper arm tucked against the towel roll or the side of your body until you begin to feel tightness in your shoulder. Slowly move your arm back to where you started. 5. Repeat 8 to 12 times. Follow-up care is a key part of your treatment and safety. Be sure to make and go to all appointments, and call your doctor if you are having problems. It's also a good idea to know your test results and keep a list of the medicines you take. Where can you learn more? Go to http://sylvie-angela.info/. Enter H018 in the search box to learn more about \"Shoulder Bursitis: Exercises. \"  Current as of: March 21, 2017  Content Version: 11.4  © 9226-5659 [employer]. Care instructions adapted under license by [employer] (which disclaims liability or warranty for this information).  If you have questions about a medical condition or this instruction, always ask your healthcare professional. [address] any warranty or liability for your use of this information. Shoulder Arthritis: Exercises  Your Care Instructions  Here are some examples of typical rehabilitation exercises for your condition. Start each exercise slowly. Ease off the exercise if you start to have pain. Your doctor or physical therapist will tell you when you can start these exercises and which ones will work best for you. How to do the exercises  Shoulder flexion (lying down)    To make a wand for this exercise, use a piece of PVC pipe or a broom handle with the broom removed. Make the wand about a foot wider than your shoulders. 5. Lie on your back, holding a wand with both hands. Your palms should face down as you hold the wand. 6. Keeping your elbows straight, slowly raise your arms over your head. Raise them until you feel a stretch in your shoulders, upper back, and chest.  7. Hold for 15 to 30 seconds. 8. Repeat 2 to 4 times. Shoulder rotation (lying down)    To make a wand for this exercise, use a piece of PVC pipe or a broom handle with the broom removed. Make the wand about a foot wider than your shoulders. 4. Lie on your back. Hold a wand with both hands with your elbows bent and palms up. 5. Keep your elbows close to your body, and move the wand across your body toward the sore arm. 6. Hold for 8 to 12 seconds. 7. Repeat 2 to 4 times. Shoulder internal rotation with towel    6. Hold a towel above and behind your head with the arm that is not sore. 7. With your sore arm, reach behind your back and grasp the towel. 8. With the arm above your head, pull the towel upward. Do this until you feel a stretch on the front and outside of your sore shoulder. 9. Hold 15 to 30 seconds. 10. Repeat 2 to 4 times. Shoulder blade squeeze    5. Stand with your arms at your sides, and squeeze your shoulder blades together.  Do not raise your shoulders up as you squeeze. 6. Hold 6 seconds. 7. Repeat 8 to 12 times. Resisted rows    For this exercise, you will need elastic exercise material, such as surgical tubing or Thera-Band. 5. Put the band around a solid object at about waist level. (A bedpost will work well.) Each hand should hold an end of the band. 6. With your elbows at your sides and bent to 90 degrees, pull the band back. Your shoulder blades should move toward each other. Return to the starting position. 7. Repeat 8 to 12 times. External rotator strengthening exercise    4. Start by tying a piece of elastic exercise material to a doorknob. You can use surgical tubing or Thera-Band. (You may also hold one end of the band in each hand.)  5. Stand or sit with your shoulder relaxed and your elbow bent 90 degrees. Your upper arm should rest comfortably against your side. Squeeze a rolled towel between your elbow and your body for comfort. This will help keep your arm at your side. 6. Hold one end of the elastic band with the hand of the painful arm. 7. Start with your forearm across your belly. Slowly rotate the forearm out away from your body. Keep your elbow and upper arm tucked against the towel roll or the side of your body until you begin to feel tightness in your shoulder. Slowly move your arm back to where you started. 8. Repeat 8 to 12 times. Internal rotator strengthening exercise    3. Start by tying a piece of elastic exercise material to a doorknob. You can use surgical tubing or Thera-Band. 4. Stand or sit with your shoulder relaxed and your elbow bent 90 degrees. Your upper arm should rest comfortably against your side. Squeeze a rolled towel between your elbow and your body for comfort. This will help keep your arm at your side. 5. Hold one end of the elastic band in the hand of the painful arm. 6. Slowly rotate your forearm toward your body until it touches your belly. Slowly move it back to where you started.   7. Keep your elbow and upper arm firmly tucked against the towel roll or at your side. 8. Repeat 8 to 12 times. Pendulum swing    If you have pain in your back, do not do this exercise. 7. Hold on to a table or the back of a chair with your good arm. Then bend forward a little and let your sore arm hang straight down. This exercise does not use the arm muscles. Rather, use your legs and your hips to create movement that makes your arm swing freely. 8. Use the movement from your hips and legs to guide the slightly swinging arm back and forth like a pendulum (or elephant trunk). Then guide it in circles that start small (about the size of a dinner plate). Make the circles a bit larger each day, as your pain allows. 9. Do this exercise for 5 minutes, 5 to 7 times each day. 10. As you have less pain, try bending over a little farther to do this exercise. This will increase the amount of movement at your shoulder. Follow-up care is a key part of your treatment and safety. Be sure to make and go to all appointments, and call your doctor if you are having problems. It's also a good idea to know your test results and keep a list of the medicines you take. Where can you learn more? Go to http://sylvie-angela.info/. Enter H562 in the search box to learn more about \"Shoulder Arthritis: Exercises. \"  Current as of: March 21, 2017  Content Version: 11.4  © 1083-3369 [employer]. Care instructions adapted under license by [employer] (which disclaims liability or warranty for this information). If you have questions about a medical condition or this instruction, always ask your healthcare professional. [place] [address] any warranty or liability for your use of this information.

## 2018-08-16 ENCOUNTER — OFFICE VISIT (OUTPATIENT)
Dept: FAMILY MEDICINE CLINIC | Age: 53
End: 2018-08-16

## 2018-08-16 VITALS
WEIGHT: 178 LBS | HEIGHT: 63 IN | OXYGEN SATURATION: 98 % | HEART RATE: 79 BPM | DIASTOLIC BLOOD PRESSURE: 67 MMHG | SYSTOLIC BLOOD PRESSURE: 134 MMHG | BODY MASS INDEX: 31.54 KG/M2 | RESPIRATION RATE: 20 BRPM | TEMPERATURE: 98.6 F

## 2018-08-16 DIAGNOSIS — R63.2 APPETITE INCREASE: Primary | ICD-10-CM

## 2018-08-16 RX ORDER — PHENTERMINE HYDROCHLORIDE 37.5 MG/1
TABLET ORAL
Qty: 30 TAB | Refills: 0 | Status: SHIPPED | OUTPATIENT
Start: 2018-08-16 | End: 2018-10-16 | Stop reason: ALTCHOICE

## 2018-08-16 NOTE — MR AVS SNAPSHOT
[address] [facility] 
 
 
 [address], [phone] Kathia Horner [id number] [phone] Patient: Eugene  MRN: [id number] :1965 Visit Information Date & Time Provider Department Dept. Phone Encounter #  
 2018 11:20 AM Demetria Gr NP [employer] [address] [id number] Follow-up Instructions Return in about 8 weeks (around 10/11/2018) for follow up appetite difficulties/MBI. Upcoming Health Maintenance Date Due Influenza Age 5 to Adult 10/29/2018* BREAST CANCER SCRN MAMMOGRAM 2019 COLONOSCOPY 10/21/2026 DTaP/Tdap/Td series (2 - Td) 2027 *Topic was postponed. The date shown is not the original due date. Allergies as of 2018  Review Complete On: 2018 By: Demetria Gr NP Severity Noted Reaction Type Reactions Cyclobenzaprine High 2016    Other (comments)  
 headache Etodolac  2016    Other (comments)  
 headache Current Immunizations  Reviewed on 2016 Name Date Pneumococcal Conjugate (PCV-13) 2016 Not reviewed this visit You Were Diagnosed With   
  
 Codes Comments Appetite increase    -  Primary ICD-10-CM: R63.2 ICD-9-CM: 783.6 BMI 31.0-31.9,adult     ICD-10-CM: Z68.31 
ICD-9-CM: V85.31 Vitals BP Pulse Temp Resp Height(growth percentile) Weight(growth percentile) 134/67 (BP 1 Location: Left arm, BP Patient Position: Sitting) 79 98.6 °F (37 °C) (Oral) 20 5' 3\" (1.6 m) 178 lb (80.7 kg) SpO2 BMI OB Status Smoking Status 98% 31.53 kg/m2 Hysterectomy Current Every Day Smoker BMI and BSA Data Body Mass Index Body Surface Area  
 31.53 kg/m 2 1.89 m 2 Preferred Pharmacy Pharmacy Name Phone [facility], [facility] [phone] Your Updated Medication List  
  
   
 This list is accurate as of 8/16/18 12:04 PM.  Always use your most recent med list.  
  
  
  
  
 carBAMazepine 200 mg tablet Commonly known as:  TEGretol One tab in AM, one tab at lunch, and two tabs before bed  
  
 ibuprofen 600 mg tablet Commonly known as:  MOTRIN Take  by mouth every six (6) hours as needed for Pain. naproxen 500 mg tablet Commonly known as:  NAPROSYN Take 1 Tab by mouth two (2) times daily as needed. phentermine 37.5 mg tablet Commonly known as:  ADIPEX-P May take 1/2 to 1 po daily prn appetite difficulties Prescriptions Printed Refills  
 phentermine (ADIPEX-P) 37.5 mg tablet 0 Sig: May take 1/2 to 1 po daily prn appetite difficulties Class: Print Follow-up Instructions Return in about 8 weeks (around 10/11/2018) for follow up appetite difficulties/MBI. Patient Instructions Body Mass Index: Care Instructions Your Care Instructions Body mass index (BMI) can help you see if your weight is raising your risk for health problems. It uses a formula to compare how much you weigh with how tall you are. · A BMI lower than 18.5 is considered underweight. · A BMI between 18.5 and 24.9 is considered healthy. · A BMI between 25 and 29.9 is considered overweight. A BMI of 30 or higher is considered obese. If your BMI is in the normal range, it means that you have a lower risk for weight-related health problems. If your BMI is in the overweight or obese range, you may be at increased risk for weight-related health problems, such as high blood pressure, heart disease, stroke, arthritis or joint pain, and diabetes. If your BMI is in the underweight range, you may be at increased risk for health problems such as fatigue, lower protection (immunity) against illness, muscle loss, bone loss, hair loss, and hormone problems. BMI is just one measure of your risk for weight-related health problems. You may be at higher risk for health problems if you are not active, you eat an unhealthy diet, or you drink too much alcohol or use tobacco products. Follow-up care is a key part of your treatment and safety. Be sure to make and go to all appointments, and call your doctor if you are having problems. It's also a good idea to know your test results and keep a list of the medicines you take. How can you care for yourself at home? · Practice healthy eating habits. This includes eating plenty of fruits, vegetables, whole grains, lean protein, and low-fat dairy. · If your doctor recommends it, get more exercise. Walking is a good choice. Bit by bit, increase the amount you walk every day. Try for at least 30 minutes on most days of the week. · Do not smoke. Smoking can increase your risk for health problems. If you need help quitting, talk to your doctor about stop-smoking programs and medicines. These can increase your chances of quitting for good. · Limit alcohol to 2 drinks a day for men and 1 drink a day for women. Too much alcohol can cause health problems. If you have a BMI higher than 25 · Your doctor may do other tests to check your risk for weight-related health problems. This may include measuring the distance around your waist. A waist measurement of more than 40 inches in men or 35 inches in women can increase the risk of weight-related health problems. · Talk with your doctor about steps you can take to stay healthy or improve your health. You may need to make lifestyle changes to lose weight and stay healthy, such as changing your diet and getting regular exercise. If you have a BMI lower than 18.5 · Your doctor may do other tests to check your risk for health problems. · Talk with your doctor about steps you can take to stay healthy or improve your health.  You may need to make lifestyle changes to gain or maintain weight and stay healthy, such as getting more healthy foods in your diet and doing exercises to build muscle. Where can you learn more? Go to http://sylvie-angela.info/. Enter S176 in the search box to learn more about \"Body Mass Index: Care Instructions. \" Current as of: October 9, 2017 Content Version: 11.7 © 8072-3859 [employer]. Care instructions adapted under license by [employer] (which disclaims liability or warranty for this information). If you have questions about a medical condition or this instruction, always ask your healthcare professional. [address] any warranty or liability for your use of this information. Introducing [facility]! [employer] introduces Storyful patient portal. Now you can access parts of your medical record, email your doctor's office, and request medication refills online. 1. In your internet browser, go to [email or web address] 2. Click on the First Time User? Click Here link in the Sign In box. You will see the New Member Sign Up page. 3. Enter your Storyful Access Code exactly as it appears below. You will not need to use this code after youve completed the sign-up process. If you do not sign up before the expiration date, you must request a new code. · Storyful Access Code: [address] Expires: 9/10/2018 11:29 AM 
 
4. Enter the last four digits of your Social Security Number (xxxx) and Date of Birth (mm/dd/yyyy) as indicated and click Submit. You will be taken to the next sign-up page. 5. Create a Storyful ID. This will be your Storyful login ID and cannot be changed, so think of one that is secure and easy to remember. 6. Create a Storyful password. You can change your password at any time. 7. Enter your Password Reset Question and Answer. This can be used at a later time if you forget your password. 8. Enter your e-mail address. You will receive e-mail notification when new information is available in [address] 9. Click Sign Up. You can now view and download portions of your medical record. 10. Click the Download Summary menu link to download a portable copy of your medical information. If you have questions, please visit the Frequently Asked Questions section of the TriviaPad website. Remember, TriviaPad is NOT to be used for urgent needs. For medical emergencies, dial 911. Now available from your iPhone and Android! Please provide this summary of care documentation to your next provider. Your primary care clinician is listed as Cr Ng. If you have any questions after today's visit, please call [phone].

## 2018-08-16 NOTE — PROGRESS NOTES
Subjective:   Ky Solis is a 45 y.o. female with appetite difficulties BMI over 30. She has been taking phentermine prn appetite control adhering to low carbohydrate/mediterreanean diet. She has been walking daily. ROS: denies depression, denies anxiety, denies binge eating +weight loss purposeful. Current Outpatient Prescriptions   Medication Sig Dispense Refill    ibuprofen (MOTRIN) 600 mg tablet Take  by mouth every six (6) hours as needed for Pain.  phentermine (ADIPEX-P) 37.5 mg tablet May take 1/2 to 1 po daily prn appetite difficulties 30 Tab 0    naproxen (NAPROSYN) 500 mg tablet Take 1 Tab by mouth two (2) times daily as needed. 60 Tab 0    carBAMazepine (TEGRETOL) 200 mg tablet One tab in AM, one tab at lunch, and two tabs before bed 90 Tab 0      Wt Readings from Last 3 Encounters:   08/16/18 178 lb (80.7 kg)   06/22/18 180 lb (81.6 kg)   06/12/18 190 lb (86.2 kg)     OBJECTIVE:  Awake and alert in no acute distress  Lungs clear throughout  S1 S2 RRR without ectopy or murmur auscultated. Extremities: no clubbing, cyanosis, peripheral edema    Visit Vitals    /67 (BP 1 Location: Left arm, BP Patient Position: Sitting)    Pulse 79    Temp 98.6 °F (37 °C) (Oral)    Resp 20    Ht 5' 3\" (1.6 m)    Wt 178 lb (80.7 kg)    SpO2 98%    BMI 31.53 kg/m2   Diagnoses and all orders for this visit:    1. Appetite increase  -     phentermine (ADIPEX-P) 37.5 mg tablet; May take 1/2 to 1 po daily prn appetite difficulties    2. BMI 31.0-31.9,adult      Reviewed risks and benefits and common side effects of new medication  Portion control and exercise healthy eating general guidelines reviewed with patient to get closer to normal BMI  I have discussed the diagnosis with the patient and the intended plan as seen in the above orders. The patient has received an after-visit summary and questions were answered concerning future plans.   I have discussed medication side effects and warnings with the patient as well. Follow-up Disposition:  Return in about 8 weeks (around 10/11/2018), or if symptoms worsen or fail to improve, for follow up appetite difficulties/BMI.

## 2018-08-16 NOTE — PATIENT INSTRUCTIONS
Body Mass Index: Care Instructions  Your Care Instructions    Body mass index (BMI) can help you see if your weight is raising your risk for health problems. It uses a formula to compare how much you weigh with how tall you are. · A BMI lower than 18.5 is considered underweight. · A BMI between 18.5 and 24.9 is considered healthy. · A BMI between 25 and 29.9 is considered overweight. A BMI of 30 or higher is considered obese. If your BMI is in the normal range, it means that you have a lower risk for weight-related health problems. If your BMI is in the overweight or obese range, you may be at increased risk for weight-related health problems, such as high blood pressure, heart disease, stroke, arthritis or joint pain, and diabetes. If your BMI is in the underweight range, you may be at increased risk for health problems such as fatigue, lower protection (immunity) against illness, muscle loss, bone loss, hair loss, and hormone problems. BMI is just one measure of your risk for weight-related health problems. You may be at higher risk for health problems if you are not active, you eat an unhealthy diet, or you drink too much alcohol or use tobacco products. Follow-up care is a key part of your treatment and safety. Be sure to make and go to all appointments, and call your doctor if you are having problems. It's also a good idea to know your test results and keep a list of the medicines you take. How can you care for yourself at home? · Practice healthy eating habits. This includes eating plenty of fruits, vegetables, whole grains, lean protein, and low-fat dairy. · If your doctor recommends it, get more exercise. Walking is a good choice. Bit by bit, increase the amount you walk every day. Try for at least 30 minutes on most days of the week. · Do not smoke. Smoking can increase your risk for health problems. If you need help quitting, talk to your doctor about stop-smoking programs and medicines. These can increase your chances of quitting for good. · Limit alcohol to 2 drinks a day for men and 1 drink a day for women. Too much alcohol can cause health problems. If you have a BMI higher than 25  · Your doctor may do other tests to check your risk for weight-related health problems. This may include measuring the distance around your waist. A waist measurement of more than 40 inches in men or 35 inches in women can increase the risk of weight-related health problems. · Talk with your doctor about steps you can take to stay healthy or improve your health. You may need to make lifestyle changes to lose weight and stay healthy, such as changing your diet and getting regular exercise. If you have a BMI lower than 18.5  · Your doctor may do other tests to check your risk for health problems. · Talk with your doctor about steps you can take to stay healthy or improve your health. You may need to make lifestyle changes to gain or maintain weight and stay healthy, such as getting more healthy foods in your diet and doing exercises to build muscle. Where can you learn more? Go to http://sylvie-angela.info/. Enter S176 in the search box to learn more about \"Body Mass Index: Care Instructions. \"  Current as of: October 9, 2017  Content Version: 11.7  © 8116-0595 [employer]. Care instructions adapted under license by [employer] (which disclaims liability or warranty for this information). If you have questions about a medical condition or this instruction, always ask your healthcare professional. [address] any warranty or liability for your use of this information.

## 2018-08-16 NOTE — PROGRESS NOTES
1. Have you been to the ER, urgent care clinic since your last visit? Hospitalized since your last visit? no    2. Have you seen or consulted any other health care providers outside of the [facility] since your last visit? Include any pap smears or colon screening.  no

## 2018-09-08 ENCOUNTER — APPOINTMENT (OUTPATIENT)
Dept: CT IMAGING | Age: 53
End: 2018-09-08
Attending: EMERGENCY MEDICINE
Payer: COMMERCIAL

## 2018-09-08 ENCOUNTER — HOSPITAL ENCOUNTER (EMERGENCY)
Age: 53
Discharge: HOME | End: 2018-09-08
Attending: EMERGENCY MEDICINE
Payer: COMMERCIAL

## 2018-09-08 VITALS
HEART RATE: 70 BPM | SYSTOLIC BLOOD PRESSURE: 125 MMHG | RESPIRATION RATE: 16 BRPM | DIASTOLIC BLOOD PRESSURE: 78 MMHG | TEMPERATURE: 97.9 F | WEIGHT: 175 LBS | HEIGHT: 64 IN | BODY MASS INDEX: 29.88 KG/M2 | OXYGEN SATURATION: 100 %

## 2018-09-08 DIAGNOSIS — S29.019A THORACIC MYOFASCIAL STRAIN, INITIAL ENCOUNTER: Primary | ICD-10-CM

## 2018-09-08 DIAGNOSIS — R30.0 DYSURIA: ICD-10-CM

## 2018-09-08 LAB
ANION GAP SERPL CALC-SCNC: 11 MMOL/L (ref 3–18)
APPEARANCE UR: CLEAR
BASOPHILS # BLD: 0 K/UL (ref 0–0.1)
BASOPHILS NFR BLD: 0 % (ref 0–2)
BILIRUB UR QL: NEGATIVE
BUN SERPL-MCNC: 18 MG/DL (ref 7–18)
BUN/CREAT SERPL: 24 (ref 12–20)
CALCIUM SERPL-MCNC: 8.5 MG/DL (ref 8.5–10.1)
CHLORIDE SERPL-SCNC: 100 MMOL/L (ref 100–108)
CO2 SERPL-SCNC: 28 MMOL/L (ref 21–32)
COLOR UR: YELLOW
CREAT SERPL-MCNC: 0.76 MG/DL (ref 0.6–1.3)
DIFFERENTIAL METHOD BLD: ABNORMAL
EOSINOPHIL # BLD: 0.2 K/UL (ref 0–0.4)
EOSINOPHIL NFR BLD: 3 % (ref 0–5)
EPITH CASTS URNS QL MICRO: ABNORMAL /LPF (ref 0–5)
ERYTHROCYTE [DISTWIDTH] IN BLOOD BY AUTOMATED COUNT: 14.8 % (ref 11.6–14.5)
GLUCOSE SERPL-MCNC: 77 MG/DL (ref 74–99)
GLUCOSE UR STRIP.AUTO-MCNC: NEGATIVE MG/DL
HCT VFR BLD AUTO: 37 % (ref 35–45)
HGB BLD-MCNC: 12.3 G/DL (ref 12–16)
HGB UR QL STRIP: NEGATIVE
KETONES UR QL STRIP.AUTO: NEGATIVE MG/DL
LEUKOCYTE ESTERASE UR QL STRIP.AUTO: ABNORMAL
LYMPHOCYTES # BLD: 2.2 K/UL (ref 0.9–3.6)
LYMPHOCYTES NFR BLD: 36 % (ref 21–52)
MCH RBC QN AUTO: 27.6 PG (ref 24–34)
MCHC RBC AUTO-ENTMCNC: 33.2 G/DL (ref 31–37)
MCV RBC AUTO: 83.1 FL (ref 74–97)
MONOCYTES # BLD: 0.3 K/UL (ref 0.05–1.2)
MONOCYTES NFR BLD: 5 % (ref 3–10)
MUCOUS THREADS URNS QL MICRO: ABNORMAL /LPF
NEUTS SEG # BLD: 3.4 K/UL (ref 1.8–8)
NEUTS SEG NFR BLD: 56 % (ref 40–73)
NITRITE UR QL STRIP.AUTO: NEGATIVE
PH UR STRIP: 5 [PH] (ref 5–8)
PLATELET # BLD AUTO: 257 K/UL (ref 135–420)
PMV BLD AUTO: 9.8 FL (ref 9.2–11.8)
POTASSIUM SERPL-SCNC: 3.4 MMOL/L (ref 3.5–5.5)
PROT UR STRIP-MCNC: NEGATIVE MG/DL
RBC # BLD AUTO: 4.45 M/UL (ref 4.2–5.3)
RBC #/AREA URNS HPF: ABNORMAL /HPF (ref 0–5)
SODIUM SERPL-SCNC: 139 MMOL/L (ref 136–145)
SP GR UR REFRACTOMETRY: 1.03 (ref 1–1.03)
UROBILINOGEN UR QL STRIP.AUTO: 1 EU/DL (ref 0.2–1)
WBC # BLD AUTO: 6.1 K/UL (ref 4.6–13.2)
WBC URNS QL MICRO: ABNORMAL /HPF (ref 0–4)

## 2018-09-08 PROCEDURE — 99283 EMERGENCY DEPT VISIT LOW MDM: CPT

## 2018-09-08 PROCEDURE — 87086 URINE CULTURE/COLONY COUNT: CPT | Performed by: EMERGENCY MEDICINE

## 2018-09-08 PROCEDURE — 96361 HYDRATE IV INFUSION ADD-ON: CPT

## 2018-09-08 PROCEDURE — 96375 TX/PRO/DX INJ NEW DRUG ADDON: CPT

## 2018-09-08 PROCEDURE — 80048 BASIC METABOLIC PNL TOTAL CA: CPT | Performed by: EMERGENCY MEDICINE

## 2018-09-08 PROCEDURE — 74011250636 HC RX REV CODE- 250/636: Performed by: EMERGENCY MEDICINE

## 2018-09-08 PROCEDURE — 81001 URINALYSIS AUTO W/SCOPE: CPT | Performed by: EMERGENCY MEDICINE

## 2018-09-08 PROCEDURE — 74011250637 HC RX REV CODE- 250/637: Performed by: EMERGENCY MEDICINE

## 2018-09-08 PROCEDURE — 85025 COMPLETE CBC W/AUTO DIFF WBC: CPT | Performed by: EMERGENCY MEDICINE

## 2018-09-08 PROCEDURE — 74176 CT ABD & PELVIS W/O CONTRAST: CPT

## 2018-09-08 PROCEDURE — 96374 THER/PROPH/DIAG INJ IV PUSH: CPT

## 2018-09-08 RX ORDER — POTASSIUM CHLORIDE 20 MEQ/1
20 TABLET, EXTENDED RELEASE ORAL
Status: COMPLETED | OUTPATIENT
Start: 2018-09-08 | End: 2018-09-08

## 2018-09-08 RX ORDER — MORPHINE SULFATE 4 MG/ML
4 INJECTION, SOLUTION INTRAMUSCULAR; INTRAVENOUS
Status: COMPLETED | OUTPATIENT
Start: 2018-09-08 | End: 2018-09-08

## 2018-09-08 RX ORDER — ACETAMINOPHEN AND CODEINE PHOSPHATE 300; 30 MG/1; MG/1
1 TABLET ORAL
Qty: 12 TAB | Refills: 0 | Status: SHIPPED | OUTPATIENT
Start: 2018-09-08 | End: 2018-10-16 | Stop reason: ALTCHOICE

## 2018-09-08 RX ORDER — ONDANSETRON 2 MG/ML
4 INJECTION INTRAMUSCULAR; INTRAVENOUS
Status: COMPLETED | OUTPATIENT
Start: 2018-09-08 | End: 2018-09-08

## 2018-09-08 RX ORDER — NITROFURANTOIN 25; 75 MG/1; MG/1
100 CAPSULE ORAL 2 TIMES DAILY
Qty: 10 CAP | Refills: 0 | Status: SHIPPED | OUTPATIENT
Start: 2018-09-08 | End: 2018-09-13

## 2018-09-08 RX ADMIN — SODIUM CHLORIDE 1000 ML: 900 INJECTION, SOLUTION INTRAVENOUS at 08:40

## 2018-09-08 RX ADMIN — ONDANSETRON 4 MG: 2 INJECTION INTRAMUSCULAR; INTRAVENOUS at 08:43

## 2018-09-08 RX ADMIN — POTASSIUM CHLORIDE 20 MEQ: 20 TABLET, EXTENDED RELEASE ORAL at 09:57

## 2018-09-08 RX ADMIN — MORPHINE SULFATE 4 MG: 4 INJECTION, SOLUTION INTRAMUSCULAR; INTRAVENOUS at 08:43

## 2018-09-08 NOTE — DISCHARGE INSTRUCTIONS
Painful Urination (Dysuria): Care Instructions  Your Care Instructions  Burning pain with urination (dysuria) is a common symptom of a urinary tract infection or other urinary problems. The bladder may become inflamed. This can cause pain when the bladder fills and empties. You may also feel pain if the tube that carries urine from the bladder to the outside of the body (urethra) gets irritated or infected. Sexually transmitted infections (STIs) also may cause pain when you urinate. Sometimes the pain can be caused by things other than an infection. The urethra can be irritated by soaps, perfumes, or foreign objects in the urethra. Kidney stones can cause pain when they pass through the urethra. The cause may be hard to find. You may need tests. Treatment for painful urination depends on the cause. Follow-up care is a key part of your treatment and safety. Be sure to make and go to all appointments, and call your doctor if you are having problems. It's also a good idea to know your test results and keep a list of the medicines you take. How can you care for yourself at home? · Drink extra water for the next day or two. This will help make the urine less concentrated. (If you have kidney, heart, or liver disease and have to limit fluids, talk with your doctor before you increase the amount of fluids you drink.)  · Avoid drinks that are carbonated or have caffeine. They can irritate the bladder. · Urinate often. Try to empty your bladder each time. For women:  · Urinate right after you have sex. · After going to the bathroom, wipe from front to back. · Avoid douches, bubble baths, and feminine hygiene sprays. And avoid other feminine hygiene products that have deodorants. When should you call for help? Call your doctor now or seek immediate medical care if:    · You have new symptoms, such as fever, nausea, or vomiting.     · You have new or worse symptoms of a urinary problem.  For example:  Lala Cifuentes have blood or pus in your urine. ¨ You have chills or body aches. ¨ It hurts worse to urinate. ¨ You have groin or belly pain. ¨ You have pain in your back just below your rib cage (the flank area).    Watch closely for changes in your health, and be sure to contact your doctor if you have any problems. Where can you learn more? Go to http://sylvie-angela.info/. Enter Z537 in the search box to learn more about \"Painful Urination (Dysuria): Care Instructions. \"  Current as of: May 12, 2017  Content Version: 11.7  © 8293-4771 [employer]. Care instructions adapted under license by "[employer]" (which disclaims liability or warranty for this information). If you have questions about a medical condition or this instruction, always ask your healthcare professional. [address] any warranty or liability for your use of this information.

## 2018-09-08 NOTE — ED PROVIDER NOTES
EMERGENCY DEPARTMENT HISTORY AND PHYSICAL EXAM 
 
7:52 AM 
 
 
Date: 2018 Patient Name: Ky Solis History of Presenting Illness Chief Complaint Patient presents with  Urinary Pain  Flank Pain History Provided By: Patient Chief Complaint: Flank Pain Duration:  Days Timing:  Constant Location: Left flank Quality: N/A Severity: 10 out of 10 Modifying Factors: None Associated Symptoms: Dysuria Additional History (Context): Ky Solis is a 45 y.o. female presenting to the ED c/o constant left flank pain for the past 3 days. States pain wraps around under her rib cage. Notes pain is 10/10 in severity. Associated symptoms include dysuria, states that she just urinated and it \"took some of the pressure off. \" Reports no modifying factors for her symptoms. Denies injury and any other symptoms or complaints. PCP: Arne Canavan, NP Past History Past Medical History: 
Past Medical History:  
Diagnosis Date  Bronchitis  Carpal tunnel syndrome on right  Chronic bronchitis ([address]  De Quervain's syndrome (tenosynovitis)  Depression   
 resolved  Ectopic pregnancy   
 two times  Epilepsy ([address]  Seizure ([address]  Sinus infection  Stenosing tenosynovitis of thumb Right side  Trigger thumb of left hand Past Surgical History: 
Past Surgical History:  
Procedure Laterality Date  COLONOSCOPY N/A 10/21/2016 COLONOSCOPY w/ biopsy performed by Clarke Hubbard MD at [address] HX APPENDECTOMY  1/12/15  
 lysis of adhesions  HX CHOLECYSTECTOMY  HX HYSTERECTOMY  HX OTHER SURGICAL    
 scar tissue removed from intestines  HX PARTIAL HYSTERECTOMY  2009  HX TONSILLECTOMY Family History: 
Family History Problem Relation Age of Onset  Heart Attack Brother   
   in 62s  Diabetes Father  Hypertension Father  Cancer Father   
  colon CA  
  Heart Disease Sister   
  stent placement  Hypertension Brother  Other Brother Stomach problems/ulcers  Cancer Sister Breast CA  Stroke Sister  Diabetes Mother  Hypertension Mother Social History: 
Social History Substance Use Topics  Smoking status: Current Every Day Smoker Packs/day: 0.50 Types: Cigarettes  Smokeless tobacco: Never Used  Alcohol use No  
 
 
Allergies: Allergies Allergen Reactions  Cyclobenzaprine Other (comments)  
  headache  Etodolac Other (comments)  
  headache Review of Systems Review of Systems Constitutional: Negative for fever. HENT: Negative for sore throat. Eyes: Negative for redness. Respiratory: Negative for shortness of breath and wheezing. Gastrointestinal: Negative for abdominal pain and nausea. Genitourinary: Positive for dysuria and flank pain. Negative for pelvic pain, vaginal discharge and vaginal pain. Musculoskeletal: Negative for neck stiffness. Skin: Negative for pallor. Neurological: Negative for headaches. Hematological: Does not bruise/bleed easily. All other systems reviewed and are negative. Physical Exam  
 
Visit Vitals  /78 (BP 1 Location: Left arm)  Pulse 70  Temp 97.9 °F (36.6 °C)  Resp 16  
 Ht 5' 4\" (1.626 m)  Wt 79.4 kg (175 lb)  SpO2 100%  BMI 30.04 kg/m2 Physical Exam  
Constitutional: She is oriented to person, place, and time. She appears well-developed and well-nourished. No distress. HENT:  
Head: Normocephalic and atraumatic. Mouth/Throat: Oropharynx is clear and moist.  
Eyes: Conjunctivae are normal. Pupils are equal, round, and reactive to light. No scleral icterus. Neck: Normal range of motion. Neck supple. Cardiovascular: Intact distal pulses. Capillary refill < 3 seconds Pulmonary/Chest: Effort normal and breath sounds normal. No respiratory distress. She has no wheezes. Abdominal: Soft. Bowel sounds are normal. She exhibits no distension and no mass. There is CVA tenderness (left). There is no rebound and no guarding. Left flank tenderness. Tenderness in left side of abdomen. Musculoskeletal: Normal range of motion. She exhibits tenderness. She exhibits no edema. Left latissimus dorsi and paraspinal left mid thoracic tenderness Lymphadenopathy:  
  She has no cervical adenopathy. Neurological: She is alert and oriented to person, place, and time. No cranial nerve deficit. She exhibits normal muscle tone. Coordination normal.  
Skin: Skin is warm and dry. No rash noted. She is not diaphoretic. Psychiatric: She has a normal mood and affect. Her behavior is normal.  
Nursing note and vitals reviewed. Diagnostic Study Results Labs - Recent Results (from the past 12 hour(s)) URINALYSIS W/ RFLX MICROSCOPIC Collection Time: 09/08/18  7:55 AM  
Result Value Ref Range Color YELLOW Appearance CLEAR Specific gravity 1.029 1.005 - 1.030    
 pH (UA) 5.0 5.0 - 8.0 Protein NEGATIVE  NEG mg/dL Glucose NEGATIVE  NEG mg/dL Ketone NEGATIVE  NEG mg/dL Bilirubin NEGATIVE  NEG Blood NEGATIVE  NEG Urobilinogen 1.0 0.2 - 1.0 EU/dL Nitrites NEGATIVE  NEG Leukocyte Esterase TRACE (A) NEG URINE MICROSCOPIC ONLY Collection Time: 09/08/18  7:55 AM  
Result Value Ref Range WBC 0 to 3 0 - 4 /hpf  
 RBC 0 to 3 0 - 5 /hpf Epithelial cells 2+ 0 - 5 /lpf Mucus 4+ (A) NEG /lpf  
CBC WITH AUTOMATED DIFF Collection Time: 09/08/18  8:35 AM  
Result Value Ref Range WBC 6.1 4.6 - 13.2 K/uL  
 RBC 4.45 4.20 - 5.30 M/uL  
 HGB 12.3 12.0 - 16.0 g/dL HCT 37.0 35.0 - 45.0 % MCV 83.1 74.0 - 97.0 FL  
 MCH 27.6 24.0 - 34.0 PG  
 MCHC 33.2 31.0 - 37.0 g/dL  
 RDW 14.8 (H) 11.6 - 14.5 % PLATELET 523 624 - 483 K/uL MPV 9.8 9.2 - 11.8 FL  
 NEUTROPHILS 56 40 - 73 % LYMPHOCYTES 36 21 - 52 % MONOCYTES 5 3 - 10 % EOSINOPHILS 3 0 - 5 % BASOPHILS 0 0 - 2 %  
 ABS. NEUTROPHILS 3.4 1.8 - 8.0 K/UL  
 ABS. LYMPHOCYTES 2.2 0.9 - 3.6 K/UL  
 ABS. MONOCYTES 0.3 0.05 - 1.2 K/UL  
 ABS. EOSINOPHILS 0.2 0.0 - 0.4 K/UL  
 ABS. BASOPHILS 0.0 0.0 - 0.1 K/UL  
 DF AUTOMATED METABOLIC PANEL, BASIC Collection Time: 09/08/18  8:35 AM  
Result Value Ref Range Sodium 139 136 - 145 mmol/L Potassium 3.4 (L) 3.5 - 5.5 mmol/L Chloride 100 100 - 108 mmol/L  
 CO2 28 21 - 32 mmol/L Anion gap 11 3.0 - 18 mmol/L Glucose 77 74 - 99 mg/dL BUN 18 7.0 - 18 MG/DL Creatinine 0.76 0.6 - 1.3 MG/DL  
 BUN/Creatinine ratio 24 (H) 12 - 20 GFR est AA >60 >60 ml/min/1.73m2 GFR est non-AA >60 >60 ml/min/1.73m2 Calcium 8.5 8.5 - 10.1 MG/DL Radiologic Studies -  
CT ABD PELV WO CONT Final Result IMPRESSION:  
  
No evidence of acute process in the abdomen or pelvis. 
  
No renal calculi or hydronephrosis. 
  
 Prior appendectomy, hysterectomy and cholecystectomy. Medical Decision Making I am the first provider for this patient. I reviewed the vital signs, available nursing notes, past medical history, past surgical history, family history and social history. Vital Signs-Reviewed the patient's vital signs. Pulse Oximetry Analysis -  100% on room air, normal 
 
Records Reviewed: Nursing Notes (Time of Review: 7:52 AM) Provider Notes (Medical Decision Making): ddx kidney stone, musculoskeletal, AAA, UTI, pyelonephritis Neuro grossly intact, no rash CT abd pelvis, IVF, analgesia, UA 
MDM Medications  
sodium chloride 0.9 % bolus infusion 1,000 mL (1,000 mL IntraVENous New Bag 9/8/18 0840) morphine injection 4 mg (4 mg IntraVENous Given 9/8/18 0843) ondansetron [facility]) injection 4 mg (4 mg IntraVENous Given 9/8/18 0843) potassium chloride (K-DUR, KLOR-CON) SR tablet 20 mEq (20 mEq Oral Given 9/8/18 0957) ED Course: Progress Notes, Reevaluation, and Consults: 
Feeling better Likely muscular but will give course abx, culture urine. I have reassessed the patient. I have discussed the workup, results and plan with the patient and patient is in agreement. Patient is feeling much better. Patient will be prescribed tylenol 3, macrobid. Patient was discharge in stable condition. Patient was given outpatient follow up. Patient is to return to emergency department if any new or worsening condition. Diagnosis Clinical Impression: 1. Thoracic myofascial strain, initial encounter 2. Dysuria Disposition: Discharged Follow-up Information Follow up With Details Comments Contact Info Lisy Tan NP Schedule an appointment as soon as possible for a visit in 2 days  [address] [facility] [address] [id number] [phone] [address] [facility] EMERGENCY DEPT  As needed, If symptoms worsen [address] [id number] [phone] Patient's Medications Start Taking ACETAMINOPHEN-CODEINE (TYLENOL-CODEINE #3) 300-30 MG PER TABLET    Take 1 Tab by mouth every four (4) hours as needed for Pain. Max Daily Amount: 6 Tabs. NITROFURANTOIN, MACROCRYSTAL-MONOHYDRATE, (MACROBID) 100 MG CAPSULE    Take 1 Cap by mouth two (2) times a day for 5 days. Continue Taking CARBAMAZEPINE (TEGRETOL) 200 MG TABLET    One tab in AM, one tab at lunch, and two tabs before bed IBUPROFEN (MOTRIN) 600 MG TABLET    Take  by mouth every six (6) hours as needed for Pain. NAPROXEN (NAPROSYN) 500 MG TABLET    Take 1 Tab by mouth two (2) times daily as needed. PHENTERMINE (ADIPEX-P) 37.5 MG TABLET    May take 1/2 to 1 po daily prn appetite difficulties These Medications have changed No medications on file Stop Taking No medications on file  
 
_______________________________ Attestations: 
Scribe Attestation Miri Salcedo acting as a scribe for and in the presence of Jil Delong DO September 08, 2018 at 7:52 AM 
    
 Provider Attestation:     
I personally performed the services described in the documentation, reviewed the documentation, as recorded by the scribe in my presence, and it accurately and completely records my words and actions. September 08, 2018 at 7:52 AM - Charly Juan DO   
_______________________________

## 2018-09-10 LAB
BACTERIA SPEC CULT: ABNORMAL
SERVICE CMNT-IMP: ABNORMAL

## 2018-09-12 ENCOUNTER — HOSPITAL ENCOUNTER (EMERGENCY)
Age: 53
Discharge: HOME | End: 2018-09-12
Attending: EMERGENCY MEDICINE | Admitting: EMERGENCY MEDICINE
Payer: COMMERCIAL

## 2018-09-12 VITALS
BODY MASS INDEX: 31.65 KG/M2 | SYSTOLIC BLOOD PRESSURE: 126 MMHG | HEIGHT: 65 IN | TEMPERATURE: 99.1 F | WEIGHT: 190 LBS | DIASTOLIC BLOOD PRESSURE: 59 MMHG | OXYGEN SATURATION: 99 % | RESPIRATION RATE: 18 BRPM | HEART RATE: 77 BPM

## 2018-09-12 DIAGNOSIS — M62.838 MUSCLE SPASM: ICD-10-CM

## 2018-09-12 DIAGNOSIS — S39.012A STRAIN OF LUMBAR REGION, INITIAL ENCOUNTER: Primary | ICD-10-CM

## 2018-09-12 DIAGNOSIS — G89.29 CHRONIC LEFT SHOULDER PAIN: ICD-10-CM

## 2018-09-12 DIAGNOSIS — M25.512 CHRONIC LEFT SHOULDER PAIN: ICD-10-CM

## 2018-09-12 PROCEDURE — 96372 THER/PROPH/DIAG INJ SC/IM: CPT

## 2018-09-12 PROCEDURE — 99282 EMERGENCY DEPT VISIT SF MDM: CPT

## 2018-09-12 PROCEDURE — 74011250636 HC RX REV CODE- 250/636: Performed by: PHYSICIAN ASSISTANT

## 2018-09-12 RX ORDER — METHOCARBAMOL 750 MG/1
750 TABLET, FILM COATED ORAL 3 TIMES DAILY
Qty: 30 TAB | Refills: 0 | Status: SHIPPED | OUTPATIENT
Start: 2018-09-12 | End: 2018-09-21 | Stop reason: SDUPTHER

## 2018-09-12 RX ORDER — KETOROLAC TROMETHAMINE 30 MG/ML
15 INJECTION, SOLUTION INTRAMUSCULAR; INTRAVENOUS
Status: COMPLETED | OUTPATIENT
Start: 2018-09-12 | End: 2018-09-12

## 2018-09-12 RX ORDER — NAPROXEN 500 MG/1
500 TABLET ORAL 2 TIMES DAILY WITH MEALS
Qty: 20 TAB | Refills: 0 | Status: SHIPPED | OUTPATIENT
Start: 2018-09-12 | End: 2018-09-21 | Stop reason: SDUPTHER

## 2018-09-12 RX ADMIN — KETOROLAC TROMETHAMINE 15 MG: 30 INJECTION, SOLUTION INTRAMUSCULAR; INTRAVENOUS at 18:04

## 2018-09-12 NOTE — LETTER
NOTIFICATION RETURN TO WORK / SCHOOL 
 
9/12/2018 6:07 PM 
 
Ms. Irina Paredes [address] [place] [id number] To Whom It May Concern: 
 
Irina Paredes is currently under the care of [address] [facility] EMERGENCY DEPT. She will return to work/school on: 9/13/18 No lifting over 5 lbs or repetitive bending for the next week. If there are questions or concerns please have the patient contact our office.  
 
 
 
Sincerely, 
 
 
 
 
SHANAE Braden

## 2018-09-12 NOTE — ED NOTES
Patient c/o left lower back pain. Patient given heat pack for discomfort. Advised patient that she is on \"shot-time\" and is slated to be discharged at [address]. She voiced understanding

## 2018-09-12 NOTE — ED PROVIDER NOTES
EMERGENCY DEPARTMENT HISTORY AND PHYSICAL EXAM 
 
5:58 PM 
 
 
Date: 9/12/2018 Patient Name: Bayron Manzano History of Presenting Illness Chief Complaint Patient presents with  Back Pain History Provided By: Patient Chief Complaint: back pain Duration:  Days Timing:  Intermittent Location: lumbar Quality: Stabbing and Yamileth Rist Severity: Moderate Modifying Factors: Tylenol #3 with no relief Associated Symptoms: denies any other associated signs or symptoms Additional History (Context): Bayron Manzano is a 45 y.o. female with seizure and smoker who presents with low back pain since last Saturday.  was seen here  And told had a UTI as well as back pain. Given Tylenol #3.  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 States back spasm up at work today. Denies changes in bowel or urination. Denies radicular sx. Denies fever or N/V 
 
PCP: Ace Joseph NP Current Outpatient Prescriptions Medication Sig Dispense Refill  methocarbamol (ROBAXIN-750) 750 mg tablet Take 1 Tab by mouth three (3) times daily. 30 Tab 0  
 naproxen (NAPROSYN) 500 mg tablet Take 1 Tab by mouth two (2) times daily (with meals) for 10 days. 20 Tab 0  
 nitrofurantoin, macrocrystal-monohydrate, (MACROBID) 100 mg capsule Take 1 Cap by mouth two (2) times a day for 5 days. 10 Cap 0  
 acetaminophen-codeine (TYLENOL-CODEINE #3) 300-30 mg per tablet Take 1 Tab by mouth every four (4) hours as needed for Pain. Max Daily Amount: 6 Tabs. 12 Tab 0  phentermine (ADIPEX-P) 37.5 mg tablet May take 1/2 to 1 po daily prn appetite difficulties 30 Tab 0  ibuprofen (MOTRIN) 600 mg tablet Take  by mouth every six (6) hours as needed for Pain.  carBAMazepine (TEGRETOL) 200 mg tablet One tab in AM, one tab at lunch, and two tabs before bed 90 Tab 0 Past History Past Medical History: 
Past Medical History:  
Diagnosis Date  Bronchitis  Carpal tunnel syndrome on right  Chronic bronchitis ([address]  De Quervain's syndrome (tenosynovitis)  Depression   
 resolved  Ectopic pregnancy   
 two times  Epilepsy ([address]  Seizure ([address]  Sinus infection  Stenosing tenosynovitis of thumb Right side  Trigger thumb of left hand Past Surgical History: 
Past Surgical History:  
Procedure Laterality Date  COLONOSCOPY N/A 10/21/2016 COLONOSCOPY w/ biopsy performed by Mildred Sherman MD at [address] HX APPENDECTOMY  1/12/15  
 lysis of adhesions  HX CHOLECYSTECTOMY  HX HYSTERECTOMY  HX OTHER SURGICAL    
 scar tissue removed from intestines  HX PARTIAL HYSTERECTOMY  2009  HX TONSILLECTOMY Family History: 
Family History Problem Relation Age of Onset  Heart Attack Brother   
   in 62s  Diabetes Father  Hypertension Father  Cancer Father   
  colon CA  
 Heart Disease Sister   
  stent placement  Hypertension Brother  Other Brother Stomach problems/ulcers  Cancer Sister Breast CA  Stroke Sister  Diabetes Mother  Hypertension Mother Social History: 
Social History Substance Use Topics  Smoking status: Current Every Day Smoker Packs/day: 0.50 Types: Cigarettes  Smokeless tobacco: Never Used  Alcohol use No  
 
 
Allergies: Allergies Allergen Reactions  Cyclobenzaprine Other (comments)  
  headache  Etodolac Other (comments)  
  headache Review of Systems Constitutional:  Denies malaise, fever, chills. Head:  Denies injury. Face:  Denies injury or pain. ENMT:  Denies sore throat. Neck:  Denies injury or pain. Chest:  Denies injury. Cardiac:  Denies chest pain or palpitations. Respiratory:  Denies cough, wheezing, difficulty breathing, shortness of breath. GI/ABD:  Denies injury, pain, distention, nausea, vomiting, diarrhea. :  Denies injury, pain, dysuria or urgency. Back:  Pain Pelvis:  Denies injury or pain. Extremity/MS:  Denies injury or pain. Neuro:  Denies headache, LOC, dizziness, neurologic symptoms/deficits/paresthesias. Skin: Denies injury, rash, itching or skin changes. Physical Exam  
 
Visit Vitals  /59 (BP 1 Location: Left arm, BP Patient Position: At rest)  Pulse 77  Temp 99.1 °F (37.3 °C)  Resp 18  Ht 5' 5\" (1.651 m)  Wt 86.2 kg (190 lb)  SpO2 99%  BMI 31.62 kg/m2 CONSTITUTIONAL: Alert, in no apparent distress; well-developed; well-nourished. HEAD:  Normocephalic, atraumatic. RESP: Chest clear, equal breath sounds. CV: S1 and S2 WNL; No murmurs, gallops or rubs. GI: Abdomen soft and non-tender. No masses or organomegaly. BACK: FROM, 5/5 strength, 2+DTR's, Left lumbar paraspinal tenderness no erythema, no edema, no ecchymosis,(-) deformity, swelling, skin changes, midline tenderness or crepitus. (-) step offs. Neg SLR bilaterlly. Full sensation to deep and light palpation bilaterally. (-) foot drop UPPER EXT:  Normal inspection. LOWER EXT: Normal inspection NEURO:  strength 5/5 and sym, sensation intact. SKIN: No rashes; Normal for age and stage. PSYCH:  Alert and oriented, normal affect. Diagnostic Study Results Labs - No results found for this or any previous visit (from the past 12 hour(s)). Radiologic Studies - No orders to display Medical Decision Making I am the first provider for this patient. I reviewed the vital signs, available nursing notes, past medical history, past surgical history, family history and social history. Vital Signs-Reviewed the patient's vital signs. Pulse Oximetry Analysis -  99 on room air (Interpretation)wnl Records Reviewed: Nursing Notes, Old Medical Records, Previous Radiology Studies and Previous Laboratory Studies (Time of Review: 5:58 PM) ED Course: Progress Notes, Reevaluation, and Consults: Provider Notes (Medical Decision Making): DDx: sciatica, spinal stenosis, arthritis, DJD, spondylitis, muscular pain/spasm, Fx (traumatic, compression, etc.), cauda equina,  UTI, pyelo, STD, kidney stones, preg, ectopic, ovarian cyst, ovarian torsion, GI etiology (constipation, pancreatitis, hepatitis, gastritis, diverticulitis, colitis, etc), acute abdomen (appendicitis, SBO, etc.), AAA, IMPRESSION AND MEDICAL DECISION MAKING: 
Based upon the patient's presentation with noted HPI and PE, along with the work up done in the emergency department, I believe that the patient has a lumbar strain and spasm. Will treat and have her follow up with her PCP and Ortho. The patient will be discharged home. Warning signs of worsening condition were discussed and understood by the patient. Based on patient's age, coexisting illness, exam, and the results of this ED evaluation, the decision to treat as an outpatient was made. Based on the information available at time of discharge, acute pathology requiring immediate intervention was deemed relative unlikely. While it is impossible to completely exclude the possibility of underlying serious disease or worsening of condition, I feel the relative likelihood is extremely low. I discussed this uncertainty with the patient, who understood ED evaluation and treatment and felt comfortable with the outpatient treatment plan. All questions regarding care, test results, and follow up were answered. The patient is stable and appropriate to discharge. They understand that they should return to the emergency department for any new or worsening symptoms. I stressed the importance of follow up for repeat assessment and possibly further evaluation/treatment. Diagnosis Clinical Impression: 1. Strain of lumbar region, initial encounter 2. Muscle spasm 3. Chronic left shoulder pain   
 
_______________________________

## 2018-09-12 NOTE — ED TRIAGE NOTES
Left sided back pain since Saturday. Was seen here and Dx with UTI and back spasms. Sent home with Tylenol #3 and Macrobid and one day off work. States went back to work on Monday and back \"locked up on me\" so she was sent home.  Medication not improving Sx

## 2018-09-12 NOTE — DISCHARGE INSTRUCTIONS

## 2018-09-21 ENCOUNTER — OFFICE VISIT (OUTPATIENT)
Dept: FAMILY MEDICINE CLINIC | Age: 53
End: 2018-09-21

## 2018-09-21 VITALS
DIASTOLIC BLOOD PRESSURE: 68 MMHG | RESPIRATION RATE: 19 BRPM | HEART RATE: 71 BPM | HEIGHT: 65 IN | SYSTOLIC BLOOD PRESSURE: 120 MMHG | WEIGHT: 174 LBS | TEMPERATURE: 98.8 F | BODY MASS INDEX: 28.99 KG/M2 | OXYGEN SATURATION: 99 %

## 2018-09-21 DIAGNOSIS — M62.830 BACK MUSCLE SPASM: Primary | ICD-10-CM

## 2018-09-21 DIAGNOSIS — Z87.440 HISTORY OF UTI: ICD-10-CM

## 2018-09-21 LAB
BILIRUB UR QL STRIP: NEGATIVE
GLUCOSE UR-MCNC: NEGATIVE MG/DL
KETONES P FAST UR STRIP-MCNC: NEGATIVE MG/DL
PH UR STRIP: 7 [PH] (ref 4.6–8)
PROT UR QL STRIP: NORMAL
SP GR UR STRIP: 1.02 (ref 1–1.03)
UA UROBILINOGEN AMB POC: NORMAL (ref 0.2–1)
URINALYSIS CLARITY POC: NORMAL
URINALYSIS COLOR POC: NORMAL
URINE BLOOD POC: NEGATIVE
URINE LEUKOCYTES POC: NEGATIVE
URINE NITRITES POC: NEGATIVE

## 2018-09-21 RX ORDER — NAPROXEN 500 MG/1
500 TABLET ORAL 2 TIMES DAILY WITH MEALS
Qty: 60 TAB | Refills: 0 | Status: SHIPPED | OUTPATIENT
Start: 2018-09-21 | End: 2018-10-01

## 2018-09-21 RX ORDER — METHOCARBAMOL 750 MG/1
750 TABLET, FILM COATED ORAL 3 TIMES DAILY
Qty: 60 TAB | Refills: 0 | Status: SHIPPED | OUTPATIENT
Start: 2018-09-21 | End: 2018-10-16 | Stop reason: SDUPTHER

## 2018-09-21 NOTE — MR AVS SNAPSHOT
[address] [facility] 
 
 
 [address], [place] [address] [address] [facility] [address] 
[phone] Patient: Damián Arias MRN: [id number] :1965 Visit Information Date & Time Provider Department Dept. Phone Encounter #  
 2018  8:20 AM Gurvinder Elizondo NP Chinyere Frey [address] [place] [address] [phone] [id number] Follow-up Instructions Return in about 2 weeks (around 10/5/2018) for patient has appointment with provider on 10-16-18. Your Appointments 10/16/2018 11:20 AM  
Office Visit with Gurvinder Elizondo NP [facility] ([facility]) Appt Note: 8 wk fu  appitite [address] [facility] [address] [facility] [address]  
[phone]  
  
   
 [address] [facility]  
  
    
 2018 10:30 AM  
New Patient with Marget Apgar, MD  
[facility]) Appt Note: left sided lower pain nx bcbs self refd,advs m/o loc arrv 30 min early w/all ids/cp$  
 [address] [facility] [address]  
[phone]  
  
   
 [address] [facility] [address] Upcoming Health Maintenance Date Due Influenza Age 5 to Adult 10/29/2018* BREAST CANCER SCRN MAMMOGRAM 2019 COLONOSCOPY 10/21/2026 DTaP/Tdap/Td series (2 - Td) 2027 *Topic was postponed. The date shown is not the original due date. Allergies as of 2018  Review Complete On: 2018 By: Gurvinder Elizondo NP Severity Noted Reaction Type Reactions Cyclobenzaprine High 2016    Other (comments)  
 headache Etodolac  2016    Other (comments)  
 headache Current Immunizations  Reviewed on 2016 Name Date Pneumococcal Conjugate (PCV-13) 2016 Not reviewed this visit You Were Diagnosed With   
  
 Codes Comments Back muscle spasm    -  Primary ICD-10-CM: Y02.958 ICD-9-CM: 724.8 History of UTI     ICD-10-CM: Z87.440 ICD-9-CM: V13.02 Vitals BP Pulse Temp Resp Height(growth percentile) Weight(growth percentile) 120/68 (BP 1 Location: Left arm, BP Patient Position: Sitting) 71 98.8 °F (37.1 °C) (Oral) 19 5' 5\" (1.651 m) 174 lb (78.9 kg) SpO2 BMI OB Status Smoking Status 99% 28.96 kg/m2 Hysterectomy Current Every Day Smoker BMI and BSA Data Body Mass Index Body Surface Area  
 28.96 kg/m 2 1.9 m 2 Preferred Pharmacy Pharmacy Name Phone [facility] [address] [facility], [facility] [phone] Your Updated Medication List  
  
   
This list is accurate as of 9/21/18 10:02 AM.  Always use your most recent med list.  
  
  
  
  
 acetaminophen-codeine 300-30 mg per tablet Commonly known as:  TYLENOL-CODEINE #3 Take 1 Tab by mouth every four (4) hours as needed for Pain. Max Daily Amount: 6 Tabs. carBAMazepine 200 mg tablet Commonly known as:  TEGretol One tab in AM, one tab at lunch, and two tabs before bed  
  
 methocarbamol 750 mg tablet Commonly known as:  [id number] Take 1 Tab by mouth three (3) times daily. naproxen 500 mg tablet Commonly known as:  NAPROSYN Take 1 Tab by mouth two (2) times daily (with meals) for 10 days. phentermine 37.5 mg tablet Commonly known as:  ADIPEX-P May take 1/2 to 1 po daily prn appetite difficulties Prescriptions Sent to Pharmacy Refills  
 methocarbamol (ROBAXIN-750) 750 mg tablet 0 Sig: Take 1 Tab by mouth three (3) times daily. Class: Normal  
 Pharmacy: [id number] [address] [facility] [facility] [employer], [address] [place] [address] Ph #: [phone] Route: Oral  
 naproxen (NAPROSYN) 500 mg tablet 0 Sig: Take 1 Tab by mouth two (2) times daily (with meals) for 10 days. Class: Normal  
 Pharmacy: [id number] [address] [facility], [address] [facility] Ph #: [phone]  Route: Oral  
  
 We Performed the Following AMB POC URINALYSIS DIP STICK AUTO W/ MICRO [41150 CPT(R)] AMB POC URINE PREGNANCY TEST, VISUAL COLOR COMPARISON [69162 CPT(R)] Follow-up Instructions Return in about 2 weeks (around 10/5/2018) for patient has appointment with provider on 10-16-18. Introducing [facility]! [facility] introduces [employer] patient portal. Now you can access parts of your medical record, email your doctor's office, and request medication refills online. 1. In your internet browser, go to [email or web address] 2. Click on the First Time User? Click Here link in the Sign In box. You will see the New Member Sign Up page. 3. Enter your [employer] Access Code exactly as it appears below. You will not need to use this code after youve completed the sign-up process. If you do not sign up before the expiration date, you must request a new code. · [employer] Access Code: [email or web address] Expires: 12/11/2018  5:22 PM 
 
4. Enter the last four digits of your Social Security Number (xxxx) and Date of Birth (mm/dd/yyyy) as indicated and click Submit. You will be taken to the next sign-up page. 5. Create a [employer] ID. This will be your [employer] login ID and cannot be changed, so think of one that is secure and easy to remember. 6. Create a [employer] password. You can change your password at any time. 7. Enter your Password Reset Question and Answer. This can be used at a later time if you forget your password. 8. Enter your e-mail address. You will receive e-mail notification when new information is available in [address] 9. Click Sign Up. You can now view and download portions of your medical record. 10. Click the Download Summary menu link to download a portable copy of your medical information. If you have questions, please visit the Frequently Asked Questions section of the [employer] website.  Remember, [employer] is NOT to be used for urgent needs. For medical emergencies, dial 911. Now available from your iPhone and Android! Please provide this summary of care documentation to your next provider. Your primary care clinician is listed as Vianney Ng. If you have any questions after today's visit, please call [phone].

## 2018-09-21 NOTE — PROGRESS NOTES
Subjective:   Genevieve Bass is a 45 y.o. female with follow up from two ED visits 9-8-18 UTI and treated with nitrofurantoin  And 9-12-18 strain of lumbar spine. She reports no symptoms of acute cystitis today. She does however, continue to complain of lumbar back pain greater on right than left. She was prescribed methocarbamol and naproxen. She reports that her pain is 6/10 burning pain. She works overnights as a medical assistant. She reports that she needs to get back to work and she has another assistant that will help her with care and transfers of patient. Review of Systems   Constitutional: Negative. Genitourinary: Negative. Musculoskeletal: Negative for falls, joint pain and neck pain. Neurological: Negative. Current Outpatient Prescriptions   Medication Sig Dispense Refill    methocarbamol (ROBAXIN-750) 750 mg tablet Take 1 Tab by mouth three (3) times daily. 30 Tab 0    naproxen (NAPROSYN) 500 mg tablet Take 1 Tab by mouth two (2) times daily (with meals) for 10 days. 20 Tab 0    phentermine (ADIPEX-P) 37.5 mg tablet May take 1/2 to 1 po daily prn appetite difficulties 30 Tab 0    carBAMazepine (TEGRETOL) 200 mg tablet One tab in AM, one tab at lunch, and two tabs before bed 90 Tab 0    acetaminophen-codeine (TYLENOL-CODEINE #3) 300-30 mg per tablet Take 1 Tab by mouth every four (4) hours as needed for Pain. Max Daily Amount: 6 Tabs. 12 Tab 0    ibuprofen (MOTRIN) 600 mg tablet Take  by mouth every six (6) hours as needed for Pain. Patient Active Problem List   Diagnosis Code    Chronic bronchitis ([facility] J42    SBO (small bowel obstruction) ([facility] K56.609    Intractable epilepsy without status epilepticus ([facility] G40.919     PMH: reviewed medications and allergy lists and medical and family history. OBJECTIVE:  Awake and alert in no acute distress  Lungs clear throughout  S1 S2 RRR without ectopy or murmur auscultated.   Musculoskeletal: TTP paraspinal right lumbar, palpable spasm    No limited ROM   Gait without difficulty  Visit Vitals    /68 (BP 1 Location: Left arm, BP Patient Position: Sitting)    Pulse 71    Temp 98.8 °F (37.1 °C) (Oral)    Resp 19    Ht 5' 5\" (1.651 m)    Wt 174 lb (78.9 kg)    SpO2 99%    BMI 28.96 kg/m2     Results for orders placed or performed in visit on 09/21/18   AMB POC URINALYSIS DIP STICK AUTO W/ MICRO   Result Value Ref Range    Color (UA POC)      Clarity (UA POC)      Glucose (UA POC) Negative Negative    Bilirubin (UA POC) Negative Negative    Ketones (UA POC) Negative Negative    Specific gravity (UA POC) 1.025 1.001 - 1.035    Blood (UA POC) Negative Negative    pH (UA POC) 7.0 4.6 - 8.0    Protein (UA POC) 1+ Negative    Urobilinogen (UA POC) 0.2 mg/dL 0.2 - 1    Nitrites (UA POC) Negative Negative    Leukocyte esterase (UA POC) Negative Negative       Diagnoses and all orders for this visit:    1. Back muscle spasm  -     methocarbamol (ROBAXIN-750) 750 mg tablet; Take 1 Tab by mouth three (3) times daily.  -     naproxen (NAPROSYN) 500 mg tablet; Take 1 Tab by mouth two (2) times daily (with meals) for 10 days. 2. History of UTI  -     AMB POC URINALYSIS DIP STICK AUTO W/ MICRO    Letter for work excuse  General comfort measures  Reviewed risks and benefits and common side effects of new medication  Patient agrees with plan and verbalizes understanding. I have discussed the diagnosis with the patient and the intended plan as seen in the above orders. The patient has received an after-visit summary and questions were answered concerning future plans. I have discussed medication side effects and warnings with the patient as well. Follow-up Disposition:  Return in about 2 weeks (around 10/5/2018) for patient has appointment with provider on 10-16-18.

## 2018-09-21 NOTE — LETTER
NOTIFICATION RETURN TO WORK / SCHOOL 
 
9/21/2018 10:25 AM 
 
Ms. Alex Sequeira [address] [facility] [address] To Whom It May Concern: 
 
Alex Sequeira [facility] the [facility] She will return to work/school on 09/21/18. If there are questions or concerns please have the patient contact our office. Sincerely, Jj Farrell NP

## 2018-09-21 NOTE — PROGRESS NOTES
Chief Complaint   Patient presents with    Muscle Problem     ED follow up form 9/12     patient complains of pressure in her left side that radiates all throughout her back, patient describes it as a back spasm. Patient was also treated for a UTI and has completed antibiotic course. 1. Have you been to the ER, urgent care clinic since your last visit? Hospitalized since your last visit? ED [facility] for back spasm     2. Have you seen or consulted any other health care providers outside of the [address] [place] [address] since your last visit? Include any pap smears or colon screening.   No

## 2018-10-16 ENCOUNTER — OFFICE VISIT (OUTPATIENT)
Dept: FAMILY MEDICINE CLINIC | Age: 53
End: 2018-10-16

## 2018-10-16 VITALS
SYSTOLIC BLOOD PRESSURE: 134 MMHG | BODY MASS INDEX: 29.12 KG/M2 | DIASTOLIC BLOOD PRESSURE: 86 MMHG | WEIGHT: 174.8 LBS | TEMPERATURE: 98.4 F | RESPIRATION RATE: 20 BRPM | OXYGEN SATURATION: 99 % | HEART RATE: 74 BPM | HEIGHT: 65 IN

## 2018-10-16 DIAGNOSIS — M62.830 BACK MUSCLE SPASM: ICD-10-CM

## 2018-10-16 DIAGNOSIS — M47.14 OSTEOARTHRITIS OF THORACIC SPINE WITH MYELOPATHY: Primary | ICD-10-CM

## 2018-10-16 RX ORDER — METHOCARBAMOL 750 MG/1
750 TABLET, FILM COATED ORAL 3 TIMES DAILY
Qty: 60 TAB | Refills: 0 | Status: SHIPPED | OUTPATIENT
Start: 2018-10-16 | End: 2019-02-05 | Stop reason: SDUPTHER

## 2018-10-16 NOTE — PROGRESS NOTES
Subjective:  
Paty Henson is a 52 y.o. female follow up for thoracic back pain and muscle spasm. Patient reports that she the carbamazepine prn helps with the spasms. She is requesting a refill today. She has an upcoming appointment with spine center on 11-05-18 and encouraged to keep appointment. Review of Systems Musculoskeletal: Negative for falls, joint pain and neck pain. Neurological: Negative for tingling and focal weakness. Diagnostics: CXR January 2018 showed thoracic spondylosis Wt Readings from Last 3 Encounters:  
10/16/18 174 lb 12.8 oz (79.3 kg) 09/21/18 174 lb (78.9 kg) 09/12/18 190 lb (86.2 kg) BP Readings from Last 3 Encounters:  
10/16/18 134/86  
09/21/18 120/68  
09/12/18 126/59 PMH: reviewed medications and allergy lists and medical and family history. OBJECTIVE: 
Awake and alert in no acute distress Lungs clear throughout S1 S2 RRR without ectopy or murmur auscultated. Musculoskeletal: TTP muscle spasm left mid thoracic region mid scapular line No warmth to palpation No edema Visit Vitals  /86 (BP 1 Location: Left arm, BP Patient Position: Sitting)  Pulse 74  Temp 98.4 °F (36.9 °C) (Oral)  Resp 20  
 Ht 5' 5\" (1.651 m)  Wt 174 lb 12.8 oz (79.3 kg)  SpO2 99%  BMI 29.09 kg/m2 Diagnoses and all orders for this visit: 
 
1. Osteoarthritis of thoracic spine with myelopathy 
-     methocarbamol (ROBAXIN-750) 750 mg tablet; Take 1 Tab by mouth three (3) times daily. 2. Back muscle spasm 
-     methocarbamol (ROBAXIN-750) 750 mg tablet; Take 1 Tab by mouth three (3) times daily. Reviewed risks and benefits and common side effects of  Medication General comfort measures Keep appointment with spine center Patient agrees with plan and verbalizes understanding. I have discussed the diagnosis with the patient and the intended plan as seen in the above orders.   The patient has received an after-visit summary and questions were answered concerning future plans. I have discussed medication side effects and warnings with the patient as well. Follow-up Disposition: 
Return if symptoms worsen or fail to improve.

## 2018-10-16 NOTE — PATIENT INSTRUCTIONS
Back Spasm: Care Instructions Your Care Instructions A back spasm is sudden tightness and pain in your back muscles. It may happen from overuse or an injury. Things like sleeping in an awkward way, bending, lifting, standing, or sitting can sometimes cause a spasm. But the cause isn't always clear. Home treatment includes using heat or ice, taking over-the-counter (OTC) pain medicines, and avoiding activities that may cause back pain. For a back spasm that doesn't get better with home care, your doctor may prescribe medicine. Treatments such as massage or manipulation may also help ease a back spasm. Your doctor may also suggest exercise or physical therapy to help improve strength and flexibility in your back muscles. In most cases, getting back to your normal activities is good for your back. Just make sure to avoid doing things that make your pain worse. Follow-up care is a key part of your treatment and safety. Be sure to make and go to all appointments, and call your doctor if you are having problems. It's also a good idea to know your test results and keep a list of the medicines you take. How can you care for yourself at home? 
 Heat, ice, and medicines 
  · To relieve pain, use heat or ice (whichever feels better) on the affected area. ¨ Put a warm water bottle, a heating pad set on low, or a warm cloth on your back. Put a thin cloth between the heating pad and your skin. Do not go to sleep with a heating pad on your skin. ¨ Try ice or a cold pack on the area for 10 to 20 minutes at a time. Put a thin cloth between the ice and your skin.  
  · For most back pain you can take over-the-counter pain medicine. Nonsteroidal anti-inflammatory drugs (NSAIDs) such as ibuprofen or naproxen seem to work best. But if you can't take NSAIDs you can try acetaminophen. Your doctor can prescribe stronger medicines if needed. Be safe with medicines. Read and follow all instructions on the label.  Body positions and posture 
  · Sit or lie in positions that are most comfortable for you and that reduce pain. Try one of these positions when you lie down: ¨ Lie on your back with your knees bent and supported by large pillows. ¨ Lie on the floor with your legs on the seat of a sofa or chair. Brandon Reef on your side with your knees and hips bent and a pillow between your legs. ¨ Lie on your stomach if it does not make pain worse.  
  · Do not sit up in bed. Avoid soft couches and twisted positions.  
  · Avoid bed rest after the first day of back pain. Bed rest can help relieve pain at first, but it delays healing. Continued rest without activity is usually not good for your back.  
  · If you must sit for long periods of time, take breaks from sitting. Change positions every 30 minutes. Get up and walk around, or lie in a comfortable position. Activity 
  · Take short walks several times a day. You can start with 5 to 10 minutes, 3 or 4 times a day, and work up to longer walks. Walk on level surfaces and avoid hills and stairs until your back starts to feel better.  
  · After your back spasm starts to feel better, try to stretch your muscles every day, especially before and after exercise and at bedtime. Regular stretching can help relax your muscles.  
  · To prevent future back pain, do exercises to stretch and strengthen your back and stomach. Learn to use good posture, safe lifting techniques, and other ways to move to help you avoid back pain. When should you call for help? Call 911 anytime you think you may need emergency care. For example, call if: 
  · You are unable to move an arm or a leg at all.  
[facility] your doctor now or seek immediate medical care if: 
  · You have new or worse symptoms in your legs, belly, or buttocks. Symptoms may include: ¨ Numbness or tingling. ¨ Weakness. ¨ Pain.  
  · You lose bladder or bowel control.  Watch closely for changes in your health, and be sure to contact your doctor if: 
  · You have a fever, lose weight, or don't feel well.  
  · You do not get better as expected. Where can you learn more? Go to http://sylvie-angela.info/. Enter E232 in the search box to learn more about \"Back Spasm: Care Instructions. \" Current as of: November 29, 2017 Content Version: 11.8 © 4008-6041 LocaMap. Care instructions adapted under license by [employer] (which disclaims liability or warranty for this information). If you have questions about a medical condition or this instruction, always ask your healthcare professional. [place] [address] any warranty or liability for your use of this information. Arthritis: Care Instructions Your Care Instructions Arthritis, also called osteoarthritis, is a breakdown of the cartilage that cushions your joints. When the cartilage wears down, your bones rub against each other. This causes pain and stiffness. Many people have some arthritis as they age. Arthritis most often affects the joints of the spine, hands, hips, knees, or feet. You can take simple measures to protect your joints, ease your pain, and help you stay active. Follow-up care is a key part of your treatment and safety. Be sure to make and go to all appointments, and call your doctor if you are having problems. It's also a good idea to know your test results and keep a list of the medicines you take. How can you care for yourself at home? · Stay at a healthy weight. Being overweight puts extra strain on your joints. · Talk to your doctor or physical therapist about exercises that will help ease joint pain. ¨ Stretch. You may enjoy gentle forms of yoga to help keep your joints and muscles flexible. ¨ Walk instead of jog.  Other types of exercise that are less stressful on the joints include riding a bicycle, swimming, zoya chi, or water exercise. ¨ Lift weights. Strong muscles help reduce stress on your joints. Stronger thigh muscles, for example, take some of the stress off of the knees and hips. Learn the right way to lift weights so you do not make joint pain worse. · Take your medicines exactly as prescribed. Call your doctor if you think you are having a problem with your medicine. · Take pain medicines exactly as directed. ¨ If the doctor gave you a prescription medicine for pain, take it as prescribed. ¨ If you are not taking a prescription pain medicine, ask your doctor if you can take an over-the-counter medicine. · Use a cane, crutch, walker, or another device if you need help to get around. These can help rest your joints. You also can use other things to make life easier, such as a higher toilet seat and padded handles on kitchen utensils. · Do not sit in low chairs, which can make it hard to get up. · Put heat or cold on your sore joints as needed. Use whichever helps you most. You also can take turns with hot and cold packs. ¨ Apply heat 2 or 3 times a day for 20 to 30 minutesusing a heating pad, hot shower, or hot packto relieve pain and stiffness. ¨ Put ice or a cold pack on your sore joint for 10 to 20 minutes at a time. Put a thin cloth between the ice and your skin. When should you call for help? Call your doctor now or seek immediate medical care if: 
  · You have sudden swelling, warmth, or pain in any joint.  
  · You have joint pain and a fever or rash.  
  · You have such bad pain that you cannot use a joint.  
 Watch closely for changes in your health, and be sure to contact your doctor if: 
  · You have mild joint symptoms that continue even with more than 6 weeks of care at home.  
  · You have stomach pain or other problems with your medicine. Where can you learn more? Go to http://sylvie-angela.info/. Enter T639 in the search box to learn more about \"Arthritis: Care Instructions. \" Current as of: June 11, 2018 Content Version: 11.8 © 1406-5338 Healthwise, Incorporated. Care instructions adapted under license by [employer] (which disclaims liability or warranty for this information). If you have questions about a medical condition or this instruction, always ask your healthcare professional. [place] [address] any warranty or liability for your use of this information.

## 2018-10-16 NOTE — MR AVS SNAPSHOT
[facility] 
 
 
 [address] [address] [address] [facility] [address] 
[phone] Patient: Angy Amaro MRN: [id number] :1965 Visit Information Date & Time Provider Department Dept. Phone Encounter #  
 10/16/2018 11:20 AM [facility]real [id number] Follow-up Instructions Return if symptoms worsen or fail to improve. Your Appointments 2018 10:30 AM  
New Patient with Sudhir Escalante MD  
[facility] Appt Note: left sided lower pain nx bcbs self refd,advs m/o loc arrv 30 min early w/all ids/cp$  
 [address] [facility] [address]  
[phone]  
  
   
 [address] [facility] [address] Upcoming Health Maintenance Date Due Shingrix Vaccine Age 50> (1 of 2) 10/11/2013 Influenza Age 5 to Adult 10/29/2018* BREAST CANCER SCRN MAMMOGRAM 2019 COLONOSCOPY 10/21/2026 DTaP/Tdap/Td series (2 - Td) 2027 *Topic was postponed. The date shown is not the original due date. Allergies as of 10/16/2018  Review Complete On: 10/16/2018 By: Martínez Chinchilla Severity Noted Reaction Type Reactions Cyclobenzaprine High 2016    Other (comments)  
 headache Etodolac  2016    Other (comments)  
 headache Current Immunizations  Reviewed on 2016 Name Date Pneumococcal Conjugate (PCV-13) 2016 Not reviewed this visit You Were Diagnosed With   
  
 Codes Comments Osteoarthritis of thoracic spine with myelopathy    -  Primary ICD-10-CM: M47.14 
ICD-9-CM: 721.41 Back muscle spasm     ICD-10-CM: U16.230 ICD-9-CM: 724.8 Vitals BP Pulse Temp Resp Height(growth percentile) Weight(growth percentile)  134/86 (BP 1 Location: Left arm, BP Patient Position: Sitting) 74 98.4 °F (36.9 °C) (Oral) 20 5' 5\" (1.651 m) 174 lb 12.8 oz (79.3 kg) SpO2 BMI OB Status Smoking Status 99% 29.09 kg/m2 Hysterectomy Current Every Day Smoker BMI and BSA Data Body Mass Index Body Surface Area  
 29.09 kg/m 2 1.91 m 2 Preferred Pharmacy Pharmacy Name Phone [facility] [address] [facility] [phone] Your Updated Medication List  
  
   
This list is accurate as of 10/16/18 11:55 AM.  Always use your most recent med list.  
  
  
  
  
 acetaminophen-codeine 300-30 mg per tablet Commonly known as:  TYLENOL-CODEINE #3 Take 1 Tab by mouth every four (4) hours as needed for Pain. Max Daily Amount: 6 Tabs. carBAMazepine 200 mg tablet Commonly known as:  TEGretol One tab in AM, one tab at lunch, and two tabs before bed  
  
 methocarbamol 750 mg tablet Commonly known as:  BCHAGQY-357 Take 1 Tab by mouth three (3) times daily. phentermine 37.5 mg tablet Commonly known as:  ADIPEX-P May take 1/2 to 1 po daily prn appetite difficulties Prescriptions Sent to Pharmacy Refills  
 methocarbamol (ROBAXIN-750) 750 mg tablet 0 Sig: Take 1 Tab by mouth three (3) times daily. Class: Normal  
 Pharmacy: [id number] [address] [facility], [address] [place] [address] Ph #: [phone] Route: Oral  
  
Follow-up Instructions Return if symptoms worsen or fail to improve. Patient Instructions Back Spasm: Care Instructions Your Care Instructions A back spasm is sudden tightness and pain in your back muscles. It may happen from overuse or an injury. Things like sleeping in an awkward way, bending, lifting, standing, or sitting can sometimes cause a spasm. But the cause isn't always clear. Home treatment includes using heat or ice, taking over-the-counter (OTC) pain medicines, and avoiding activities that may cause back pain. For a back spasm that doesn't get better with home care, your doctor may prescribe medicine. Treatments such as massage or manipulation may also help ease a back spasm. Your doctor may also suggest exercise or physical therapy to help improve strength and flexibility in your back muscles. In most cases, getting back to your normal activities is good for your back. Just make sure to avoid doing things that make your pain worse. Follow-up care is a key part of your treatment and safety. Be sure to make and go to all appointments, and call your doctor if you are having problems. It's also a good idea to know your test results and keep a list of the medicines you take. How can you care for yourself at home? 
 Heat, ice, and medicines 
  · To relieve pain, use heat or ice (whichever feels better) on the affected area. ¨ Put a warm water bottle, a heating pad set on low, or a warm cloth on your back. Put a thin cloth between the heating pad and your skin. Do not go to sleep with a heating pad on your skin. ¨ Try ice or a cold pack on the area for 10 to 20 minutes at a time. Put a thin cloth between the ice and your skin.  
  · For most back pain you can take over-the-counter pain medicine. Nonsteroidal anti-inflammatory drugs (NSAIDs) such as ibuprofen or naproxen seem to work best. But if you can't take NSAIDs you can try acetaminophen. Your doctor can prescribe stronger medicines if needed. Be safe with medicines. Read and follow all instructions on the label.  
 Body positions and posture 
  · Sit or lie in positions that are most comfortable for you and that reduce pain. Try one of these positions when you lie down: ¨ Lie on your back with your knees bent and supported by large pillows. ¨ Lie on the floor with your legs on the seat of a sofa or chair. Jinx Oppenheim on your side with your knees and hips bent and a pillow between your legs. ¨ Lie on your stomach if it does not make pain worse.   · Do not sit up in bed. Avoid soft couches and twisted positions.  
  · Avoid bed rest after the first day of back pain. Bed rest can help relieve pain at first, but it delays healing. Continued rest without activity is usually not good for your back.  
  · If you must sit for long periods of time, take breaks from sitting. Change positions every 30 minutes. Get up and walk around, or lie in a comfortable position. Activity 
  · Take short walks several times a day. You can start with 5 to 10 minutes, 3 or 4 times a day, and work up to longer walks. Walk on level surfaces and avoid hills and stairs until your back starts to feel better.  
  · After your back spasm starts to feel better, try to stretch your muscles every day, especially before and after exercise and at bedtime. Regular stretching can help relax your muscles.  
  · To prevent future back pain, do exercises to stretch and strengthen your back and stomach. Learn to use good posture, safe lifting techniques, and other ways to move to help you avoid back pain. When should you call for help? Call 911 anytime you think you may need emergency care. For example, call if: 
  · You are unable to move an arm or a leg at all.  
[facility] your doctor now or seek immediate medical care if: 
  · You have new or worse symptoms in your legs, belly, or buttocks. Symptoms may include: ¨ Numbness or tingling. ¨ Weakness. ¨ Pain.  
  · You lose bladder or bowel control.  
 Watch closely for changes in your health, and be sure to contact your doctor if: 
  · You have a fever, lose weight, or don't feel well.  
  · You do not get better as expected. Where can you learn more? Go to http://sylvie-angela.info/. Enter E232 in the search box to learn more about \"Back Spasm: Care Instructions. \" Current as of: November 29, 2017 Content Version: 11.8 © 0706-3808 Healthwise, Incorporated.  Care instructions adapted under license by [address] (which disclaims liability or warranty for this information). If you have questions about a medical condition or this instruction, always ask your healthcare professional. [address] any warranty or liability for your use of this information. Arthritis: Care Instructions Your Care Instructions Arthritis, also called osteoarthritis, is a breakdown of the cartilage that cushions your joints. When the cartilage wears down, your bones rub against each other. This causes pain and stiffness. Many people have some arthritis as they age. Arthritis most often affects the joints of the spine, hands, hips, knees, or feet. You can take simple measures to protect your joints, ease your pain, and help you stay active. Follow-up care is a key part of your treatment and safety. Be sure to make and go to all appointments, and call your doctor if you are having problems. It's also a good idea to know your test results and keep a list of the medicines you take. How can you care for yourself at home? · Stay at a healthy weight. Being overweight puts extra strain on your joints. · Talk to your doctor or physical therapist about exercises that will help ease joint pain. ¨ Stretch. You may enjoy gentle forms of yoga to help keep your joints and muscles flexible. ¨ Walk instead of jog. Other types of exercise that are less stressful on the joints include riding a bicycle, swimming, zoya chi, or water exercise. ¨ Lift weights. Strong muscles help reduce stress on your joints. Stronger thigh muscles, for example, take some of the stress off of the knees and hips. Learn the right way to lift weights so you do not make joint pain worse. · Take your medicines exactly as prescribed. Call your doctor if you think you are having a problem with your medicine. · Take pain medicines exactly as directed.  
¨ If the doctor gave you a prescription medicine for pain, take it as prescribed. ¨ If you are not taking a prescription pain medicine, ask your doctor if you can take an over-the-counter medicine. · Use a cane, crutch, walker, or another device if you need help to get around. These can help rest your joints. You also can use other things to make life easier, such as a higher toilet seat and padded handles on kitchen utensils. · Do not sit in low chairs, which can make it hard to get up. · Put heat or cold on your sore joints as needed. Use whichever helps you most. You also can take turns with hot and cold packs. ¨ Apply heat 2 or 3 times a day for 20 to 30 minutesusing a heating pad, hot shower, or hot packto relieve pain and stiffness. ¨ Put ice or a cold pack on your sore joint for 10 to 20 minutes at a time. Put a thin cloth between the ice and your skin. When should you call for help? Call your doctor now or seek immediate medical care if: 
  · You have sudden swelling, warmth, or pain in any joint.  
  · You have joint pain and a fever or rash.  
  · You have such bad pain that you cannot use a joint.  
 Watch closely for changes in your health, and be sure to contact your doctor if: 
  · You have mild joint symptoms that continue even with more than 6 weeks of care at home.  
  · You have stomach pain or other problems with your medicine. Where can you learn more? Go to http://sylvie-angela.info/. Enter D591 in the search box to learn more about \"Arthritis: Care Instructions. \" Current as of: June 11, 2018 Content Version: 11.8 © 0692-8203 Nimbus Cloud Apps. Care instructions adapted under license by [employer] (which disclaims liability or warranty for this information). If you have questions about a medical condition or this instruction, always ask your healthcare professional. [address] any warranty or liability for your use of this information. Introducing [facility]! Cira Sanchez introduces Napartner patient portal. Now you can access parts of your medical record, email your doctor's office, and request medication refills online. 1. In your internet browser, go to [email or web address] 2. Click on the First Time User? Click Here link in the Sign In box. You will see the New Member Sign Up page. 3. Enter your Napartner Access Code exactly as it appears below. You will not need to use this code after youve completed the sign-up process. If you do not sign up before the expiration date, you must request a new code. · Nap[employer]ner Access Code: [email or web address] Expires: 12/11/2018  5:22 PM 
 
4. Enter the last four digits of your Social Security Number (xxxx) and Date of Birth (mm/dd/yyyy) as indicated and click Submit. You will be taken to the next sign-up page. 5. Create a Napartner ID. This will be your Napartner login ID and cannot be changed, so think of one that is secure and easy to remember. 6. Create a Napartner password. You can change your password at any time. 7. Enter your Password Reset Question and Answer. This can be used at a later time if you forget your password. 8. Enter your e-mail address. You will receive e-mail notification when new information is available in [address] 9. Click Sign Up. You can now view and download portions of your medical record. 10. Click the Download Summary menu link to download a portable copy of your medical information. If you have questions, please visit the Frequently Asked Questions section of the Napartner website. Remember, Napartner is NOT to be used for urgent needs. For medical emergencies, dial 911. Now available from your iPhone and Android! Please provide this summary of care documentation to your next provider. Your primary care clinician is listed as Elizabeth Ng. If you have any questions after today's visit, please call [phone].

## 2018-10-16 NOTE — PROGRESS NOTES
Chief Complaint Patient presents with  
 Other Appetite control and medication review  Back Pain 1. Have you been to the ER, urgent care clinic since your last visit? Hospitalized since your last visit? No 
2. Have you seen or consulted any other health care providers outside of the [address] [place] [address] since your last visit? Include any pap smears or colon screening.   No

## 2018-10-21 ENCOUNTER — HOSPITAL ENCOUNTER (EMERGENCY)
Age: 53
Discharge: HOME | End: 2018-10-21
Attending: EMERGENCY MEDICINE
Payer: COMMERCIAL

## 2018-10-21 ENCOUNTER — APPOINTMENT (OUTPATIENT)
Dept: GENERAL RADIOLOGY | Age: 53
End: 2018-10-21
Attending: PHYSICIAN ASSISTANT
Payer: COMMERCIAL

## 2018-10-21 VITALS
WEIGHT: 174 LBS | HEART RATE: 66 BPM | RESPIRATION RATE: 20 BRPM | OXYGEN SATURATION: 100 % | TEMPERATURE: 99.3 F | DIASTOLIC BLOOD PRESSURE: 62 MMHG | SYSTOLIC BLOOD PRESSURE: 131 MMHG | BODY MASS INDEX: 30.83 KG/M2 | HEIGHT: 63 IN

## 2018-10-21 DIAGNOSIS — J01.90 ACUTE NON-RECURRENT SINUSITIS, UNSPECIFIED LOCATION: Primary | ICD-10-CM

## 2018-10-21 DIAGNOSIS — R05.9 COUGH: ICD-10-CM

## 2018-10-21 PROCEDURE — 99282 EMERGENCY DEPT VISIT SF MDM: CPT

## 2018-10-21 PROCEDURE — 71046 X-RAY EXAM CHEST 2 VIEWS: CPT

## 2018-10-21 RX ORDER — NAPROXEN 500 MG/1
500 TABLET ORAL 2 TIMES DAILY WITH MEALS
COMMUNITY
End: 2019-02-04

## 2018-10-21 RX ORDER — BENZONATATE 100 MG/1
100 CAPSULE ORAL
Qty: 30 CAP | Refills: 0 | Status: SHIPPED | OUTPATIENT
Start: 2018-10-21 | End: 2018-10-28

## 2018-10-21 RX ORDER — AMOXICILLIN AND CLAVULANATE POTASSIUM 875; 125 MG/1; MG/1
1 TABLET, FILM COATED ORAL 2 TIMES DAILY
Qty: 20 TAB | Refills: 0 | Status: SHIPPED | OUTPATIENT
Start: 2018-10-21 | End: 2019-02-04

## 2018-10-21 NOTE — ED PROVIDER NOTES
EMERGENCY DEPARTMENT HISTORY AND PHYSICAL EXAM 
 
Date: 10/21/2018 Patient Name: Heriberto Miller History of Presenting Illness Chief Complaint Patient presents with  Cough  Sinus Pain History Provided By:patient Chief Complaint: cough Duration:few days Timing:acute Location: chest 
Quality:productive Severity: moderate Modifying Factors: sudafed has not alleviated the sx Associated Symptoms:  
 
 
Additional History (Context): Heriberto Miller is a 52 y.o. female with PMH seizures and carpal tunnel who presents with complaints of a productive cough, sinus pressure/pain, and congestion x a few days. States tx with sudafed has not alleviated the sx. No other complaints. PCP: Vanesa Francisco NP Current Outpatient Medications Medication Sig Dispense Refill  naproxen (NAPROSYN) 500 mg tablet Take 500 mg by mouth two (2) times daily (with meals).  methocarbamol (ROBAXIN-750) 750 mg tablet Take 1 Tab by mouth three (3) times daily. 60 Tab 0  carBAMazepine (TEGRETOL) 200 mg tablet One tab in AM, one tab at lunch, and two tabs before bed 90 Tab 0 Past History Past Medical History: 
Past Medical History:  
Diagnosis Date  Bronchitis  Carpal tunnel syndrome on right  Chronic bronchitis ([address]  De Quervain's syndrome (tenosynovitis)  Depression   
 resolved  Ectopic pregnancy   
 two times  Epilepsy ([address]  Seizure ([address]  Sinus infection  Stenosing tenosynovitis of thumb Right side  Trigger thumb of left hand Past Surgical History: 
Past Surgical History:  
Procedure Laterality Date  HX APPENDECTOMY  1/12/15  
 lysis of adhesions  HX CHOLECYSTECTOMY  HX HYSTERECTOMY  HX OTHER SURGICAL    
 scar tissue removed from intestines  HX PARTIAL HYSTERECTOMY  11/2009  HX TONSILLECTOMY Family History: 
Family History Problem Relation Age of Onset  Heart Attack Brother  in 62s  Diabetes Father  Hypertension Father  Cancer Father   
     colon CA  
 Heart Disease Sister   
     stent placement  Hypertension Brother  Other Brother Stomach problems/ulcers  Cancer Sister Breast CA  Stroke Sister  Diabetes Mother  Hypertension Mother Social History: 
Social History Tobacco Use  Smoking status: Current Every Day Smoker Packs/day: 0.50 Types: Cigarettes  Smokeless tobacco: Never Used Substance Use Topics  Alcohol use: No  
 Drug use: No  
 
 
Allergies: Allergies Allergen Reactions  Cyclobenzaprine Other (comments)  
  headache  Etodolac Other (comments)  
  headache Review of Systems Review of Systems Constitutional: Positive for chills. Negative for fever. HENT: Positive for congestion, sinus pressure and sinus pain. Negative for ear pain and rhinorrhea. Eyes: Negative. Negative for pain and redness. Respiratory: Positive for cough. Negative for shortness of breath, wheezing and stridor. Cardiovascular: Negative. Negative for chest pain and leg swelling. Gastrointestinal: Negative. Negative for abdominal pain, constipation, diarrhea, nausea and vomiting. Genitourinary: Negative. Negative for dysuria and frequency. Musculoskeletal: Positive for myalgias. Negative for back pain and neck pain. Skin: Negative. Negative for rash and wound. Neurological: Negative. Negative for dizziness, seizures, syncope and headaches. All other systems reviewed and are negative. All Other Systems Negative Physical Exam  
 
Vitals:  
 10/21/18 6510 BP: 131/62 Pulse: 66 Resp: 20 Temp: 99.3 °F (37.4 °C) SpO2: 100% Weight: 78.9 kg (174 lb) Height: 5' 3\" (1.6 m) Physical Exam  
Constitutional: She is oriented to person, place, and time. She appears well-developed and well-nourished. She appears distressed.   
HENT:  
 Head: Normocephalic and atraumatic. Bilateral nasal congestion and TTP noted to the bilateral maxillary sinuses Eyes: Conjunctivae are normal. Right eye exhibits no discharge. Left eye exhibits no discharge. No scleral icterus. Neck: Normal range of motion. Neck supple. Cardiovascular: Normal rate, regular rhythm and normal heart sounds. Exam reveals no gallop and no friction rub. No murmur heard. Pulmonary/Chest: Effort normal and breath sounds normal. No stridor. No respiratory distress. She has no wheezes. She has no rales. Musculoskeletal: Normal range of motion. Lymphadenopathy:  
  She has no cervical adenopathy. Neurological: She is alert and oriented to person, place, and time. Coordination normal.  
Gait is steady. Able to ambulate without difficulty. Skin: Skin is warm and dry. No rash noted. She is not diaphoretic. No erythema. Psychiatric: She has a normal mood and affect. Her behavior is normal. Thought content normal.  
Nursing note and vitals reviewed. Diagnostic Study Results Labs - No results found for this or any previous visit (from the past 12 hour(s)). Radiologic Studies -  
XR CHEST PA LAT    (Results Pending) CT Results  (Last 48 hours) None CXR Results  (Last 48 hours) None Medical Decision Making I am the first provider for this patient. I reviewed the vital signs, available nursing notes, past medical history, past surgical history, family history and social history. Vital Signs-Reviewed the patient's vital signs. Records Reviewed:  
Betty Yang PA-C 10:47 AM  
 
Procedures: 
Procedures Provider Notes (Medical Decision Making): Impression:  Cough, sinusitis MED RECONCILIATION: 
No current facility-administered medications for this encounter. Current Outpatient Medications Medication Sig  
 naproxen (NAPROSYN) 500 mg tablet Take 500 mg by mouth two (2) times daily (with meals).  methocarbamol (ROBAXIN-750) 750 mg tablet Take 1 Tab by mouth three (3) times daily.  carBAMazepine (TEGRETOL) 200 mg tablet One tab in AM, one tab at lunch, and two tabs before bed Disposition: D/c 
 
DISCHARGE NOTE:  
 
Pt has been reexamined. Patient has no new complaints, changes, or physical findings. Care plan outlined and precautions discussed. Results of the x-ray were reviewed with the patient. All medications were reviewed with the patient; will d/c home with augmentin and tessalon. All of pt's questions and concerns were addressed. Patient was instructed and agrees to follow up with PCP, as well as to return to the ED upon further deterioration. Patient is ready to go home. Betty Yang PA-C 10:48 AM  
 
 
Diagnosis Clinical Impression: sinusitis, cough

## 2018-10-21 NOTE — DISCHARGE INSTRUCTIONS
Cough: Care Instructions  Your Care Instructions    A cough is your body's response to something that bothers your throat or airways. Many things can cause a cough. You might cough because of a cold or the flu, bronchitis, or asthma. Smoking, postnasal drip, allergies, and stomach acid that backs up into your throat also can cause coughs. A cough is a symptom, not a disease. Most coughs stop when the cause, such as a cold, goes away. You can take a few steps at home to cough less and feel better. Follow-up care is a key part of your treatment and safety. Be sure to make and go to all appointments, and call your doctor if you are having problems. It's also a good idea to know your test results and keep a list of the medicines you take. How can you care for yourself at home? · Drink lots of water and other fluids. This helps thin the mucus and soothes a dry or sore throat. Honey or lemon juice in hot water or tea may ease a dry cough. · Take cough medicine as directed by your doctor. · Prop up your head on pillows to help you breathe and ease a dry cough. · Try cough drops to soothe a dry or sore throat. Cough drops don't stop a cough. Medicine-flavored cough drops are no better than candy-flavored drops or hard candy. · Do not smoke. Avoid secondhand smoke. If you need help quitting, talk to your doctor about stop-smoking programs and medicines. These can increase your chances of quitting for good. When should you call for help? Call 911 anytime you think you may need emergency care.  For example, call if:    · You have severe trouble breathing.    Call your doctor now or seek immediate medical care if:    · You cough up blood.     · You have new or worse trouble breathing.     · You have a new or higher fever.     · You have a new rash.    Watch closely for changes in your health, and be sure to contact your doctor if:    · You cough more deeply or more often, especially if you notice more mucus or a change in the color of your mucus.     · You have new symptoms, such as a sore throat, an earache, or sinus pain.     · You do not get better as expected. Where can you learn more? Go to http://sylvie-angela.info/. Enter D279 in the search box to learn more about \"Cough: Care Instructions. \"  Current as of: 2017  Content Version: 11.8  © 3119-4282 [employer]. Care instructions adapted under license by [employer] (which disclaims liability or warranty for this information). If you have questions about a medical condition or this instruction, always ask your healthcare professional. [address] any warranty or liability for your use of this information. [employer] Activation    Thank you for requesting access to [employer]. Please follow the instructions below to securely access and download your online medical record. [employer] allows you to send messages to your doctor, view your test results, renew your prescriptions, schedule appointments, and more. How Do I Sign Up? 1. In your internet browser, go to [email or web address]  2. Click on the First Time User? Click Here link in the Sign In box. You will be redirect to the New Member Sign Up page. 3. Enter your [employer] Access Code exactly as it appears below. You will not need to use this code after youve completed the sign-up process. If you do not sign up before the expiration date, you must request a new code. [employer] Access Code: [email or web address]  Expires: 2018  5:22 PM (This is the date your [employer] access code will )    4. Enter the last four digits of your Social Security Number (xxxx) and Date of Birth (mm/dd/yyyy) as indicated and click Submit. You will be taken to the next sign-up page. 5. Create a [employer] ID. This will be your [employer] login ID and cannot be changed, so think of one that is secure and easy to remember. 6. Create a [employer] password.  You can change your password at any time. 7. Enter your Password Reset Question and Answer. This can be used at a later time if you forget your password. 8. Enter your e-mail address. You will receive e-mail notification when new information is available in [address] 9. Click Sign Up. You can now view and download portions of your medical record. 10. Click the Download Summary menu link to download a portable copy of your medical information. Additional Information    If you have questions, please visit the Frequently Asked Questions section of the [employer] website at [email or web address] Remember, [employer] is NOT to be used for urgent needs. For medical emergencies, dial 911.

## 2018-10-21 NOTE — LETTER
NOTIFICATION OF RETURN TO WORK / SCHOOL 
10/21/2018 Ms. Minal Mccallum [address] [facility] [id number] To Whom It May Concern: 
 
Minal Mccallum was seen in the ED on 10/21/18 and may be excused from work for 2 days. Sincerely, Betty Ayala [facility]

## 2018-10-21 NOTE — ED NOTES
I have reviewed discharge instructions with the patient. The patient verbalized understanding. Current Discharge Medication List  
  
START taking these medications Details  
benzonatate (TESSALON PERLES) 100 mg capsule Take 1 Cap by mouth three (3) times daily as needed for Cough for up to 7 days. Qty: 30 Cap, Refills: 0  
  
amoxicillin-clavulanate (AUGMENTIN) 875-125 mg per tablet Take 1 Tab by mouth two (2) times a day. Qty: 20 Tab, Refills: 0 Patient armband removed and shredded

## 2018-10-31 ENCOUNTER — HOSPITAL ENCOUNTER (EMERGENCY)
Age: 53
Discharge: HOME | End: 2018-10-31
Attending: EMERGENCY MEDICINE
Payer: COMMERCIAL

## 2018-10-31 VITALS
RESPIRATION RATE: 16 BRPM | DIASTOLIC BLOOD PRESSURE: 83 MMHG | HEART RATE: 70 BPM | OXYGEN SATURATION: 99 % | TEMPERATURE: 98.3 F | SYSTOLIC BLOOD PRESSURE: 152 MMHG

## 2018-10-31 DIAGNOSIS — R05.9 COUGH: Primary | ICD-10-CM

## 2018-10-31 PROCEDURE — 99282 EMERGENCY DEPT VISIT SF MDM: CPT

## 2018-10-31 RX ORDER — CODEINE PHOSPHATE AND GUAIFENESIN 10; 100 MG/5ML; MG/5ML
SOLUTION ORAL
Qty: 120 ML | Refills: 0 | Status: SHIPPED | OUTPATIENT
Start: 2018-10-31 | End: 2019-02-04

## 2018-10-31 NOTE — LETTER
[address] [place] [address] [facility] EMERGENCY DEPT 
[address] [facility] [address] 
[phone] Work/School Note Date: 10/31/2018 To Whom It May concern: 
 
Petar Palacio was seen and treated today in the emergency room by the following provider(s): 
Attending Provider: Tiffanie Prajapati MD 
Physician Assistant: Will Wong. Petar Palacio may return to work on 11/01/2018.  
 
Sincerely, 
 
 
 
 
Naty Pringle RN

## 2018-11-01 NOTE — ED TRIAGE NOTES
Pt. States \"I have a chest cold; it started off as a sinus infection. I got a lot of cold in my chest\" reports symptoms started about a week ago.

## 2018-11-01 NOTE — ED PROVIDER NOTES
EMERGENCY DEPARTMENT HISTORY AND PHYSICAL EXAM 
 
Date: 10/31/2018 Patient Name: Lyndon Cole History of Presenting Illness Chief Complaint Patient presents with  Cold Symptoms  Cough History Provided By: Patient Chief Complaint: cough Duration: 1 Weeks Timing:  Constant Location: chest 
Quality: n/a Severity: Moderate Modifying Factors: finished abx for sinus infection; taking tessalon perles w/o relief of cough Associated Symptoms: denies any other associated signs or symptoms Additional History (Context): Lyndon Cole is a 52 y.o. female with No significant past medical history who presents with cough. Seen at [facility] 10 days ago for sinus infection and is on last dose of Augmentin today. States has a constant cough which is now clear (initially was yellow sputum). No fever, chills, N/V, CP, SOB, sinus pain or any other symptoms. Pt states she has not had anything else for cough other than tessalon perles which are not working. PCP: Harrison Bernard NP Current Outpatient Medications Medication Sig Dispense Refill  guaiFENesin-codeine (ROBITUSSIN AC) 100-10 mg/5 mL solution 5mL every 4 hours prn 120 mL 0  
 naproxen (NAPROSYN) 500 mg tablet Take 500 mg by mouth two (2) times daily (with meals).  amoxicillin-clavulanate (AUGMENTIN) 875-125 mg per tablet Take 1 Tab by mouth two (2) times a day. 20 Tab 0  
 methocarbamol (ROBAXIN-750) 750 mg tablet Take 1 Tab by mouth three (3) times daily. 60 Tab 0  carBAMazepine (TEGRETOL) 200 mg tablet One tab in AM, one tab at lunch, and two tabs before bed 90 Tab 0 Past History Past Medical History: 
Past Medical History:  
Diagnosis Date  Bronchitis  Carpal tunnel syndrome on right  Chronic bronchitis ([address]  De Quervain's syndrome (tenosynovitis)  Depression   
 resolved  Ectopic pregnancy   
 two times  Epilepsy ([address]  Seizure ([address]  Sinus infection  Stenosing tenosynovitis of thumb Right side  Trigger thumb of left hand Past Surgical History: 
Past Surgical History:  
Procedure Laterality Date  HX APPENDECTOMY  1/12/15  
 lysis of adhesions  HX CHOLECYSTECTOMY  HX HYSTERECTOMY  HX OTHER SURGICAL    
 scar tissue removed from intestines  HX PARTIAL HYSTERECTOMY  2009  HX TONSILLECTOMY Family History: 
Family History Problem Relation Age of Onset  Heart Attack Brother   
      in 62s  Diabetes Father  Hypertension Father  Cancer Father   
     colon CA  
 Heart Disease Sister   
     stent placement  Hypertension Brother  Other Brother Stomach problems/ulcers  Cancer Sister Breast CA  Stroke Sister  Diabetes Mother  Hypertension Mother Social History: 
Social History Tobacco Use  Smoking status: Current Every Day Smoker Packs/day: 0.50 Types: Cigarettes  Smokeless tobacco: Never Used Substance Use Topics  Alcohol use: No  
 Drug use: No  
 
 
Allergies: Allergies Allergen Reactions  Cyclobenzaprine Other (comments)  
  headache  Etodolac Other (comments)  
  headache Review of Systems Review of Systems Constitutional: Negative for chills and fever. HENT: Negative. Respiratory: Positive for cough. Negative for shortness of breath and wheezing. Cardiovascular: Negative. Neurological: Negative. All other systems reviewed and are negative. All Other Systems Negative Physical Exam  
 
Vitals:  
 10/31/18 2027 BP: 152/83 Pulse: 70 Resp: 16 Temp: 98.3 °F (36.8 °C) SpO2: 99% Physical Exam  
Constitutional: She appears well-developed and well-nourished. No distress. HENT:  
Head: Normocephalic and atraumatic.   
Right Ear: External ear normal.  
Left Ear: External ear normal.  
Mouth/Throat: Oropharynx is clear and moist.  
Eyes: Conjunctivae are normal.  
 Neck: Normal range of motion. Neck supple. Cardiovascular: Normal rate and regular rhythm. Pulmonary/Chest: Effort normal and breath sounds normal. She has no wheezes. Musculoskeletal: Normal range of motion. Skin: Skin is warm and dry. She is not diaphoretic. Psychiatric: She has a normal mood and affect. Diagnostic Study Results Labs - No results found for this or any previous visit (from the past 12 hour(s)). Radiologic Studies - No orders to display CT Results  (Last 48 hours) None CXR Results  (Last 48 hours) None Medical Decision Making I am the first provider for this patient. I reviewed the vital signs, available nursing notes, past medical history, past surgical history, family history and social history. Vital Signs-Reviewed the patient's vital signs. Pulse Oximetry Analysis - 99% on RA Records Reviewed: Nursing Notes Procedures: 
Procedures Provider Notes (Medical Decision Making): pt c/o cough x 1 week initially yellow sputum but this has cleared up after taking augmentin. Pt afebrile, lungs cta bilat. Will give robitussin ac for cough. Advised to drink plenty of fluids, pcp f/u. MED RECONCILIATION: 
No current facility-administered medications for this encounter. Current Outpatient Medications Medication Sig  
 guaiFENesin-codeine (ROBITUSSIN AC) 100-10 mg/5 mL solution 5mL every 4 hours prn  
 naproxen (NAPROSYN) 500 mg tablet Take 500 mg by mouth two (2) times daily (with meals).  amoxicillin-clavulanate (AUGMENTIN) 875-125 mg per tablet Take 1 Tab by mouth two (2) times a day.  methocarbamol (ROBAXIN-750) 750 mg tablet Take 1 Tab by mouth three (3) times daily.  carBAMazepine (TEGRETOL) 200 mg tablet One tab in AM, one tab at lunch, and two tabs before bed Disposition: D/c to home DISCHARGE NOTE:  
 
Pt has been reexamined.   Patient has no new complaints, changes, or physical findings. Care plan outlined and precautions discussed. All medications were reviewed with the patient; will d/c home with robitussin ac. All of pt's questions and concerns were addressed. Patient was instructed and agrees to follow up with pcp, as well as to return to the ED upon further deterioration. Patient is ready to go home. Follow-up Information Follow up With Specialties Details Why Contact Info Kashif Ricardo NP Family [address] [place] [id number] [phone] Current Discharge Medication List  
  
START taking these medications Details  
guaiFENesin-codeine (ROBITUSSIN AC) 100-10 mg/5 mL solution 5mL every 4 hours prn 
Qty: 120 mL, Refills: 0 Associated Diagnoses: Cough CONTINUE these medications which have NOT CHANGED Details  
naproxen (NAPROSYN) 500 mg tablet Take 500 mg by mouth two (2) times daily (with meals). amoxicillin-clavulanate (AUGMENTIN) 875-125 mg per tablet Take 1 Tab by mouth two (2) times a day. Qty: 20 Tab, Refills: 0  
  
methocarbamol (ROBAXIN-750) 750 mg tablet Take 1 Tab by mouth three (3) times daily. Qty: 60 Tab, Refills: 0 Associated Diagnoses: Back muscle spasm; Osteoarthritis of thoracic spine with myelopathy  
  
carBAMazepine (TEGRETOL) 200 mg tablet One tab in AM, one tab at lunch, and two tabs before bed 
Qty: 90 Tab, Refills: 0 Associated Diagnoses: Recurrent seizures ([facility] Diagnosis Clinical Impression: 1. Cough

## 2018-11-01 NOTE — DISCHARGE INSTRUCTIONS
Cough: Care Instructions  Your Care Instructions    A cough is your body's response to something that bothers your throat or airways. Many things can cause a cough. You might cough because of a cold or the flu, bronchitis, or asthma. Smoking, postnasal drip, allergies, and stomach acid that backs up into your throat also can cause coughs. A cough is a symptom, not a disease. Most coughs stop when the cause, such as a cold, goes away. You can take a few steps at home to cough less and feel better. Follow-up care is a key part of your treatment and safety. Be sure to make and go to all appointments, and call your doctor if you are having problems. It's also a good idea to know your test results and keep a list of the medicines you take. How can you care for yourself at home? · Drink lots of water and other fluids. This helps thin the mucus and soothes a dry or sore throat. Honey or lemon juice in hot water or tea may ease a dry cough. · Take cough medicine as directed by your doctor. · Prop up your head on pillows to help you breathe and ease a dry cough. · Try cough drops to soothe a dry or sore throat. Cough drops don't stop a cough. Medicine-flavored cough drops are no better than candy-flavored drops or hard candy. · Do not smoke. Avoid secondhand smoke. If you need help quitting, talk to your doctor about stop-smoking programs and medicines. These can increase your chances of quitting for good. When should you call for help? Call 911 anytime you think you may need emergency care.  For example, call if:    · You have severe trouble breathing.    Call your doctor now or seek immediate medical care if:    · You cough up blood.     · You have new or worse trouble breathing.     · You have a new or higher fever.     · You have a new rash.    Watch closely for changes in your health, and be sure to contact your doctor if:    · You cough more deeply or more often, especially if you notice more mucus or a change in the color of your mucus.     · You have new symptoms, such as a sore throat, an earache, or sinus pain.     · You do not get better as expected. Where can you learn more? Go to http://sylvie-angela.info/. Enter D279 in the search box to learn more about \"Cough: Care Instructions. \"  Current as of: December 6, 2017  Content Version: 11.8  © 6320-3540 [employer]. Care instructions adapted under license by [employer] (which disclaims liability or warranty for this information). If you have questions about a medical condition or this instruction, always ask your healthcare professional. [address] any warranty or liability for your use of this information.

## 2019-02-04 ENCOUNTER — HOSPITAL ENCOUNTER (EMERGENCY)
Age: 54
Discharge: HOME | End: 2019-02-04
Attending: EMERGENCY MEDICINE
Payer: COMMERCIAL

## 2019-02-04 VITALS
BODY MASS INDEX: 31.24 KG/M2 | OXYGEN SATURATION: 100 % | HEIGHT: 64 IN | SYSTOLIC BLOOD PRESSURE: 154 MMHG | RESPIRATION RATE: 27 BRPM | HEART RATE: 59 BPM | TEMPERATURE: 97.5 F | WEIGHT: 183 LBS | DIASTOLIC BLOOD PRESSURE: 89 MMHG

## 2019-02-04 DIAGNOSIS — S16.1XXA STRAIN OF NECK MUSCLE, INITIAL ENCOUNTER: Primary | ICD-10-CM

## 2019-02-04 DIAGNOSIS — M54.2 ACUTE NECK PAIN: ICD-10-CM

## 2019-02-04 LAB
ALBUMIN SERPL-MCNC: 3.9 G/DL (ref 3.4–5)
ALBUMIN/GLOB SERPL: 1 {RATIO} (ref 0.8–1.7)
ALP SERPL-CCNC: 95 U/L (ref 45–117)
ALT SERPL-CCNC: 24 U/L (ref 13–56)
ANION GAP SERPL CALC-SCNC: 3 MMOL/L (ref 3–18)
AST SERPL-CCNC: 15 U/L (ref 15–37)
ATRIAL RATE: 60 BPM
BASOPHILS # BLD: 0 K/UL (ref 0–0.1)
BASOPHILS NFR BLD: 0 % (ref 0–2)
BILIRUB SERPL-MCNC: 0.2 MG/DL (ref 0.2–1)
BUN SERPL-MCNC: 16 MG/DL (ref 7–18)
BUN/CREAT SERPL: 21 (ref 12–20)
CALCIUM SERPL-MCNC: 8.6 MG/DL (ref 8.5–10.1)
CALCULATED P AXIS, ECG09: 60 DEGREES
CALCULATED R AXIS, ECG10: 39 DEGREES
CALCULATED T AXIS, ECG11: 57 DEGREES
CHLORIDE SERPL-SCNC: 104 MMOL/L (ref 100–108)
CK MB CFR SERPL CALC: NORMAL % (ref 0–4)
CK MB SERPL-MCNC: <1 NG/ML (ref 5–25)
CK SERPL-CCNC: 125 U/L (ref 26–192)
CO2 SERPL-SCNC: 28 MMOL/L (ref 21–32)
CREAT SERPL-MCNC: 0.75 MG/DL (ref 0.6–1.3)
DIAGNOSIS, 93000: NORMAL
DIFFERENTIAL METHOD BLD: NORMAL
EOSINOPHIL # BLD: 0.1 K/UL (ref 0–0.4)
EOSINOPHIL NFR BLD: 3 % (ref 0–5)
ERYTHROCYTE [DISTWIDTH] IN BLOOD BY AUTOMATED COUNT: 13.5 % (ref 11.6–14.5)
GLOBULIN SER CALC-MCNC: 4 G/DL (ref 2–4)
GLUCOSE SERPL-MCNC: 84 MG/DL (ref 74–99)
HCT VFR BLD AUTO: 36.3 % (ref 35–45)
HGB BLD-MCNC: 12.5 G/DL (ref 12–16)
LYMPHOCYTES # BLD: 2 K/UL (ref 0.9–3.6)
LYMPHOCYTES NFR BLD: 38 % (ref 21–52)
MAGNESIUM SERPL-MCNC: 2.3 MG/DL (ref 1.6–2.6)
MCH RBC QN AUTO: 28.2 PG (ref 24–34)
MCHC RBC AUTO-ENTMCNC: 34.4 G/DL (ref 31–37)
MCV RBC AUTO: 81.8 FL (ref 74–97)
MONOCYTES # BLD: 0.3 K/UL (ref 0.05–1.2)
MONOCYTES NFR BLD: 5 % (ref 3–10)
NEUTS SEG # BLD: 2.8 K/UL (ref 1.8–8)
NEUTS SEG NFR BLD: 54 % (ref 40–73)
P-R INTERVAL, ECG05: 138 MS
PLATELET # BLD AUTO: 247 K/UL (ref 135–420)
PMV BLD AUTO: 9.2 FL (ref 9.2–11.8)
POTASSIUM SERPL-SCNC: 4.1 MMOL/L (ref 3.5–5.5)
PROT SERPL-MCNC: 7.9 G/DL (ref 6.4–8.2)
Q-T INTERVAL, ECG07: 460 MS
QRS DURATION, ECG06: 72 MS
QTC CALCULATION (BEZET), ECG08: 460 MS
RBC # BLD AUTO: 4.44 M/UL (ref 4.2–5.3)
SODIUM SERPL-SCNC: 135 MMOL/L (ref 136–145)
TROPONIN I SERPL-MCNC: <0.02 NG/ML (ref 0–0.04)
VENTRICULAR RATE, ECG03: 60 BPM
WBC # BLD AUTO: 5.2 K/UL (ref 4.6–13.2)

## 2019-02-04 PROCEDURE — 80053 COMPREHEN METABOLIC PANEL: CPT

## 2019-02-04 PROCEDURE — 83735 ASSAY OF MAGNESIUM: CPT

## 2019-02-04 PROCEDURE — 82553 CREATINE MB FRACTION: CPT

## 2019-02-04 PROCEDURE — 93005 ELECTROCARDIOGRAM TRACING: CPT

## 2019-02-04 PROCEDURE — 74011250637 HC RX REV CODE- 250/637: Performed by: EMERGENCY MEDICINE

## 2019-02-04 PROCEDURE — 85025 COMPLETE CBC W/AUTO DIFF WBC: CPT

## 2019-02-04 PROCEDURE — 99284 EMERGENCY DEPT VISIT MOD MDM: CPT

## 2019-02-04 RX ORDER — NAPROXEN 500 MG/1
500 TABLET ORAL 2 TIMES DAILY WITH MEALS
Qty: 20 TAB | Refills: 0 | Status: SHIPPED | OUTPATIENT
Start: 2019-02-04 | End: 2019-02-14

## 2019-02-04 RX ORDER — ACETAMINOPHEN AND CODEINE PHOSPHATE 300; 30 MG/1; MG/1
1 TABLET ORAL
Status: COMPLETED | OUTPATIENT
Start: 2019-02-04 | End: 2019-02-04

## 2019-02-04 RX ORDER — ACETAMINOPHEN AND CODEINE PHOSPHATE 300; 30 MG/1; MG/1
1 TABLET ORAL
Qty: 5 TAB | Refills: 0 | Status: SHIPPED | OUTPATIENT
Start: 2019-02-04 | End: 2019-02-05 | Stop reason: SINTOL

## 2019-02-04 RX ORDER — METHOCARBAMOL 500 MG/1
750 TABLET, FILM COATED ORAL
Status: COMPLETED | OUTPATIENT
Start: 2019-02-04 | End: 2019-02-04

## 2019-02-04 RX ADMIN — ACETAMINOPHEN AND CODEINE PHOSPHATE 1 TABLET: 300; 30 TABLET ORAL at 08:35

## 2019-02-04 RX ADMIN — METHOCARBAMOL 750 MG: 500 TABLET, FILM COATED ORAL at 08:35

## 2019-02-04 NOTE — DISCHARGE INSTRUCTIONS
Patient Education        Neck Strain: Care Instructions  Your Care Instructions    You have strained the muscles and ligaments in your neck. A sudden, awkward movement can strain the neck. This often occurs with falls or car accidents or during certain sports. Everyday activities like working on a computer or sleeping can also cause neck strain if they force you to hold your neck in an awkward position for a long time. It is common for neck pain to get worse for a day or two after an injury, but it should start to feel better after that. You may have more pain and stiffness for several days before it gets better. This is expected. It may take a few weeks or longer for it to heal completely. Good home treatment can help you get better faster and avoid future neck problems. Follow-up care is a key part of your treatment and safety. Be sure to make and go to all appointments, and call your doctor if you are having problems. It's also a good idea to know your test results and keep a list of the medicines you take. How can you care for yourself at home? · If you were given a neck brace (cervical collar) to limit neck motion, wear it as instructed for as many days as your doctor tells you to. Do not wear it longer than you were told to. Wearing a brace for too long can make neck stiffness worse and weaken the neck muscles. · You can try using heat or ice to see if it helps. ? Try using a heating pad on a low or medium setting for 15 to 20 minutes every 2 to 3 hours. Try a warm shower in place of one session with the heating pad. You can also buy single-use heat wraps that last up to 8 hours. ? You can also try an ice pack for 10 to 15 minutes every 2 to 3 hours. · Take pain medicines exactly as directed. ? If the doctor gave you a prescription medicine for pain, take it as prescribed. ? If you are not taking a prescription pain medicine, ask your doctor if you can take an over-the-counter medicine.   · Gently rub the area to relieve pain and help with blood flow. Do not massage the area if it hurts to do so. · Do not do anything that makes the pain worse. Take it easy for a couple of days. You can do your usual activities if they do not hurt your neck or put it at risk for more stress or injury. · Try sleeping on a special neck pillow. Place it under your neck, not under your head. Placing a tightly rolled-up towel under your neck while you sleep will also work. If you use a neck pillow or rolled towel, do not use your regular pillow at the same time. · To prevent future neck pain, do exercises to stretch and strengthen your neck and back. Learn how to use good posture, safe lifting techniques, and proper body mechanics. When should you call for help? Call 911 anytime you think you may need emergency care. For example, call if:    · You are unable to move an arm or a leg at all.   [facility] your doctor now or seek immediate medical care if:    · You have new or worse symptoms in your arms, legs, chest, belly, or buttocks. Symptoms may include:  ? Numbness or tingling. ? Weakness. ? Pain.     · You lose bladder or bowel control.    Watch closely for changes in your health, and be sure to contact your doctor if:    · You are not getting better as expected. Where can you learn more? Go to http://sylvie-angela.info/. Enter M253 in the search box to learn more about \"Neck Strain: Care Instructions. \"  Current as of: September 20, 2018  Content Version: 11.9  © 2542-4169 [employer], [employer]orporated. Care instructions adapted under license by [employer] (which disclaims liability or warranty for this information). If you have questions about a medical condition or this instruction, always ask your healthcare professional. [place] [address] any warranty or liability for your use of this information.          Patient Education        Neck Strain or Sprain: Rehab Exercises  Your Care Instructions  Here are some examples of typical rehabilitation exercises for your condition. Start each exercise slowly. Ease off the exercise if you start to have pain. Your doctor or physical therapist will tell you when you can start these exercises and which ones will work best for you. How to do the exercises  Neck rotation    1. Sit in a firm chair, or stand up straight. 2. Keeping your chin level, turn your head to the right, and hold for 15 to 30 seconds. 3. Turn your head to the left and hold for 15 to 30 seconds. 4. Repeat 2 to 4 times to each side. Neck stretches    1. Look straight ahead, and tip your right ear to your right shoulder. Do not let your left shoulder rise up as you tip your head to the right. 2. Hold for 15 to 30 seconds. 3. Tilt your head to the left. Do not let your right shoulder rise up as you tip your head to the left. 4. Hold for 15 to 30 seconds. 5. Repeat 2 to 4 times to each side. Forward neck flexion    1. Sit in a firm chair, or stand up straight. 2. Bend your head forward. 3. Hold for 15 to 30 seconds. 4. Repeat 2 to 4 times. Lateral (side) bend strengthening    1. With your right hand, place your first two fingers on your right temple. 2. Start to bend your head to the side while using gentle pressure from your fingers to keep your head from bending. 3. Hold for about 6 seconds. 4. Repeat 8 to 12 times. 5. Switch hands and repeat the same exercise on your left side. Forward bend strengthening    1. Place your first two fingers of either hand on your forehead. 2. Start to bend your head forward while using gentle pressure from your fingers to keep your head from bending. 3. Hold for about 6 seconds. 4. Repeat 8 to 12 times. Neutral position strengthening    1. Using one hand, place your fingertips on the back of your head at the top of your neck.   2. Start to bend your head backward while using gentle pressure from your fingers to keep your head from bending. 3. Hold for about 6 seconds. 4. Repeat 8 to 12 times. Chin tuck    1. Lie on the floor with a rolled-up towel under your neck. Your head should be touching the floor. 2. Slowly bring your chin toward your chest.  3. Hold for a count of 6, and then relax for up to 10 seconds. 4. Repeat 8 to 12 times. Follow-up care is a key part of your treatment and safety. Be sure to make and go to all appointments, and call your doctor if you are having problems. It's also a good idea to know your test results and keep a list of the medicines you take. Where can you learn more? Go to http://sylvie-angela.info/. Enter M679 in the search box to learn more about \"Neck Strain or Sprain: Rehab Exercises. \"  Current as of: September 20, 2018  Content Version: 11.9  © 5544-8912 [employer]. Care instructions adapted under license by [employer] (which disclaims liability or warranty for this information). If you have questions about a medical condition or this instruction, always ask your healthcare professional. [address] any warranty or liability for your use of this information.

## 2019-02-04 NOTE — LETTER
NOTIFICATION RETURN TO WORK / SCHOOL 
 
2/4/2019 10:56 AM 
 
Ms. Arnoldo Braun [address] [facility] [address] To Whom It May Concern: 
 
Arnoldo Braun is currently under the care of [facility] EM[facility]Y DEPT. She will return to work/school on: 2/05/2019 If there are questions or concerns please have the patient contact our office.  
 
 
 
Sincerely, 
 
 
America Sylvester, DO

## 2019-02-04 NOTE — ED TRIAGE NOTES
Pt c/o neck pain, believes it might be spasms in her neck. Pt states that her neck is throbbing and hurts worse after work. 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 Pt has hx of back spasms. 11/10 neck pain located at the center of her neck and stretches out to both shoulders. PT took advil for the pain, did not find any relief. Pt took flexeril as well with the pain pill. States it only worked when pt was relaxing at home laying down with her head elevated on neck pillow. When she gets up and is moving the medication does not work.

## 2019-02-04 NOTE — ED PROVIDER NOTES
EMERGENCY DEPARTMENT HISTORY AND PHYSICAL EXAM 
 
7:34 AM 
 
 
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 Relieved on its own when going to sleep. Associated Symptoms: back pain. no numbness or tingling in extremities. Additional History (Context): Agusto Casillas is a 52 y.o. female with a history of epilepsy presenting to the ED for the evaluation of intermittent neck spasms that she has had for the last few weeks. Patient states that the pain is sharp or throbbing in nature and is mostly located on the right posterior side of her neck. He notes that last night was when the pain was at its worst so she had to leave work early and came to the ED for further evaluation. 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 She also explains that the pain is exacerbated by turning her head to the left. It is relieved on its own when she lays in bed to go to sleep. Patient notes that her neck spasms started when she initially had back spasms 6 weeks ago. Patient was seen for it and given Flexeril and has also been taking Advil for pain control.  notes that the back spasms then radiated up to her neck 2-3 weeks afterwards. Patient notes that she has been taking the Flexeril and Advil but only with mild temporary relief of her neck pain/spasms. When sitting up, patient notes that she has to use a brace pillow due to the pain and also has to have her neck turned a certain way.  Denies any numbness or tingling to her extremities. No chest pain or shortness of breath. Patient does have a history of epilepsy and notes that she Tegretol for it. Has been compliant. Denies EtOH or drug usage but does admit to tobacco use. Reports family history of HTN, MI, stroke, diabetes. PCP: Rolando Fajardo NP Current Outpatient Medications Medication Sig Dispense Refill  acetaminophen-codeine (TYLENOL-CODEINE #3) 300-30 mg per tablet Take 1 Tab by mouth every four (4) hours as needed for Pain for up to 5 doses. Max Daily Amount: 6 Tabs. 5 Tab 0  
 naproxen (NAPROSYN) 500 mg tablet Take 1 Tab by mouth two (2) times daily (with meals) for 10 days. 20 Tab 0  
 methocarbamol (ROBAXIN-750) 750 mg tablet Take 1 Tab by mouth three (3) times daily. 60 Tab 0  carBAMazepine (TEGRETOL) 200 mg tablet One tab in AM, one tab at lunch, and two tabs before bed 90 Tab 0 Past History Past Medical History: 
Past Medical History:  
Diagnosis Date  Bronchitis  Carpal tunnel syndrome on right  Chronic bronchitis ([address]  De Quervain's syndrome (tenosynovitis)  Depression   
 resolved  Ectopic pregnancy   
 two times  Epilepsy ([address]  Seizure ([address]  Sinus infection  Stenosing tenosynovitis of thumb Right side  Trigger thumb of left hand Past Surgical History: 
Past Surgical History:  
Procedure Laterality Date  COLONOSCOPY N/A 10/21/2016 COLONOSCOPY w/ biopsy performed by Prosper Manzano MD at [address] HX APPENDECTOMY  1/12/15  
 lysis of adhesions  HX CHOLECYSTECTOMY  HX HYSTERECTOMY  HX OTHER SURGICAL    
 scar tissue removed from intestines  HX PARTIAL HYSTERECTOMY  2009  HX TONSILLECTOMY Family History: 
Family History Problem Relation Age of Onset  Heart Attack Brother   
      in 62s  Diabetes Father  Hypertension Father  Cancer Father   
     colon CA  
 Heart Disease Sister   
     stent placement  Hypertension Brother  Other Brother Stomach problems/ulcers  Cancer Sister Breast CA  Stroke Sister  Diabetes Mother  Hypertension Mother Social History: 
Social History Tobacco Use  Smoking status: Current Every Day Smoker Packs/day: 0.50 Types: Cigarettes  Smokeless tobacco: Never Used Substance Use Topics  Alcohol use: No  
 Drug use: No  
 
 
Allergies: Allergies Allergen Reactions  Cyclobenzaprine Other (comments)  
  headache  Etodolac Other (comments)  
  headache Review of Systems Review of Systems Constitutional: Negative for activity change, fatigue and fever. HENT: Negative for congestion and rhinorrhea. Eyes: Negative for visual disturbance. Respiratory: Negative for shortness of breath. Cardiovascular: Negative for chest pain and palpitations. Gastrointestinal: Negative for abdominal pain, diarrhea, nausea and vomiting. Genitourinary: Negative for dysuria and hematuria. Musculoskeletal: Positive for back pain and neck pain. Skin: Negative for rash. Neurological: Negative for dizziness, weakness, light-headedness and numbness. All other systems reviewed and are negative. Physical Exam  
 
Visit Vitals /57 (BP 1 Location: Left arm, BP Patient Position: At rest) Pulse 62 Temp 97.5 °F (36.4 °C) Resp 14 Ht 5' 4\" (1.626 m) Wt 83 kg (183 lb) SpO2 100% BMI 31.41 kg/m² Physical Exam  
Constitutional: She is oriented to person, place, and time. She appears well-developed and well-nourished. No distress. HENT:  
Head: Normocephalic and atraumatic. Right Ear: External ear normal.  
Left Ear: External ear normal.  
Nose: Nose normal.  
Mouth/Throat: Oropharynx is clear and moist.  
Eyes: Conjunctivae and EOM are normal. Pupils are equal, round, and reactive to light. No scleral icterus. Neck: Neck supple. No JVD present. No tracheal deviation present.  No thyromegaly present. Full active ROM with pain, B paraspinal muscle tenderness noted, no step off, no midline pain   
Cardiovascular: Normal rate, regular rhythm, normal heart sounds and intact distal pulses. Exam reveals no gallop and no friction rub. No murmur heard. Pulmonary/Chest: Effort normal and breath sounds normal. She exhibits no tenderness. Abdominal: Soft. Bowel sounds are normal. She exhibits no distension. There is no tenderness. There is no rebound and no guarding. Musculoskeletal: Normal range of motion. She exhibits no edema or tenderness. B upper thoracic paraspinal tenderness noted R>L Lymphadenopathy:  
  She has no cervical adenopathy. Neurological: She is alert and oriented to person, place, and time. No cranial nerve deficit. Coordination normal.  
No sensory loss, Gait normal, Motor 5/5 Skin: Skin is warm and dry. Psychiatric: She has a normal mood and affect. Her behavior is normal. Judgment and thought content normal.  
Supportive  at the bedside Nursing note and vitals reviewed. Diagnostic Study Results Labs - Recent Results (from the past 12 hour(s)) CBC WITH AUTOMATED DIFF Collection Time: 02/04/19  8:10 AM  
Result Value Ref Range WBC 5.2 4.6 - 13.2 K/uL  
 RBC 4.44 4.20 - 5.30 M/uL  
 HGB 12.5 12.0 - 16.0 g/dL HCT 36.3 35.0 - 45.0 % MCV 81.8 74.0 - 97.0 FL  
 MCH 28.2 24.0 - 34.0 PG  
 MCHC 34.4 31.0 - 37.0 g/dL  
 RDW 13.5 11.6 - 14.5 % PLATELET 810 189 - 700 K/uL MPV 9.2 9.2 - 11.8 FL  
 NEUTROPHILS 54 40 - 73 % LYMPHOCYTES 38 21 - 52 % MONOCYTES 5 3 - 10 % EOSINOPHILS 3 0 - 5 % BASOPHILS 0 0 - 2 %  
 ABS. NEUTROPHILS 2.8 1.8 - 8.0 K/UL  
 ABS. LYMPHOCYTES 2.0 0.9 - 3.6 K/UL  
 ABS. MONOCYTES 0.3 0.05 - 1.2 K/UL  
 ABS. EOSINOPHILS 0.1 0.0 - 0.4 K/UL  
 ABS. BASOPHILS 0.0 0.0 - 0.1 K/UL  
 DF AUTOMATED METABOLIC PANEL, COMPREHENSIVE Collection Time: 02/04/19  8:10 AM  
Result Value Ref Range Sodium 135 (L) 136 - 145 mmol/L Potassium 4.1 3.5 - 5.5 mmol/L Chloride 104 100 - 108 mmol/L  
 CO2 28 21 - 32 mmol/L Anion gap 3 3.0 - 18 mmol/L Glucose 84 74 - 99 mg/dL BUN 16 7.0 - 18 MG/DL Creatinine 0.75 0.6 - 1.3 MG/DL  
 BUN/Creatinine ratio 21 (H) 12 - 20 GFR est AA >60 >60 ml/min/1.73m2 GFR est non-AA >60 >60 ml/min/1.73m2 Calcium 8.6 8.5 - 10.1 MG/DL Bilirubin, total 0.2 0.2 - 1.0 MG/DL  
 ALT (SGPT) 24 13 - 56 U/L  
 AST (SGOT) 15 15 - 37 U/L Alk. phosphatase 95 45 - 117 U/L Protein, total 7.9 6.4 - 8.2 g/dL Albumin 3.9 3.4 - 5.0 g/dL Globulin 4.0 2.0 - 4.0 g/dL A-G Ratio 1.0 0.8 - 1.7 MAGNESIUM Collection Time: 02/04/19  8:10 AM  
Result Value Ref Range Magnesium 2.3 1.6 - 2.6 mg/dL CARDIAC PANEL,(CK, CKMB & TROPONIN) Collection Time: 02/04/19  8:10 AM  
Result Value Ref Range  26 - 192 U/L  
 CK - MB <1.0 <3.6 ng/ml CK-MB Index  0.0 - 4.0 % CALCULATION NOT PERFORMED WHEN RESULT IS BELOW LINEAR LIMIT Troponin-I, QT <0.02 0.0 - 0.045 NG/ML  
EKG, 12 LEAD, INITIAL Collection Time: 02/04/19  8:39 AM  
Result Value Ref Range Ventricular Rate 60 BPM  
 Atrial Rate 60 BPM  
 P-R Interval 138 ms QRS Duration 72 ms Q-T Interval 460 ms QTC Calculation (Bezet) 460 ms Calculated P Axis 60 degrees Calculated R Axis 39 degrees Calculated T Axis 57 degrees Diagnosis Normal sinus rhythm Normal ECG When compared with ECG of 27-FEB-2018 12:56, No significant change was found Confirmed by Vince Conti MD, --- ([id number]) on 2/4/2019 10:04:55 AM 
  
 
 
Radiologic Studies - No orders to display Medical Decision Making It should be noted that I, Rafael Nunez MD will be the provider of record for this patient. I reviewed the vital signs, available nursing notes, past medical history, past surgical history, family history and social history. Vital Signs-Reviewed the patient's vital signs. Pulse Oximetry Analysis -  99% RA (Interpretation) non-hypoxic Cardiac Monitor: 
Rate: 68 bpm 
Rhythm: NSR  
 
EKG: Interpreted by the EP. Time Interpreted: 8086 Rate: 60 bpm  
 Rhythm: NSR Interpretation: No STEMI. Records Reviewed: Nursing Notes (Time of Review: 7:34 AM) Provider Notes (Medical Decision Making): MDM Number of Diagnoses or Management Options Diagnosis management comments: Pt is a 49yo female with a hx of recurrent thoracic back pain followed by PMD, depression, seizure disorder on tegretol, CTS presents to the ED with increasing neck pain that worsens at work. Pt has full but painful ROM. Suspect this is muscle strain but will screen with EKG, one set of cardiac markers but pain has been constant for over a week, lytes, renal function, pain control then reevaluate. Consuelo Rodriguez DO 8:53 AM 
 
 
 
 
ED Course: Progress Notes, Reevaluation, and Consults: 
Pt is feeling much better and was able to sleep. Labs and EKG are reassuring. Suspect musculoskeletal pain and will refer to the spine center and increase pain control and return if at all worsened or concerned. Will give flexeril as she has robaxin at home without relief. Consuelo Rodriguez DO 10:39 AM 
 
 
Diagnosis Clinical Impression: 1. Strain of neck muscle, initial encounter 2. Acute neck pain Disposition: Discharge Follow-up Information Follow up With Specialties Details Why Contact Info Camelia Gill NP Nurse Practitioner In 2 days  [address] [facility] [address] [facility] [address] [phone] Lisandro Aguirre MD Orthopedic Surgery Schedule an appointment as soon as possible for a visit in 1 week  [place] [address] [address] [facility] [address] [phone] Medication List  
  
START taking these medications   
acetaminophen-codeine 300-30 mg per tablet Commonly known as:  TYLENOL-CODEINE #3 
 Take 1 Tab by mouth every four (4) hours as needed for Pain for up to 5 doses. Max Daily Amount: 6 Tabs. CONTINUE taking these medications   
carBAMazepine 200 mg tablet Commonly known as:  TEGretol One tab in AM, one tab at lunch, and two tabs before bed 
  
methocarbamol 750 mg tablet Commonly known as:  UELMFRO-162 Take 1 Tab by mouth three (3) times daily. naproxen 500 mg tablet Commonly known as:  NAPROSYN Take 1 Tab by mouth two (2) times daily (with meals) for 10 days. STOP taking these medications   
amoxicillin-clavulanate 875-125 mg per tablet Commonly known as:  AUGMENTIN 
  
guaiFENesin-codeine 100-10 mg/5 mL solution Commonly known as:  ROBITUSSIN AC Where to Get Your Medications Information about where to get these medications is not yet available Ask your nurse or doctor about these medications · acetaminophen-codeine 300-30 mg per tablet · naproxen 500 mg tablet 
  
 
_______________________________ Attestations: 
Scribe Attestation Ethan Gutierrez acting as a scribe for and in the presence of Chiara Farmer DO February 04, 2019 at 7:34 AM 
    
Provider Attestation:     
I personally performed the services described in the documentation, reviewed the documentation, as recorded by the scribe in my presence, and it accurately and completely records my words and actions. February 04, 2019 at 7:34 AM - Chiara Farmer DO  
_______________________________

## 2019-02-05 ENCOUNTER — OFFICE VISIT (OUTPATIENT)
Dept: FAMILY MEDICINE CLINIC | Age: 54
End: 2019-02-05

## 2019-02-05 VITALS
HEIGHT: 64 IN | RESPIRATION RATE: 20 BRPM | HEART RATE: 72 BPM | TEMPERATURE: 98.1 F | WEIGHT: 180 LBS | BODY MASS INDEX: 30.73 KG/M2 | SYSTOLIC BLOOD PRESSURE: 132 MMHG | OXYGEN SATURATION: 99 % | DIASTOLIC BLOOD PRESSURE: 80 MMHG

## 2019-02-05 DIAGNOSIS — T50.905A ADVERSE EFFECT OF DRUG, INITIAL ENCOUNTER: ICD-10-CM

## 2019-02-05 DIAGNOSIS — M62.838 NECK MUSCLE SPASM: Primary | ICD-10-CM

## 2019-02-05 RX ORDER — METHOCARBAMOL 750 MG/1
750 TABLET, FILM COATED ORAL
Qty: 90 TAB | Refills: 0 | Status: SHIPPED | OUTPATIENT
Start: 2019-02-05 | End: 2019-04-04 | Stop reason: SDUPTHER

## 2019-02-05 NOTE — PROGRESS NOTES
Subjective:   Stephanie Shannon is a 52 y.o. female for follow up ED visit for right sided severe neck pain. She has been experiencing this pain for months. She has changed her pillows and tried robaxin but ran out. She reports that the robaxin helped some but the tight feeling on her right neck will not respond to anything anymore. 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 She works over night and usually just has to help turn patients from side to side. She rates the pain as moderate now and it radiates toward right shoulder. She was prescribed Tylenol #3 and naprosyn 500 mg for prn use. She reports that she does not like the way the Tylenol #3 makes her feel (dizziness) and she has not taken any more doses. Review of Systems   Musculoskeletal: Negative for back pain. Neurological: Negative for tingling, focal weakness and headaches. PMH: reviewed medications and allergy lists and medical and family history. OBJECTIVE:  Awake and alert in no acute distress  Neck supple without lymphadenopathy, no carotid artery bruits auscultated bilaterally. No thyromegaly  Lungs clear throughout  S1 S2 RRR without ectopy or murmur auscultated. Musculoskeletal: +TTP paraspinals right cervical, +TTP trapezius right   Pain with neck ROM lateral bending, turning, flexing and attempted hyperextension   Neuro no focal changes   Visit Vitals  /80 (BP 1 Location: Left arm, BP Patient Position: Sitting)   Pulse 72   Temp 98.1 °F (36.7 °C) (Oral)   Resp 20   Ht 5' 4\" (1.626 m)   Wt 180 lb (81.6 kg)   SpO2 99%   BMI 30.90 kg/m²     Diagnoses and all orders for this visit:    Neck muscle spasm  -     REFERRAL TO PHYSICAL THERAPY  -     methocarbamol (ROBAXIN-750) 750 mg tablet;  Take 1 Tab by mouth three (3) times daily as needed., Normal, Disp-90 Tab, R-0    Adverse effect of drug, initial encounter    stop acetaminophen with codeine  Take only Naprosyn prn and methocarbamol prn   Patient agrees with plan and verbalizes understanding. I have discussed the diagnosis with the patient and the intended plan as seen in the above orders. The patient has received an after-visit summary and questions were answered concerning future plans. I have discussed medication side effects and warnings with the patient as well. Follow-up Disposition:  Return in about 5 weeks (around 3/12/2019), or if symptoms worsen or fail to improve, for follow up neck spasm .

## 2019-02-05 NOTE — PROGRESS NOTES
Chief Complaint   Patient presents with   Anastasiya Mckeon ED Follow-up     Seen at [facility] on 2/4/2018 for Neck pain. 1. Have you been to the ER, urgent care clinic since your last visit? Hospitalized since your last visit? yes here today to follow up    2. Have you seen or consulted any other health care providers outside of the [address] [place] [address] since your last visit? Include any pap smears or colon screening.   no

## 2019-03-05 ENCOUNTER — HOSPITAL ENCOUNTER (OUTPATIENT)
Dept: LAB | Age: 54
Discharge: HOME | End: 2019-03-05
Payer: COMMERCIAL

## 2019-03-05 LAB
ALBUMIN SERPL-MCNC: 4 G/DL (ref 3.4–5)
ALBUMIN/GLOB SERPL: 1.1 {RATIO} (ref 0.8–1.7)
ALP SERPL-CCNC: 91 U/L (ref 45–117)
ALT SERPL-CCNC: 22 U/L (ref 13–56)
ANION GAP SERPL CALC-SCNC: 7 MMOL/L (ref 3–18)
AST SERPL-CCNC: 13 U/L (ref 15–37)
BILIRUB DIRECT SERPL-MCNC: <0.1 MG/DL (ref 0–0.2)
BILIRUB SERPL-MCNC: 0.2 MG/DL (ref 0.2–1)
BUN SERPL-MCNC: 16 MG/DL (ref 7–18)
BUN/CREAT SERPL: 22 (ref 12–20)
CALCIUM SERPL-MCNC: 8.6 MG/DL (ref 8.5–10.1)
CARBAMAZEPINE SERPL-MCNC: 7.1 UG/ML (ref 4–12)
CHLORIDE SERPL-SCNC: 105 MMOL/L (ref 100–108)
CO2 SERPL-SCNC: 29 MMOL/L (ref 21–32)
CREAT SERPL-MCNC: 0.74 MG/DL (ref 0.6–1.3)
ERYTHROCYTE [DISTWIDTH] IN BLOOD BY AUTOMATED COUNT: 13.7 % (ref 11.6–14.5)
GLOBULIN SER CALC-MCNC: 3.8 G/DL (ref 2–4)
GLUCOSE SERPL-MCNC: 78 MG/DL (ref 74–99)
HCT VFR BLD AUTO: 36 % (ref 35–45)
HGB BLD-MCNC: 12.2 G/DL (ref 12–16)
MCH RBC QN AUTO: 27.7 PG (ref 24–34)
MCHC RBC AUTO-ENTMCNC: 33.9 G/DL (ref 31–37)
MCV RBC AUTO: 81.8 FL (ref 74–97)
PLATELET # BLD AUTO: 256 K/UL (ref 135–420)
PMV BLD AUTO: 9.6 FL (ref 9.2–11.8)
POTASSIUM SERPL-SCNC: 4.5 MMOL/L (ref 3.5–5.5)
PROT SERPL-MCNC: 7.8 G/DL (ref 6.4–8.2)
RBC # BLD AUTO: 4.4 M/UL (ref 4.2–5.3)
SODIUM SERPL-SCNC: 141 MMOL/L (ref 136–145)
WBC # BLD AUTO: 5.6 K/UL (ref 4.6–13.2)

## 2019-03-05 PROCEDURE — 80048 BASIC METABOLIC PNL TOTAL CA: CPT

## 2019-03-05 PROCEDURE — 80156 ASSAY CARBAMAZEPINE TOTAL: CPT

## 2019-03-05 PROCEDURE — 36415 COLL VENOUS BLD VENIPUNCTURE: CPT

## 2019-03-05 PROCEDURE — 85027 COMPLETE CBC AUTOMATED: CPT

## 2019-03-05 PROCEDURE — 80076 HEPATIC FUNCTION PANEL: CPT

## 2019-03-06 ENCOUNTER — HOSPITAL ENCOUNTER (OUTPATIENT)
Dept: PHYSICAL THERAPY | Age: 54
Discharge: HOME | End: 2019-03-06
Payer: COMMERCIAL

## 2019-03-06 PROCEDURE — 97014 ELECTRIC STIMULATION THERAPY: CPT | Performed by: PHYSICAL THERAPIST

## 2019-03-06 PROCEDURE — 97530 THERAPEUTIC ACTIVITIES: CPT | Performed by: PHYSICAL THERAPIST

## 2019-03-06 PROCEDURE — 97161 PT EVAL LOW COMPLEX 20 MIN: CPT | Performed by: PHYSICAL THERAPIST

## 2019-03-06 NOTE — PROGRESS NOTES
PT DAILY TREATMENT NOTE 10    Patient Name: Ayden Bartholomew  Date:3/6/2019  : 1965  [x]  Patient  Verified  Payor: BLUE CROSS / Plan: [address] [place] [address] / Product Type: PPO /    In time:1155  Out time:1235  Total Treatment Time (min): 40  Visit #: 1 of 8    Medicare/BCBS Only   Total Timed Codes (min):  30 1:1 Treatment Time:  40       Treatment Area: Cervicalgia [M54.2]  Other muscle spasm [M62.838]    SUBJECTIVE  Pain Level (0-10 scale): 10  Any medication changes, allergies to medications, adverse drug reactions, diagnosis change, or new procedure performed?: [x] No    [] Yes (see summary sheet for update)  Subjective functional status/changes:   [] No changes reported  See eval.    OBJECTIVE    Modality rationale: decrease inflammation, decrease pain and increase tissue extensibility to improve the patients ability to tolerate daily tasks with improved mobility.    Min Type Additional Details   10 [x] Estim:  [x]Unatt       [x]IFC  []Premod                        []Other:  []w/ice   [x]w/heat  Position:seated  Location:cervical spine    [] Estim: []Att    []TENS instruct  []NMES                    []Other:  []w/US   []w/ice   []w/heat  Position:  Location:    []  Traction: [] Cervical       []Lumbar                       [] Prone          []Supine                       []Intermittent   []Continuous Lbs:  [] before manual  [] after manual    []  Ultrasound: []Continuous   [] Pulsed                           []1MHz   []3MHz W/cm2:  Location:    []  Iontophoresis with dexamethasone         Location: [] Take home patch   [] In clinic    []  Ice     []  heat  []  Ice massage  []  Laser   []  Anodyne Position:  Location:    []  Laser with stim  []  Other:  Position:  Location:    []  Vasopneumatic Device Pressure:       [] lo [] med [] hi   Temperature: [] lo [] med [] hi   [x] Skin assessment post-treatment:  [x]intact []redness- no adverse reaction    []redness - adverse reaction:     10 min [x]Eval                  []Re-Eval       20 min Therapeutic Activity:  []  See flow sheet :   Rationale: increase ROM, increase strength and increase proprioception  to improve the patients ability to tolerate daily activity with reduced pain. With   [] TE   [] TA   [] neuro   [] other: Patient Education: [x] Review HEP    [] Progressed/Changed HEP based on:   [] positioning   [] body mechanics   [] transfers   [] heat/ice application    [] other:      Other Objective/Functional Measures: See eval.     Pain Level (0-10 scale) post treatment: 8    ASSESSMENT/Changes in Function:  [x]  See Plan of Care           Progress towards goals / Updated goals:  Short Term Goals: To be accomplished in 2 weeks:  1. Pt to report [place] with HEP. Eval status: HEP issued and reviewed physically/verbally with pt    Long Term Goals: To be accomplished in 4 weeks:  1. Pt to report score of TBD on FOTO, indicating improved tolerance to activity and reduced pain. Eval status: TBD on initial FOTO  2. Pt to report max level of pain <5/10 with activity, indicating reduced pain and improved mobility. Eval status: max level of pain 10/10  3. Pt to be able to tolerate standing/walking 8 hours with no increased pain to be able to accomplish work tasks. Eval status: pt limited by pain at work  4. Pt to demonstrate full cervical ROM in all planes with no increased c/o pain to improve mobility and reduce fall risk. Eval status: pt with 50% limited flexion, 75% limited extension, 75% limited R/L lateral flexion, 75% limited rotation R/L  5. 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    Eval status: pt notes significant pain with resident tasks at work    PLAN  []  Upgrade activities as tolerated     [x]  Continue plan of care  []  Update interventions per flow sheet       []  Discharge due to:_  []  Other:_      David Emerson PT 3/6/2019  12:44 PM    Future Appointments   Date Time Provider Dilma Tania   3/8/2019  4:00 PM Marcelo Good [facility]   3/11/2019 11:30 AM Janis Green PT [facility]   3/12/2019 10:00 AM Irasema Coronel, NP [facility]   3/14/2019 12:00 PM Caitlin Rodriguez PTA [facility]   3/19/2019  8:00 AM Caitlin Rodriguez PTA [facility]   3/21/2019  8:00 AM Caitlin Rodriguez PTA [facility]

## 2019-03-06 NOTE — PROGRESS NOTES
[facility] - [place] [address]  [address], [address] [phone] fax    Plan of Care/ Statement of Necessity for Physical Therapy Services           Patient name: Lisette Willett Start of Care: 3/6/2019   Referral source: Jerardo Golden NP : 1965    Medical Diagnosis: Cervicalgia [M54.2]  Other muscle spasm [M62.838]  Payor: BLUE CROSS / Plan: [address] [place] [address] / Product Type: PPO /  Onset Date:19    Treatment Diagnosis: cervical pain, muscle spasm   Prior Hospitalization: see medical history Provider#: [id number]   Medications: Verified on Patient summary List    Comorbidities: epilepsy, tobacco use   Prior Level of Function: Pt reports Independent PLOF. 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 The Plan of Care and following information is based on the information from the initial evaluation. Assessment/ key information: Pt is a 53 y/o female who presents today with new onset of neck pain/muscle spasm beginning around a month ago without specific cause. Pt reports that she had back spasms around 2 months ago and went to the ER for pain and then returned to the ER after the onset of her neck pain and cervical muscle spasm. Pt reports pain and stiffness with lifting residents and completing work tasks as well as dressing herself. Pt presents with reduced cervical ROM, full B UE strength with MMT, negative ULTT. She notes intermittent HA. Pt ttp through the cervical paraspinals and upper trapezius/levator scapulae region. Pt would benefit from skilled PT intervention to address the above limitations and return her to full PLOF.     Evaluation Complexity History MEDIUM  Complexity : 1-2 comorbidities / personal factors will impact the outcome/ POC ; Examination MEDIUM Complexity : 3 Standardized tests and measures addressing body structure, function, activity limitation and / or participation in recreation  ;Presentation LOW Complexity : Stable, uncomplicated  ;Clinical Decision Making MEDIUM Complexity : FOTO score of 26-74  Overall Complexity Rating: LOW   Problem List: pain affecting function, decrease ROM, decrease strength, decrease ADL/ functional abilitiies, decrease activity tolerance and decrease flexibility/ joint mobility   Treatment Plan may include any combination of the following: Therapeutic exercise, Therapeutic activities, Neuromuscular re-education, Physical agent/modality, Manual therapy, Patient education, Self Care training and Functional mobility training  Patient / Family readiness to learn indicated by: asking questions, trying to perform skills and interest  Persons(s) to be included in education: patient (P)  Barriers to Learning/Limitations: None  Patient Goal (s): to be able to function without this sharp shooting pain through my neck  Patient Self Reported Health Status: good  Rehabilitation Potential: good  Short Term Goals: To be accomplished in 2 weeks:  1. Pt to report [place] with HEP. Eval status: HEP issued and reviewed physically/verbally with pt    Long Term Goals: To be accomplished in 4 weeks:  1. Pt to report score of TBD on FOTO, indicating improved tolerance to activity and reduced pain. Eval status: TBD on initial FOTO  2. Pt to report max level of pain <5/10 with activity, indicating reduced pain and improved mobility. Eval status: max level of pain 10/10  3. Pt to be able to tolerate standing/walking 8 hours with no increased pain to be able to accomplish work tasks. Eval status: pt limited by pain at work  4. Pt to demonstrate full cervical ROM in all planes with no increased c/o pain to improve mobility and reduce fall risk. Eval status: pt with 50% limited flexion, 75% limited extension, 75% limited R/L lateral flexion, 75% limited rotation R/L  5.   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 Eval status: pt notes significant pain with resident tasks at work    Frequency / Duration: Patient to be seen 2 times per week for 4 weeks. Patient/ Caregiver education and instruction: Diagnosis, prognosis, self care, activity modification and exercises   [x]  Plan of care has been reviewed with SADI Berg, PT 3/6/2019 12:48 PM    ________________________________________________________________________    I certify that the above Therapy Services are being furnished while the patient is under my care. I agree with the treatment plan and certify that this therapy is necessary.     Physician's Signature:____________Date:_________TIME:________    ** Signature, Date and Time must be completed for valid certification **    Please sign and return to [facility]  [address] [place], [address] [phone] fax

## 2019-03-07 ENCOUNTER — HOSPITAL ENCOUNTER (EMERGENCY)
Age: 54
Discharge: HOME | End: 2019-03-07
Attending: EMERGENCY MEDICINE
Payer: COMMERCIAL

## 2019-03-07 ENCOUNTER — APPOINTMENT (OUTPATIENT)
Dept: GENERAL RADIOLOGY | Age: 54
End: 2019-03-07
Attending: EMERGENCY MEDICINE
Payer: COMMERCIAL

## 2019-03-07 VITALS
HEIGHT: 64 IN | SYSTOLIC BLOOD PRESSURE: 173 MMHG | TEMPERATURE: 98.4 F | RESPIRATION RATE: 16 BRPM | WEIGHT: 180 LBS | DIASTOLIC BLOOD PRESSURE: 88 MMHG | HEART RATE: 71 BPM | BODY MASS INDEX: 30.73 KG/M2 | OXYGEN SATURATION: 100 %

## 2019-03-07 DIAGNOSIS — M50.30 DDD (DEGENERATIVE DISC DISEASE), CERVICAL: ICD-10-CM

## 2019-03-07 DIAGNOSIS — S16.1XXA STRAIN OF NECK MUSCLE, INITIAL ENCOUNTER: Primary | ICD-10-CM

## 2019-03-07 PROCEDURE — 74011250637 HC RX REV CODE- 250/637: Performed by: EMERGENCY MEDICINE

## 2019-03-07 PROCEDURE — 72050 X-RAY EXAM NECK SPINE 4/5VWS: CPT

## 2019-03-07 PROCEDURE — 99282 EMERGENCY DEPT VISIT SF MDM: CPT

## 2019-03-07 RX ORDER — OXYCODONE AND ACETAMINOPHEN 5; 325 MG/1; MG/1
2 TABLET ORAL
Status: COMPLETED | OUTPATIENT
Start: 2019-03-07 | End: 2019-03-07

## 2019-03-07 RX ORDER — TRAMADOL HYDROCHLORIDE 50 MG/1
50 TABLET ORAL
Qty: 20 TAB | Refills: 0 | Status: SHIPPED | OUTPATIENT
Start: 2019-03-07 | End: 2019-03-12 | Stop reason: ALTCHOICE

## 2019-03-07 RX ORDER — LIDOCAINE 50 MG/G
PATCH TOPICAL
Qty: 30 EACH | Refills: 0 | Status: SHIPPED | OUTPATIENT
Start: 2019-03-07 | End: 2020-01-02

## 2019-03-07 RX ADMIN — OXYCODONE AND ACETAMINOPHEN 2 TABLET: 5; 325 TABLET ORAL at 16:25

## 2019-03-07 NOTE — LETTER
NOTIFICATION RETURN TO WORK / SCHOOL 
 
3/7/2019 4:26 PM 
 
Ms. Rody Flood [address] [facility] [address] To Whom It May Concern: 
 
Rody Flood is currently under the care of [facility] EM[facility]GENCY DEPT. She will return to work/school on: 3/9/19. If there are questions or concerns please have the patient contact our office.  
 
 
 
Sincerely, 
 
 
Sourav Spencer PA-C

## 2019-03-07 NOTE — ED PROVIDER NOTES
EMERGENCY DEPARTMENT HISTORY AND PHYSICAL EXAM    Date: 3/7/2019  Patient Name: Tali Sung    History of Presenting Illness     Chief Complaint   Patient presents with    Neck Pain         History Provided By: Patient    Chief Complaint: Neck Pain  Duration:  3 days  Timing:  Worsening  Location: Musculoskeletal  Quality: Aching  Severity: Severe  Modifying Factors: None reported  Associated Symptoms: headache      Additional History (Context): Tali Sung is a 52 y.o. female with a PMHx of seizures who presents with c/o severe, worsening neck pain onset 3 days. No report from patient of any specific injury or trauma to her neck. Says that her pain in her neck has worsened and is now causing her to experience headaches. She has drug allergies to Cyclobenzaprine, Acetaminophen-codeine and Etodolac. Current smoker, no etoh or drug use. Associated sx include a headache. Denies any fever, chills, CP, SOB, abdominal pain, nausea, vomiting, diarrhea and any urinary sx. No further concerns or complaints at this time. PCP: Brynn Mejias NP    Current Facility-Administered Medications   Medication Dose Route Frequency Provider Last Rate Last Dose    oxyCODONE-acetaminophen (PERCOCET) 5-325 mg per tablet 2 Tab  2 Tab Oral NOW Sofi Abdi PA         Current Outpatient Medications   Medication Sig Dispense Refill    traMADol (ULTRAM) 50 mg tablet Take 1 Tab by mouth every six (6) hours as needed for Pain for up to 5 days. Max Daily Amount: 200 mg. 20 Tab 0    lidocaine (LIDODERM) 5 % Apply patch to the affected area for 12 hours a day and remove for 12 hours a day. 30 Each 0    methocarbamol (ROBAXIN-750) 750 mg tablet Take 1 Tab by mouth three (3) times daily as needed.  90 Tab 0    carBAMazepine (TEGRETOL) 200 mg tablet One tab in AM, one tab at lunch, and two tabs before bed 90 Tab 0       Past History     Past Medical History:  Past Medical History:   Diagnosis Date    Bronchitis     Carpal tunnel syndrome on right     Chronic bronchitis (HCC)     De Quervain's syndrome (tenosynovitis)     Depression     resolved    Ectopic pregnancy     two times    Epilepsy ([address]     Seizure ([address]     Sinus infection     Stenosing tenosynovitis of thumb     Right side    Trigger thumb of left hand        Past Surgical History:  Past Surgical History:   Procedure Laterality Date    COLONOSCOPY N/A 10/21/2016    COLONOSCOPY w/ biopsy performed by Maritza Reyes MD at [address] [place] [address] HX APPENDECTOMY  1/12/15    lysis of adhesions    HX CHOLECYSTECTOMY      HX HYSTERECTOMY      HX OTHER SURGICAL      scar tissue removed from intestines    HX PARTIAL HYSTERECTOMY  2009    HX TONSILLECTOMY         Family History:  Family History   Problem Relation Age of Onset    Heart Attack Brother          in 62s    Diabetes Father     Hypertension Father     Cancer Father         colon CA    Heart Disease Sister         stent placement    Hypertension Brother     Other Brother         Stomach problems/ulcers    Cancer Sister         Breast CA    Stroke Sister     Diabetes Mother     Hypertension Mother        Social History:  Social History     Tobacco Use    Smoking status: Current Every Day Smoker     Packs/day: 0.50     Types: Cigarettes    Smokeless tobacco: Never Used   Substance Use Topics    Alcohol use: No    Drug use: No       Allergies: Allergies   Allergen Reactions    Cyclobenzaprine Other (comments)     headache    Acetaminophen-Codeine Vertigo    Etodolac Other (comments)     headache         Review of Systems   Review of Systems   Constitutional: Negative for activity change, fatigue and fever. HENT: Negative for congestion and rhinorrhea. Eyes: Negative for visual disturbance. Respiratory: Negative for shortness of breath. Cardiovascular: Negative for chest pain and palpitations. Gastrointestinal: Negative for abdominal pain, diarrhea, nausea and vomiting. Genitourinary: Negative for dysuria and hematuria. Musculoskeletal: Positive for neck pain. Negative for back pain. Skin: Negative for rash. Neurological: Positive for headaches. Negative for dizziness, weakness and light-headedness. All other systems reviewed and are negative. All Other Systems Negative  Physical Exam     Vitals:    03/07/19 1404   BP: 173/88   Pulse: 71   Resp: 16   Temp: 98.4 °F (36.9 °C)   SpO2: 100%   Weight: 81.6 kg (180 lb)   Height: 5' 4\" (1.626 m)     Physical Exam   Constitutional: She is oriented to person, place, and time. She appears well-developed. HENT:   Head: Normocephalic and atraumatic. Eyes: Pupils are equal, round, and reactive to light. Neck: No JVD present. No tracheal deviation present. No thyromegaly present. Midline inferior cervical spine and bilateral trapezius muscle TTP. Pain w/lateral rotation of head past 25 degrees to either direction. Cardiovascular: Normal rate, regular rhythm, normal heart sounds and intact distal pulses. Exam reveals no gallop and no friction rub. No murmur heard. Equal radial pulses. Pulmonary/Chest: Effort normal and breath sounds normal. No stridor. No respiratory distress. She has no wheezes. She has no rales. She exhibits no tenderness. Abdominal: Soft. She exhibits no distension and no mass. There is no tenderness. There is no rebound and no guarding. Musculoskeletal: She exhibits no edema or tenderness. Lymphadenopathy:     She has no cervical adenopathy. Neurological: She is alert and oriented to person, place, and time. Skin: Skin is warm and dry. No rash noted. No erythema. No pallor. Psychiatric: She has a normal mood and affect. Her behavior is normal. Thought content normal.   Nursing note and vitals reviewed. Diagnostic Study Results     Labs -   No results found for this or any previous visit (from the past 12 hour(s)).     Radiologic Studies -   XR SPINE CERV 4 OR 5 V    (Results Pending)     CT Results  (Last 48 hours)    None        CXR Results  (Last 48 hours)    None            Medical Decision Making   I am the first provider for this patient. I reviewed the vital signs, available nursing notes, past medical history, past surgical history, family history and social history. Vital Signs-Reviewed the patient's vital signs. Pulse Oximetry Analysis - 100% on room air    Cardiac Monitor:  Rate: 71 bpm  Rhythm: NSR      Records Reviewed: Nursing Notes, Old Medical Records and Previous Radiology Studies    Procedures:  Procedures    Provider Notes (Medical Decision Making): get x-ray; treat pain. considerable DDD on films. Refer to ortho. MED RECONCILIATION:  Current Facility-Administered Medications   Medication Dose Route Frequency    oxyCODONE-acetaminophen (PERCOCET) 5-325 mg per tablet 2 Tab  2 Tab Oral NOW     Current Outpatient Medications   Medication Sig    traMADol (ULTRAM) 50 mg tablet Take 1 Tab by mouth every six (6) hours as needed for Pain for up to 5 days. Max Daily Amount: 200 mg.    lidocaine (LIDODERM) 5 % Apply patch to the affected area for 12 hours a day and remove for 12 hours a day.  methocarbamol (ROBAXIN-750) 750 mg tablet Take 1 Tab by mouth three (3) times daily as needed.  carBAMazepine (TEGRETOL) 200 mg tablet One tab in AM, one tab at lunch, and two tabs before bed       Disposition:  home    DISCHARGE NOTE:   4:09 PM    Pt has been reexamined. Patient has no new complaints, changes, or physical findings. Care plan outlined and precautions discussed. Results of x-rays were reviewed with the patient. All medications were reviewed with the patient; will d/c home with ultram, lidoderm. All of pt's questions and concerns were addressed. Patient was instructed and agrees to follow up with PCP, ortho, as well as to return to the ED upon further deterioration. Patient is ready to go home.     Follow-up Information     Follow up With Specialties Details Why Contact Info    Jeanette Lanza MD Orthopedic Surgery Schedule an appointment as soon as possible for a visit in 1 day  [address] [facility] EMERGENCY DEPT Emergency Medicine  If symptoms worsen return immediately [address]  [phone]          Current Discharge Medication List      START taking these medications    Details   traMADol (ULTRAM) 50 mg tablet Take 1 Tab by mouth every six (6) hours as needed for Pain for up to 5 days. Max Daily Amount: 200 mg. Qty: 20 Tab, Refills: 0    Associated Diagnoses: DDD (degenerative disc disease), cervical      lidocaine (LIDODERM) 5 % Apply patch to the affected area for 12 hours a day and remove for 12 hours a day. Qty: 30 Each, Refills: 0               Diagnosis     Clinical Impression:   1. Strain of neck muscle, initial encounter    2. DDD (degenerative disc disease), cervical         Scribe Attestation     Anna Marie Zuleta acting as a scribe for and in the presence of Nadege Quezada. March 07, 2019 at 2:56 PM       Provider Attestation:      I personally performed the services described in the documentation, reviewed the documentation, as recorded by the scribe in my presence, and it accurately and completely records my words and actions.  March 07, 2019 at 2:56 PM - Damián Rivers PA-C.

## 2019-03-07 NOTE — DISCHARGE INSTRUCTIONS
Patient Education        Cervical Disc Disease: Care Instructions  Your Care Instructions    Cervical disc disease results from damage, disease, or wear and tear to the discs between the bones (vertebra) in your neck. The discs act as shock absorbers for the spine and keep the spine flexible. When a disc is damaged, it can bulge out and press against the nerve roots or spinal cord. This is sometimes called a herniated or \"slipped disc. \" This pressure can cause pain and numbness or tingling in your arms and hands. It can also cause weakness in your legs. An accident can damage a disc and cause it to break open (rupture). Aging and hard physical work can also cause damage to cervical discs. The first treatments for cervical disc disease include physical therapy, special neck exercises, heat, and pain medicine. If these fail, your doctor may inject steroids and pain medicine into your neck. Surgery is usually done only if other treatments have not worked. Follow-up care is a key part of your treatment and safety. Be sure to make and go to all appointments, and call your doctor if you are having problems. It's also a good idea to know your test results and keep a list of the medicines you take. How can you care for yourself at home? · Take pain medicines exactly as directed. ? If the doctor gave you a prescription medicine for pain, take it as prescribed. ? If you are not taking a prescription pain medicine, ask your doctor if you can take an over-the-counter medicine. · Don't spend too long in one position. Take short breaks to move around and change positions. · Wear a seat belt and shoulder harness when you are in a car. · Sleep with a pillow under your head and neck that keeps your neck straight. · Follow your doctor's instructions for gentle neck-stretching exercises. · Do not smoke. Smoking can slow healing of your discs.  If you need help quitting, talk to your doctor about stop-smoking programs and medicines. These can increase your chances of quitting for good. · Avoid strenuous work or exercise until your doctor says it is okay. When should you call for help? Call 911 anytime you think you may need emergency care. For example, call if:    · You are unable to move an arm or a leg at all.   [facility] your doctor now or seek immediate medical care if:    · You have new or worse symptoms in your arms, legs, belly, or buttocks. Symptoms may include:  ? Numbness or tingling. ? Weakness. ? Pain.     · You lose bladder or bowel control.    Watch closely for changes in your health, and be sure to contact your doctor if:    · You do not get better as expected. Where can you learn more? Go to http://sylvie-angela.info/. Enter N118 in the search box to learn more about \"Cervical Disc Disease: Care Instructions. \"  Current as of: September 20, 2018  Content Version: 11.9  © 7363-9131 [employer]. Care instructions adapted under license by [employer] (which disclaims liability or warranty for this information). If you have questions about a medical condition or this instruction, always ask your healthcare professional. [place] [address] any warranty or liability for your use of this information. Patient Education        Neck Strain: Care Instructions  Your Care Instructions    You have strained the muscles and ligaments in your neck. A sudden, awkward movement can strain the neck. This often occurs with falls or car accidents or during certain sports. Everyday activities like working on a computer or sleeping can also cause neck strain if they force you to hold your neck in an awkward position for a long time. It is common for neck pain to get worse for a day or two after an injury, but it should start to feel better after that. You may have more pain and stiffness for several days before it gets better. This is expected.  It may take a few weeks or longer for it to heal completely. Good home treatment can help you get better faster and avoid future neck problems. Follow-up care is a key part of your treatment and safety. Be sure to make and go to all appointments, and call your doctor if you are having problems. It's also a good idea to know your test results and keep a list of the medicines you take. How can you care for yourself at home? · If you were given a neck brace (cervical collar) to limit neck motion, wear it as instructed for as many days as your doctor tells you to. Do not wear it longer than you were told to. Wearing a brace for too long can make neck stiffness worse and weaken the neck muscles. · You can try using heat or ice to see if it helps. ? Try using a heating pad on a low or medium setting for 15 to 20 minutes every 2 to 3 hours. Try a warm shower in place of one session with the heating pad. You can also buy single-use heat wraps that last up to 8 hours. ? You can also try an ice pack for 10 to 15 minutes every 2 to 3 hours. · Take pain medicines exactly as directed. ? If the doctor gave you a prescription medicine for pain, take it as prescribed. ? If you are not taking a prescription pain medicine, ask your doctor if you can take an over-the-counter medicine. · Gently rub the area to relieve pain and help with blood flow. Do not massage the area if it hurts to do so. · Do not do anything that makes the pain worse. Take it easy for a couple of days. You can do your usual activities if they do not hurt your neck or put it at risk for more stress or injury. · Try sleeping on a special neck pillow. Place it under your neck, not under your head. Placing a tightly rolled-up towel under your neck while you sleep will also work. If you use a neck pillow or rolled towel, do not use your regular pillow at the same time. · To prevent future neck pain, do exercises to stretch and strengthen your neck and back.  Learn how to use good posture, safe lifting techniques, and proper body mechanics. When should you call for help? Call 911 anytime you think you may need emergency care. For example, call if:    · You are unable to move an arm or a leg at all.   [facility] your doctor now or seek immediate medical care if:    · You have new or worse symptoms in your arms, legs, chest, belly, or buttocks. Symptoms may include:  ? Numbness or tingling. ? Weakness. ? Pain.     · You lose bladder or bowel control.    Watch closely for changes in your health, and be sure to contact your doctor if:    · You are not getting better as expected. Where can you learn more? Go to http://sylvieiPositionangela.info/. Enter M253 in the search box to learn more about \"Neck Strain: Care Instructions. \"  Current as of: September 20, 2018  Content Version: 11.9  © 0324-5236 [employer]. Care instructions adapted under license by [employer] (which disclaims liability or warranty for this information). If you have questions about a medical condition or this instruction, always ask your healthcare professional. [place] [address] any warranty or liability for your use of this information. Patient Education        Learning About Degenerative Disc Disease  What is degenerative disc disease? Degenerative disc disease is not really a disease. It's a term used to describe the normal changes in your spinal discs as you age. Spinal discs are small, spongy discs that separate the bones (vertebrae) that make up the spine. The discs act as shock absorbers for the spine, so it can flex, bend, and twist.  Degenerative disc disease can take place in one or more places along the spine. It most often occurs in the discs in the lower back and the neck. What causes it? As we age, our spinal discs break down, or degenerate. This breakdown causes the symptoms of degenerative disc disease in some people.   When the discs break down, they can lose fluid and dry out, and their outer layers can have tiny cracks or tears. This leads to less padding and less space between the bones in the spine. The body reacts to this by making bony growths on the spine called bone spurs. These spurs can press on the spinal nerve roots or spinal cord. This can cause pain and can affect how well the nerves work. What are the symptoms? Many people with degenerative disc disease have no pain. But others have severe pain or other symptoms that limit their activities. Some of the most common symptoms are:  · Pain in the back or neck. Where the pain occurs depends on which discs are affected. · Pain that gets worse when you move, such as bending over, reaching up, or twisting. · Pain that may occur in the rear end (buttocks), arm, or leg if a nerve is pinched. · Numbness or tingling in your arm or leg. How is it diagnosed? A doctor can often diagnose degenerative disc disease while doing a physical exam. If your exam shows no signs of a serious condition, imaging tests (such as an X-ray) aren't likely to help your doctor find the cause of your symptoms. Sometimes degenerative disc disease is found when an X-ray is taken for another reason, such as an injury or other health problem. But even if the doctor finds degenerative disc disease, that doesn't always mean that you will have symptoms. How is it treated? There are several things you can do at home to manage pain from this problem. · To relieve pain, use ice or heat (whichever feels better) on the affected area. ¨ Put ice or a cold pack on the area for 10 to 20 minutes at a time. Put a thin cloth between the ice and your skin. ¨ Put a warm water bottle, a heating pad set on low, or a warm cloth on your back. Put a thin cloth between the heating pad and your skin. Do not go to sleep with a heating pad on your skin.   · Ask your doctor if you can take acetaminophen (such as Tylenol) or nonsteroidal anti-inflammatory drugs, such as ibuprofen or naproxen. Your doctor can prescribe stronger medicines if needed. Be safe with medicines. Read and follow all instructions on the label. · Get some exercise every day. Exercise is one of the best ways to help your back feel better and stay better. It's best to start each exercise slowly. You may notice a little soreness, and that's okay. But if an exercise makes your pain worse, stop doing it. Here are things you can try:  ¨ Walking. It's the simplest and maybe the best activity for your back. It gets your blood moving and helps your muscles stay strong. ¨ Exercises that gently stretch and strengthen your stomach, back, and leg muscles. The stronger those muscles are, the better they're able to protect your back. If you have constant or severe pain in your back or spine, you may need other treatments, such as physical therapy. In some cases, your doctor may suggest surgery. Follow-up care is a key part of your treatment and safety. Be sure to make and go to all appointments, and call your doctor if you are having problems. It's also a good idea to know your test results and keep a list of the medicines you take. Where can you learn more? Go to http://syvlie-angela.info/. Enter N373 in the search box to learn more about \"Learning About Degenerative Disc Disease. \"  Current as of: March 21, 2017  Content Version: 11.5  © 6304-4035 [employer]. Care instructions adapted under license by [employer] (which disclaims liability or warranty for this information). If you have questions about a medical condition or this instruction, always ask your healthcare professional. [place] [address] any warranty or liability for your use of this information. Patient Education        Learning About Degenerative Disc Disease  What is degenerative disc disease? Degenerative disc disease is not really a disease.  It's a term used to describe the normal changes in your spinal discs as you age. Spinal discs are small, spongy discs that separate the bones (vertebrae) that make up the spine. The discs act as shock absorbers for the spine, so it can flex, bend, and twist.  Degenerative disc disease can take place in one or more places along the spine. It most often occurs in the discs in the lower back and the neck. What causes it? As we age, our spinal discs break down, or degenerate. This breakdown causes the symptoms of degenerative disc disease in some people. When the discs break down, they can lose fluid and dry out, and their outer layers can have tiny cracks or tears. This leads to less padding and less space between the bones in the spine. The body reacts to this by making bony growths on the spine called bone spurs. These spurs can press on the spinal nerve roots or spinal cord. This can cause pain and can affect how well the nerves work. What are the symptoms? Many people with degenerative disc disease have no pain. But others have severe pain or other symptoms that limit their activities. Some of the most common symptoms are:  · Pain in the back or neck. Where the pain occurs depends on which discs are affected. · Pain that gets worse when you move, such as bending over, reaching up, or twisting. · Pain that may occur in the rear end (buttocks), arm, or leg if a nerve is pinched. · Numbness or tingling in your arm or leg. How is it diagnosed? A doctor can often diagnose degenerative disc disease while doing a physical exam. If your exam shows no signs of a serious condition, imaging tests (such as an X-ray) aren't likely to help your doctor find the cause of your symptoms. Sometimes degenerative disc disease is found when an X-ray is taken for another reason, such as an injury or other health problem. But even if the doctor finds degenerative disc disease, that doesn't always mean that you will have symptoms.   How is it treated? There are several things you can do at home to manage pain from this problem. · To relieve pain, use ice or heat (whichever feels better) on the affected area. ? Put ice or a cold pack on the area for 10 to 20 minutes at a time. Put a thin cloth between the ice and your skin. ? Put a warm water bottle, a heating pad set on low, or a warm cloth on your back. Put a thin cloth between the heating pad and your skin. Do not go to sleep with a heating pad on your skin. · Ask your doctor if you can take acetaminophen (such as Tylenol) or nonsteroidal anti-inflammatory drugs, such as ibuprofen or naproxen. Your doctor can prescribe stronger medicines if needed. Be safe with medicines. Read and follow all instructions on the label. · Get some exercise every day. Exercise is one of the best ways to help your back feel better and stay better. It's best to start each exercise slowly. You may notice a little soreness, and that's okay. But if an exercise makes your pain worse, stop doing it. Here are things you can try:  ? Walking. It's the simplest and maybe the best activity for your back. It gets your blood moving and helps your muscles stay strong. ? Exercises that gently stretch and strengthen your stomach, back, and leg muscles. The stronger those muscles are, the better they're able to protect your back. If you have constant or severe pain in your back or spine, you may need other treatments, such as physical therapy. In some cases, your doctor may suggest surgery. Follow-up care is a key part of your treatment and safety. Be sure to make and go to all appointments, and call your doctor if you are having problems. It's also a good idea to know your test results and keep a list of the medicines you take. Where can you learn more? Go to http://sylvie-angela.info/. Enter S101 in the search box to learn more about \"Learning About Degenerative Disc Disease. \"  Current as of: September 20, 2018  Content Version: 11.9  © 5610-7978 [employer]. Care instructions adapted under license by "[employer] (which disclaims liability or warranty for this information). If you have questions about a medical condition or this instruction, always ask your healthcare professional. [address] any warranty or liability for your use of this information.

## 2019-03-08 ENCOUNTER — HOSPITAL ENCOUNTER (OUTPATIENT)
Dept: PHYSICAL THERAPY | Age: 54
Discharge: HOME | End: 2019-03-08
Payer: COMMERCIAL

## 2019-03-08 PROCEDURE — 97530 THERAPEUTIC ACTIVITIES: CPT | Performed by: PHYSICAL THERAPIST

## 2019-03-08 NOTE — PROGRESS NOTES
PT DAILY TREATMENT NOTE 10-18    Patient Name: Stephanie Shannon  Date:3/8/2019  : 1965  [x]  Patient  Verified  Payor: [employer] [place] / Plan: [address] [place] [address] / Product Type: PPO /    In time:355  Out time:415  Total Treatment Time (min): 20  Visit #: 2 of 8    Medicare/BCBS Only   Total Timed Codes (min):  20 1:1 Treatment Time:  20       Treatment Area: Cervicalgia [M54.2]  Other muscle spasm [M62.838]    SUBJECTIVE  Pain Level (0-10 scale): 10  Any medication changes, allergies to medications, adverse drug reactions, diagnosis change, or new procedure performed?: [x] No    [] Yes (see summary sheet for update)  Subjective functional status/changes:   [] No changes reported  \"I went to the ER after therapy last time. I was diagnosed with \"disc degeneration and arthritis. \"    OBJECTIVE    20 min Therapeutic Activity:  []  See flow sheet :   Rationale: increase ROM, increase strength, improve coordination, improve balance and increase proprioception  to improve the patients ability to tolerate daily activity with reduced pain. With   [] TE   [] TA   [] neuro   [] other: Patient Education: [x] Review HEP    [] Progressed/Changed HEP based on:   [] positioning   [] body mechanics   [] transfers   [] heat/ice application    [] other:      Other Objective/Functional Measures: PT spoke at length with pt regarding what pt would like to do with her POC. Pt is extremely concerned with her diagnoses of \"disc degeneration and arthritis\" that she received at the ER and states that she, \"wants something faster than therapy, like a shot. \"  Pt asked to be put on hold x 2 weeks and is seeking appointment with spine specialist from her PCP. Will follow. Pain Level (0-10 scale) post treatment: 10    ASSESSMENT/Changes in Function: Pt to be on hold until 3/22/19. Will follow up with pt at that time. Progress towards goals / Updated goals:  Short Term Goals:  To be accomplished in 2 weeks: 1. Pt to report [place] with HEP. Eval status: HEP issued and reviewed physically/verbally with pt     Long Term Goals: To be accomplished in 4 weeks:  1. Pt to report score of TBD on FOTO, indicating improved tolerance to activity and reduced pain. Eval status: TBD on initial FOTO  2. Pt to report max level of pain <5/10 with activity, indicating reduced pain and improved mobility. Eval status: max level of pain 10/10  3. Pt to be able to tolerate standing/walking 8 hours with no increased pain to be able to accomplish work tasks. Eval status: pt limited by pain at work  4. Pt to demonstrate full cervical ROM in all planes with no increased c/o pain to improve mobility and reduce fall risk. Eval status: pt with 50% limited flexion, 75% limited extension, 75% limited R/L lateral flexion, 75% limited rotation R/L  5. 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 Eval status: pt notes significant pain with resident tasks at work    PLAN  []  Upgrade activities as tolerated     []  Continue plan of care  []  Update interventions per flow sheet       []  Discharge due to:_  [x]  Other: Place pt on hold x 2 weeks to determine D/C vs continuing therapy.      [place], [place] 3/8/2019  4:44 PM    Future Appointments   Date Time Provider Dilma Marin   3/11/2019 11:30 AM [facility]   3/12/2019 10:00 AM Bruce Coronel NP [facility]   3/14/2019 12:00 PM Thang Evans [facility]   3/19/2019  8:00 AM [facility]ang Evans [facility]   3/21/2019  8:00 AM Thang Evans P[facility]

## 2019-03-11 ENCOUNTER — APPOINTMENT (OUTPATIENT)
Dept: PHYSICAL THERAPY | Age: 54
End: 2019-03-11
Payer: COMMERCIAL

## 2019-03-12 ENCOUNTER — TELEPHONE (OUTPATIENT)
Dept: FAMILY MEDICINE CLINIC | Age: 54
End: 2019-03-12

## 2019-03-12 ENCOUNTER — HOSPITAL ENCOUNTER (OUTPATIENT)
Dept: LAB | Age: 54
Discharge: HOME | End: 2019-03-12
Payer: COMMERCIAL

## 2019-03-12 ENCOUNTER — OFFICE VISIT (OUTPATIENT)
Dept: FAMILY MEDICINE CLINIC | Age: 54
End: 2019-03-12

## 2019-03-12 VITALS
OXYGEN SATURATION: 98 % | SYSTOLIC BLOOD PRESSURE: 146 MMHG | WEIGHT: 182.8 LBS | HEART RATE: 79 BPM | DIASTOLIC BLOOD PRESSURE: 73 MMHG | TEMPERATURE: 98.6 F | RESPIRATION RATE: 18 BRPM | BODY MASS INDEX: 31.21 KG/M2 | HEIGHT: 64 IN

## 2019-03-12 DIAGNOSIS — M54.2 CERVICAL PAIN (NECK): ICD-10-CM

## 2019-03-12 DIAGNOSIS — M54.2 CERVICAL PAIN (NECK): Primary | ICD-10-CM

## 2019-03-12 DIAGNOSIS — G89.29 CHRONIC NECK PAIN: ICD-10-CM

## 2019-03-12 DIAGNOSIS — M54.2 CHRONIC NECK PAIN: ICD-10-CM

## 2019-03-12 DIAGNOSIS — M62.838 NECK MUSCLE SPASM: ICD-10-CM

## 2019-03-12 DIAGNOSIS — T50.905A ADVERSE EFFECT OF DRUG, INITIAL ENCOUNTER: ICD-10-CM

## 2019-03-12 DIAGNOSIS — M50.30 DDD (DEGENERATIVE DISC DISEASE), CERVICAL: ICD-10-CM

## 2019-03-12 PROCEDURE — 80307 DRUG TEST PRSMV CHEM ANLYZR: CPT

## 2019-03-12 RX ORDER — OXYCODONE AND ACETAMINOPHEN 5; 325 MG/1; MG/1
1 TABLET ORAL
Qty: 60 TAB | Refills: 0 | Status: SHIPPED | OUTPATIENT
Start: 2019-03-12 | End: 2019-03-15

## 2019-03-12 NOTE — LETTER
NOTIFICATION RETURN TO WORK / SCHOOL 
 
3/12/2019 10:47 AM 
 
Ms. Tea Cheng [address]. [address] [facility] [id number] To Whom It May Concern: 
 
Tea Cheng [facility] the [facility] She will return to work/school uncertain at this time pending evaluation by Spine Specialist.  Please send Family Medical Leave Act [facility])  forms for completion. If there are questions or concerns please have the patient contact our office. Sincerely, Lobo Gamez NP

## 2019-03-12 NOTE — PATIENT INSTRUCTIONS
Oxycodone/Acetaminophen (By mouth)   Acetaminophen (p-kznz-b-MIN-oh-fen), Oxycodone Hydrochloride (uc-n-KWU-done thu-droe-KLOR-armaan)  Treats moderate to moderately severe pain. This medicine is a narcotic pain reliever. Brand Name(s): Endocet, Percocet, Primlev, Xartemis XR   There may be other brand names for this medicine. When This Medicine Should Not Be Used: This medicine is not right for everyone. Do not use it if you had an allergic reaction to acetaminophen or oxycodone, or if you have serious breathing problems or paralytic ileus. How to Use This Medicine:   Capsule, Liquid, Tablet, Long Acting Tablet  · Your doctor will tell you how much medicine to use. Do not use more than directed. · An overdose can be dangerous. Follow directions carefully so you do not get too much medicine at one time. · Oral liquid: Measure the oral liquid medicine with a marked measuring spoon, oral syringe, or medicine cup. · Swallow the extended-release tablet whole. Do not crush, break, or chew it. Do not lick or wet the tablet before placing it in your mouth. Do not give this medicine through a feeding tube. · This medicine should come with a Medication Guide. Ask your pharmacist for a copy if you do not have one. · Missed dose: If you miss a dose of this medicine, skip the missed dose and go back to your regular dosing schedule. Do not double doses. · Store the medicine in a closed container at room temperature, away from heat, moisture, and direct light. Ask your pharmacist about the best way to dispose of medicine you do not use. Drugs and Foods to Avoid:   Ask your doctor or pharmacist before using any other medicine, including over-the-counter medicines, vitamins, and herbal products. · Do not use Xartemis XR if you are using or have used an MAO inhibitor in the past 14 days. · Some medicines can affect how this medicine works.  Tell your doctor if you are using any of the following:   ¨ Carbamazepine, erythromycin, ketoconazole, lamotrigine, mirtazapine, naltrexone, phenytoin, propranolol, rifampin, ritonavir, tramadol, trazodone, or zidovudine  ¨ Birth control pills  ¨ Diuretic (water pill)  ¨ Medicine to treat depression  ¨ Phenothiazine medicine  ¨ Triptan medicine to treat migraine headaches  · Do not drink alcohol while you are using this medicine. Acetaminophen can damage your liver, and alcohol can increase this risk. Do not take acetaminophen without asking your doctor if you have 3 or more drinks of alcohol every day. · Tell your doctor if you use anything else that makes you sleepy. Some examples are allergy medicine, narcotic pain medicine, and alcohol. Tell your doctor if you are using buprenorphine, butorphanol, nalbuphine, pentazocine, a benzodiazepine, or a muscle relaxer. Warnings While Using This Medicine:   · Tell your doctor if you are pregnant or breastfeeding, or if you have kidney disease, liver disease, heart disease, low blood pressure, breathing problems or lung disease (such as asthma, COPD), thyroid problems, [place] disease, pancreas or gallbladder problems, prostate problems, trouble urinating, or a stomach problems, or a history of head injury or brain damage, seizures, or alcohol or drug abuse. Tell your doctor if you are allergic to codeine. · This medicine may cause the following problems:  ¨ High risk of overdose, which can lead to death  ¨ Respiratory depression (serious breathing problem that can be life-threatening)  ¨ Liver problems  ¨ Serious skin reactions  ¨ Serotonin syndrome (when used with certain medicines)  · This medicine may make you dizzy or drowsy. Do not drive or do anything that could be dangerous until you know how this medicine affects you. Sit or lie down if you feel dizzy. Stand up carefully. · This medicine contains acetaminophen.  Read the labels of all other medicines you are using to see if they also contain acetaminophen, or ask your doctor or pharmacist. Francesco Davidson not use more than 4 grams (4,000 milligrams) total of acetaminophen in one day. · This medicine can be habit-forming. Do not use more than your prescribed dose. Call your doctor if you think your medicine is not working. · Do not stop using this medicine suddenly. Your doctor will need to slowly decrease your dose before you stop it completely. · This medicine could cause infertility. Talk with your doctor before using this medicine if you plan to have children. · This medicine may cause constipation, especially with long-term use. Ask your doctor if you should use a laxative to prevent and treat constipation. · Keep all medicine out of the reach of children. Never share your medicine with anyone. Possible Side Effects While Using This Medicine:   Call your doctor right away if you notice any of these side effects:  · Allergic reaction: Itching or hives, swelling in your face or hands, swelling or tingling in your mouth or throat, chest tightness, trouble breathing  · Anxiety, restlessness, fast heartbeat, fever, muscle spasms, twitching, diarrhea, seeing or hearing things that are not there  · Blistering, peeling, red skin rash  · Blue lips, fingernails, or skin  · Dark urine or pale stools, loss of appetite, stomach pain, yellow skin or eyes  · Extreme weakness, shallow breathing, uneven heartbeat, seizures, sweating, or cold or clammy skin  · Severe confusion, lightheadedness, dizziness, or fainting  · Severe constipation, nausea, or vomiting  · Trouble breathing or slow breathing  If you notice these less serious side effects, talk with your doctor:   · Headache  · Mild constipation, nausea, or vomiting  · Mild sleepiness or drowsiness  If you notice other side effects that you think are caused by this medicine, tell your doctor. Call your doctor for medical advice about side effects.  You may report side effects to FDA at 6-324-FDA-2033  © 2017 [facility] Information is for End User's use only and may not be sold, redistributed or otherwise used for commercial purposes. The above information is an  only. It is not intended as medical advice for individual conditions or treatments. Talk to your doctor, nurse or pharmacist before following any medical regimen to see if it is safe and effective for you. Methocarbamol (By mouth)   Methocarbamol (meth-oh-YADIEL-ba-mol)  Treats muscle pain and spasms. Brand Name(s): Robaxin, Robaxin-750   There may be other brand names for this medicine. When This Medicine Should Not Be Used: This medicine is not right for everyone. Do not use it if you had an allergic reaction to methocarbamol. How to Use This Medicine:   Tablet  · Take your medicine as directed. Your dose may need to be changed several times to find what works best for you. · Missed dose: Take a dose as soon as you remember. If it is almost time for your next dose, wait until then and take a regular dose. Do not take extra medicine to make up for a missed dose. · Store the medicine in a closed container at room temperature, away from heat, moisture, and direct light. Drugs and Foods to Avoid:   Ask your doctor or pharmacist before using any other medicine, including over-the-counter medicines, vitamins, and herbal products. · Some medicines can affect how methocarbamol works. Tell your doctor if you are using pyridostigmine. · Do not drink alcohol while you are using this medicine. · Tell your doctor if you use anything else that makes you sleepy. Some examples are allergy medicine, narcotic pain medicine, and alcohol. Warnings While Using This Medicine:   · Tell your doctor if you are pregnant or breastfeeding, or if you have kidney disease, liver disease, or myasthenia gravis. · This medicine may make you dizzy or drowsy. Do not drive or do anything that could be dangerous until you know how this medicine affects you.   · Tell any doctor or dentist who treats you that you are using this medicine. This medicine may affect certain medical test results. · Your doctor will check your progress and the effects of this medicine at regular visits. Keep all appointments. · Keep all medicine out of the reach of children. Never share your medicine with anyone. Possible Side Effects While Using This Medicine:   Call your doctor right away if you notice any of these side effects:  · Allergic reaction: Itching or hives, swelling in your face or hands, swelling or tingling in your mouth or throat, chest tightness, trouble breathing  · Blurred vision, redness, pain, or swelling of the eyes  · Dark urine or pale stools, nausea, vomiting, loss of appetite, stomach pain, yellow skin or eyes  · Fever  · Lightheadedness, dizziness, fainting  · Seizures  · Slow heartbeat  · Unusual bleeding, bruising, or weakness  If you notice these less serious side effects, talk with your doctor:   · Confusion, trouble sleeping  · Headache  · Warmth or redness in your face, neck, arms, or upper chest  If you notice other side effects that you think are caused by this medicine, tell your doctor. Call your doctor for medical advice about side effects. You may report side effects to FDA at 3-674-FDA-7177  © 2017 [facility] Information is for End User's use only and may not be sold, redistributed or otherwise used for commercial purposes. The above information is an  only. It is not intended as medical advice for individual conditions or treatments. Talk to your doctor, nurse or pharmacist before following any medical regimen to see if it is safe and effective for you. Cervical Spondylosis: Care Instructions  Your Care Instructions    Cervical spondylosis is a type of arthritis of the neck. It can happen as people get older. It may be caused by bone spurs or other problems. You may have neck pain and stiffness.  Sometimes the space around the spinal cord narrows. When this happens, it is called spinal stenosis. Spinal stenosis can cause pain, numbness, or weakness in the arms, legs, feet, and rear end (buttocks). It can also cause loss of bowel and bladder control. You can treat some of your symptoms with over-the-counter pain medicine. But if you have spinal stenosis with severe symptoms, you may need surgery. Follow-up care is a key part of your treatment and safety. Be sure to make and go to all appointments, and call your doctor if you are having problems. It's also a good idea to know your test results and keep a list of the medicines you take. How can you care for yourself at home? · Take anti-inflammatory medicines to reduce neck pain. These include ibuprofen (Advil, Motrin) and naproxen (Aleve). Be safe with medicines. Read and follow all instructions on the label. · Follow your doctor's recommendation about activity. He or she may tell you not to do sports or activities that could injure your neck. When should you call for help? Call 911 anytime you think you may need emergency care. For example, call if:    · You are unable to move an arm or a leg at all.   [facility] your doctor now or seek immediate medical care if:    · You have new or worse symptoms in your arms, legs, belly, or buttocks. Symptoms may include:  ? Numbness or tingling. ? Weakness. ? Pain.     · You lose bladder or bowel control.    Watch closely for changes in your health, and be sure to contact your doctor if:    · You do not get better as expected. Where can you learn more? Go to http://sylvie-angela.info/. Enter V220 in the search box to learn more about \"Cervical Spondylosis: Care Instructions. \"  Current as of: September 20, 2018  Content Version: 11.9  © 5596-6336 [employer]. Care instructions adapted under license by [employer] (which disclaims liability or warranty for this information).  If you have questions about a medical condition or this instruction, always ask your healthcare professional. [place] [address] any warranty or liability for your use of this information.

## 2019-03-12 NOTE — PROGRESS NOTES
1. Have you been to the ER, urgent care clinic since your last visit? Hospitalized since your last visit? [facility] ER for muscle spasm 3/7/2019. It was determined that patient has cervical disc disease. 2. Have you seen or consulted any other health care providers outside of the [address] [place] [address] since your last visit? Include any pap smears or colon screening. No    Patient is here for ER visit follow up. Latest ER visit was 3/7/19.

## 2019-03-12 NOTE — PROGRESS NOTES
Subjective:   Ayden Bartholomew is a 52 y.o. female here today with  for follow up emergency room visit (3-7-19) for strain of neck muscles and DDD cervical spine. She was given tramadol 50 mg one po every six hours as needed for pain for up to 5 days and lidocaine 5% topical patch #30. Lidocaine patch per patient caused skin burning. She has been taking 100 mg tramadol without pain relief and has not slept more than two hours. She is crying. 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 Last seen by this provider on 2-5-19 (several months subjective report of cervical neck pain for several months greater on right than left) and prescribed methocarbamal and also is in physical therapy. She reports that she is not able to tolerate physical therapy because of the pain in her neck which she reports is worse on right than left. She is crying as she discusses. She reports that she cannot sleep because when she turns her neck she says she hurts. She describes the pain as aching intermittent sharp jabbing pains with movement. This is a chronic problem that is significantly worsened. Per review of available records and patients , there are not sign of overuse, misuse, diversion, or concerning side effects. Today we reviewed: the risk of overdose, addiction, and dependency proper storage and disposal of medications the goals of treatment (improve functionality, quality of life, and pain) alternative treatment options including non-narcotic modalities the risks and benefits of continuing with a narcotic based pain regimen pain management contract signed, opiod risk abuse screen performed, compliance urine ordered. The following changes were made to the patients current treatment plan: medications adjusted, see orders.           Current Outpatient Medications   Medication Sig Dispense Refill    traMADol (ULTRAM) 50 mg tablet Take 1 Tab by mouth every six (6) hours as needed for Pain for up to 5 days. Max Daily Amount: 200 mg. 20 Tab 0    methocarbamol (ROBAXIN-750) 750 mg tablet Take 1 Tab by mouth three (3) times daily as needed. 90 Tab 0    carBAMazepine (TEGRETOL) 200 mg tablet One tab in AM, one tab at lunch, and two tabs before bed 90 Tab 0    lidocaine (LIDODERM) 5 % Apply patch to the affected area for 12 hours a day and remove for 12 hours a day. 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 Review of Systems   Constitutional: Negative. Musculoskeletal: Positive for myalgias. Neurological: Positive for tingling and headaches. Right upper extremity and intermittent not constant   Psychiatric/Behavioral: The patient has insomnia. OBJECTIVE:  Awake and alert in no acute distress  Lungs clear throughout  S1 S2 RRR without ectopy or murmur auscultated. Inspection: no erythema no edema no warmth to palpation  Cervical paraspinals exquisite TTP bilaterally greater on right than left   ROM unable to perform ROM to head and neck without significant pain  Gait  Normal   Integumentary no rashes normal skin turgor  Visit Vitals  /73 (BP 1 Location: Left arm, BP Patient Position: Sitting)   Pulse 79   Temp 98.6 °F (37 °C) (Oral)   Resp 18   Ht 5' 4\" (1.626 m)   Wt 182 lb 12.8 oz (82.9 kg)   SpO2 98%   BMI 31.38 kg/m²     Diagnoses and all orders for this visit:    Cervical pain (neck)  -     oxyCODONE-acetaminophen (PERCOCET) 5-325 mg per tablet;  Take 1 Tab by mouth every four (4) hours as needed for Pain for up to 3 days. Max Daily Amount: 6 Tabs., Print, Disp-60 Tab, R-0  -     REFERRAL TO ORTHOPEDICS  -     COMPLIANCE DRUG SCREEN/PRESCRIPTION MONITORING; Future    DDD (degenerative disc disease), cervical  -     oxyCODONE-acetaminophen (PERCOCET) 5-325 mg per tablet; Take 1 Tab by mouth every four (4) hours as needed for Pain for up to 3 days. Max Daily Amount: 6 Tabs., Print, Disp-60 Tab, R-0  -     REFERRAL TO ORTHOPEDICS    Neck muscle spasm  -     oxyCODONE-acetaminophen (PERCOCET) 5-325 mg per tablet; Take 1 Tab by mouth every four (4) hours as needed for Pain for up to 3 days. Max Daily Amount: 6 Tabs., Print, Disp-60 Tab, R-0  -     REFERRAL TO ORTHOPEDICS    Chronic neck pain  -     oxyCODONE-acetaminophen (PERCOCET) 5-325 mg per tablet; Take 1 Tab by mouth every four (4) hours as needed for Pain for up to 3 days. Max Daily Amount: 6 Tabs., Print, Disp-60 Tab, R-0  -     REFERRAL TO ORTHOPEDICS  -     COMPLIANCE DRUG SCREEN/PRESCRIPTION MONITORING; Future    Adverse effect of drug, initial encounter    letter for work, pending [facility] paperwork  Reviewed risks and benefits and common side effects of new medication  Breakthrough pain Aleve (follow over the counter package dosing instructions.)  No acetaminophen  Remaining tramadol discarded in examination room sharps container  Patient agrees with plan and verbalizes understanding. I have discussed the diagnosis with the patient and the intended plan as seen in the above orders. The patient has received an after-visit summary and questions were answered concerning future plans. I have discussed medication side effects and warnings with the patient as well. Follow-up Disposition:  Return in about 9 days (around 3/21/2019) for follow up chronic neck pain pending acceptance by spine center.

## 2019-03-12 NOTE — TELEPHONE ENCOUNTER
Pt's  dropped off [place] paperwork today.  He stated pt was here earlier today for OV and provider told her okay to bring this paperwork in     He was made aware forms may take 7 to 10 business for completion     Form placed in APRIL Coronel's mailbox    When completed she request it be faxed to [phone]

## 2019-03-14 ENCOUNTER — APPOINTMENT (OUTPATIENT)
Dept: PHYSICAL THERAPY | Age: 54
End: 2019-03-14
Payer: COMMERCIAL

## 2019-03-17 LAB — DRUGS UR: NORMAL

## 2019-03-18 NOTE — TELEPHONE ENCOUNTER
Called and informed patient that FMLA paperwork was still pending with provider and would be available by the end of the week, patient verbalized understanding.

## 2019-03-19 ENCOUNTER — APPOINTMENT (OUTPATIENT)
Dept: PHYSICAL THERAPY | Age: 54
End: 2019-03-19
Payer: COMMERCIAL

## 2019-03-21 ENCOUNTER — APPOINTMENT (OUTPATIENT)
Dept: PHYSICAL THERAPY | Age: 54
End: 2019-03-21
Payer: COMMERCIAL

## 2019-03-21 ENCOUNTER — OFFICE VISIT (OUTPATIENT)
Dept: FAMILY MEDICINE CLINIC | Age: 54
End: 2019-03-21

## 2019-03-21 VITALS
OXYGEN SATURATION: 98 % | SYSTOLIC BLOOD PRESSURE: 136 MMHG | HEART RATE: 66 BPM | RESPIRATION RATE: 17 BRPM | TEMPERATURE: 98 F | HEIGHT: 64 IN | WEIGHT: 185.6 LBS | DIASTOLIC BLOOD PRESSURE: 82 MMHG | BODY MASS INDEX: 31.69 KG/M2

## 2019-03-21 DIAGNOSIS — M62.838 NECK MUSCLE SPASM: Primary | ICD-10-CM

## 2019-03-21 DIAGNOSIS — M50.30 DDD (DEGENERATIVE DISC DISEASE), CERVICAL: ICD-10-CM

## 2019-03-21 DIAGNOSIS — M54.2 NECK PAIN: ICD-10-CM

## 2019-03-21 NOTE — PROGRESS NOTES
Subjective:   Júnior Hebert is a 52 y.o. female presents to office today with her  for follow up 3-12-19 visit. She was seen in the ED for neck muscle strain and DDD cervical spine. She was experiencing severe pain in her neck and was unable to to experience any pain relief from tramadol which was discarded in office sharps container by provider. She was unable to tolerate the [id number] patch because of skin burning and irritation. She was prescribed a 7 day prescription for percocet prn extreme pain, advised to utilize naprosyn 500 mg po bid and to take methocarbamol as prescribed prn. Also she has been referred to physical therapy but could not tolerate because of neck pain. Pain today 3/10 took methocarbamol and Naprosyn a few hour ago. Continues to complain of limited movement to neck and head because of pain. Has pending appointment to spine center April 2019. 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 She reports that she often has to turn, lift and changes patients by her self. She does recall several months ago that she was turning a patient away from her to assess her and the patient fell back towards her and she remembered feeling a pain in her neck. She reports that she did not think anything of this but the next morning she began to experience right sided neck pain. She reports today that she thought she slept wrong. Patient also reports that she notices that the longer she is up and moving around the pain in her neck particularly on right increases and she has to take pain medication. She reports that she reserves the percocet for extreme 10/10 pain. She denies refills today  [place] paperwork completed. No return date for patient until evaluation by spine center  Review of Systems   Constitutional: Negative. Musculoskeletal: Positive for myalgias and neck pain. Negative for back pain, falls and joint pain. Skin: Negative. Neurological: Negative for headaches. OBJECTIVE:  Awake and alert in no acute distress  Lungs clear throughout  S1 S2 RRR without ectopy or murmur auscultated. Musculoskeletal: +TTP paravertebral bilaterally greater on right than left palpable spasm   Palpable spasm trapezius bilaterally greater on right than left  Pain with lateral bending, flexion and hyperextension of neck   Integumentary: normal skin turgor  No rashes   Visit Vitals  /82 (BP 1 Location: Left arm, BP Patient Position: Sitting)   Pulse 66   Temp 98 °F (36.7 °C) (Oral)   Resp 17   Ht 5' 4\" (1.626 m)   Wt 185 lb 9.6 oz (84.2 kg)   SpO2 98%   BMI 31.86 kg/m²     Diagnoses and all orders for this visit:    Neck muscle spasm continue current treatment plan    DDD (degenerative disc disease), cervical continue current treatment plan    Neck pain continue current treatment plan      FMLA paperwork completed   Pending appointment with Spine Center April 2019  General comfort measures  Patient agrees with plan and verbalizes understanding. I have discussed the diagnosis with the patient and the intended plan as seen in the above orders. The patient has received an after-visit summary and questions were answered concerning future plans. I have discussed medication side effects and warnings with the patient as well. Time with Pt 15 minutes, >50% of which was counseling pt regarding Dx and Tx options FMLA neck pain DDD neck  and coordination of care. Follow-up and Dispositions    · Return in about 2 weeks (around 4/4/2019) for neck pain .

## 2019-03-21 NOTE — PROGRESS NOTES
Chief Complaint   Patient presents with    Neck Pain     1. Have you been to the ER, urgent care clinic since your last visit? Hospitalized since your last visit? No     2. Have you seen or consulted any other health care providers outside of the [address] [place] [address] since your last visit? Include any pap smears or colon screening.   No

## 2019-04-01 NOTE — PROGRESS NOTES
[facility] - [facility]  [address], [address] [phone] fax    Discharge Summary  Patient name: Tali Sung Start of Care: 3/6/2019   Referral source: Brynn Mejias NP : [id number]                Medical Diagnosis: Cervicalgia [M54.2]  Other muscle spasm [M62.838]  Payor: BLUE CROSS / Plan: [address] [place] [address] / Product Type: PPO /  Onset Date:19                Treatment Diagnosis: cervical pain, muscle spasm   Prior Hospitalization: see medical history Provider#: [id number]   Medications: Verified on Patient summary List    Comorbidities: epilepsy, tobacco use   Prior Level of Function: Pt reports Independent PLOF. 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             Visits from [facility]: 2    Missed Visits: 0    Reporting Period : 3/6/19 to 3/8/19    Short Term Goals: To be accomplished in 2 weeks:  1.  Pt to report [place] with HEP.              Eval status: HEP issued and reviewed physically/verbally with pt  met     Long Term Goals: To be accomplished in 4 weeks:- not met  1.  Pt to report score of TBD on FOTO, indicating improved tolerance to activity and reduced pain.               Eval status: TBD on initial FOTO  2.  Pt to report max level of pain <5/10 with activity, indicating reduced pain and improved mobility.                Eval status: max level of pain 10/10  3.  Pt to be able to tolerate standing/walking 8 hours with no increased pain to be able to accomplish work tasks.  Dolores Slates status: pt limited by pain at work  4.  Pt to demonstrate full cervical ROM in all planes with no increased c/o pain to improve mobility and reduce fall risk.               Eval status: pt with 50% limited flexion, 75% limited extension, 75% limited R/L lateral flexion, 75% limited rotation R/L  5.  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              Eval status: pt notes significant pain with resident tasks at work        Assessment/Summary of care: patient seen for 2 treatment sessions only. Wished to be placed on hold to allow MD f/u. Patient has not since f/u with our office and current status is unknown. We will discharge from PT services at this time.      RECOMMENDATIONS:  [x]Discontinue therapy: []Patient has reached or is progressing toward set goals      [x]Patient is non-compliant or has abdicated      []Due to lack of appreciable progress towards set [address], PT 4/1/2019 3:57 PM

## 2019-04-04 ENCOUNTER — HOSPITAL ENCOUNTER (OUTPATIENT)
Age: 54
Discharge: HOME | End: 2019-04-04
Attending: NURSE PRACTITIONER
Payer: COMMERCIAL

## 2019-04-04 ENCOUNTER — OFFICE VISIT (OUTPATIENT)
Dept: FAMILY MEDICINE CLINIC | Age: 54
End: 2019-04-04

## 2019-04-04 VITALS
HEIGHT: 64 IN | BODY MASS INDEX: 31.86 KG/M2 | SYSTOLIC BLOOD PRESSURE: 146 MMHG | TEMPERATURE: 98.1 F | RESPIRATION RATE: 17 BRPM | HEART RATE: 70 BPM | WEIGHT: 186.6 LBS | DIASTOLIC BLOOD PRESSURE: 74 MMHG | OXYGEN SATURATION: 98 %

## 2019-04-04 DIAGNOSIS — M62.838 NECK MUSCLE SPASM: ICD-10-CM

## 2019-04-04 DIAGNOSIS — M54.2 CHRONIC NECK PAIN: Primary | ICD-10-CM

## 2019-04-04 DIAGNOSIS — G89.29 CHRONIC NECK PAIN: ICD-10-CM

## 2019-04-04 DIAGNOSIS — M54.12 CERVICAL RADICULOPATHY: ICD-10-CM

## 2019-04-04 DIAGNOSIS — G89.29 CHRONIC NECK PAIN: Primary | ICD-10-CM

## 2019-04-04 DIAGNOSIS — M54.2 CHRONIC NECK PAIN: ICD-10-CM

## 2019-04-04 PROCEDURE — 72141 MRI NECK SPINE W/O DYE: CPT

## 2019-04-04 RX ORDER — METHOCARBAMOL 750 MG/1
750 TABLET, FILM COATED ORAL
Qty: 90 TAB | Refills: 0 | Status: SHIPPED | OUTPATIENT
Start: 2019-04-04 | End: 2019-11-08 | Stop reason: ALTCHOICE

## 2019-04-04 NOTE — PROGRESS NOTES
Chief Complaint   Patient presents with    Pain (Chronic)     1. Have you been to the ER, urgent care clinic since your last visit? Hospitalized since your last visit? No     2. Have you seen or consulted any other health care providers outside of the [address] [place] [address] since your last visit? Include any pap smears or colon screening.  No

## 2019-04-04 NOTE — PROGRESS NOTES
Alyse Neff RTC today to follow up on chronic neck pain diagnosis. We discussed her osteoarthritis that is affecting her neck. Significant changes since last visit: below:. Was seen 3-21-19 for neck muscle spasm associated with DDD cervical and associated neck pain  Has been referred to physical therapy and she went but unable to continue because of neck pain. 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 She also reports if she is looking down and when reposition she experiences  \"electricity feeling\"  in the neck and trapezius   \"Electricity feeling \" and burning upper extremities to upper arms bilaterally  Pain with all movement of head and neck. Pain now is 3/10 but with any movement may accelerate to 10/10  She is currently taking Aleve two a day  and muscle relaxer twice a day and if pain is severe she will take percocet. She has percocet left over and is not requesting refill today         She is not  able to do her normal daily activities. She reports the following adverse side effects: none. Least pain over the last week has been 3/10. Worst pain over the last week has been 10/10. Opioid Risk Tool Reviewed: YES    Aberrant behaviors: None. Urine Drug Screen: reviewed and up to date. Controlled substance agreement on file: YES.  reviewed:yes    Pill count is consistent with her prescription: yes    OBJECTIVE:  Awake and alert in no acute distress  Lungs clear throughout  S1 S2 RRR without ectopy or murmur auscultated.   Musculoskeletal: +TTP paraspinals cervical, trapezius bilaterally  Pain with flexion and extension of neck unable to hyperextend the neck, pain with turning toward right (left paraspinals), turning toward left (right paraspinals) unable to laterally bend  Motor strength upper extremities +5/5  Neuro no focal changes  Visit Vitals  /74 (BP 1 Location: Left arm, BP Patient Position: Sitting)   Pulse 70   Temp 98.1 °F (36.7 °C) (Oral)   Resp 17   Ht 5' 4\" (1.626 m)   Wt 186 lb 9.6 oz (84.6 kg)   SpO2 98%   BMI 32.03 kg/m²     Diagnoses and all orders for this visit:    Chronic neck pain  -     MRI CERV SPINE WO CONT; Future    Neck muscle spasm  -     methocarbamol (ROBAXIN-750) 750 mg tablet; Take 1 Tab by mouth three (3) times daily as needed (3 times daily as needed). , Normal, Disp-90 Tab, R-0  -     MRI CERV SPINE WO CONT; Future    Cervical radiculopathy  -     MRI CERV SPINE WO CONT; Future      Keep upcoming appointment with spine center  Patient agrees with plan and verbalizes understanding. FMLA   appointment with spine center evaluation   I have discussed the diagnosis with the patient and the intended plan as seen in the above orders. The patient has received an after-visit summary and questions were answered concerning future plans. I have discussed medication side effects and warnings with the patient as well. Follow-up and Dispositions    · Return in about 1 month (around 5/2/2019), or if symptoms worsen or fail to improve, for follow up neck pain.

## 2019-04-08 DIAGNOSIS — E04.1 THYROID NODULE: ICD-10-CM

## 2019-04-08 DIAGNOSIS — E01.0 THYROMEGALY: Primary | ICD-10-CM

## 2019-04-08 NOTE — PROGRESS NOTES
Called and spoke with patient regarding MRI results showing degenertaive disease, patient was also made aware that her thyroid was enlarged and she was going to need a thyroid ultrasound, patient verbalized understanding.

## 2019-04-08 NOTE — PROGRESS NOTES
Please call patient and let her know that her thyroid is enlarged and thyroid ultrasound has been ordered  Obey Sotelo

## 2019-04-15 ENCOUNTER — HOSPITAL ENCOUNTER (OUTPATIENT)
Dept: ULTRASOUND IMAGING | Age: 54
Discharge: HOME | End: 2019-04-15
Attending: NURSE PRACTITIONER
Payer: COMMERCIAL

## 2019-04-15 DIAGNOSIS — E01.0 THYROMEGALY: ICD-10-CM

## 2019-04-15 DIAGNOSIS — E04.1 THYROID NODULE: ICD-10-CM

## 2019-04-15 PROCEDURE — 76536 US EXAM OF HEAD AND NECK: CPT

## 2019-04-16 DIAGNOSIS — E04.2 MULTIPLE THYROID NODULES: Primary | ICD-10-CM

## 2019-04-19 ENCOUNTER — TELEPHONE (OUTPATIENT)
Dept: FAMILY MEDICINE CLINIC | Age: 54
End: 2019-04-19

## 2019-04-19 NOTE — TELEPHONE ENCOUNTER
Patient calling asking for a copy of the [employer] paperwork that was faxed over to be filled out. Patient would like to know if the paperwork has be completed and faxed back.     pls advise

## 2019-04-19 NOTE — TELEPHONE ENCOUNTER
Called and spoke with patient to inform her that her [facility] paper work has been faxed over to [employer], patient verbalized understanding.

## 2019-04-23 ENCOUNTER — OFFICE VISIT (OUTPATIENT)
Dept: ORTHOPEDIC SURGERY | Age: 54
End: 2019-04-23

## 2019-04-23 VITALS
WEIGHT: 186.2 LBS | RESPIRATION RATE: 16 BRPM | HEIGHT: 64 IN | TEMPERATURE: 98.3 F | SYSTOLIC BLOOD PRESSURE: 148 MMHG | HEART RATE: 71 BPM | BODY MASS INDEX: 31.79 KG/M2 | DIASTOLIC BLOOD PRESSURE: 86 MMHG | OXYGEN SATURATION: 100 %

## 2019-04-23 DIAGNOSIS — M54.12 CERVICAL RADICULOPATHY: Primary | ICD-10-CM

## 2019-04-23 DIAGNOSIS — M54.2 NECK PAIN: ICD-10-CM

## 2019-04-23 DIAGNOSIS — M79.18 CERVICAL MYOFASCIAL PAIN SYNDROME: ICD-10-CM

## 2019-04-23 RX ORDER — PREDNISONE 10 MG/1
TABLET ORAL
Qty: 21 TAB | Refills: 0 | Status: SHIPPED | OUTPATIENT
Start: 2019-04-23 | End: 2019-07-25 | Stop reason: ALTCHOICE

## 2019-04-23 NOTE — PROGRESS NOTES
[address]  [address], [address]  [place], [address] [place] [address] Street  Phone: [phone]  Fax: [phone]        Clare Melvin  : 1965  PCP: Nicole Padilla NP  2019    NEW PATIENT      HISTORY OF PRESENT ILLNESS  Lauro Rivera is a 52 y.o. female c/o neck pain radiating into her shoulders/trapezii (L>R) x  2 months. She notes she woke up with pain and initially she felt she had slept wrong. She went to the Er on 19 for neck pain and spasms x a few weeks. She was taking Flexeril and Advil with minimal relief. About 6 weeks prior, her pain began as spasms in her back, then they moved to her neck. She began PT on 3/6/19, but was unable to complete her full course as it exacerbated her pain given her limited ROM. She went to the ER on 3/7/19 for this pain. She was given Percocet. She has taken Tramadol, lidocaine patches (ineffective), and Robaxin. She has h/o epilepsy since she was 23 and takes Tegretol. She reports occasional sensations in her hands, but her pain typically remains axial. 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 Cervical MRI 19: Multilevel moderate degenerative disc disease and multilevel mild ventral cord compression C4-5, C6-7 and C7-T1 with mild central canal stenosis. No myelopathy. Multilevel high-grade neural foraminal stenosis most severe right C4-5 and bilateral C7-T1 neural foramina. Enlarged multinodular thyroid gland, incompletely evaluated, can be evaluated with ultrasound. She rates her pain as a 10/10 today. ASSESSMENT  Her pain is likely myofascial in nature given her tenderness to palpation of her trapezii, but there may be a component of cervical radiculopathy at the root of her axial pain. Discussed options of: PT vs trigger point injections vs cervical interlaminar injections. PLAN  1.  Referred for cervical interlaminar injections. 2. 10 mg Prednisone dose pack. 3. Provided work note to extend her time off. She can call if she is interested in a note to return to work. 4. Will likely consider PT if her symptoms do not improve with the injections. Pt will f/u in 2 weeks after cervical interlaminar injections or sooner if needed. Diagnoses and all orders for this visit:    1. Cervical radiculopathy  -     REFERRAL TO PAIN MANAGEMENT  -     predniSONE (STERAPRED DS) 10 mg dose pack; See administration instruction per 10mg dose pack    2. Neck pain  -     predniSONE (STERAPRED DS) 10 mg dose pack; See administration instruction per 10mg dose pack    3. Cervical myofascial pain syndrome  -     predniSONE (STERAPRED DS) 10 mg dose pack; See administration instruction per 10mg dose pack       CHIEF COMPLAINT  Sandra Monae is seen today in consultation at the request of Earl Wall NP for complaints of neck pain x 2-3 months. PAST MEDICAL HISTORY   Past Medical History:   Diagnosis Date    Bronchitis     Carpal tunnel syndrome on right     Chronic bronchitis ([address]     De Quervain's syndrome (tenosynovitis)     Depression     resolved    Ectopic pregnancy     two times    Epilepsy ([address]     Seizure ([address]     Sinus infection     Stenosing tenosynovitis of thumb     Right side    Trigger thumb of left hand        Past Surgical History:   Procedure Laterality Date    COLONOSCOPY N/A 10/21/2016    COLONOSCOPY w/ biopsy performed by Mildred Shelley MD at [address] HX APPENDECTOMY  1/12/15    lysis of adhesions    HX CHOLECYSTECTOMY      HX HYSTERECTOMY      HX OTHER SURGICAL      scar tissue removed from intestines    HX PARTIAL HYSTERECTOMY  11/2009    HX TONSILLECTOMY         MEDICATIONS    Current Outpatient Medications   Medication Sig Dispense Refill    methocarbamol (ROBAXIN-750) 750 mg tablet Take 1 Tab by mouth three (3) times daily as needed (3 times daily as needed).  90 Tab 0    lidocaine (LIDODERM) 5 % Apply patch to the affected area for 12 hours a day and remove for 12 hours a day. 30 Each 0    carBAMazepine (TEGRETOL) 200 mg tablet One tab in AM, one tab at lunch, and two tabs before bed 90 Tab 0       ALLERGIES  Allergies   Allergen Reactions    Etodolac Other (comments)     headache          SOCIAL HISTORY    Social History     Socioeconomic History    Marital status: LEGALLY      Spouse name: Not on file    Number of children: 1    Years of education: Not on file    Highest education level: Not on file   Occupational History    Occupation: CNA     Employer: [address]     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   Tobacco Use    Smoking status: Current Every Day Smoker     Packs/day: 0.50     Types: Cigarettes    Smokeless tobacco: Never Used   Substance and Sexual Activity    Alcohol use: No    Drug use: No    Sexual activity: Not Currently       FAMILY HISTORY  Family History   Problem Relation Age of Onset    Heart Attack Brother          in 62s    Diabetes Father     Hypertension Father     Cancer Father         colon CA    Heart Disease Sister         stent placement    Hypertension Brother     Other Brother         Stomach problems/ulcers    Cancer Sister         Breast CA    Stroke Sister     Diabetes Mother     Hypertension Mother          REVIEW OF SYSTEMS  Review of Systems   Musculoskeletal: Positive for neck pain.         Upper back/bilateral shoulder pain  Episodic sensation in hands         PHYSICAL EXAMINATION  Visit Vitals  /86   Pulse 71   Temp 98.3 °F (36.8 °C)   Resp 16   Ht 5' 4\" (1.626 m)   Wt 186 lb 3.2 oz (84.5 kg)   SpO2 100%   BMI 31.96 kg/m²         Pain Assessment  2018   Location of Pain Shoulder   Location Modifiers Left   Severity of Pain 4   Quality of Pain Aching   Duration of Pain -   Frequency of Pain Constant   Aggravating Factors Exercise   Limiting Behavior Yes   Relieving Factors Nothing   Result of Injury No         Constitutional:  Well developed, well nourished, in no acute distress. Psychiatric: Affect and mood are appropriate. HEENT: Normocephalic, atraumatic. Extraocular movements intact. Integumentary: No rashes or abrasions noted on exposed areas. Cardiovascular: Regular rate and rhythm. Pulmonary: Clear to auscultation bilaterally. SPINE/MUSCULOSKELETAL EXAM    Cervical spine:  Neck is midline. Normal muscle tone. No focal atrophy is noted. ROM pain free. Shoulder ROM intact. Tenderness to palpation of bilateral trapezii. Negative Spurling's sign. Negative Tinel's sign. Negative Norton's sign. Sensation in the bilateral arms grossly intact to light touch. Lumbar spine:  No rash, ecchymosis, or gross obliquity. No fasciculations. No focal atrophy is noted. No pain with hip ROM. Full range of motion. No tenderness to palpation. No tenderness to palpation at the sciatic notch. SI joints non-tender. Trochanters non tender. Sensation in the bilateral legs grossly intact to light touch. MOTOR:      Biceps  Triceps Deltoids Wrist Ext Wrist Flex Hand Intrin   Right 5/5 5/5 5/5 5/5 5/5 5/5   Left 5/5 5/5 5/5 5/5 5/5 5/5             Hip Flex  Quads Hamstrings Ankle DF EHL Ankle PF   Right 5/5 5/5 5/5 5/5 5/5 5/5   Left 5/5 5/5 5/5 5/5 5/5 5/5     DTRs are 2+ biceps, triceps, brachioradialis, patella, and Achilles. Negative Straight Leg raise. Squat not tested. No difficulty with tandem gait. Ambulation without assistive device. FWB. RADIOGRAPHS  Cervical MRI images taken on 4/4/19 personally reviewed with patient:  Alignment: Straightening of the cervical lordosis without subluxation. Vertebral body height: Normal  Marrow signal: Multilevel discogenic reactive bone marrow changes without  significant edema signal. No suspicious bone marrow lesion or infiltrative bone  marrow process.   Disc spaces: Multilevel disc height loss and endplate osteophytosis     Cervicomedullary Junction: Patent  Cervical cord: No cord expansion or atrophy. Further discussed below where  relevant.     On axial imaging, findings at each level are as follows: There is PLL  thickening.     C2/C3: Mild disc osteophyte complex and ligamentum thickening. Patent canal and  foramina.     C3/C4: Mild disc osteophyte complex and ligamentum thickening partially effacing  ventral CSF space. Uncovertebral spurring. Patent central canal. Moderate right  and mild left foraminal stenosis.     C4/C5: Moderate disc osteophyte complex flattening ventral cord. AP canal  diameter 9 mm. Uncovertebral spurring. Severe right and mild to moderate left  foraminal stenosis.     C5/C6: Mild disc osteophyte complex partially effacing ventral CSF space. AP  canal diameter 10 mm. Patent foramina.     C6/C7: Moderate disc osteophyte complex flattening ventral cord more toward the  left. AP canal diameter 8 mm. Patent neural foramina.     C7/T1: Moderate disc osteophyte complex flattening ventral cord. AP canal  diameter 7-8 mm. Uncovertebral spurring. Moderate to severe foraminal stenosis.     Other structures: Remainder of the visualized upper thoracic levels T1-T4  demonstrate patent canal and foramina. Enlarged thyroid gland multinodular right  lobe.     IMPRESSION  IMPRESSION:     1. Multilevel moderate degenerative disc disease and multilevel mild ventral  cord compression C4-5, C6-7 and C7-T1 with mild central canal stenosis. No  myelopathy. 2. Multilevel high-grade neural foraminal stenosis most severe right C4-5 and  bilateral C7-T1 neural foramina. 3. Enlarged multinodular thyroid gland, incompletely evaluated, can be evaluated  with ultrasound.  reviewed    Ms. Hopson has a reminder for a \"due or due soon\" health maintenance. I have asked that she contact her primary care provider for follow-up on this health maintenance.      32 minutes of face-to-face contact were spent with the patient during today's visit extensively discussing symptoms and treatment plan. All questions were answered. More than half of this visit today was spent on counseling. Written by Libby Soriano, as dictated by Dr. Berenice Lam. I, Dr. Berenice Lam, confirm that all documentation is accurate.

## 2019-04-23 NOTE — TELEPHONE ENCOUNTER
Patient dropped off Short Term Disability Claim Form Physicians Statement to be filled out and faxed back to KARINA BROWN [facility]. Patient would like a call when paperwork is complete so that she can  copy.  Paperwork placed on [employer]

## 2019-04-23 NOTE — PATIENT INSTRUCTIONS
Neck Spasm: Exercises  Your Care Instructions  Here are some examples of typical rehabilitation exercises for your condition. Start each exercise slowly. Ease off the exercise if you start to have pain. Your doctor or physical therapist will tell you when you can start these exercises and which ones will work best for you. How to do the exercises  Levator scapula stretch    1. Sit in a firm chair, or stand up straight. 2. Gently tilt your head toward your left shoulder. 3. Turn your head to look down into your armpit, bending your head slightly forward. Let the weight of your head stretch your neck muscles. 4. Hold for 15 to 30 seconds. 5. Return to your starting position. 6. Follow the same instructions above, but tilt your head toward your right shoulder. 7. Repeat 2 to 4 times toward each shoulder. Upper trapezius stretch    1. Sit in a firm chair, or stand up straight. 2. This stretch works best if you keep your shoulder down as you lean away from it. To help you remember to do this, start by relaxing your shoulders and lightly holding on to your thighs or your chair. 3. Tilt your head toward your shoulder and hold for 15 to 30 seconds. Let the weight of your head stretch your muscles. 4. If you would like a little added stretch, place your arm behind your back. Use the arm opposite of the direction you are tilting your head. For example, if you are tilting your head to the left, place your right arm behind your back. 5. Repeat 2 to 4 times toward each shoulder. Neck rotation    1. Sit in a firm chair, or stand up straight. 2. Keeping your chin level, turn your head to the right, and hold for 15 to 30 seconds. 3. Turn your head to the left, and hold for 15 to 30 seconds. 4. Repeat 2 to 4 times to each side. Chin tuck    1. Lie on the floor with a rolled-up towel under your neck. Your head should be touching the floor. 2. Slowly bring your chin toward the front of your neck.   3. Hold for a count of 6, and then relax for up to 10 seconds. 4. Repeat 8 to 12 times. Forward neck flexion    1. Sit in a firm chair, or stand up straight. 2. Bend your head forward. 3. Hold for 15 to 30 seconds, then return to your starting position. 4. Repeat 2 to 4 times. Follow-up care is a key part of your treatment and safety. Be sure to make and go to all appointments, and call your doctor if you are having problems. It's also a good idea to know your test results and keep a list of the medicines you take. Where can you learn more? Go to http://sylvie-angela.info/. Enter P962 in the search box to learn more about \"Neck Spasm: Exercises. \"  Current as of: September 20, 2018  Content Version: 11.9  © 1730-2927 [employer] Care instructions adapted under license by [employer] (which disclaims liability or warranty for this information). If you have questions about a medical condition or this instruction, always ask your healthcare professional. [place] [address] any warranty or liability for your use of this information. Neck: Exercises  Your Care Instructions  Here are some examples of typical rehabilitation exercises for your condition. Start each exercise slowly. Ease off the exercise if you start to have pain. Your doctor or physical therapist will tell you when you can start these exercises and which ones will work best for you. How to do the exercises  Neck stretch    1. This stretch works best if you keep your shoulder down as you lean away from it. To help you remember to do this, start by relaxing your shoulders and lightly holding on to your thighs or your chair. 2. Tilt your head toward your shoulder and hold for 15 to 30 seconds. Let the weight of your head stretch your muscles. 3. If you would like a little added stretch, use your hand to gently and steadily pull your head toward your shoulder.  For example, keeping your right shoulder down, lean your head to the left. 4. Repeat 2 to 4 times toward each shoulder. Diagonal neck stretch    1. Turn your head slightly toward the direction you will be stretching, and tilt your head diagonally toward your chest and hold for 15 to 30 seconds. 2. If you would like a little added stretch, use your hand to gently and steadily pull your head forward on the diagonal.  3. Repeat 2 to 4 times toward each side. Dorsal glide stretch    1. Sit or stand tall and look straight ahead. 2. Slowly tuck your chin as you glide your head backward over your body  3. Hold for a count of 6, and then relax for up to 10 seconds. 4. Repeat 8 to 12 times. Chest and shoulder stretch    1. Sit or stand tall and glide your head backward as in the dorsal glide stretch. 2. Raise both arms so that your hands are next to your ears. 3. Take a deep breath, and as you breathe out, lower your elbows down and behind your back. You will feel your shoulder blades slide down and together, and at the same time you will feel a stretch across your chest and the front of your shoulders. 4. Hold for about 6 seconds, and then relax for up to 10 seconds. 5. Repeat 8 to 12 times. Strengthening: Hands on head    1. Move your head backward, forward, and side to side against gentle pressure from your hands, holding each position for about 6 seconds. 2. Repeat 8 to 12 times. Follow-up care is a key part of your treatment and safety. Be sure to make and go to all appointments, and call your doctor if you are having problems. It's also a good idea to know your test results and keep a list of the medicines you take. Where can you learn more? Go to http://sylvie-angela.info/. Enter P975 in the search box to learn more about \"Neck: Exercises. \"  Current as of: September 20, 2018  Content Version: 11.9  © 9581-1410 [employer].  Care instructions adapted under license by [facility] (which disclaims liability or warranty for this information). If you have questions about a medical condition or this instruction, always ask your healthcare professional. [address] any warranty or liability for your use of this information. Shoulder Blade: Exercises  Your Care Instructions  Here are some examples of typical exercises for your condition. Start each exercise slowly. Ease off the exercise if you start to have pain. Your doctor or physical therapist will tell you when you can start these exercises and which ones will work best for you. How to do the exercises  Shoulder roll    1. Stand tall with your chin slightly tucked. Imagine that a string at the top of your head is pulling you straight up. 2. Keep your arms relaxed. All motion will be in your shoulders. 3. Shrug your shoulders up toward your ears, then up and back. [place] your shoulders down and back, like you're sliding your hands down into your back pants pockets. 4. Repeat the circles at least 2 to 4 times. 5. This exercise is also helpful anytime you want to relax. Lower neck and upper back stretch    1. With your arms about shoulder height, clasp your hands in front of you. 2. Drop your chin toward your chest.  3. Reach straight forward so you are rounding your upper back. Think about pulling your shoulder blades apart. Yolanda Euceda feel a stretch across your upper back and shoulders. Hold for at least 6 seconds. 4. Repeat 2 to 4 times. Triceps stretch    1. Reach your arm straight up. 2. Keeping your elbow in place, bend your arm and reach your hand down behind your back. 3. With your other hand, apply gentle pressure to the bent elbow. Yolanda Euceda feel a stretch at the back of your upper arm and shoulder. Hold about 6 seconds. 4. Repeat 2 to 4 times with each arm. Shoulder stretch    1. Relax your shoulders.   2. Raise one arm to shoulder height, and reach it across your chest.  3. Pull the arm slightly toward you with your other arm. This will help you get a gentle stretch. Hold for about 6 seconds. 4. Repeat 2 to 4 times. Shoulder blade squeeze    1. Sit or stand up tall with your arms at your sides. 2. Keep your shoulders relaxed and down, not shrugged. 3. Squeeze your shoulder blades together. Hold for 6 seconds, then relax. 4. Repeat 8 to 12 times. Straight-arm shoulder blade squeeze    1. Sit or stand tall. Relax your shoulders. 2. With palms down, hold your elastic tubing or band straight out in front of you. 3. Start with slight tension in the tubing or band, with your hands about shoulder-width apart. 4. Slowly pull straight out to the sides, squeezing your shoulder blades together. Keep your arms straight and at shoulder height. Slowly release. 5. Repeat 8 to 12 times. Rowing    1. [place] your elastic tubing or band at about waist height. Take one end in each hand. 2. Sit or stand with your feet hip-width apart. 3. Hold your arms straight in front of you. Adjust your distance to create slight tension in the tubing or band. 4. Slightly tuck your chin. Relax your shoulders. 5. Without shrugging your shoulders, pull straight back. Your elbows will pass alongside your waist.    Pull-downs    1. [place] your elastic tubing or band in the top of a closed door. Take one end in each hand. 2. Either sit or stand, depending on what is more comfortable. If you feel unsteady, sit on a chair. 3. Start with your arms up and comfortably apart, elbows straight. There should be a slight tension in the tubing or band. 4. Slightly tuck your chin, and look straight ahead. 5. Keeping your back straight, slowly pull down and back, bending your elbows. 6. Stop where your hands are level with your chin, in a \"goalpost\" position. 7. Repeat 8 to 12 times. Chest T stretch    1. Lie on your back. Raise your knees so they are bent. Plant your feet on the floor, hip-width apart.   2. Uli Carpio your chin, and relax your shoulders. 3. Reach your arms straight out to the sides. If you don't feel a mild stretch in your shoulders and across your chest, use a foam roll or a tightly rolled blanket under your spine, from your tailbone to your head. 4. Relax in this position for at least 15 to 30 seconds while you breathe normally. Repeat 2 to 4 times. 5. As you get used to this stretch, keep adding a little more time until you are able relax in this position for 2 or 3 minutes. When you can relax for at least 2 minutes, you only need to do the exercise 1 time per session. Chest goalpost stretch    1. Lie on your back. Raise your knees so they are bent. Plant your feet on the floor, hip-width apart. 2. Tuck your chin, and relax your shoulders. 3. Reach your arms straight out to the sides. 4. Bend your arms at the elbows, with your hands pointed toward the top of your head. Your arms should make an L on either side of your head. Your palms should be facing up. 5. If you don't feel a mild stretch in your shoulders and across your chest, use a foam roll or tightly rolled blanket under your spine, from your tailbone to your head. 6. Relax in this position for at least 15 to 30 seconds while you breathe normally. Repeat 2 to 4 times. 7. Each day you do this exercise, add a little more time until you can relax in this position for 2 or 3 minutes. When you can relax for at least 2 minutes, you only need to do the exercise 1 time per session. Follow-up care is a key part of your treatment and safety. Be sure to make and go to all appointments, and call your doctor if you are having problems. It's also a good idea to know your test results and keep a list of the medicines you take. Where can you learn more? Go to http://sylvie-angela.info/. Enter [phone] in the search box to learn more about \"Shoulder Blade: Exercises. \"  Current as of: September 20, 2018  Content Version: 11.9  © 7852-3223 [employer]. Care instructions adapted under license by [employer] (which disclaims liability or warranty for this information). If you have questions about a medical condition or this instruction, always ask your healthcare professional. [place] [address] any warranty or liability for your use of this information. Healthy Upper Back: Exercises  Your Care Instructions  Here are some examples of exercises for your upper back. Start each exercise slowly. Ease off the exercise if you start to have pain. Your doctor or physical therapist will tell you when you can start these exercises and which ones will work best for you. How to do the exercises  Lower neck and upper back stretch    5. Stretch your arms out in front of your body. Clasp one hand on top of your other hand. 6. Gently reach out so that you feel your shoulder blades stretching away from each other. 7. Gently bend your head forward. 8. Hold for 15 to 30 seconds. 9. Repeat 2 to 4 times. Midback stretch    4. Kneel on the floor, and sit back on your ankles. 5. Lean forward, place your hands on the floor, and stretch your arms out in front of you. Rest your head between your arms. 6. Gently push your chest toward the floor, reaching as far in front of you as possible. 7. Hold for 15 to 30 seconds. 8. Repeat 2 to 4 times. Shoulder rolls    5. Sit comfortably with your feet shoulder-width apart. You can also do this exercise while standing. 6. Roll your shoulders up, then back, and then down in a smooth, circular motion. 7. Repeat 2 to 4 times. Wall push-up    6. Stand against a wall with your feet about 12 to 24 inches back from the wall. If you feel any pain when you do this exercise, stand closer to the wall. 7. Place your hands on the wall slightly wider apart than your shoulders, and lean forward. 8. Gently lean your body toward the wall. Then push back to your starting position.  Keep the motion smooth and controlled. 9. Repeat 8 to 12 times. Resisted shoulder blade squeeze    3. Sit or stand, holding the band in both hands in front of you. Keep your elbows close to your sides, bent at a 90-degree angle. Your palms should face up. 4. Squeeze your shoulder blades together, and move your arms to the outside, stretching the band. Be sure to keep your elbows at your sides while you do this. 5. Relax. 6. Repeat 8 to 12 times. Resisted rows    1. Put the band around a solid object, such as a bedpost, at about waist level. Hold one end of the band in each hand. 2. With your elbows at your sides and bent to 90 degrees, pull the band back to move your shoulder blades toward each other. Return to the starting position. 3. Repeat 8 to 12 times. Follow-up care is a key part of your treatment and safety. Be sure to make and go to all appointments, and call your doctor if you are having problems. It's also a good idea to know your test results and keep a list of the medicines you take. Where can you learn more? Go to http://sylvie-angela.info/. Enter C882 in the search box to learn more about \"Healthy Upper Back: Exercises. \"  Current as of: September 20, 2018  Content Version: 11.9  © 6507-7894 [employer]. Care instructions adapted under license by [employer] (which disclaims liability or warranty for this information). If you have questions about a medical condition or this instruction, always ask your healthcare professional. [place] [address] any warranty or liability for your use of this information. Learning About a Cervical Epidural Injection  What is a cervical epidural injection? A cervical epidural injection is a shot of medicine in your neck. The injection goes into the area around the spinal cord in your neck.   A doctor may give it to you to help with pain, tingling, or numbness in your neck, shoulder, or arm.  This injection may have both a steroid, which reduces swelling and pain, and a local anesthetic, which numbs the nerves. Or it may only have a steroid. Some people get a series of these injections over weeks or months. How is a cervical epidural done? The doctor will use a tiny needle to numb the skin where you are getting the injection. After the skin is numb, your doctor will use a larger needle for the epidural injection. He or she may use X-ray or ultrasound to help guide the needle. You may feel some pressure. But you should not feel pain. It takes about 10 to 15 minutes to get this injection. You will probably go home about 20 to 30 minutes after you get it. You will need someone to drive you home. What can you expect after a cervical epidural?  If your injection included local anesthetic medicine, your neck, shoulder, arm, or hand may feel heavy or numb right after the shot. With a local anesthetic, your pain may be gone right away. But it may return after a few hours. This is because the steroid hasn't started working yet. Before the steroid starts to work, your neck, shoulder, or arm may be sore for a few days. These injections don't always work. When they do, it takes 1 to 5 days. The pain relief can last for several days to a few months or longer. Some people are dizzy or feel sick to their stomach after getting this shot. These symptoms usually don't last very long. You may want to do less than normal for a few days. But you may also be able to return to your daily routine. If your pain is better, you may be able to keep doing your normal activities or physical therapy. But try not to overdo it, even if your pain has improved a lot. If your pain is only a little better or if it comes back, your doctor may want you to get another injection in a few weeks. If your pain has not changed, talk to your doctor about other treatment choices.   Follow-up care is a key part of your treatment and safety. Be sure to make and go to all appointments, and call your doctor if you are having problems. It's also a good idea to know your test results and keep a list of the medicines you take. Where can you learn more? Go to http://sylvie-angela.info/. Enter L710 in the search box to learn more about \"Learning About a Cervical Epidural Injection. \"  Current as of: September 20, 2018  Content Version: 11.9  © 9822-3021 [employer]. Care instructions adapted under license by [employer] (which disclaims liability or warranty for this information). If you have questions about a medical condition or this instruction, always ask your healthcare professional. [address] any warranty or liability for your use of this information.

## 2019-04-23 NOTE — LETTER
NOTIFICATION RETURN TO WORK / SCHOOL 
 
4/23/2019 1:48 PM 
 
Ms. Anali Ruby [address] [facility] [id number] To Whom It May Concern: 
 
Anali Ruby is currently under the care of [address] [place] [address]. She was seen in our office today, 4/23/19. She will remain out of work while she is undergoing treatment. If there are questions or concerns please have the patient contact our office.  
 
 
 
Sincerely, 
 
 
Darren Guy MD

## 2019-05-16 ENCOUNTER — TELEPHONE (OUTPATIENT)
Dept: ORTHOPEDIC SURGERY | Age: 54
End: 2019-05-16

## 2019-05-16 NOTE — TELEPHONE ENCOUNTER
Casie Elizabeth its was confirmed that the patient is seeing Dr. Melisa Montejo for injections which explains why it not present in Epic. Patient f/u appt was rescheduled for 6/13.  The letter will reflect this date for re-eval.

## 2019-05-16 NOTE — LETTER
NOTIFICATION RETURN TO WORK 
 
5/16/2019 2:59 PM 
 
Ms. Miles Patel [address] [facility] [id number] To Whom It May Concern: 
 
Miles Patel is currently under the care of [address] [facility]. She will return to work on light duty with the following restrictions: no pushing or lifting more than 15 lbs. She will be reassessed at her scheduled follow up appointment on 6/13/2019. If there are questions or concerns please have the patient contact our office.  
 
 
 
Sincerely, 
 
 
Brandon Duke MD

## 2019-05-16 NOTE — TELEPHONE ENCOUNTER
Patient stated she just got scheduled for her injection for 6/5/19. Unable to confirm this, message left for  to verify date and injection f/u date. Injection f/u date is before the actual injection. Her letter will need to state her re evaluation date, which will be her f/u appointment.

## 2019-05-16 NOTE — TELEPHONE ENCOUNTER
Patient called and would like to know if [facility] can place her on Light Duty until she gets the Injection for the Neck scheduled. Patient said her job needs to know if she can lift or pull or any other restrictions. Patient said that [facility] office is waiting for the approval from her insurance to go ahead and schedule her injection. But in the meantime she needs a letter to be placed on Light Duty. Will  at [facility] location. Patient tel. [phone].

## 2019-06-13 ENCOUNTER — OFFICE VISIT (OUTPATIENT)
Dept: ORTHOPEDIC SURGERY | Age: 54
End: 2019-06-13

## 2019-06-13 VITALS
DIASTOLIC BLOOD PRESSURE: 68 MMHG | HEIGHT: 64 IN | SYSTOLIC BLOOD PRESSURE: 137 MMHG | RESPIRATION RATE: 16 BRPM | TEMPERATURE: 98.3 F | BODY MASS INDEX: 33.29 KG/M2 | OXYGEN SATURATION: 100 % | WEIGHT: 195 LBS | HEART RATE: 75 BPM

## 2019-06-13 DIAGNOSIS — M54.2 NECK PAIN: ICD-10-CM

## 2019-06-13 DIAGNOSIS — M54.12 CERVICAL RADICULOPATHY: ICD-10-CM

## 2019-06-13 DIAGNOSIS — M79.18 CERVICAL MYOFASCIAL PAIN SYNDROME: Primary | ICD-10-CM

## 2019-06-13 RX ORDER — GABAPENTIN 300 MG/1
300 CAPSULE ORAL 3 TIMES DAILY
Qty: 90 CAP | Refills: 2 | Status: SHIPPED | OUTPATIENT
Start: 2019-06-13 | End: 2020-01-02

## 2019-06-13 NOTE — LETTER
NOTIFICATION RETURN TO WORK 
 
6/13/2019 10:27 AM 
 
Ms. Bayron Manzano [address] [facility] [phone] To Whom It May Concern: 
 
Bayron Manzano is currently under the care of [address] [place] [address]. She will return to work remaining on light duty with the following restrictions: no pushing or lifting more than 15 lbs. She will be reassessed in approximately 6 weeks. If there are questions or concerns please have the patient contact our office.  
 
 
 
Sincerely, 
 
 
Rafaela Greenberg MD

## 2019-06-13 NOTE — PATIENT INSTRUCTIONS
Gabapentin (Neurontin) 300 mg three times a day (TID) daily instructions:       Morning Afternoon Night   Week 1 - - 1 pill   Week 2 1 pill - 1 pill   Week 3 and onwards 1 pill 1 pill 1 pill     Week 1: Take one pill each night. Week 2: Take one pill in the morning and one pill at night. Week 3 and onwards: Take one pill in the morning, one pill in the afternoon, and one pill at night. Continue taking this dose each day. Neck Spasm: Exercises  Your Care Instructions  Here are some examples of typical rehabilitation exercises for your condition. Start each exercise slowly. Ease off the exercise if you start to have pain. Your doctor or physical therapist will tell you when you can start these exercises and which ones will work best for you. How to do the exercises  Levator scapula stretch    1. Sit in a firm chair, or stand up straight. 2. Gently tilt your head toward your left shoulder. 3. Turn your head to look down into your armpit, bending your head slightly forward. Let the weight of your head stretch your neck muscles. 4. Hold for 15 to 30 seconds. 5. Return to your starting position. 6. Follow the same instructions above, but tilt your head toward your right shoulder. 7. Repeat 2 to 4 times toward each shoulder. Upper trapezius stretch    1. Sit in a firm chair, or stand up straight. 2. This stretch works best if you keep your shoulder down as you lean away from it. To help you remember to do this, start by relaxing your shoulders and lightly holding on to your thighs or your chair. 3. Tilt your head toward your shoulder and hold for 15 to 30 seconds. Let the weight of your head stretch your muscles. 4. If you would like a little added stretch, place your arm behind your back. Use the arm opposite of the direction you are tilting your head. For example, if you are tilting your head to the left, place your right arm behind your back.   5. Repeat 2 to 4 times toward each shoulder. Neck rotation    1. Sit in a firm chair, or stand up straight. 2. Keeping your chin level, turn your head to the right, and hold for 15 to 30 seconds. 3. Turn your head to the left, and hold for 15 to 30 seconds. 4. Repeat 2 to 4 times to each side. Chin tuck    1. Lie on the floor with a rolled-up towel under your neck. Your head should be touching the floor. 2. Slowly bring your chin toward the front of your neck. 3. Hold for a count of 6, and then relax for up to 10 seconds. 4. Repeat 8 to 12 times. Forward neck flexion    1. Sit in a firm chair, or stand up straight. 2. Bend your head forward. 3. Hold for 15 to 30 seconds, then return to your starting position. 4. Repeat 2 to 4 times. Follow-up care is a key part of your treatment and safety. Be sure to make and go to all appointments, and call your doctor if you are having problems. It's also a good idea to know your test results and keep a list of the medicines you take. Where can you learn more? Go to http://[facility]info/. Enter P962 in the search box to learn more about \"Neck Spasm: Exercises. \"  Current as of: September 20, 2018  Content Version: 11.9  © 8078-4307 [employer]. Care instructions adapted under license by [employer] (which disclaims liability or warranty for this information). If you have questions about a medical condition or this instruction, always ask your healthcare professional. [address] any warranty or liability for your use of this information. Neck: Exercises  Your Care Instructions  Here are some examples of typical rehabilitation exercises for your condition. Start each exercise slowly. Ease off the exercise if you start to have pain. Your doctor or physical therapist will tell you when you can start these exercises and which ones will work best for you. How to do the exercises  Neck stretch    1.  This stretch works best if you keep your shoulder down as you lean away from it. To help you remember to do this, start by relaxing your shoulders and lightly holding on to your thighs or your chair. 2. Tilt your head toward your shoulder and hold for 15 to 30 seconds. Let the weight of your head stretch your muscles. 3. If you would like a little added stretch, use your hand to gently and steadily pull your head toward your shoulder. For example, keeping your right shoulder down, lean your head to the left. 4. Repeat 2 to 4 times toward each shoulder. Diagonal neck stretch    1. Turn your head slightly toward the direction you will be stretching, and tilt your head diagonally toward your chest and hold for 15 to 30 seconds. 2. If you would like a little added stretch, use your hand to gently and steadily pull your head forward on the diagonal.  3. Repeat 2 to 4 times toward each side. Dorsal glide stretch    1. Sit or stand tall and look straight ahead. 2. Slowly tuck your chin as you glide your head backward over your body  3. Hold for a count of 6, and then relax for up to 10 seconds. 4. Repeat 8 to 12 times. Chest and shoulder stretch    1. Sit or stand tall and glide your head backward as in the dorsal glide stretch. 2. Raise both arms so that your hands are next to your ears. 3. Take a deep breath, and as you breathe out, lower your elbows down and behind your back. You will feel your shoulder blades slide down and together, and at the same time you will feel a stretch across your chest and the front of your shoulders. 4. Hold for about 6 seconds, and then relax for up to 10 seconds. 5. Repeat 8 to 12 times. Strengthening: Hands on head    1. Move your head backward, forward, and side to side against gentle pressure from your hands, holding each position for about 6 seconds. 2. Repeat 8 to 12 times. Follow-up care is a key part of your treatment and safety.  Be sure to make and go to all appointments, and call your doctor if you are having problems. It's also a good idea to know your test results and keep a list of the medicines you take. Where can you learn more? Go to http://[facility].info/. Enter P975 in the search box to learn more about \"Neck: Exercises. \"  Current as of: September 20, 2018  Content Version: 11.9  © 4708-0130 [employer]. Care instructions adapted under license by [employer] (which disclaims liability or warranty for this information). If you have questions about a medical condition or this instruction, always ask your healthcare professional. [place] [address] any warranty or liability for your use of this information. Shoulder Blade: Exercises  Your Care Instructions  Here are some examples of typical exercises for your condition. Start each exercise slowly. Ease off the exercise if you start to have pain. Your doctor or physical therapist will tell you when you can start these exercises and which ones will work best for you. How to do the exercises  Shoulder roll    1. Stand tall with your chin slightly tucked. Imagine that a string at the top of your head is pulling you straight up. 2. Keep your arms relaxed. All motion will be in your shoulders. 3. Shrug your shoulders up toward your ears, then up and back. [place] your shoulders down and back, like you're sliding your hands down into your back pants pockets. 4. Repeat the circles at least 2 to 4 times. 5. This exercise is also helpful anytime you want to relax. Lower neck and upper back stretch    1. With your arms about shoulder height, clasp your hands in front of you. 2. Drop your chin toward your chest.  3. Reach straight forward so you are rounding your upper back. Think about pulling your shoulder blades apart. Debbie Brooks feel a stretch across your upper back and shoulders. Hold for at least 6 seconds. 4. Repeat 2 to 4 times.     Triceps stretch    1. Reach your arm straight up. 2. Keeping your elbow in place, bend your arm and reach your hand down behind your back. 3. With your other hand, apply gentle pressure to the bent elbow. Rosemary Councilman feel a stretch at the back of your upper arm and shoulder. Hold about 6 seconds. 4. Repeat 2 to 4 times with each arm. Shoulder stretch    1. Relax your shoulders. 2. Raise one arm to shoulder height, and reach it across your chest.  3. Pull the arm slightly toward you with your other arm. This will help you get a gentle stretch. Hold for about 6 seconds. 4. Repeat 2 to 4 times. Shoulder blade squeeze    1. Sit or stand up tall with your arms at your sides. 2. Keep your shoulders relaxed and down, not shrugged. 3. Squeeze your shoulder blades together. Hold for 6 seconds, then relax. 4. Repeat 8 to 12 times. Straight-arm shoulder blade squeeze    1. Sit or stand tall. Relax your shoulders. 2. With palms down, hold your elastic tubing or band straight out in front of you. 3. Start with slight tension in the tubing or band, with your hands about shoulder-width apart. 4. Slowly pull straight out to the sides, squeezing your shoulder blades together. Keep your arms straight and at shoulder height. Slowly release. 5. Repeat 8 to 12 times. Rowing    1. [place] your elastic tubing or band at about waist height. Take one end in each hand. 2. Sit or stand with your feet hip-width apart. 3. Hold your arms straight in front of you. Adjust your distance to create slight tension in the tubing or band. 4. Slightly tuck your chin. Relax your shoulders. 5. Without shrugging your shoulders, pull straight back. Your elbows will pass alongside your waist.    Pull-downs    1. [place] your elastic tubing or band in the top of a closed door. Take one end in each hand. 2. Either sit or stand, depending on what is more comfortable. If you feel unsteady, sit on a chair.   3. Start with your arms up and comfortably apart, elbows straight. There should be a slight tension in the tubing or band. 4. Slightly tuck your chin, and look straight ahead. 5. Keeping your back straight, slowly pull down and back, bending your elbows. 6. Stop where your hands are level with your chin, in a \"goalpost\" position. 7. Repeat 8 to 12 times. Chest T stretch    1. Lie on your back. Raise your knees so they are bent. Plant your feet on the floor, hip-width apart. 2. Tuck your chin, and relax your shoulders. 3. Reach your arms straight out to the sides. If you don't feel a mild stretch in your shoulders and across your chest, use a foam roll or a tightly rolled blanket under your spine, from your tailbone to your head. 4. Relax in this position for at least 15 to 30 seconds while you breathe normally. Repeat 2 to 4 times. 5. As you get used to this stretch, keep adding a little more time until you are able relax in this position for 2 or 3 minutes. When you can relax for at least 2 minutes, you only need to do the exercise 1 time per session. Chest goalpost stretch    1. Lie on your back. Raise your knees so they are bent. Plant your feet on the floor, hip-width apart. 2. Tuck your chin, and relax your shoulders. 3. Reach your arms straight out to the sides. 4. Bend your arms at the elbows, with your hands pointed toward the top of your head. Your arms should make an L on either side of your head. Your palms should be facing up. 5. If you don't feel a mild stretch in your shoulders and across your chest, use a foam roll or tightly rolled blanket under your spine, from your tailbone to your head. 6. Relax in this position for at least 15 to 30 seconds while you breathe normally. Repeat 2 to 4 times. 7. Each day you do this exercise, add a little more time until you can relax in this position for 2 or 3 minutes. When you can relax for at least 2 minutes, you only need to do the exercise 1 time per session.     Follow-up care is a key part of your treatment and safety. Be sure to make and go to all appointments, and call your doctor if you are having problems. It's also a good idea to know your test results and keep a list of the medicines you take. Where can you learn more? Go to http://[facility].info/. Enter ([phone] in the search box to learn more about \"Shoulder Blade: Exercises. \"  Current as of: September 20, 2018  Content Version: 11.9  © 8501-3016 [employer]. Care instructions adapted under license by [employer] (which disclaims liability or warranty for this information). If you have questions about a medical condition or this instruction, always ask your healthcare professional. [address] any warranty or liability for your use of this information. Healthy Upper Back: Exercises  Your Care Instructions  Here are some examples of exercises for your upper back. Start each exercise slowly. Ease off the exercise if you start to have pain. Your doctor or physical therapist will tell you when you can start these exercises and which ones will work best for you. How to do the exercises  Lower neck and upper back stretch    5. Stretch your arms out in front of your body. Clasp one hand on top of your other hand. 6. Gently reach out so that you feel your shoulder blades stretching away from each other. 7. Gently bend your head forward. 8. Hold for 15 to 30 seconds. 9. Repeat 2 to 4 times. Midback stretch    4. Kneel on the floor, and sit back on your ankles. 5. Lean forward, place your hands on the floor, and stretch your arms out in front of you. Rest your head between your arms. 6. Gently push your chest toward the floor, reaching as far in front of you as possible. 7. Hold for 15 to 30 seconds. 8. Repeat 2 to 4 times. Shoulder rolls    5. Sit comfortably with your feet shoulder-width apart. You can also do this exercise while standing.   6. Roll your shoulders up, then back, and then down in a smooth, circular motion. 7. Repeat 2 to 4 times. Wall push-up    6. Stand against a wall with your feet about 12 to 24 inches back from the wall. If you feel any pain when you do this exercise, stand closer to the wall. 7. Place your hands on the wall slightly wider apart than your shoulders, and lean forward. 8. Gently lean your body toward the wall. Then push back to your starting position. Keep the motion smooth and controlled. 9. Repeat 8 to 12 times. Resisted shoulder blade squeeze    3. Sit or stand, holding the band in both hands in front of you. Keep your elbows close to your sides, bent at a 90-degree angle. Your palms should face up. 4. Squeeze your shoulder blades together, and move your arms to the outside, stretching the band. Be sure to keep your elbows at your sides while you do this. 5. Relax. 6. Repeat 8 to 12 times. Resisted rows    1. Put the band around a solid object, such as a bedpost, at about waist level. Hold one end of the band in each hand. 2. With your elbows at your sides and bent to 90 degrees, pull the band back to move your shoulder blades toward each other. Return to the starting position. 3. Repeat 8 to 12 times. Follow-up care is a key part of your treatment and safety. Be sure to make and go to all appointments, and call your doctor if you are having problems. It's also a good idea to know your test results and keep a list of the medicines you take. Where can you learn more? Go to http://[facility]vie-[facility]ela.info/. Enter J957 in the search box to learn more about \"Healthy Upper Back: Exercises. \"  Current as of: September 20, 2018  Content Version: 11.9  © 2805-3503 [employer]. Care instructions adapted under license by [employer] (which disclaims liability or warranty for this information).  If you have questions about a medical condition or this instruction, always ask your healthcare professional. [place] [address] any warranty or liability for your use of this information.

## 2019-06-13 NOTE — PROGRESS NOTES
[address], [address]  [place], [address] [place] [address] Street  Phone: [phone]  Fax: [phone]        Bekah Thomas  : 1965  PCP: Karli Johnson NP  2019    PROGRESS NOTE      HISTORY OF PRESENT ILLNESS  Bayron Manzano is a 52 y.o. female who was seen as a new patient 19 with c/o neck pain radiating into her shoulders/trapezii (L>R) x  2 months. She notes she woke up with pain and initially she felt she had slept wrong. She went to the ER on 19 for neck pain and spasms x a few weeks. She was taking Flexeril and Advil with minimal relief. About 6 weeks prior, her pain began as spasms in her back, then they moved to her neck. She began PT on 3/6/19, but was unable to complete her full course as it exacerbated her pain given her limited ROM. She went to the ER on 3/7/19 for this pain. She was given Percocet. She has taken Tramadol, lidocaine patches (ineffective), and Robaxin. She has h/o epilepsy since she was 23 and takes Tegretol. She reports occasional sensations in her hands, but her pain typically remains axial. 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 Cervical MRI 19: Multilevel moderate degenerative disc disease and multilevel mild ventral cord compression C4-5, C6-7 and C7-T1 with mild central canal stenosis. No myelopathy. Multilevel high-grade neural foraminal stenosis most severe right C4-5 and bilateral C7-T1 neural foramina. Enlarged multinodular thyroid gland, incompletely evaluated, can be evaluated with ultrasound. Bayron Manzano comes in to the office today for f/u. She notes that she found some relief with cervical interlmainar injections with Dr. Feli Pinzon. She reports that she found some relief with her right-sided pain and ROM, but she continues to have stiffness, spasms, pain, and limited ROM on the left.  She notes that her stiffness is worse when she sits in [facility] or when it rains. She is on light duty at work, so she has to sit at a computer. She gets up and walks around to help relieve the stiffness. She reports that she is frequently under stress. She rates her pain as a 7/10 today. ASSESSMENT  Her pain is likely myofascial in nature given her tenderness to palpation of her trapezii, but there may be a component of cervical radiculopathy at the root of her axial pain. We discussed the option of PT vs surgical consult. She is not interested in surgery at this time. PLAN  1. Referral to PT with dry needling. 2. Gabapentin 300 mg TID. 3. Provided work note to remain on light duty. She can call if she would like a work note to return to full duty. Pt will f/u in 6 weeks or sooner as needed. Diagnoses and all orders for this visit:    1. Cervical myofascial pain syndrome  -     REFERRAL TO PHYSICAL THERAPY    2. Neck pain  -     REFERRAL TO PHYSICAL THERAPY    3. Cervical radiculopathy  -     REFERRAL TO PHYSICAL THERAPY  -     gabapentin (NEURONTIN) 300 mg capsule; Take 1 Cap by mouth three (3) times daily. PAST MEDICAL HISTORY   Past Medical History:   Diagnosis Date    Bronchitis     Carpal tunnel syndrome on right     Chronic bronchitis ([address]     De Quervain's syndrome (tenosynovitis)     Depression     resolved    Ectopic pregnancy     two times    Epilepsy ([address]     Seizure ([address]     Sinus infection     Stenosing tenosynovitis of thumb     Right side    Trigger thumb of left hand        Past Surgical History:   Procedure Laterality Date    COLONOSCOPY N/A 10/21/2016    COLONOSCOPY w/ biopsy performed by Lizbet Whaley MD at [address] HX APPENDECTOMY  1/12/15    lysis of adhesions    HX CHOLECYSTECTOMY      HX HYSTERECTOMY      HX OTHER SURGICAL      scar tissue removed from intestines    HX PARTIAL HYSTERECTOMY  11/2009    HX TONSILLECTOMY     .       MEDICATIONS    Current Outpatient Medications   Medication Sig Dispense Refill    predniSONE (STERAPRED DS) 10 mg dose pack See administration instruction per 10mg dose pack 21 Tab 0    methocarbamol (ROBAXIN-750) 750 mg tablet Take 1 Tab by mouth three (3) times daily as needed (3 times daily as needed). 90 Tab 0    lidocaine (LIDODERM) 5 % Apply patch to the affected area for 12 hours a day and remove for 12 hours a day. 30 Each 0    carBAMazepine (TEGRETOL) 200 mg tablet One tab in AM, one tab at lunch, and two tabs before bed 90 Tab 0        ALLERGIES  Allergies   Allergen Reactions    Etodolac Other (comments)     headache          SOCIAL HISTORY    Social History     Socioeconomic History    Marital status: LEGALLY      Spouse name: Not on file    Number of children: 1    Years of education: Not on file    Highest education level: Not on file   Occupational History    Occupation: CNA     Employer: [address]     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   Tobacco Use    Smoking status: Current Every Day Smoker     Packs/day: 0.50     Types: Cigarettes    Smokeless tobacco: Never Used   Substance and Sexual Activity    Alcohol use: No    Drug use: No    Sexual activity: Not Currently       FAMILY HISTORY  Family History   Problem Relation Age of Onset    Heart Attack Brother          in 62s    Diabetes Father     Hypertension Father     Cancer Father         colon CA    Heart Disease Sister         stent placement    Hypertension Brother     Other Brother         Stomach problems/ulcers    Cancer Sister         Breast CA    Stroke Sister     Diabetes Mother     Hypertension Mother          REVIEW OF SYSTEMS  Review of Systems   Musculoskeletal: Positive for neck pain.         Upper back/bilateral shoulder pain  Episodic altered sensation in hands           PHYSICAL EXAMINATION  Visit Vitals  /68   Pulse 75   Temp 98.3 °F (36.8 °C)   Resp 16   Ht 5' 4\" (1.626 m)   Wt 195 lb (88.5 kg)   SpO2 100% BMI 33.47 kg/m²       Pain Assessment  6/13/2019   Location of Pain Neck   Location Modifiers -   Severity of Pain 7   Quality of Pain Throbbing   Duration of Pain -   Frequency of Pain Intermittent   Aggravating Factors (No Data)   Aggravating Factors Comment increased ROM   Limiting Behavior -   Relieving Factors Rest   Result of Injury -           Constitutional:  Well developed, well nourished, in no acute distress. Psychiatric: Affect and mood are appropriate. Integumentary: No rashes or abrasions noted on exposed areas. SPINE/MUSCULOSKELETAL EXAM    Cervical spine:  Neck is midline. Normal muscle tone. No focal atrophy is noted. ROM pain free. Shoulder ROM intact. Tenderness to palpation of bilateral trapezii. Negative Spurling's sign. Negative Tinel's sign. Negative Norton's sign. Sensation in the bilateral arms grossly intact to light touch.      Lumbar spine:  No rash, ecchymosis, or gross obliquity. No fasciculations. No focal atrophy is noted. No pain with hip ROM. Full range of motion. No tenderness to palpation. No tenderness to palpation at the sciatic notch. SI joints non-tender. Trochanters non tender. Sensation in the bilateral legs grossly intact to light touch. MOTOR:      Biceps  Triceps Deltoids Wrist Ext Wrist Flex Hand Intrin   Right 5/5 5/5 5/5 5/5 5/5 5/5   Left 5/5 5/5 5/5 5/5 5/5 5/5             Hip Flex  Quads Hamstrings Ankle DF EHL Ankle PF   Right 5/5 5/5 5/5 5/5 5/5 5/5   Left 5/5 5/5 5/5 5/5 5/5 5/5     DTRs are 2+ biceps, triceps, brachioradialis, patella, and Achilles.     Negative Straight Leg raise. Squat not tested. No difficulty with tandem gait.      Ambulation without assistive device.  FWB.       RADIOGRAPHS  Cervical MRI images taken on 4/4/19 personally reviewed with patient:  Alignment: Straightening of the cervical lordosis without subluxation. Vertebral body height: Normal  Marrow signal: Multilevel discogenic reactive bone marrow changes without  significant edema signal. No suspicious bone marrow lesion or infiltrative bone  marrow process. Disc spaces: Multilevel disc height loss and endplate osteophytosis     Cervicomedullary Junction: Patent  Cervical cord: No cord expansion or atrophy. Further discussed below where  relevant.     On axial imaging, findings at each level are as follows: There is PLL  thickening.     C2/C3: Mild disc osteophyte complex and ligamentum thickening. Patent canal and  foramina.     C3/C4: Mild disc osteophyte complex and ligamentum thickening partially effacing  ventral CSF space. Uncovertebral spurring. Patent central canal. Moderate right  and mild left foraminal stenosis.     C4/C5: Moderate disc osteophyte complex flattening ventral cord. AP canal  diameter 9 mm. Uncovertebral spurring. Severe right and mild to moderate left  foraminal stenosis.     C5/C6: Mild disc osteophyte complex partially effacing ventral CSF space. AP  canal diameter 10 mm. Patent foramina.     C6/C7: Moderate disc osteophyte complex flattening ventral cord more toward the  left. AP canal diameter 8 mm. Patent neural foramina.     C7/T1: Moderate disc osteophyte complex flattening ventral cord. AP canal  diameter 7-8 mm. Uncovertebral spurring. Moderate to severe foraminal stenosis.     Other structures: Remainder of the visualized upper thoracic levels T1-T4  demonstrate patent canal and foramina. Enlarged thyroid gland multinodular right  lobe.     IMPRESSION  IMPRESSION:     1. Multilevel moderate degenerative disc disease and multilevel mild ventral  cord compression C4-5, C6-7 and C7-T1 with mild central canal stenosis. No  myelopathy. 2. Multilevel high-grade neural foraminal stenosis most severe right C4-5 and  bilateral C7-T1 neural foramina.   3. Enlarged multinodular thyroid gland, incompletely evaluated, can be evaluated  with ultrasound. 12 minutes of face-to-face contact were spent with the patient during today's visit extensively discussing symptoms and treatment plan. All questions were answered. More than half of this visit today was spent on counseling.      Written by Rey Green as dictated by Christina Serrano MD

## 2019-06-17 ENCOUNTER — HOSPITAL ENCOUNTER (OUTPATIENT)
Dept: PHYSICAL THERAPY | Age: 54
Discharge: HOME | End: 2019-06-17
Payer: COMMERCIAL

## 2019-06-17 PROCEDURE — 97110 THERAPEUTIC EXERCISES: CPT

## 2019-06-17 PROCEDURE — 97162 PT EVAL MOD COMPLEX 30 MIN: CPT

## 2019-06-17 NOTE — PROGRESS NOTES
PT DAILY TREATMENT NOTE 10-18    Patient Name: Priya Cedillo  Date:2019  : 1965  [x]  Patient  Verified  Payor: BLUE CROSS / Plan: [address] [place] [address] / Product Type: PPO /    In time:12:13  Out time:12:55  Total Treatment Time (min): 42  Visit #: 1 of 8    Medicare/BCBS Only   Total Timed Codes (min):  17 1:1 Treatment Time:  42       Treatment Area: Cervicalgia [M54.2]  Radiculopathy, cervical region [M54.12]  Myalgia, other site [M79.18]    SUBJECTIVE  Pain Level (0-10 scale): 7  Any medication changes, allergies to medications, adverse drug reactions, diagnosis change, or new procedure performed?: [x] No    [] Yes (see summary sheet for update)  Subjective functional status/changes:   [] No changes reported  Patient is a 52 y.o female presenting with a chief complaint of neck pain since 2019 with unknown STEPHANIE. Patient reports the pain is primarily on the left side, however if the intensity of pain increases it will travel to the right side. Patient denies any radiating symptoms, denies any numbness/tingling/diminished sensation. Patient does report frequent headaches located in the suboccipital and temporal area that she thinks is caused from one of her recent medications. Patient reports the pain as an \"achy\" feeling with increased stiffness if she is looking at the computer screen too long at work. Patient reports her pain is worse with cervical rotation. Pain is somewhat relieved with heat. Patient received a cortisone injection on  in which she states helped her achieve more cervical AROM. Patient reports recent cervical xray showed DDD and recent cervical MRI showed HNP. OBJECTIVE    Modality rationale: decrease pain and increase tissue extensibility to improve the patients ability to perform ADLs.     Min Type Additional Details    [] Estim:  []Unatt       []IFC  []Premod                        []Other:  []w/ice   []w/heat  Position:  Location:    [] Estim: []Att    []TENS instruct  []NMES                    []Other:  []w/US   []w/ice   []w/heat  Position:  Location:    []  Traction: [] Cervical       []Lumbar                       [] Prone          []Supine                       []Intermittent   []Continuous Lbs:  [] before manual  [] after manual    []  Ultrasound: []Continuous   [] Pulsed                           []1MHz   []3MHz W/cm2:  Location:    []  Iontophoresis with dexamethasone         Location: [] Take home patch   [] In clinic   10 []  Ice     [x]  heat  []  Ice massage  []  Laser   []  Anodyne Position: seated  Location: cervical spine    []  Laser with stim  []  Other:  Position:  Location:    []  Vasopneumatic Device Pressure:       [] lo [] med [] hi   Temperature: [] lo [] med [] hi   [] Skin assessment post-treatment:  []intact []redness- no adverse reaction    []redness - adverse reaction:     15 min [x]Eval                  []Re-Eval       17 min Therapeutic Exercise:  [x] See flow sheet : HEP exercises + STM (B) UT/LS   Rationale: increase ROM and increase strength to improve the patients ability to perform ADLs. With   [] TE   [] TA   [] neuro   [] other: Patient Education: [x] Review HEP    [] Progressed/Changed HEP based on:   [] positioning   [] body mechanics   [] transfers   [] heat/ice application    [] other:      Other Objective/Functional Measures: See IE     Pain Level (0-10 scale) post treatment: 4-5    ASSESSMENT/Changes in Function: Patient reports with limited cervical AROM in all directions, myofascial and joint restrictions (B) UT, decreased activity tolerance, and postural deficits. Patient with increased tenderness to palpate and very slowed movement patterns with transitional movements due to increased pain. Patient reported decreased pain post manual and heat today.  Patient will benefit from skilled physical therapy in order to address the above impairments and improve patients ease of functional task in order to return to full duty at work. Patient will continue to benefit from skilled PT services to modify and progress therapeutic interventions, address functional mobility deficits, address ROM deficits, address strength deficits, analyze and address soft tissue restrictions, analyze and cue movement patterns, analyze and modify body mechanics/ergonomics and assess and modify postural abnormalities to attain remaining goals. [x]  See Plan of Care  []  See progress note/recertification  []  See Discharge Summary         Progress towards goals / Updated goals:  Short Term Goals: To be accomplished in 2 weeks:                1. Patient will be independent with HEP in order to improve activity tolerance. 2. Patient will be able to decrease average cervical pain to no more than 5/10 in order to improve activity tolerance. Long Term Goals: To be accomplished in 4 weeks:               1. Patient will be able to improve (B) cervical rotation to 50 degrees in order improve ease of looking behind her while driving. 2. Patient will be able to improve cervical flexion and extension to 30 degrees in order to improve ease of ADLs. 3. Patient will be able to improve FOTO score to 64 in order to demonstrate improvements in functional independence.      PLAN  []  Upgrade activities as tolerated     [x]  Continue plan of care  []  Update interventions per flow sheet       []  Discharge due to:_  []  Other:_      Delphine President, PT 6/17/2019  12:39 PM    Future Appointments   Date Time Provider Dilma Marin   7/25/2019 11:15 AM Petra Monaco  [address]

## 2019-06-17 NOTE — PROGRESS NOTES
[facility]  [address]  [place], [address]  [phone] fax    Plan of Care/ Statement of Necessity for Physical Therapy Services    Patient name: Erin Osborne Start of Care: 2019   Referral source: Timothy Dogulas MD : 1965    Medical Diagnosis: Cervicalgia [M54.2]  Radiculopathy, cervical region [M54.12]  Myalgia, other site [M79.18]  Payor: BLUE CROSS / Plan: [address] [place] [address] / Product Type: PPO /  Onset Date: 2019   Treatment Diagnosis: Cervical pain   Prior Hospitalization: see medical history Provider#: [id number]   Medications: Verified on Patient summary List    Comorbidities: epilepsy, arthritis, back pain   Prior Level of Function: (I) with all ADLs and going to the gym regularly. 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 The Plan of Care and following information is based on the information from the initial evaluation. Assessment/ key information: Patient is a 52 y.o female presenting with a chief complaint of neck pain since 2019 with unknown STEPHANIE. Patient reports the pain is primarily on the left side, however if the intensity of pain increases it will travel to the right side. Patient denies any radiating symptoms, denies any numbness/tingling/diminished sensation. Patient does report frequent headaches located in the suboccipital and temporal area that she thinks is caused from one of her recent medications. Patient reports the pain as an \"achy\" feeling with increased stiffness if she is looking at the computer screen too long at work. Patient reports her pain is worse with cervical rotation. Pain is somewhat relieved with heat. Patient received a cortisone injection on  in which she states helped her achieve more cervical AROM. Patient reports recent cervical xray showed DDD and recent cervical MRI showed HNP.   Patient reports with limited cervical AROM in all directions, myofascial and joint restrictions (B) UT, decreased activity tolerance, and postural deficits. Patient with increased tenderness to palpate and very slowed movement patterns with transitional movements due to increased pain. Patient reported decreased pain post manual and heat today. Patient will benefit from skilled physical therapy in order to address the above impairments and improve patients ease of functional task in order to return to full duty at work. Evaluation Complexity History MEDIUM  Complexity : 1-2 comorbidities / personal factors will impact the outcome/ POC ; Examination MEDIUM Complexity : 3 Standardized tests and measures addressing body structure, function, activity limitation and / or participation in recreation  ;Presentation MEDIUM Complexity : Evolving with changing characteristics  ; Clinical Decision Making MEDIUM Complexity : FOTO score of 26-74  Overall Complexity Rating: MEDIUM  Problem List: pain affecting function, decrease ROM, decrease strength, decrease ADL/ functional abilitiies, decrease activity tolerance and decrease flexibility/ joint mobility   Treatment Plan may include any combination of the following: Therapeutic exercise, Neuromuscular re-education, Physical agent/modality, Manual therapy, Patient education and Functional mobility training  Patient / Family readiness to learn indicated by: asking questions, trying to perform skills and interest  Persons(s) to be included in education: patient (P)  Barriers to Learning/Limitations: None  Patient Goal (s): To be able to function how I was before  Patient Self Reported Health Status: good  Rehabilitation Potential: good    Short Term Goals: To be accomplished in 2 weeks:   1. Patient will be independent with HEP in order to improve activity tolerance. 2. Patient will be able to decrease average cervical pain to no more than 5/10 in order to improve activity tolerance.    Long Term Goals: To be accomplished in 4 weeks:   1. Patient will be able to improve (B) cervical rotation to 50 degrees in order improve ease of looking behind her while driving. 2. Patient will be able to improve cervical flexion and extension to 30 degrees in order to improve ease of ADLs. 3. Patient will be able to improve FOTO score to 64 in order to demonstrate improvements in functional independence. Frequency / Duration: Patient to be seen 2 times per week for 4 weeks. Patient/ Caregiver education and instruction: Diagnosis, prognosis, activity modification and exercises   [x]  Plan of care has been reviewed with SADI Palomo, PT 6/17/2019 12:38 PM    ________________________________________________________________________    I certify that the above Therapy Services are being furnished while the patient is under my care. I agree with the treatment plan and certify that this therapy is necessary.     Physician's Signature:____________Date:_________TIME:________    ** Signature, Date and Time must be completed for valid certification **    Please sign and return to [facility] [address] [place] [address] & [facility]  [address]  [place], [facility] [address]  [phone] fax

## 2019-06-21 ENCOUNTER — HOSPITAL ENCOUNTER (OUTPATIENT)
Dept: PHYSICAL THERAPY | Age: 54
Discharge: HOME | End: 2019-06-21
Payer: COMMERCIAL

## 2019-06-21 PROCEDURE — 97110 THERAPEUTIC EXERCISES: CPT

## 2019-06-21 PROCEDURE — 97140 MANUAL THERAPY 1/> REGIONS: CPT

## 2019-06-21 NOTE — PROGRESS NOTES
PT DAILY TREATMENT NOTE 10-18    Patient Name: Ines Eisenberg  Date:2019  : 1965  [x]  Patient  Verified  Payor: VLADISLAV [place] / Plan: [address] [place] [address] / Product Type: PPO /    In time:8:00  Out time:8:40  Total Treatment Time (min): 40  Visit #: 2 of 8    Medicare/BCBS Only   Total Timed Codes (min):  30 1:1 Treatment Time:  23       Treatment Area: Cervicalgia [M54.2]  Radiculopathy, cervical region [M54.12]  Myalgia, other site [M79.18]    SUBJECTIVE  Pain Level (0-10 scale): 7/10  Any medication changes, allergies to medications, adverse drug reactions, diagnosis change, or new procedure performed?: [x] No    [] Yes (see summary sheet for update)  Subjective functional status/changes:   [] No changes reported  Pt reports her neck is still bothering her. Pt states she is compliant with HEP. OBJECTIVE    Modality rationale: decrease inflammation and decrease pain to improve the patients ability to tolerate ADLs.     Min Type Additional Details    [] Estim:  []Unatt       []IFC  []Premod                        []Other:  []w/ice   []w/heat  Position:  Location:    [] Estim: []Att    []TENS instruct  []NMES                    []Other:  []w/US   []w/ice   []w/heat  Position:  Location:    []  Traction: [] Cervical       []Lumbar                       [] Prone          []Supine                       []Intermittent   []Continuous Lbs:  [] before manual  [] after manual    []  Ultrasound: []Continuous   [] Pulsed                           []1MHz   []3MHz W/cm2:  Location:    []  Iontophoresis with dexamethasone         Location: [] Take home patch   [] In clinic   10 [x]  Ice     []  heat  []  Ice massage  []  Laser   []  Anodyne Position:supine  Location:cervical    []  Laser with stim  []  Other:  Position:  Location:    []  Vasopneumatic Device Pressure:       [] lo [] med [] hi   Temperature: [] lo [] med [] hi   [] Skin assessment post-treatment:  []intact []redness- no adverse reaction    []redness - adverse reaction:     22 min Therapeutic Exercise:  [x] See flow sheet :   Rationale: increase ROM and increase strength to improve the patients ability to tolerate ADLs. 8 min Manual Therapy:  PROM c/s SOR, TPR to bilateral UT. Rationale: decrease pain, increase ROM and increase tissue extensibility to improve functional mobility. With   [] TE   [] TA   [] neuro   [] other: Patient Education: [x] Review HEP    [] Progressed/Changed HEP based on:   [] positioning   [] body mechanics   [] transfers   [] heat/ice application    [] other:      Other Objective/Functional Measures: initiated exercises as per flow sheet. Pain Level (0-10 scale) post treatment: 4/10    ASSESSMENT/Changes in Function: Pt has good carry over with with vc's for form. Pt has continued trigger points through bilateral UT. Patient will continue to benefit from skilled PT services to modify and progress therapeutic interventions, address functional mobility deficits, address ROM deficits, address strength deficits, analyze and address soft tissue restrictions and analyze and cue movement patterns to attain remaining goals. []  See Plan of Care  []  See progress note/recertification  []  See Discharge Summary         Progress towards goals / Updated goals:  Short Term Goals: To be accomplished in 2 weeks:                1. Patient will be independent with HEP in order to improve activity tolerance.                2. Patient will be able to decrease average cervical pain to no more than 5/10 in order to improve activity tolerance.   Long Term Goals: To be accomplished in 4 weeks:               1. Patient will be able to improve (B) cervical rotation to 50 degrees in order improve ease of looking behind her while driving.                2. Patient will be able to improve cervical flexion and extension to 30 degrees in order to improve ease of ADLs.                 3. Patient will be able to improve FOTO score to 64 in order to demonstrate improvements in functional independence.          PLAN  []  Upgrade activities as tolerated     []  Continue plan of care  []  Update interventions per flow sheet       []  Discharge due to:_  []  Other:_      Kelley Perry, PTA 6/21/2019  8:14 AM    Future Appointments   Date Time Provider Dilma Marin   6/25/2019  9:00 AM [facility]   6/27/2019  9[facility]30 AM [facility]   7/1/2019  9:00 AM Jassi Suarez, PTA [facility]   7/3/2019  9:00 AM Ian Fennel [facility]   7/8/2019  8:30 AM Angy Talamantes, PT [facility]   7/11/2019  9:00 AM Ian Fennel [facility]   7/25/2019 11:15 AM Lena Cedillo  [address]

## 2019-06-25 ENCOUNTER — HOSPITAL ENCOUNTER (OUTPATIENT)
Dept: PHYSICAL THERAPY | Age: 54
Discharge: HOME | End: 2019-06-25
Payer: COMMERCIAL

## 2019-06-25 PROCEDURE — 97110 THERAPEUTIC EXERCISES: CPT

## 2019-06-25 PROCEDURE — 97140 MANUAL THERAPY 1/> REGIONS: CPT

## 2019-06-25 NOTE — PROGRESS NOTES
PT DAILY TREATMENT NOTE 10-18    Patient Name: Chloe Romano  Date:2019  : 1965  [x]  Patient  Verified  Payor: BLUE CROSS / Plan: [address] [place] [address] / Product Type: PPO /    In time:9:00  Out time:9:45  Total Treatment Time (min): 45  Visit #: 3 of 8    Medicare/BCBS Only   Total Timed Codes (min):  45 1:1 Treatment Time:  35       Treatment Area: Cervicalgia [M54.2]  Radiculopathy, cervical region [M54.12]  Myalgia, other site [M79.18]    SUBJECTIVE  Pain Level (0-10 scale): 3  Any medication changes, allergies to medications, adverse drug reactions, diagnosis change, or new procedure performed?: [x] No    [] Yes (see summary sheet for update)  Subjective functional status/changes:   [] No changes reported  \"It felt better after last time\"    OBJECTIVE    Modality rationale: decrease pain to improve the patients ability to perform daily tasks   Min Type Additional Details    [] Estim:  []Unatt       []IFC  []Premod                        []Other:  []w/ice   []w/heat  Position:  Location:    [] Estim: []Att    []TENS instruct  []NMES                    []Other:  []w/US   []w/ice   []w/heat  Position:  Location:    []  Traction: [] Cervical       []Lumbar                       [] Prone          []Supine                       []Intermittent   []Continuous Lbs:  [] before manual  [] after manual    []  Ultrasound: []Continuous   [] Pulsed                           []1MHz   []3MHz W/cm2:  Location:    []  Iontophoresis with dexamethasone         Location: [] Take home patch   [] In clinic   10 []  Ice     [x]  heat  []  Ice massage  []  Laser   []  Anodyne Position: supine  Location: neck    []  Laser with stim  []  Other:  Position:  Location:    []  Vasopneumatic Device Pressure:       [] lo [] med [] hi   Temperature: [] lo [] med [] hi   [] Skin assessment post-treatment:  []intact []redness- no adverse reaction    []redness - adverse reaction:     27 min Therapeutic Exercise:  [x] See flow sheet :   Rationale: increase ROM and increase strength to improve the patients ability to perform ADLs    8 min Manual Therapy:  SOR, MFR/DTM to jimmy UT/LS, gentle c/s rot mobs   Rationale: decrease pain, increase ROM and increase tissue extensibility to improve functional mobility            With   [] TE   [] TA   [] neuro   [] other: Patient Education: [x] Review HEP    [] Progressed/Changed HEP based on:   [] positioning   [] body mechanics   [] transfers   [] heat/ice application    [] other:      Other Objective/Functional Measures:   C/s pain L>R today     Pain Level (0-10 scale) post treatment: 1    ASSESSMENT/Changes in Function: pt cont's to be restricted with active c/s rot and SB but CROM improved following manual therapy today. Pt also states pain level decr'd following treatment today. Min postural cues. Patient will continue to benefit from skilled PT services to modify and progress therapeutic interventions, address functional mobility deficits, address ROM deficits, address strength deficits, analyze and address soft tissue restrictions, analyze and cue movement patterns and assess and modify postural abnormalities to attain remaining goals. []  See Plan of Care  []  See progress note/recertification  []  See Discharge Summary         Progress towards goals / Updated goals:  Short Term Goals: To be accomplished in 2 weeks:                1. Patient will be independent with HEP in order to improve activity tolerance.                2. Patient will be able to decrease average cervical pain to no more than 5/10 in order to improve activity tolerance.   Long Term Goals: To be accomplished in 4 weeks:               1. Patient will be able to improve (B) cervical rotation to 50 degrees in order improve ease of looking behind her while driving.                2. Patient will be able to improve cervical flexion and extension to 30 degrees in order to improve ease of ADLs.              3. Patient will be able to improve FOTO score to 64 in order to demonstrate improvements in functional independence.       PLAN  []  Upgrade activities as tolerated     [x]  Continue plan of care  []  Update interventions per flow sheet       []  Discharge due to:_  []  Other:_      Marlo Lynne, PTA 6/25/2019  8:58 AM    Future Appointments   Date Time Provider Dilma Dohertyi   6/25/2019  9:00 AM [facility]   6/27/2019  9:30 AM Reymundo, [address] [facility]   7/1/2019  9:00 AM Tiffanie Garsia, [facility]   7/3/2019  9:00 AM Reymundo, [address] [facility]   7/8/2019  8:30 AM Todd Centeno[facility]   7/11/2019  9:00 AM [place] [address] [facility]   7/25/2019 11:15 AM Marcos Rocha  [address]

## 2019-06-27 ENCOUNTER — HOSPITAL ENCOUNTER (OUTPATIENT)
Dept: PHYSICAL THERAPY | Age: 54
Discharge: HOME | End: 2019-06-27
Payer: COMMERCIAL

## 2019-06-27 PROCEDURE — 97140 MANUAL THERAPY 1/> REGIONS: CPT

## 2019-06-27 PROCEDURE — 97110 THERAPEUTIC EXERCISES: CPT

## 2019-06-27 NOTE — PROGRESS NOTES
PT DAILY TREATMENT NOTE 10-18    Patient Name: Eulalio Martinez  Date:2019  : 1965  [x]  Patient  Verified  Payor: VLADISLAV YARELI / Plan: [address] [place] [address] / Product Type: PPO /    In time:9:32  Out time:10:17  Total Treatment Time (min): 45  Visit #: 4 of 8    Medicare/BCBS Only   Total Timed Codes (min):  40 1:1 Treatment Time:  34       Treatment Area: Cervicalgia [M54.2]  Radiculopathy, cervical region [M54.12]  Myalgia, other site [M79.18]    SUBJECTIVE  Pain Level (0-10 scale): 5  Any medication changes, allergies to medications, adverse drug reactions, diagnosis change, or new procedure performed?: [x] No    [] Yes (see summary sheet for update)  Subjective functional status/changes:   [] No changes reported  Pt reports tension through her neck due to sleeping under air conditioner    OBJECTIVE    Modality rationale: decrease inflammation and decrease pain to improve the patients ability to improve ease of ADLs with less soreness   Min Type Additional Details    [] Estim:  []Unatt       []IFC  []Premod                        []Other:  []w/ice   []w/heat  Position:  Location:    [] Estim: []Att    []TENS instruct  []NMES                    []Other:  []w/US   []w/ice   []w/heat  Position:  Location:    []  Traction: [] Cervical       []Lumbar                       [] Prone          []Supine                       []Intermittent   []Continuous Lbs:  [] before manual  [] after manual    []  Ultrasound: []Continuous   [] Pulsed                           []1MHz   []3MHz W/cm2:  Location:    []  Iontophoresis with dexamethasone         Location: [] Take home patch   [] In clinic   5 [x]  Ice     []  heat  []  Ice massage  []  Laser   []  Anodyne Position: seated  Location: cervical    []  Laser with stim  []  Other:  Position:  Location:    []  Vasopneumatic Device Pressure:       [] lo [] med [] hi   Temperature: [] lo [] med [] hi   [] Skin assessment post-treatment:  []intact []redness- no adverse reaction    []redness - adverse reaction:       30 min Therapeutic Exercise:  [] See flow sheet :   Rationale: increase ROM and increase strength to improve the patients ability to perform daily tasks and self care    10 min Manual Therapy:  DN palpation and setup, T/S PAs and rib springs, cervical rotation mobs   Rationale: decrease pain, increase ROM, increase tissue extensibility and decrease trigger points to improve ease of ADL performance and work duties    Dry Needling Procedure Note    Procedure: An intramuscular manual therapy (dry needling) and a neuro-muscular re-education treatment was done to deactivate myofascial trigger points with a 30 gauge filament needle under aseptic technique. Indications:  [x] Myofascial pain and dysfunction [] Muscled spasms  [x] Myalgia/myositis   [] Muscle cramps  [x] Muscle imbalances  [] Temporomandibular Dysfunction  [] Other:    Chart reviewed for the following:  Frankie ELAM, have reviewed the medical history, summary list and precautions/contraindications for [facility].   TIME OUT performed immediately prior to start of procedure:  Frankie ELAM, have performed the following reviews on [facility] prior to the start of the session:      [x] Verified patient identification by name and date of birth    [x] Agreement on all muscles being treated was verified   [x] Purpose of dry needling, side effects, possible complications, risks and benefits were explained to the patient   [x] Procedure site(s) verified  [x] Patient was positioned for comfort and draped for privacy  [x] Informed Consent was signed (initial visit) and verified verbally (subsequent visits)  [x] Patient was instructed on the need to report the use of blood thinners and/or immunosuppressant medications  [x] How to respond to possible adverse effects of treatment  [x] Self treatment of post needling soreness: ice, heat (moist heat, heat wraps) and stretching  [x] Opportunity was given to ask any questions, all questions were answered            Time: 9:50  Date of procedure: 6/27/2019  Treatment: The following muscles were treated today with intramuscular dry needling  [] Left [] Right Masster  [] Left [] Right Temporalis  [] Left [] Right Zygomaticus Major / Minor  [] Left [] Right Lateral Pterygoid  [] Left [] Right Medial Pterygoid  [] Left [] Right Digastric Post / Anterior Belly  [] Left [] Right Sternocleidomastoid  [] Left [] Right Scalene Anterior / Medial / Posterior  [] Left [] Right Extra Laryngeal Muscles  [] Left [] Right Upper Trapezius  [] Left [] Right Middle Trapezius  [] Left [] Right Lower Trapezius  [] Left [] Right Oblique Capitis Inferior  [] Left [x] Right Splenius Capitis / Cervicis  [] Left [x] Right Semispinalis: Capitis / Cervicis  [] Left [x] Right Multifidi / Rotatores Cervicis / Thoracic  [] Left [] Right Longissimus Thoracis / Illiocostalis  [] Left [] Right Levator Scapulae  [] Left [] Right Supraspinatus / Infraspinatus  [] Left [] Right Teres Major / Minor  [] Left [] Right Rhomboids / Serratus posterior superior  [] Left [] Right Pectoralis Major / Minor  [] Left [] Right Serratus Anterior  [] Left [] Right Latissimus Dorsi  [] Left [] Right Subscapularis  [] Left [] Right Coracobrachialis  [] Left [] Right Biceps Brachii  [] Left [] Right Deltoid: Anterior / Medial / Posterior  [] Left [] Right Brachialis  [] Left [] Right Triceps  [] Left [] Right Brachioradialis  [] Left [] Right Extensor Carpi Radialis Brevis / Extensor Carpi Radialis Longus    [] Left [] Right  Extensor digitorum  [] Left [] Right Supinator / Pronator Teres  [] Left [] Right Flexor Carpi Radialis/ Flexor Carpi Ulnaris   [] Left [] Right  Flexor Digitorum Superficialis/ Flexor Digitorum Profundus  [] Left [] Right Flexor Pollicis Longus / Flexor Pollicis Brevis / Palmaris Longus  [] Left [] Right Abductor Pollicis Longus / Abductor Pollicis Brevis  [] Left [] Right Opponens Pollicis / Adductor Pollicis  [] Left [] Right Dorsal / Palmar Interossei / Lumbricalis  [] Left [] Right Abductor Digiti Minimi / Opponens Digiti Minimi    Patient's response to today's treatment:  [x] Latent Twitch Response     [] Muscle relaxation [] Pain Relief  [x] Post needling soreness    [x] without complications  [] Increased Range of Motion   [] Decreased headaches    [] Decreased Tinnitus  [] Other:     Performed by: Rowan Lucero, MENDOZA, CMTPT        With   [] TE   [] TA   [] neuro   [] other: Patient Education: [x] Review HEP    [] Progressed/Changed HEP based on:   [] positioning   [] body mechanics   [] transfers   [] heat/ice application    [] other:      Other Objective/Functional Measures: 50 deg B cervical rotation AROM post needling     Pain Level (0-10 scale) post treatment: not given \"tension\" reported    ASSESSMENT/Changes in Function: Pt demonstrates improved cervical ROM at this time. She still exhibits discomfort with end range left rotation and reports tension through cervical spine and UT. Progress with segmental mobility and cervical mechanics. Patient will continue to benefit from skilled PT services to modify and progress therapeutic interventions, address functional mobility deficits, address ROM deficits, address strength deficits, analyze and address soft tissue restrictions, analyze and cue movement patterns and analyze and modify body mechanics/ergonomics to attain remaining goals. []  See Plan of Care  []  See progress note/recertification  []  See Discharge Summary         Progress towards goals / Updated goals:  Short Term Goals: To be accomplished in 2 weeks:                1.  Patient will be independent with HEP in order to improve activity tolerance. pt reports compliance 6/27/19               2. Patient will be able to decrease average cervical pain to no more than 5/10 in order to improve activity tolerance.   Long Term Goals: To be accomplished in 4 weeks:               1. Patient will be able to improve (B) cervical rotation to 50 degrees in order improve ease of looking behind her while driving. Met- 50 deg B 6/27/19               2. Patient will be able to improve cervical flexion and extension to 30 degrees in order to improve ease of ADLs.                 3. Patient will be able to improve FOTO score to 64 in order to demonstrate improvements in functional independence.     PLAN  []  Upgrade activities as tolerated     []  Continue plan of care  []  Update interventions per flow sheet       []  Discharge due to:_  []  Other:_      Emory Cormier, DPT, CMTPT 6/27/2019  10:02 AM    Future Appointments   Date Time Provider Dilma Dohertyi   7/1/2019  9:00 AM Kristina Barroso PTA [facility]   7/3/2019  9:00 AM Reymundo [address] [facility]   7/8/2019  8:30 AM Philipp Olivia, PT [facility]   7/11/2019  9:00 AM Shayna Osei [facility]   7/25/2019 11:15 AM Luna Simmons  [address]

## 2019-07-01 ENCOUNTER — HOSPITAL ENCOUNTER (OUTPATIENT)
Dept: PHYSICAL THERAPY | Age: 54
Discharge: HOME | End: 2019-07-01
Payer: COMMERCIAL

## 2019-07-01 PROCEDURE — 97140 MANUAL THERAPY 1/> REGIONS: CPT

## 2019-07-01 PROCEDURE — 97110 THERAPEUTIC EXERCISES: CPT

## 2019-07-01 NOTE — PROGRESS NOTES
PT DAILY TREATMENT NOTE 10-18    Patient Name: Wilma Link  Date:2019  : 1965  [x]  Patient  Verified  Payor: BLUE CROSS / Plan: [address] [place] [address] / Product Type: PPO /    In time:9:05  Out time:9:35  Total Treatment Time (min): 30  Visit #: 5 of 8    Medicare/BCBS Only   Total Timed Codes (min):  30 1:1 Treatment Time:  30       Treatment Area: Cervicalgia [M54.2]  Radiculopathy, cervical region [M54.12]  Myalgia, other site [M79.18]    SUBJECTIVE  Pain Level (0-10 scale): 3/10  Any medication changes, allergies to medications, adverse drug reactions, diagnosis change, or new procedure performed?: [x] No    [] Yes (see summary sheet for update)  Subjective functional status/changes:   [] No changes reported  Pt reports no new complaints of pain. Pt reports compliance with HEP. OBJECTIVE    22 min Therapeutic Exercise:  [x] See flow sheet :   Rationale: increase ROM and increase strength to improve the patients ability to tolerate ADLs. 8 min Manual Therapy:  DTM to bilateral UT. Rationale: decrease pain, increase ROM and increase tissue extensibility to improve functional mobility. With   [] TE   [] TA   [] neuro   [] other: Patient Education: [x] Review HEP    [] Progressed/Changed HEP based on:   [] positioning   [] body mechanics   [] transfers   [] heat/ice application    [] other:      Other Objective/Functional Measures: continued UT compensations with all activities. Pain Level (0-10 scale) post treatment: 0/10    ASSESSMENT/Changes in Function: Pt continues to require frequent vc's to decrease UT compensations and perform proper scapulohumeral rhythm with shoulder elevation.      Patient will continue to benefit from skilled PT services to modify and progress therapeutic interventions, address functional mobility deficits, address ROM deficits, address strength deficits, analyze and address soft tissue restrictions and analyze and cue movement patterns to attain remaining goals. []  See Plan of Care  []  See progress note/recertification  []  See Discharge Summary         Progress towards goals / Updated goals:  Short Term Goals: To be accomplished in 2 weeks:                1. Patient will be independent with HEP in order to improve activity tolerance. pt reports compliance 6/27/19               2. Patient will be able to decrease average cervical pain to no more than 5/10 in order to improve activity tolerance.   Long Term Goals: To be accomplished in 4 weeks:               1. Patient will be able to improve (B) cervical rotation to 50 degrees in order improve ease of looking behind her while driving. Met- 50 deg B 6/27/19               2. Patient will be able to improve cervical flexion and extension to 30 degrees in order to improve ease of ADLs.              3. Patient will be able to improve FOTO score to 64 in order to demonstrate improvements in functional independence.     PLAN  []  Upgrade activities as tolerated     [x]  Continue plan of care  []  Update interventions per flow sheet       []  Discharge due to:_  []  Other:_      Ivelisse Souza, [facility] 7/1/2019  9:10 AM    Future Appointments   Date Time Provider Dilma Dohertyi   7/3/2019  9:00 AM Reymundo [address] [facility]   7/8/2019  8:30 AM [facility]   7/11/2019  9:00 AM Sami Guzmán [facility]   7/25/2019 11:15 AM Yanely Lancaster  [address]

## 2019-07-03 ENCOUNTER — HOSPITAL ENCOUNTER (OUTPATIENT)
Dept: PHYSICAL THERAPY | Age: 54
Discharge: HOME | End: 2019-07-03
Payer: COMMERCIAL

## 2019-07-03 PROCEDURE — 97110 THERAPEUTIC EXERCISES: CPT

## 2019-07-03 PROCEDURE — 97140 MANUAL THERAPY 1/> REGIONS: CPT

## 2019-07-03 NOTE — PROGRESS NOTES
PT DAILY TREATMENT NOTE 10-18    Patient Name: Shilpi Brito  Date:7/3/2019  : 1965  [x]  Patient  Verified  Payor: BLUE CROSS / Plan: [address] [place] [address] / Product Type: PPO /    In time:9:08  Out time:9:50  Total Treatment Time (min): 42  Visit #: 6 of 8    Medicare/BCBS Only   Total Timed Codes (min):  32 1:1 Treatment Time:  32       Treatment Area: Cervicalgia [M54.2]  Radiculopathy, cervical region [M54.12]  Myalgia, other site [M79.18]    SUBJECTIVE  Pain Level (0-10 scale): 3  Any medication changes, allergies to medications, adverse drug reactions, diagnosis change, or new procedure performed?: [x] No    [] Yes (see summary sheet for update)  Subjective functional status/changes:   [] No changes reported  \"Just a dull ache\"    OBJECTIVE    Modality rationale: decrease inflammation and decrease pain to improve the patients ability to perform ADLs with less post needling soreness   Min Type Additional Details    [] Estim:  []Unatt       []IFC  []Premod                        []Other:  []w/ice   []w/heat  Position:  Location:    [] Estim: []Att    []TENS instruct  []NMES                    []Other:  []w/US   []w/ice   []w/heat  Position:  Location:    []  Traction: [] Cervical       []Lumbar                       [] Prone          []Supine                       []Intermittent   []Continuous Lbs:  [] before manual  [] after manual    []  Ultrasound: []Continuous   [] Pulsed                           []1MHz   []3MHz W/cm2:  Location:    []  Iontophoresis with dexamethasone         Location: [] Take home patch   [] In clinic   10 [x]  Ice     []  heat  []  Ice massage  []  Laser   []  Anodyne Position: supine  Location: cervical    []  Laser with stim  []  Other:  Position:  Location:    []  Vasopneumatic Device Pressure:       [] lo [] med [] hi   Temperature: [] lo [] med [] hi   [] Skin assessment post-treatment:  []intact []redness- no adverse reaction    []redness  adverse reaction:       17 min Therapeutic Exercise:  [] See flow sheet :   Rationale: increase ROM and increase strength to improve the patients ability to perform daily tasks and self care      15 min Manual Therapy:  DN palpation and setup, t/s PAs and rib springs   Rationale: decrease pain, increase ROM and increase tissue extensibility to improve ease of ADL performance    Dry Needling Procedure Note    Procedure: An intramuscular manual therapy (dry needling) and a neuro-muscular re-education treatment was done to deactivate myofascial trigger points with a 30 gauge filament needle under aseptic technique. Indications:  [x] Myofascial pain and dysfunction [] Muscled spasms  [] Myalgia/myositis   [] Muscle cramps  [x] Muscle imbalances  [] Temporomandibular Dysfunction  [] Other:    Chart reviewed for the following:  I, Leita Holter, have reviewed the medical history, summary list and precautions/contraindications for [facility].   TIME OUT performed immediately prior to start of procedure:  I, Leita Holter, have performed the following reviews on [facility] prior to the start of the session:      [x] Verified patient identification by name and date of birth    [x] Agreement on all muscles being treated was verified   [x] Purpose of dry needling, side effects, possible complications, risks and benefits were explained to the patient   [x] Procedure site(s) verified  [x] Patient was positioned for comfort and draped for privacy  [x] Informed Consent was signed (initial visit) and verified verbally (subsequent visits)  [] Patient was instructed on the need to report the use of blood thinners and/or immunosuppressant medications  [] How to respond to possible adverse effects of treatment  [] Self treatment of post needling soreness: ice, heat (moist heat, heat wraps) and stretching  [x] Opportunity was given to ask any questions, all questions were answered            Time: 9:09  Date of procedure: 7/3/2019  Treatment: The following muscles were treated today with intramuscular dry needling  [] Left [] Right Masster  [] Left [] Right Temporalis  [] Left [] Right Zygomaticus Major / Minor  [] Left [] Right Lateral Pterygoid  [] Left [] Right Medial Pterygoid  [] Left [] Right Digastric Post / Anterior Belly  [] Left [] Right Sternocleidomastoid  [] Left [] Right Scalene Anterior / Medial / Posterior  [] Left [] Right Extra Laryngeal Muscles  [x] Left [x] Right Upper Trapezius  [] Left [] Right Middle Trapezius  [] Left [] Right Lower Trapezius  [] Left [] Right Oblique Capitis Inferior  [x] Left [] Right Splenius Capitis / Cervicis  [x] Left [x] Right Semispinalis: Capitis / Cervicis  [x] Left [x] Right Multifidi / Rotatores Cervicis / Thoracic  [] Left [] Right Longissimus Thoracis / Illiocostalis  [] Left [] Right Levator Scapulae  [] Left [] Right Supraspinatus / Infraspinatus  [] Left [] Right Teres Major / Minor  [] Left [] Right Rhomboids / Serratus posterior superior  [] Left [] Right Pectoralis Major / Minor  [] Left [] Right Serratus Anterior  [] Left [] Right Latissimus Dorsi  [] Left [] Right Subscapularis  [] Left [] Right Coracobrachialis  [] Left [] Right Biceps Brachii  [] Left [] Right Deltoid: Anterior / Medial / Posterior  [] Left [] Right Brachialis  [] Left [] Right Triceps  [] Left [] Right Brachioradialis  [] Left [] Right Extensor Carpi Radialis Brevis / Extensor Carpi Radialis Longus    [] Left [] Right  Extensor digitorum  [] Left [] Right Supinator / Pronator Teres  [] Left [] Right Flexor Carpi Radialis/ Flexor Carpi Ulnaris   [] Left [] Right  Flexor Digitorum Superficialis/ Flexor Digitorum Profundus  [] Left [] Right Flexor Pollicis Longus / Flexor Pollicis Brevis / Palmaris Longus  [] Left [] Right Abductor Pollicis Longus / Abductor Pollicis Brevis  [] Left [] Right Opponens Pollicis / Adductor Pollicis  [] Left [] Right Dorsal / Palmar Interossei / Lumbricalis  [] Left [] Right Abductor Digiti Minimi / Opponens Digiti Minimi    Patient's response to today's treatment:  [x] Latent Twitch Response     [] Muscle relaxation [] Pain Relief  [x] Post needling soreness    [x] without complications  [] Increased Range of Motion   [] Decreased headaches    [] Decreased Tinnitus  [] Other:     Performed by: Karen Pelayo DPT, CMTPT          With   [] TE   [] TA   [] neuro   [] other: Patient Education: [x] Review HEP    [] Progressed/Changed HEP based on:   [] positioning   [] body mechanics   [] transfers   [] heat/ice application    [] other:      Other Objective/Functional Measures:      Pain Level (0-10 scale) post treatment: 0    ASSESSMENT/Changes in Function: Pt with good tolerance to increased needling today. Multiple twitches elicited through all muscles needled. She does report a bit of improvement in regards to pain but overall still feeling a rather dull ache sensation consistently. Progress with cervical and thoracic mobility     Patient will continue to benefit from skilled PT services to modify and progress therapeutic interventions, address functional mobility deficits, address ROM deficits, address strength deficits, analyze and address soft tissue restrictions, analyze and cue movement patterns and analyze and modify body mechanics/ergonomics to attain remaining goals. []  See Plan of Care  []  See progress note/recertification  []  See Discharge Summary         Progress towards goals / Updated goals:  Short Term Goals: To be accomplished in 2 weeks:                1. Patient will be independent with HEP in order to improve activity tolerance. pt reports compliance 6/27/19               2. Patient will be able to decrease average cervical pain to no more than 5/10 in order to improve activity tolerance. Progressing- 5-6/10 on average 7/3/19  Long Term Goals: To be accomplished in 4 weeks:               1.  Patient will be able to improve (B) cervical rotation to 50 degrees in order improve ease of looking behind her while driving. Met- 50 deg B 6/27/19               2. Patient will be able to improve cervical flexion and extension to 30 degrees in order to improve ease of ADLs.              3. Patient will be able to improve FOTO score to 64 in order to demonstrate improvements in functional independence.     PLAN  []  Upgrade activities as tolerated     []  Continue plan of care  []  Update interventions per flow sheet       []  Discharge due to:_  []  Other:_      Chloé De La Cruz DPT, CMTPT 7/3/2019  9:26 AM    Future Appointments   Date Time Provider Dilma Dohertyi   7/8/2019  8:30 AM Shannen Espinoza PT [id number]   7/11/2019  9:00 AM Reymundo, [address] [facility]   7/25/2019 11:15 AM Joshua Patel  [id number]

## 2019-07-08 ENCOUNTER — HOSPITAL ENCOUNTER (OUTPATIENT)
Dept: PHYSICAL THERAPY | Age: 54
Discharge: HOME | End: 2019-07-08
Payer: COMMERCIAL

## 2019-07-08 ENCOUNTER — TELEPHONE (OUTPATIENT)
Dept: ORTHOPEDIC SURGERY | Age: 54
End: 2019-07-08

## 2019-07-08 PROCEDURE — 97110 THERAPEUTIC EXERCISES: CPT

## 2019-07-08 PROCEDURE — 97140 MANUAL THERAPY 1/> REGIONS: CPT

## 2019-07-08 NOTE — PROGRESS NOTES
PT DAILY TREATMENT NOTE 10-18    Patient Name: Prem Hilliard  Date:2019  : 1965  [x]  Patient  Verified  Payor: BLUE CROSS / Plan: [address] [place] [address] / Product Type: PPO /    In time:8:35  Out time:9:04  Total Treatment Time (min): 29  Visit #: 7 of 8    Medicare/BCBS Only   Total Timed Codes (min):  29 1:1 Treatment Time:  24       Treatment Area: Cervicalgia [M54.2]  Radiculopathy, cervical region [M54.12]  Myalgia, other site [M79.18]    SUBJECTIVE  Pain Level (0-10 scale): 0  Any medication changes, allergies to medications, adverse drug reactions, diagnosis change, or new procedure performed?: [x] No    [] Yes (see summary sheet for update)  Subjective functional status/changes:   [] No changes reported  \"Today is a good day, just a little stiffness since its early morning\". OBJECTIVE        21 min Therapeutic Exercise:  [x] See flow sheet :   Rationale: increase ROM and increase strength to improve the patients ability to perform ADLs. 8 min Manual Therapy:  DTM (B) UT and C/S paraspinals    Rationale: increase ROM and increase tissue extensibility to improve patients ease of work related task. With   [] TE   [] TA   [] neuro   [] other: Patient Education: [x] Review HEP    [] Progressed/Changed HEP based on:   [] positioning   [] body mechanics   [] transfers   [] heat/ice application    [] other:      Other Objective/Functional Measures: C/S flexion - 30 degrees. C/S extension - 34 degrees. Pain Level (0-10 scale) post treatment: 0    ASSESSMENT/Changes in Function: Improvements noted in cervical AROM and pain control since [facility]. Patient tolerates all exercises void of symptoms and reports improved ease of ADLs. Pending no changes in symptoms plan to D/C patient at next visit with updated HEP.      Patient will continue to benefit from skilled PT services to modify and progress therapeutic interventions, address functional mobility deficits, address ROM deficits, address strength deficits, analyze and address soft tissue restrictions, analyze and cue movement patterns, analyze and modify body mechanics/ergonomics and assess and modify postural abnormalities to attain remaining goals. [x]  See Plan of Care  []  See progress note/recertification  []  See Discharge Summary         Progress towards goals / Updated goals:  Short Term Goals: To be accomplished in 2 weeks:                1. Patient will be independent with HEP in order to improve activity tolerance. pt reports compliance 6/27/19               2. Patient will be able to decrease average cervical pain to no more than 5/10 in order to improve activity tolerance. Progressing- 5-6/10 on average 7/3/19  Long Term Goals: To be accomplished in 4 weeks:               1. Patient will be able to improve (B) cervical rotation to 50 degrees in order improve ease of looking behind her while driving. Met- 50 deg B 6/27/19               2. Patient will be able to improve cervical flexion and extension to 30 degrees in order to improve ease of ADLs. GOAL MET -  C/S flexion - 30 degrees. C/S extension - 34 degrees (7/8/19).              3. Patient will be able to improve FOTO score to 64 in order to demonstrate improvements in functional independence.     PLAN  []  Upgrade activities as tolerated     [x]  Continue plan of care  []  Update interventions per flow sheet       []  Discharge due to:_  []  Other:_      Randall Longoria, PT 7/8/2019  8:39 AM    Future Appointments   Date Time Provider Dilma Marin   7/11/2019  9:00 AM Reymundo [address] [facility]   7/25/2019 11:15 AM Eduar Bynum MD [facility]

## 2019-07-08 NOTE — TELEPHONE ENCOUNTER
MD approved, left message for patient to return call. Letter needs to be read to patient for approval prior to saving.

## 2019-07-08 NOTE — TELEPHONE ENCOUNTER
She was given a RTW with restrictions on 6/13/19.   You cannot have a RTW \"full duty\" with \"restrictions\"

## 2019-07-08 NOTE — TELEPHONE ENCOUNTER
Patient called and is requesting a letter to return to work Full Duty From [employer]. Patient would like the Full Duty letter with restriction that she can not lift anything over 15 pounds. Patient will  at the [facility] location. Patient tel. [phone].

## 2019-07-08 NOTE — TELEPHONE ENCOUNTER
Patient would like to take out the word light duty and maybe eliminate the weight restriction. 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 She stated she just wants it to state that she need assistance with pushing turning lifting and transferring larger patients. Her smallest patient is 100lbs and the largest maybe 300lbs +.

## 2019-07-11 ENCOUNTER — HOSPITAL ENCOUNTER (OUTPATIENT)
Dept: PHYSICAL THERAPY | Age: 54
Discharge: HOME | End: 2019-07-11
Payer: COMMERCIAL

## 2019-07-11 PROCEDURE — 97110 THERAPEUTIC EXERCISES: CPT

## 2019-07-11 PROCEDURE — 97112 NEUROMUSCULAR REEDUCATION: CPT

## 2019-07-11 NOTE — PROGRESS NOTES
PT DISCHARGE DAILY NOTE AND [id number]  Patient name: Chuck Rocha Start of Care: 2019   Referral source: Raine Sellers MD : 1965                Medical Diagnosis: Cervicalgia [M54.2]  Radiculopathy, cervical region [M54.12]  Myalgia, other site [M79.18]  Payor: VLADISLAV CROSS / Plan: [email or web address] / Product Type: PPO /  Onset Date: 2019   Treatment Diagnosis: Cervical pain   Prior Hospitalization: see medical history Provider#: [id number]   Medications: Verified on Patient summary List    Comorbidities: epilepsy, arthritis, back pain   Prior Level of Function: (I) with all ADLs and going to the gym regularly. 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    Visits from Start of Care: 8    Missed Visits: 0    Reporting Period : 2019 to 2019    Date:2019  : 1965  [x]  Patient  Verified  Payor: Haleigh Andre / Plan: [email or web address] / Product Type: PPO /    In time:9:00  Out time:9:37  Total Treatment Time (min): 37  Visit #: 8 of 8    Medicare/BCBS Only   Total Timed Codes (min):  27 1:1 Treatment Time:  24       SUBJECTIVE  Pain Level (0-10 scale): 5  Any medication changes, allergies to medications, adverse drug reactions, diagnosis change, or new procedure performed?: [x] No    [] Yes (see summary sheet for update)  Subjective functional status/changes:   [] No changes reported  \"I don't know what my doctor is gonna say, its not really different\"    OBJECTIVE    Modality rationale: decrease inflammation and decrease pain to improve the patients ability to perform ADLs with less post needling soreness   Min Type Additional Details    [] Estim:  []Unatt       []IFC  []Premod                        []Other:  []w/ice   []w/heat  Position:  Location:    [] Estim: []Att    []TENS instruct  []NMES                    []Other:  []w/US   []w/ice   []w/heat  Position:  Location:    []  Traction: [] Cervical []Lumbar                       [] Prone          []Supine                       []Intermittent   []Continuous Lbs:  [] before manual  [] after manual    []  Ultrasound: []Continuous   [] Pulsed                           []1MHz   []3MHz W/cm2:  Location:    []  Iontophoresis with dexamethasone         Location: [] Take home patch   [] In clinic   10 [x]  Ice     []  heat  []  Ice massage  []  Laser   []  Anodyne Position: seated  Location: left shoulder    []  Laser with stim  []  Other:  Position:  Location:    []  Vasopneumatic Device Pressure:       [] lo [] med [] hi   Temperature: [] lo [] med [] hi   [] Skin assessment post-treatment:  []intact []redness- no adverse reaction    []redness  adverse reaction:     8 min Therapeutic Exercise:  [] See flow sheet :   Rationale: increase ROM and increase strength to improve the patients ability to improve ease of ADLs     9 min Neuromuscular Re-education:  []  See flow sheet :   Rationale: increase ROM, improve coordination and increase proprioception  to improve the patients ability to perform daily tasks and self care with improved cervical mechanics    Dry Needling Procedure Note    Procedure: An intramuscular manual therapy (dry needling) and a neuro-muscular re-education treatment was done to deactivate myofascial trigger points with a 30 gauge filament needle under aseptic technique. Indications:  [x] Myofascial pain and dysfunction [] Muscled spasms  [x] Myalgia/myositis   [] Muscle cramps  [] Muscle imbalances  [] Temporomandibular Dysfunction  [] Other:    Chart reviewed for the following:  Sylvia ELAM, have reviewed the medical history, summary list and precautions/contraindications for [facility].   TIME OUT performed immediately prior to start of procedure:  Sylvia ELAM, have performed the following reviews on [facility] prior to the start of the session:      [x] Verified patient identification by name and date of birth    [x] Agreement on all muscles being treated was verified   [x] Purpose of dry needling, side effects, possible complications, risks and benefits were explained to the patient   [x] Procedure site(s) verified  [x] Patient was positioned for comfort and draped for privacy  [x] Informed Consent was signed (initial visit) and verified verbally (subsequent visits)  [] Patient was instructed on the need to report the use of blood thinners and/or immunosuppressant medications  [] How to respond to possible adverse effects of treatment  [] Self treatment of post needling soreness: ice, heat (moist heat, heat wraps) and stretching  [x] Opportunity was given to ask any questions, all questions were answered            Time: 9:12  Date of procedure: 7/11/2019  Treatment: The following muscles were treated today with intramuscular dry needling  [] Left [] Right Masster  [] Left [] Right Temporalis  [] Left [] Right Zygomaticus Major / Minor  [] Left [] Right Lateral Pterygoid  [] Left [] Right Medial Pterygoid  [] Left [] Right Digastric Post / Anterior Belly  [] Left [] Right Sternocleidomastoid  [] Left [] Right Scalene Anterior / Medial / Posterior  [] Left [] Right Extra Laryngeal Muscles  [x] Left [] Right Upper Trapezius  [] Left [] Right Middle Trapezius  [] Left [] Right Lower Trapezius  [] Left [] Right Oblique Capitis Inferior  [] Left [] Right Splenius Capitis / Cervicis  [] Left [] Right Semispinalis: Capitis / Cervicis  [] Left [] Right Multifidi / Rotatores Cervicis / Thoracic  [] Left [] Right Longissimus Thoracis / Illiocostalis  [x] Left [] Right Levator Scapulae  [] Left [] Right Supraspinatus / Infraspinatus  [] Left [] Right Teres Major / Minor  [] Left [] Right Rhomboids / Serratus posterior superior  [] Left [] Right Pectoralis Major / Minor  [] Left [] Right Serratus Anterior  [] Left [] Right Latissimus Dorsi  [] Left [] Right Subscapularis  [] Left [] Right Coracobrachialis  [] Left [] Right Biceps Brachii  [] Left [] Right Deltoid: Anterior / Medial / Posterior  [] Left [] Right Brachialis  [] Left [] Right Triceps  [] Left [] Right Brachioradialis  [] Left [] Right Extensor Carpi Radialis Brevis / Extensor Carpi Radialis Longus    [] Left [] Right  Extensor digitorum  [] Left [] Right Supinator / Pronator Teres  [] Left [] Right Flexor Carpi Radialis/ Flexor Carpi Ulnaris   [] Left [] Right  Flexor Digitorum Superficialis/ Flexor Digitorum Profundus  [] Left [] Right Flexor Pollicis Longus / Flexor Pollicis Brevis / Palmaris Longus  [] Left [] Right Abductor Pollicis Longus / Abductor Pollicis Brevis  [] Left [] Right Opponens Pollicis / Adductor Pollicis  [] Left [] Right Dorsal / Palmar Interossei / Lumbricalis  [] Left [] Right Abductor Digiti Minimi / Opponens Digiti Minimi    Patient's response to today's treatment:  [x] Latent Twitch Response     [] Muscle relaxation [] Pain Relief  [x] Post needling soreness    [x] without complications  [] Increased Range of Motion   [] Decreased headaches    [] Decreased Tinnitus  [] Other:     Performed by: Leita Holter DPT, CMTPT            With   [] TE   [] TA   [] neuro   [] other: Patient Education: [x] Review HEP    [] Progressed/Changed HEP based on:   [] positioning   [] body mechanics   [] transfers   [] heat/ice application    [] other:      Other Objective/Functional Measures:      Pain Level (0-10 scale) post treatment: 2    Summary of Care:  Short Term Goals: To be accomplished in 2 weeks:                1. Patient will be independent with HEP in order to improve activity tolerance. pt reports compliance 6/27/19               2. Patient will be able to decrease average cervical pain to no more than 5/10 in order to improve activity tolerance. Progressing- 5-6/10 on average 7/3/19  Long Term Goals: To be accomplished in 4 weeks:               1.  Patient will be able to improve (B) cervical rotation to 50 degrees in order improve ease of looking behind her while driving. Met- 50 deg B 6/27/19               2. Patient will be able to improve cervical flexion and extension to 30 degrees in order to improve ease of ADLs. GOAL MET -  C/S flexion - 30 degrees. C/S extension - 34 degrees (7/8/19).              3. Patient will be able to improve FOTO score to 64 in order to demonstrate improvements in functional independence. Mt- increased to 69 7/11/19    ASSESSMENT/Changes in Function: Pt does demonstrate improved cervical mobility since start of therapy. Her pain has decreased overall but still variable in intensity. Pt reports minimal change in overall state despite objective improvements.  Will D/C at this time for MD f/u    Thank you for this referral!      PLAN  [x]Discontinue therapy: [x]Patient has reached or is progressing toward set goals      [x]Patient is non-compliant or has abdicated      []Due to lack of appreciable progress towards set goals    Katie Jones DPT, JULITO 7/11/2019  9:14 AM

## 2019-07-25 ENCOUNTER — OFFICE VISIT (OUTPATIENT)
Dept: ORTHOPEDIC SURGERY | Age: 54
End: 2019-07-25

## 2019-07-25 VITALS
WEIGHT: 197 LBS | DIASTOLIC BLOOD PRESSURE: 75 MMHG | BODY MASS INDEX: 33.63 KG/M2 | HEIGHT: 64 IN | HEART RATE: 72 BPM | SYSTOLIC BLOOD PRESSURE: 127 MMHG

## 2019-07-25 DIAGNOSIS — M25.512 CHRONIC LEFT SHOULDER PAIN: ICD-10-CM

## 2019-07-25 DIAGNOSIS — M19.012 PRIMARY OSTEOARTHRITIS OF LEFT SHOULDER: ICD-10-CM

## 2019-07-25 DIAGNOSIS — M54.2 NECK PAIN: ICD-10-CM

## 2019-07-25 DIAGNOSIS — G89.29 CHRONIC LEFT SHOULDER PAIN: ICD-10-CM

## 2019-07-25 DIAGNOSIS — M75.52 BURSITIS OF LEFT SHOULDER: ICD-10-CM

## 2019-07-25 DIAGNOSIS — M54.12 CERVICAL RADICULOPATHY: ICD-10-CM

## 2019-07-25 DIAGNOSIS — M79.18 CERVICAL MYOFASCIAL PAIN SYNDROME: Primary | ICD-10-CM

## 2019-07-25 NOTE — LETTER
7/25/2019 11:56 AM 
 
Ms. Priscila Hamilton [address] [facility] [id number] To Whom It May Concern: 
 
Priscila Hamilton is currently under the care of [address] [place] [address]. She will return to work full duty on 7/26/19. If there are questions or concerns please have the patient contact our office.  
 
 
Sincerely, 
 
 
Jaden Burns MD

## 2019-07-25 NOTE — LETTER
7/25/2019 11:56 AM 
 
Ms. Lindsay Soliz [address] [facility] [address] To Whom It May Concern: 
 
Lindsay Soliz is currently under the care of [address] [place] [address]. She will return to full duty. If there are questions or concerns please have the patient contact our office.  
 
 
Sincerely, 
 
 
Reji Plaza MD

## 2019-07-25 NOTE — PROGRESS NOTES
[address]  [place]  Phone: [phone]  Fax: [phone]        Jonnie Morris  : 1965  PCP: Vale Rushing NP  2019    PROGRESS NOTE      HISTORY OF PRESENT ILLNESS  Juliette Bassett is a 52 y.o. female who was seen as a new patient 19 with c/o neck pain radiating into her shoulders/trapezii (L>R) x  2 months. She notes she woke up with pain and initially she felt she had slept wrong. She went to the ER on 19 for neck pain and spasms x a few weeks. She was taking Flexeril and Advil with minimal relief. About 6 weeks prior, her pain began as spasms in her back, then they moved to her neck. She began PT on 3/6/19, but was unable to complete her full course as it exacerbated her pain given her limited ROM. She went to the ER on 3/7/19 for this pain. She was given Percocet. She has taken Tramadol, lidocaine patches (ineffective), and Robaxin. She has h/o epilepsy since she was 16 and takes Tegretol. She reports occasional sensations in her hands, but her pain typically remains axial. 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 Cervical MRI 19: Multilevel moderate degenerative disc disease and multilevel mild ventral cord compression C4-5, C6-7 and C7-T1 with mild central canal stenosis. No myelopathy. Multilevel high-grade neural foraminal stenosis most severe right C4-5 and bilateral C7-T1 neural foramina. Enlarged multinodular thyroid gland, incompletely evaluated, can be evaluated with ultrasound. She was last seen on 19. She notes that she found some relief with cervical interlmainar injections with Dr. Gabe Engle. She reports that she found some relief with her right-sided pain and ROM, but she continues to have stiffness, spasms, pain, and limited ROM on the left.  She notes that her stiffness is worse when she sits in [facility] or when it rains. She is on light duty at work, so she has to sit at a computer. She gets up and walks around to help relieve the stiffness. She reports that she is frequently under stress     Hamlin Clackamas comes in to the office today for f/u. She notes significant benefit from dry needling and PT. She reports headache prescribed at last visit. Pt says that she intends to return to work soon as she is afraid of staying on light duty longer and losing her job. She states that she intends on reducing her work hours from 10 to 8 hrs a day. ASSESSMENT  Patient is cleared to return to full duty with caution. PLAN  1. Continue HEP. Pt will f/u in 3 months or sooner as needed. Diagnoses and all orders for this visit:    1. Cervical myofascial pain syndrome    2. Neck pain    3. Cervical radiculopathy    4. Chronic left shoulder pain    5. Bursitis of left shoulder    6. Primary osteoarthritis of left shoulder               PAST MEDICAL HISTORY   Past Medical History:   Diagnosis Date    Bronchitis     Carpal tunnel syndrome on right     Chronic bronchitis (HCC)     De Quervain's syndrome (tenosynovitis)     Depression     resolved    Ectopic pregnancy     two times    Epilepsy ([address]     Seizure ([address]     Sinus infection     Stenosing tenosynovitis of thumb     Right side    Trigger thumb of left hand        Past Surgical History:   Procedure Laterality Date    COLONOSCOPY N/A 10/21/2016    COLONOSCOPY w/ biopsy performed by Geoffrey Villareal MD at [address] HX APPENDECTOMY  1/12/15    lysis of adhesions    HX CHOLECYSTECTOMY      HX HYSTERECTOMY      HX OTHER SURGICAL      scar tissue removed from intestines    HX PARTIAL HYSTERECTOMY  11/2009    HX TONSILLECTOMY     .       MEDICATIONS    Current Outpatient Medications   Medication Sig Dispense Refill    carBAMazepine (TEGRETOL) 200 mg tablet One tab in AM, one tab at lunch, and two tabs before bed 90 Tab 0    gabapentin (NEURONTIN) 300 mg capsule Take 1 Cap by mouth three (3) times daily. (Patient not taking: Reported on 2019) 90 Cap 2    methocarbamol (ROBAXIN-750) 750 mg tablet Take 1 Tab by mouth three (3) times daily as needed (3 times daily as needed). (Patient not taking: Reported on 2019) 90 Tab 0    lidocaine (LIDODERM) 5 % Apply patch to the affected area for 12 hours a day and remove for 12 hours a day. (Patient not taking: Reported on 2019) 30 Each 0        ALLERGIES  Allergies   Allergen Reactions    Etodolac Other (comments)     headache          SOCIAL HISTORY    Social History     Socioeconomic History    Marital status: LEGALLY      Spouse name: Not on file    Number of children: 1    Years of education: Not on file    Highest education level: Not on file   Occupational History    Occupation: CNA     Employer: [address]     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   Tobacco Use    Smoking status: Current Every Day Smoker     Packs/day: 0.50     Types: Cigarettes    Smokeless tobacco: Never Used   Substance and Sexual Activity    Alcohol use: No    Drug use: No    Sexual activity: Not Currently       FAMILY HISTORY  Family History   Problem Relation Age of Onset    Heart Attack Brother          in 62s    Diabetes Father     Hypertension Father     Cancer Father         colon CA    Heart Disease Sister         stent placement    Hypertension Brother     Other Brother         Stomach problems/ulcers    Cancer Sister         Breast CA    Stroke Sister     Diabetes Mother     Hypertension Mother          REVIEW OF SYSTEMS  ROS       PHYSICAL EXAMINATION  Visit Vitals  /75   Pulse 72   Ht 5' 4\" (1.626 m)   Wt 197 lb (89.4 kg)   BMI 33.81 kg/m²       Pain Assessment  2019   Location of Pain Neck   Location Modifiers -   Severity of Pain 0   Quality of Pain Aching; Sharp   Quality of Pain Comment STIFF   Duration of Pain -   Frequency of Pain Intermittent Aggravating Factors Bending;Stretching   Aggravating Factors Comment -   Limiting Behavior Some   Relieving Factors Rest   Result of Injury No           Constitutional:  Well developed, well nourished, in no acute distress. Psychiatric: Affect and mood are appropriate. Integumentary: No rashes or abrasions noted on exposed areas. SPINE/MUSCULOSKELETAL EXAM    Cervical spine:  Neck is midline. Normal muscle tone. No focal atrophy is noted. ROM pain free. Shoulder ROM intact.   Tenderness to palpation of bilateral trapezii. Negative Spurling's sign. Negative Tinel's sign. Negative Norton's sign.      Sensation in the bilateral arms grossly intact to light touch.      Lumbar spine:  No rash, ecchymosis, or gross obliquity. No fasciculations. No focal atrophy is noted. No pain with hip ROM. Full range of motion. No tenderness to palpation. No tenderness to palpation at the sciatic notch. SI joints non-tender. Trochanters non tender.     Sensation in the bilateral legs grossly intact to light touch. MOTOR:      Biceps  Triceps Deltoids Wrist Ext Wrist Flex Hand Intrin   Right 5/5 5/5 5/5 5/5 5/5 5/5   Left 5/5 5/5 5/5 5/5 5/5 5/5             Hip Flex  Quads Hamstrings Ankle DF EHL Ankle PF   Right 5/5 5/5 5/5 5/5 5/5 5/5   Left 5/5 5/5 5/5 5/5 5/5 5/5     DTRs are 2+ biceps, triceps, brachioradialis, patella, and Achilles.     Negative Straight Leg raise. Squat not tested. No difficulty with tandem gait.      Ambulation without assistive device. FWB.       RADIOGRAPHS  Cervical MRI images taken on 4/4/19 personally reviewed with patient:  Alignment: Straightening of the cervical lordosis without subluxation. Vertebral body height: Normal  Marrow signal: Multilevel discogenic reactive bone marrow changes without  significant edema signal. No suspicious bone marrow lesion or infiltrative bone  marrow process.   Disc spaces: Multilevel disc height loss and endplate osteophytosis     Cervicomedullary Junction: Patent  Cervical cord: No cord expansion or atrophy. Further discussed below where  relevant.     On axial imaging, findings at each level are as follows: There is PLL  thickening.     C2/C3: Mild disc osteophyte complex and ligamentum thickening. Patent canal and  foramina.     C3/C4: Mild disc osteophyte complex and ligamentum thickening partially effacing  ventral CSF space. Uncovertebral spurring. Patent central canal. Moderate right  and mild left foraminal stenosis.     C4/C5: Moderate disc osteophyte complex flattening ventral cord. AP canal  diameter 9 mm. Uncovertebral spurring. Severe right and mild to moderate left  foraminal stenosis.     C5/C6: Mild disc osteophyte complex partially effacing ventral CSF space. AP  canal diameter 10 mm. Patent foramina.     C6/C7: Moderate disc osteophyte complex flattening ventral cord more toward the  left. AP canal diameter 8 mm. Patent neural foramina.     C7/T1: Moderate disc osteophyte complex flattening ventral cord. AP canal  diameter 7-8 mm. Uncovertebral spurring. Moderate to severe foraminal stenosis.     Other structures: Remainder of the visualized upper thoracic levels T1-T4  demonstrate patent canal and foramina. Enlarged thyroid gland multinodular right  lobe.     IMPRESSION  IMPRESSION:     1. Multilevel moderate degenerative disc disease and multilevel mild ventral  cord compression C4-5, C6-7 and C7-T1 with mild central canal stenosis. No  myelopathy. 2. Multilevel high-grade neural foraminal stenosis most severe right C4-5 and  bilateral C7-T1 neural foramina. 3. Enlarged multinodular thyroid gland, incompletely evaluated, can be evaluated  with ultrasound. 15 minutes of face-to-face contact were spent with the patient during today's visit extensively discussing symptoms and treatment plan. All questions were answered. More than half of this visit today was spent on counseling.      Written by Ba Mirza as dictated by Jace Paiz MD

## 2019-11-07 ENCOUNTER — HOSPITAL ENCOUNTER (EMERGENCY)
Age: 54
Discharge: HOME | End: 2019-11-08
Attending: EMERGENCY MEDICINE | Admitting: EMERGENCY MEDICINE
Payer: COMMERCIAL

## 2019-11-07 DIAGNOSIS — R20.2 PARESTHESIA OF RIGHT ARM AND LEG: Primary | ICD-10-CM

## 2019-11-07 DIAGNOSIS — G89.29 CHRONIC NECK PAIN: ICD-10-CM

## 2019-11-07 DIAGNOSIS — M54.2 CHRONIC NECK PAIN: ICD-10-CM

## 2019-11-07 PROCEDURE — 99283 EMERGENCY DEPT VISIT LOW MDM: CPT

## 2019-11-07 NOTE — LETTER
NOTIFICATION RETURN TO WORK  
 
11/8/2019 1:14 AM 
 
Ms. Esteban Noe [address] [facility] [address] To Whom It May Concern: 
 
Esteban Noe was seen at [facility] EMERGENCY DEPT on 08 NOV 2019. She will return to work on: 11 NOV 2019 If there are questions or concerns please have the patient contact our office. Sincerely, Bam King MD PGY-1 Emergency Medicine

## 2019-11-08 VITALS
SYSTOLIC BLOOD PRESSURE: 155 MMHG | TEMPERATURE: 98.3 F | RESPIRATION RATE: 16 BRPM | OXYGEN SATURATION: 99 % | DIASTOLIC BLOOD PRESSURE: 63 MMHG | HEART RATE: 80 BPM

## 2019-11-08 RX ORDER — CYCLOBENZAPRINE HCL 5 MG
5 TABLET ORAL
Qty: 21 TAB | Refills: 0 | Status: SHIPPED | OUTPATIENT
Start: 2019-11-08 | End: 2020-01-02

## 2019-11-08 NOTE — DISCHARGE INSTRUCTIONS
Continue to take your home medications as prescribed. Take Flexeril as prescribed. Follow up with your primary care doctor within one week. Return to the ER for worsening numbness, weakness in your arms or legs, difficulty speaking, changes in your vision, or any other concerns.

## 2019-11-08 NOTE — ED TRIAGE NOTES
Pt has hx of herniated disk in neck but states tonight she has had numbness of right arm and leg that is intermittent, made worse with standing, made better with walking, states it also occasionally occurs in left leg as well, t has no loss of muscle tone.

## 2019-11-08 NOTE — ED PROVIDER NOTES
EMERGENCY DEPARTMENT HISTORY AND PHYSICAL EXAM    11:52 PM      Date: 11/7/2019  Patient Name: Kip Frausto    History of Presenting Illness     Chief Complaint   Patient presents with    Numbness         History Provided By: Patient  Location/Duration/Severity/Modifying factors   Kip Frausto is a 61-year-old woman with a history of epilepsy and herniated disc in her neck as well as chronic neck pain who presents today for intermittent numbness and paresthesias in her right arm and right leg for the past week. She states she has had increasing neck pain recently, and that she noticed about a week ago that she was having numbness and tingling in her right leg that was worse with prolonged standing and lying down, and better with ambulation. She states she had similar symptoms in her right arm. She denies difficulty ambulating, heaviness in her right arm or right leg, difficulty speaking, or changes in vision. She states she is concerned she may be having a stroke, because her sister has had a stroke. PCP: Timoteo Nielson NP    Current Outpatient Medications   Medication Sig Dispense Refill    cyclobenzaprine (FLEXERIL) 5 mg tablet Take 1 Tab by mouth three (3) times daily as needed for Muscle Spasm(s). 21 Tab 0    gabapentin (NEURONTIN) 300 mg capsule Take 1 Cap by mouth three (3) times daily. (Patient not taking: Reported on 7/25/2019) 90 Cap 2    lidocaine (LIDODERM) 5 % Apply patch to the affected area for 12 hours a day and remove for 12 hours a day.  (Patient not taking: Reported on 7/25/2019) 30 Each 0    carBAMazepine (TEGRETOL) 200 mg tablet One tab in AM, one tab at lunch, and two tabs before bed 90 Tab 0       Past History     Past Medical History:  Past Medical History:   Diagnosis Date    Bronchitis     Carpal tunnel syndrome on right     Chronic bronchitis (HCC)     De Quervain's syndrome (tenosynovitis)     Depression     resolved    Ectopic pregnancy     two times    [facility]     Seizure ([facility]     Sinus infection     Stenosing tenosynovitis of thumb     Right side    Trigger thumb of left hand        Past Surgical History:  Past Surgical History:   Procedure Laterality Date    COLONOSCOPY N/A 10/21/2016    COLONOSCOPY w/ biopsy performed by Letha Neumann MD at [address] HX APPENDECTOMY  1/12/15    lysis of adhesions    HX CHOLECYSTECTOMY      HX HYSTERECTOMY      HX OTHER SURGICAL      scar tissue removed from intestines    HX PARTIAL HYSTERECTOMY  2009    HX TONSILLECTOMY         Family History:  Family History   Problem Relation Age of Onset    Heart Attack Brother          in 62s    Diabetes Father     Hypertension Father     Cancer Father         colon CA    Heart Disease Sister         stent placement    Hypertension Brother     Other Brother         Stomach problems/ulcers    Cancer Sister         Breast CA    Stroke Sister     Diabetes Mother     Hypertension Mother        Social History:  Social History     Tobacco Use    Smoking status: Current Every Day Smoker     Packs/day: 0.50     Types: Cigarettes    Smokeless tobacco: Never Used   Substance Use Topics    Alcohol use: No    Drug use: No       Allergies: Allergies   Allergen Reactions    Etodolac Other (comments)     headache         Review of Systems       Review of Systems   Constitutional: Negative for appetite change and fever. HENT: Positive for sinus pressure. Negative for rhinorrhea, sore throat and trouble swallowing. Eyes: Negative for photophobia and visual disturbance. Respiratory: Negative for cough, chest tightness, shortness of breath and wheezing. Cardiovascular: Negative for chest pain and leg swelling. Gastrointestinal: Negative for abdominal pain, blood in stool, constipation, diarrhea, nausea and vomiting. Endocrine: Negative for polydipsia and polyuria.    Genitourinary: Negative for difficulty urinating, dysuria and hematuria. Musculoskeletal: Positive for neck pain (Chronic) and neck stiffness (Chronic). Negative for back pain. Skin: Negative for rash. Neurological: Positive for numbness (In R arm and R leg x1 week). Negative for dizziness, seizures, syncope, facial asymmetry, speech difficulty, weakness, light-headedness and headaches. Psychiatric/Behavioral: Negative for agitation and confusion. Physical Exam     Visit Vitals  /63   Pulse 80   Temp 98.3 °F (36.8 °C)   Resp 16   SpO2 99%         Physical Exam   Constitutional: She is oriented to person, place, and time. She appears well-developed and well-nourished. No distress. HENT:   Head: Normocephalic and atraumatic. Mouth/Throat: Oropharynx is clear and moist.   Eyes: Pupils are equal, round, and reactive to light. Conjunctivae and EOM are normal.   Neck: Normal range of motion. Neck supple. Cardiovascular: Normal rate, regular rhythm, normal heart sounds and intact distal pulses. No murmur heard. Pulmonary/Chest: Effort normal and breath sounds normal. No respiratory distress. She has no wheezes. She has no rales. Abdominal: Soft. Bowel sounds are normal. She exhibits no distension. There is no tenderness. There is no rebound and no guarding. Musculoskeletal: Normal range of motion. She exhibits no edema. Neurological: She is alert and oriented to person, place, and time. No cranial nerve deficit. She exhibits normal muscle tone. She displays a negative Romberg sign. Coordination and gait normal. GCS eye subscore is 4. GCS verbal subscore is 5. GCS motor subscore is 6. She displays no Babinski's sign on the right side. She displays no Babinski's sign on the left side. Unable to elicit DTRs bilaterally. Skin: She is not diaphoretic. Nursing note and vitals reviewed. Diagnostic Study Results     Labs -  No results found for this or any previous visit (from the past 12 hour(s)).     Radiologic Studies -   No orders to display         Medical Decision Making   I am the first provider for this patient. I reviewed the vital signs, available nursing notes, past medical history, past surgical history, family history and social history. Vital Signs-Reviewed the patient's vital signs. EKG: None    Records Reviewed: Nursing Notes and Old Medical Records (Time of Review: 11:52 PM)    ED Course: Progress Notes, Reevaluation, and Consults:         Provider Notes (Medical Decision Making):   MDM  49-year-old female with history of epilepsy and herniated disc in her neck with chronic neck pain presenting with intermittent paresthesias in her right arm and right leg for the past week. Symptoms worse with prolonged standing and lying down, improved with ambulation. Differential includes stroke, TIA, and radiculopathy. Neuro exam with decreased sensation to light touch over right forearm and right leg, no other deficits noted. Patient with considerable amount of pain in her neck, with muscle tightness present along her entire back. No midline tenderness to palpation. Based on intermittent and positional nature of her symptoms, as well as no other deficits except decreased sensation, unlikely that patient is experiencing stroke or TIA. Patient very reassured once informed of neuro exam findings. Plan to discharge home with Flexeril to follow-up with her primary care doctor within 1 week, patient amenable with plan. Diagnosis     Clinical Impression:   1. Paresthesia of right arm and leg    2.  Chronic neck pain        Disposition: Discharge    Follow-up Information     Follow up With Specialties Details Why Contact Cristina Garber NP Nurse Practitioner Schedule an appointment as soon as possible for a visit in 1 week  [facility] [address]  [address]  [phone]      [facility] EMERGENCY DEPT Emergency Medicine  If symptoms worsen [address] [place] [address] [address] [address]  12 [facility], Matthew Westbrook MD Orthopedic Surgery  for follow-up as needed [address] [facility],[address] [phone]             Patient's Medications   Start Taking    CYCLOBENZAPRINE (FLEXERIL) 5 MG TABLET    Take 1 Tab by mouth three (3) times daily as needed for Muscle Spasm(s). Continue Taking    CARBAMAZEPINE (TEGRETOL) 200 MG TABLET    One tab in AM, one tab at lunch, and two tabs before bed    GABAPENTIN (NEURONTIN) 300 MG CAPSULE    Take 1 Cap by mouth three (3) times daily. LIDOCAINE (LIDODERM) 5 %    Apply patch to the affected area for 12 hours a day and remove for 12 hours a day. These Medications have changed    No medications on file   Stop Taking    METHOCARBAMOL (ROBAXIN-750) 750 MG TABLET    Take 1 Tab by mouth three (3) times daily as needed (3 times daily as needed). Disclaimer: Sections of this note are dictated using utilizing voice recognition software. Minor typographical errors may be present. If questions arise, please do not hesitate to contact me or call our department.

## 2019-11-14 ENCOUNTER — OFFICE VISIT (OUTPATIENT)
Dept: FAMILY MEDICINE CLINIC | Age: 54
End: 2019-11-14

## 2019-11-14 VITALS
WEIGHT: 209 LBS | DIASTOLIC BLOOD PRESSURE: 72 MMHG | OXYGEN SATURATION: 98 % | HEART RATE: 77 BPM | TEMPERATURE: 98.5 F | BODY MASS INDEX: 35.68 KG/M2 | HEIGHT: 64 IN | RESPIRATION RATE: 16 BRPM | SYSTOLIC BLOOD PRESSURE: 124 MMHG

## 2019-11-14 DIAGNOSIS — G89.29 CHRONIC NECK PAIN: Primary | ICD-10-CM

## 2019-11-14 DIAGNOSIS — M54.16 LUMBAR BACK PAIN WITH RADICULOPATHY AFFECTING LOWER EXTREMITY: ICD-10-CM

## 2019-11-14 DIAGNOSIS — M54.2 CHRONIC NECK PAIN: Primary | ICD-10-CM

## 2019-11-14 DIAGNOSIS — M54.12 CERVICAL RADICULOPATHY: ICD-10-CM

## 2019-11-14 RX ORDER — DULOXETIN HYDROCHLORIDE 20 MG/1
20 CAPSULE, DELAYED RELEASE ORAL DAILY
Qty: 30 CAP | Refills: 1 | Status: SHIPPED | OUTPATIENT
Start: 2019-11-14 | End: 2019-12-12 | Stop reason: DRUGHIGH

## 2019-11-14 NOTE — PROGRESS NOTES
Chief Complaint   Patient presents with   [address] [place] [address] ED Follow-up     1. Have you been to the ER, urgent care clinic since your last visit? [facility] your [facility]    2. Have you seen or consulted any other health care providers outside of the [address] [place] [address] since your last visit? Include any pap smears or colon screening.  No

## 2019-11-14 NOTE — PROGRESS NOTES
Subjective:   Cheri Nicole is a 62 y.o. female emergency room follow up on 11-. Patient is complaining today of neck pain bilaterally and also lower back pain with some radiation down   Right lower extremity. Pain 6/10 aching neck and lumbar spine   Review of Systems   Musculoskeletal: Negative for falls, joint pain and myalgias. Neurological: Negative for dizziness, tingling, focal weakness, weakness and headaches. Recent PMH from 11- ED visit   She has history of epilepsy and herniated disc cervical spine and chronic neck pain. She went to the emergency room for intermittent numbness and paresthesias right arm and right leg for one week. Endorsed increasing neck pain for one week. She also reported that the intermittent tingling down right leg with intermittent numbness triggered by prolonged standing and supine positioning  She was concerned she may be having a stroke as +family history of CVA (sister)  No imaging studies in ED   Recent PMH:   Patient seen by spine center Dr. Emi Pisano cervical myofascial pain syndrome, neck pain, cervical radiculopathy, chronic left shoulder pain, bursitis of left shoulder, primary osteoarthritis of left shoulder  At 7- visit patient was cleared to return to work with caution. Current Outpatient Medications   Medication Sig Dispense Refill    cyclobenzaprine (FLEXERIL) 5 mg tablet Take 1 Tab by mouth three (3) times daily as needed for Muscle Spasm(s). 21 Tab 0    carBAMazepine (TEGRETOL) 200 mg tablet One tab in AM, one tab at lunch, and two tabs before bed 90 Tab 0    gabapentin (NEURONTIN) 300 mg capsule Take 1 Cap by mouth three (3) times daily. (Patient not taking: Reported on 7/25/2019) 90 Cap 2    lidocaine (LIDODERM) 5 % Apply patch to the affected area for 12 hours a day and remove for 12 hours a day.  (Patient not taking: Reported on 7/25/2019) 30 Each 0        OBJECTIVE:  Awake and alert in no acute distress  Lungs clear throughout  S1 S2 RRR without ectopy or murmur auscultated. Inspection: no erythema no edema no warmth to palpation  Cervical paraspinals TTP positive bilaterally   Thoracic paraspinals TTP positive bilaterally   Lumbar paraspinals TTP positive bilaterally slightly positive right lower extremity straight leg   ROM no limitations to head, neck or lower spine   Gait  No antalgic gait   Neuro no focal changes   Visit Vitals  /72 (BP 1 Location: Left arm, BP Patient Position: Sitting)   Pulse 77   Temp 98.5 °F (36.9 °C) (Oral)   Resp 16   Ht 5' 4\" (1.626 m)   Wt 209 lb (94.8 kg)   SpO2 98%   BMI 35.87 kg/m²     Diagnoses and all orders for this visit:    Chronic neck pain  -     DULoxetine (CYMBALTA) 20 mg capsule; Take 1 Cap by mouth daily. , Normal, Disp-30 Cap, R-1    Cervical radiculopathy  -     DULoxetine (CYMBALTA) 20 mg capsule; Take 1 Cap by mouth daily. , Normal, Disp-30 Cap, R-1    Lumbar back pain with radiculopathy affecting lower extremity  -     DULoxetine (CYMBALTA) 20 mg capsule; Take 1 Cap by mouth daily. , Normal, Disp-30 Cap, R-1    Reviewed risks and benefits and common side effects of new medication  General comfort measures  Anticipatory guidance regarding emergency care if symptoms worsen and patient verbalizes understanding. Patient agrees with plan and verbalizes understanding. I have discussed the diagnosis with the patient and the intended plan as seen in the above orders. The patient has received an after-visit summary and questions were answered concerning future plans. I have discussed medication side effects and warnings with the patient as well. Follow-up and Dispositions    · Return in about 4 weeks (around 12/12/2019) for follow up neck and lower back pain .

## 2019-12-12 ENCOUNTER — OFFICE VISIT (OUTPATIENT)
Dept: FAMILY MEDICINE CLINIC | Age: 54
End: 2019-12-12

## 2019-12-12 VITALS
WEIGHT: 206 LBS | RESPIRATION RATE: 16 BRPM | SYSTOLIC BLOOD PRESSURE: 130 MMHG | BODY MASS INDEX: 35.17 KG/M2 | HEART RATE: 82 BPM | HEIGHT: 64 IN | TEMPERATURE: 98.1 F | OXYGEN SATURATION: 98 % | DIASTOLIC BLOOD PRESSURE: 68 MMHG

## 2019-12-12 DIAGNOSIS — G89.29 CHRONIC NECK PAIN: Primary | ICD-10-CM

## 2019-12-12 DIAGNOSIS — M54.2 CHRONIC NECK PAIN: Primary | ICD-10-CM

## 2019-12-12 RX ORDER — DULOXETIN HYDROCHLORIDE 30 MG/1
30 CAPSULE, DELAYED RELEASE ORAL DAILY
Qty: 90 CAP | Refills: 0 | Status: SHIPPED | OUTPATIENT
Start: 2019-12-12 | End: 2020-01-09 | Stop reason: DRUGHIGH

## 2019-12-12 NOTE — PROGRESS NOTES
Subjective:   Robel Otero is a 62 y.o. female here today for follow up chronic neck pain, cervical radiculopathy, lumbar back pain with radiculopathy affecting lower extremity  Taking duloxetine 20 mg daily prescribed   Pain severity today 3/10 soreness but it does increase to moderate range and posterior headaches will occur  She thought it was related to duloxetine so she stopped but the headaches continued   No back pain complaints today     Current Outpatient Medications   Medication Sig Dispense Refill    DULoxetine (CYMBALTA) 20 mg capsule Take 1 Cap by mouth daily. 30 Cap 1    cyclobenzaprine (FLEXERIL) 5 mg tablet Take 1 Tab by mouth three (3) times daily as needed for Muscle Spasm(s). 21 Tab 0    gabapentin (NEURONTIN) 300 mg capsule Take 1 Cap by mouth three (3) times daily. (Patient not taking: Reported on 7/25/2019) 90 Cap 2    lidocaine (LIDODERM) 5 % Apply patch to the affected area for 12 hours a day and remove for 12 hours a day. (Patient not taking: Reported on 7/25/2019) 30 Each 0    carBAMazepine (TEGRETOL) 200 mg tablet One tab in AM, one tab at lunch, and two tabs before bed 90 Tab 0      Review of Systems   Constitutional: Negative for weight loss. Musculoskeletal: Positive for myalgias. Negative for falls. Neurological: Negative for dizziness and headaches. Wt Readings from Last 3 Encounters:   12/12/19 206 lb (93.4 kg)   11/14/19 209 lb (94.8 kg)   07/25/19 197 lb (89.4 kg)     BP Readings from Last 3 Encounters:   12/12/19 130/68   11/14/19 124/72   11/07/19 155/63     PMH: reviewed medications and allergy lists and medical and family history. OBJECTIVE:  Awake and alert in no acute distress  Neck supple without lymphadenopathy, no carotid artery bruits auscultated bilaterally. Lungs clear throughout  S1 S2 RRR without ectopy or murmur auscultated. Neuro: cranial nerves II-XII tested and intact. No gait abnormalities.   Inspection: no erythema no edema no warmth to palpation  Cervical paraspinals +TTP bilaterally palpable tightness trapezius bilaterally  Pain with turning neck, flexion  No limited ROM to upper extremities normal gait  Visit Vitals  /68 (BP 1 Location: Left arm, BP Patient Position: Sitting)   Pulse 82   Temp 98.1 °F (36.7 °C) (Oral)   Resp 16   Ht 5' 4\" (1.626 m)   Wt 206 lb (93.4 kg)   SpO2 98%   BMI 35.36 kg/m²     Diagnoses and all orders for this visit:    Chronic neck pain  -     Increase DULoxetine (CYMBALTA) 30 mg capsule; Take 1 Cap by mouth daily. Indications: Chronic Muscle or Bone Pain, Normal, Disp-90 Cap, R-0    BMI 35.0-35.9,adult    Reviewed risks and benefits and common side effects of new medication  I have reviewed/discussed the above normal BMI with the patient. I have recommended the following interventions: dietary management education, guidance, and counseling, encourage exercise, monitor weight and prescribed dietary intake . Mariano Abdalla Patient agrees with plan and verbalizes understanding. I have discussed the diagnosis with the patient and the intended plan as seen in the above orders. The patient has received an after-visit summary and questions were answered concerning future plans. I have discussed medication side effects and warnings with the patient as well. Follow-up and Dispositions    · Return in about 4 weeks (around 1/9/2020) for chronic neck pain since increase in duloxetine doseage and also BMI .

## 2019-12-12 NOTE — PROGRESS NOTES
Chief Complaint   Patient presents with    Back Pain     follow up    Weight Gain       1. Have you been to the ER, urgent care clinic since your last visit? Hospitalized since your last visit? No    2. Have you seen or consulted any other health care providers outside of the [address] [place] [address] since your last visit? Include any pap smears or colon screening.  No

## 2020-01-02 ENCOUNTER — HOSPITAL ENCOUNTER (EMERGENCY)
Age: 55
Discharge: HOME | End: 2020-01-02
Attending: EMERGENCY MEDICINE
Payer: COMMERCIAL

## 2020-01-02 VITALS
TEMPERATURE: 98.5 F | SYSTOLIC BLOOD PRESSURE: 126 MMHG | WEIGHT: 206 LBS | BODY MASS INDEX: 36.5 KG/M2 | HEIGHT: 63 IN | OXYGEN SATURATION: 100 % | RESPIRATION RATE: 16 BRPM | HEART RATE: 68 BPM | DIASTOLIC BLOOD PRESSURE: 75 MMHG

## 2020-01-02 DIAGNOSIS — N30.01 ACUTE CYSTITIS WITH HEMATURIA: Primary | ICD-10-CM

## 2020-01-02 LAB
ALBUMIN SERPL-MCNC: 3.8 G/DL (ref 3.4–5)
ALBUMIN/GLOB SERPL: 1 {RATIO} (ref 0.8–1.7)
ALP SERPL-CCNC: 108 U/L (ref 45–117)
ALT SERPL-CCNC: 21 U/L (ref 13–56)
ANION GAP SERPL CALC-SCNC: 7 MMOL/L (ref 3–18)
APPEARANCE UR: ABNORMAL
AST SERPL-CCNC: 19 U/L (ref 10–38)
BACTERIA URNS QL MICRO: ABNORMAL /HPF
BASOPHILS # BLD: 0 K/UL (ref 0–0.1)
BASOPHILS NFR BLD: 0 % (ref 0–2)
BILIRUB SERPL-MCNC: 0.2 MG/DL (ref 0.2–1)
BILIRUB UR QL: NEGATIVE
BUN SERPL-MCNC: 15 MG/DL (ref 7–18)
BUN/CREAT SERPL: 22 (ref 12–20)
CALCIUM SERPL-MCNC: 8.7 MG/DL (ref 8.5–10.1)
CHLORIDE SERPL-SCNC: 107 MMOL/L (ref 100–111)
CO2 SERPL-SCNC: 28 MMOL/L (ref 21–32)
COLOR UR: YELLOW
CREAT SERPL-MCNC: 0.69 MG/DL (ref 0.6–1.3)
DIFFERENTIAL METHOD BLD: ABNORMAL
EOSINOPHIL # BLD: 0.1 K/UL (ref 0–0.4)
EOSINOPHIL NFR BLD: 2 % (ref 0–5)
EPITH CASTS URNS QL MICRO: ABNORMAL /LPF (ref 0–5)
ERYTHROCYTE [DISTWIDTH] IN BLOOD BY AUTOMATED COUNT: 14.6 % (ref 11.6–14.5)
GLOBULIN SER CALC-MCNC: 3.7 G/DL (ref 2–4)
GLUCOSE SERPL-MCNC: 83 MG/DL (ref 74–99)
GLUCOSE UR STRIP.AUTO-MCNC: NEGATIVE MG/DL
HCT VFR BLD AUTO: 36.8 % (ref 35–45)
HGB BLD-MCNC: 12.2 G/DL (ref 12–16)
HGB UR QL STRIP: ABNORMAL
KETONES UR QL STRIP.AUTO: ABNORMAL MG/DL
LEUKOCYTE ESTERASE UR QL STRIP.AUTO: ABNORMAL
LIPASE SERPL-CCNC: 108 U/L (ref 73–393)
LYMPHOCYTES # BLD: 2.3 K/UL (ref 0.9–3.6)
LYMPHOCYTES NFR BLD: 37 % (ref 21–52)
MCH RBC QN AUTO: 27.3 PG (ref 24–34)
MCHC RBC AUTO-ENTMCNC: 33.2 G/DL (ref 31–37)
MCV RBC AUTO: 82.3 FL (ref 74–97)
MONOCYTES # BLD: 0.3 K/UL (ref 0.05–1.2)
MONOCYTES NFR BLD: 4 % (ref 3–10)
MUCOUS THREADS URNS QL MICRO: ABNORMAL /LPF
NEUTS SEG # BLD: 3.5 K/UL (ref 1.8–8)
NEUTS SEG NFR BLD: 57 % (ref 40–73)
NITRITE UR QL STRIP.AUTO: NEGATIVE
PH UR STRIP: 5.5 [PH] (ref 5–8)
PLATELET # BLD AUTO: 246 K/UL (ref 135–420)
PMV BLD AUTO: 9.9 FL (ref 9.2–11.8)
POTASSIUM SERPL-SCNC: 4.1 MMOL/L (ref 3.5–5.5)
PROT SERPL-MCNC: 7.5 G/DL (ref 6.4–8.2)
PROT UR STRIP-MCNC: ABNORMAL MG/DL
RBC # BLD AUTO: 4.47 M/UL (ref 4.2–5.3)
RBC #/AREA URNS HPF: ABNORMAL /HPF (ref 0–5)
SODIUM SERPL-SCNC: 142 MMOL/L (ref 136–145)
SP GR UR REFRACTOMETRY: 1.02 (ref 1–1.03)
UROBILINOGEN UR QL STRIP.AUTO: 1 EU/DL (ref 0.2–1)
WBC # BLD AUTO: 6.1 K/UL (ref 4.6–13.2)
WBC URNS QL MICRO: ABNORMAL /HPF (ref 0–4)

## 2020-01-02 PROCEDURE — 74011250636 HC RX REV CODE- 250/636: Performed by: PHYSICIAN ASSISTANT

## 2020-01-02 PROCEDURE — 74011250637 HC RX REV CODE- 250/637: Performed by: PHYSICIAN ASSISTANT

## 2020-01-02 PROCEDURE — 87077 CULTURE AEROBIC IDENTIFY: CPT

## 2020-01-02 PROCEDURE — 99283 EMERGENCY DEPT VISIT LOW MDM: CPT

## 2020-01-02 PROCEDURE — 80053 COMPREHEN METABOLIC PANEL: CPT

## 2020-01-02 PROCEDURE — 81001 URINALYSIS AUTO W/SCOPE: CPT

## 2020-01-02 PROCEDURE — 87186 SC STD MICRODIL/AGAR DIL: CPT

## 2020-01-02 PROCEDURE — 96374 THER/PROPH/DIAG INJ IV PUSH: CPT

## 2020-01-02 PROCEDURE — 87086 URINE CULTURE/COLONY COUNT: CPT

## 2020-01-02 PROCEDURE — 85025 COMPLETE CBC W/AUTO DIFF WBC: CPT

## 2020-01-02 PROCEDURE — 96361 HYDRATE IV INFUSION ADD-ON: CPT

## 2020-01-02 PROCEDURE — 74011000250 HC RX REV CODE- 250: Performed by: PHYSICIAN ASSISTANT

## 2020-01-02 PROCEDURE — 83690 ASSAY OF LIPASE: CPT

## 2020-01-02 RX ORDER — TRAMADOL HYDROCHLORIDE 50 MG/1
50 TABLET ORAL
Status: COMPLETED | OUTPATIENT
Start: 2020-01-02 | End: 2020-01-02

## 2020-01-02 RX ORDER — CEPHALEXIN 500 MG/1
500 CAPSULE ORAL 4 TIMES DAILY
Qty: 28 CAP | Refills: 0 | Status: SHIPPED | OUTPATIENT
Start: 2020-01-02 | End: 2020-01-09

## 2020-01-02 RX ORDER — PHENAZOPYRIDINE HYDROCHLORIDE 200 MG/1
200 TABLET, FILM COATED ORAL 3 TIMES DAILY
Qty: 6 TAB | Refills: 0 | Status: SHIPPED | OUTPATIENT
Start: 2020-01-02 | End: 2020-01-04

## 2020-01-02 RX ADMIN — TRAMADOL HYDROCHLORIDE 50 MG: 50 TABLET, FILM COATED ORAL at 16:00

## 2020-01-02 RX ADMIN — CEFTRIAXONE SODIUM 1 G: 1 INJECTION, POWDER, FOR SOLUTION INTRAMUSCULAR; INTRAVENOUS at 16:02

## 2020-01-02 RX ADMIN — SODIUM CHLORIDE 1000 ML: 900 INJECTION, SOLUTION INTRAVENOUS at 16:01

## 2020-01-02 NOTE — LETTER
[facility] EMERGENCY DEPT 
[address] [facility] [address] [phone] Work/School Note Date: 1/2/2020 To Whom It May concern: 
 
Irina Paredes was seen and treated today in the emergency room by the following provider(s): 
Attending Provider: Ata Dallas MD 
Physician Assistant: Darby Cano PA-C. Irina Paredes may return to work on 1/6/2020. Sincerely, Lico Valdovinos RN

## 2020-01-02 NOTE — ED PROVIDER NOTES
EMERGENCY DEPARTMENT HISTORY AND PHYSICAL EXAM    Date: 1/2/2020  Patient Name: Alyse Neff    History of Presenting Illness     Chief Complaint   Patient presents with    Abdominal Pain    Urinary Pain         History Provided By: patient     Chief Complaint: abd pain   Duration: 1 week  Timing: acute  Location: lower abdomen   Quality:burning   Severity: moderate  Modifying Factors: none   Associated Symptoms: dysuria       Additional History (Context): Alyse Neff is a 62 y.o. female with PMH seizures, epilepsy, and depression who presents with c/o 1 week of dysuria and lower abdominal pain. Patient denies fever and chills. Denies vaginal discharge. No other complaints reported at this time. PCP: Dannielle Mcqueen NP    Current Facility-Administered Medications   Medication Dose Route Frequency Provider Last Rate Last Dose    sodium chloride 0.9 % bolus infusion 1,000 mL  1,000 mL IntraVENous ONCE Betty Yang PA-C 1,000 mL/hr at 01/02/20 1601 1,000 mL at 01/02/20 1601     Current Outpatient Medications   Medication Sig Dispense Refill    cephALEXin (KEFLEX) 500 mg capsule Take 1 Cap by mouth four (4) times daily for 7 days. 28 Cap 0    phenazopyridine (PYRIDIUM) 200 mg tablet Take 1 Tab by mouth three (3) times daily for 2 days. 6 Tab 0    DULoxetine (CYMBALTA) 30 mg capsule Take 1 Cap by mouth daily.  Indications: Chronic Muscle or Bone Pain 90 Cap 0    carBAMazepine (TEGRETOL) 200 mg tablet One tab in AM, one tab at lunch, and two tabs before bed 90 Tab 0       Past History     Past Medical History:  Past Medical History:   Diagnosis Date    Bronchitis     Carpal tunnel syndrome on right     Chronic bronchitis (HCC)     De Quervain's syndrome (tenosynovitis)     Depression     resolved    Ectopic pregnancy     two times    Epilepsy (HCC)     Seizure (HCC)     Sinus infection     Stenosing tenosynovitis of thumb     Right side    Trigger thumb of left hand Past Surgical History:  Past Surgical History:   Procedure Laterality Date    COLONOSCOPY N/A 10/21/2016    COLONOSCOPY w/ biopsy performed by Damaris Thomson MD at [address] HX APPENDECTOMY  1/12/15    lysis of adhesions    HX CHOLECYSTECTOMY      HX HYSTERECTOMY      HX OTHER SURGICAL      scar tissue removed from intestines    HX PARTIAL HYSTERECTOMY  2009    HX TONSILLECTOMY         Family History:  Family History   Problem Relation Age of Onset    Heart Attack Brother          in 62s    Diabetes Father     Hypertension Father     Cancer Father         colon CA    Heart Disease Sister         stent placement    Hypertension Brother     Other Brother         Stomach problems/ulcers    Cancer Sister         Breast CA    Stroke Sister     Diabetes Mother     Hypertension Mother        Social History:  Social History     Tobacco Use    Smoking status: Current Every Day Smoker     Packs/day: 0.50     Types: Cigarettes    Smokeless tobacco: Never Used   Substance Use Topics    Alcohol use: No    Drug use: No       Allergies: Allergies   Allergen Reactions    Etodolac Other (comments)     headache         Review of Systems   Review of Systems   Constitutional: Negative. Negative for chills and fever. HENT: Negative. Negative for congestion, ear pain and rhinorrhea. Eyes: Negative. Negative for pain and redness. Respiratory: Negative. Negative for cough, shortness of breath, wheezing and stridor. Cardiovascular: Negative. Negative for chest pain and leg swelling. Gastrointestinal: Positive for abdominal pain. Negative for constipation, diarrhea, nausea and vomiting. Genitourinary: Positive for dysuria. Negative for frequency. Musculoskeletal: Negative. Negative for back pain and neck pain. Skin: Negative. Negative for rash and wound. Neurological: Negative. Negative for dizziness, seizures, syncope and headaches.    All other systems reviewed and are negative. All Other Systems Negative  Physical Exam     Vitals:    01/02/20 1347   BP: 128/82   Pulse: 65   Resp: 18   Temp: 98.5 °F (36.9 °C)   SpO2: 99%   Weight: 93.4 kg (206 lb)   Height: 5' 3\" (1.6 m)     Physical Exam  Vitals signs and nursing note reviewed. Constitutional:       General: She is not in acute distress. Appearance: She is well-developed. She is not diaphoretic. HENT:      Head: Normocephalic and atraumatic. Eyes:      General: No scleral icterus. Right eye: No discharge. Left eye: No discharge. Conjunctiva/sclera: Conjunctivae normal.   Neck:      Musculoskeletal: Normal range of motion and neck supple. Cardiovascular:      Rate and Rhythm: Normal rate and regular rhythm. Heart sounds: Normal heart sounds. No murmur. No friction rub. No gallop. Pulmonary:      Effort: Pulmonary effort is normal. No respiratory distress. Breath sounds: Normal breath sounds. No stridor. No wheezing or rales. Abdominal:      General: Bowel sounds are normal. There is no distension. Palpations: Abdomen is soft. There is no mass. Tenderness: There is tenderness. There is no guarding. Comments: Mild RLQ and epigastric TTP noted , no guarding or peritoneal signs noted   Musculoskeletal: Normal range of motion. Skin:     General: Skin is warm and dry. Findings: No erythema or rash. Neurological:      Mental Status: She is alert and oriented to person, place, and time. Coordination: Coordination normal.      Comments: Gait is steady and patient exhibits no evidence of ataxia. Patient is able to ambulate without difficulty. No focal neurological deficit noted. No facial droop, slurred speech, or evidence of altered mentation noted on exam.     Psychiatric:         Behavior: Behavior normal.         Thought Content:  Thought content normal.                Diagnostic Study Results     Labs -     Recent Results (from the past 12 hour(s)) URINALYSIS W/ RFLX MICROSCOPIC    Collection Time: 01/02/20  2:00 PM   Result Value Ref Range    Color YELLOW      Appearance TURBID      Specific gravity 1.025 1.005 - 1.030      pH (UA) 5.5 5.0 - 8.0      Protein TRACE (A) NEG mg/dL    Glucose NEGATIVE  NEG mg/dL    Ketone TRACE (A) NEG mg/dL    Bilirubin NEGATIVE  NEG      Blood TRACE (A) NEG      Urobilinogen 1.0 0.2 - 1.0 EU/dL    Nitrites NEGATIVE  NEG      Leukocyte Esterase MODERATE (A) NEG     URINE MICROSCOPIC ONLY    Collection Time: 01/02/20  2:00 PM   Result Value Ref Range    WBC TOO NUMEROUS TO COUNT 0 - 4 /hpf    RBC 0 to 3 0 - 5 /hpf    Epithelial cells 1+ 0 - 5 /lpf    Bacteria 4+ (A) NEG /hpf    Mucus 1+ (A) NEG /lpf   CBC WITH AUTOMATED DIFF    Collection Time: 01/02/20  4:00 PM   Result Value Ref Range    WBC 6.1 4.6 - 13.2 K/uL    RBC 4.47 4.20 - 5.30 M/uL    HGB 12.2 12.0 - 16.0 g/dL    HCT 36.8 35.0 - 45.0 %    MCV 82.3 74.0 - 97.0 FL    MCH 27.3 24.0 - 34.0 PG    MCHC 33.2 31.0 - 37.0 g/dL    RDW 14.6 (H) 11.6 - 14.5 %    PLATELET 033 022 - 849 K/uL    MPV 9.9 9.2 - 11.8 FL    NEUTROPHILS 57 40 - 73 %    LYMPHOCYTES 37 21 - 52 %    MONOCYTES 4 3 - 10 %    EOSINOPHILS 2 0 - 5 %    BASOPHILS 0 0 - 2 %    ABS. NEUTROPHILS 3.5 1.8 - 8.0 K/UL    ABS. LYMPHOCYTES 2.3 0.9 - 3.6 K/UL    ABS. MONOCYTES 0.3 0.05 - 1.2 K/UL    ABS. EOSINOPHILS 0.1 0.0 - 0.4 K/UL    ABS.  BASOPHILS 0.0 0.0 - 0.1 K/UL    DF AUTOMATED     METABOLIC PANEL, COMPREHENSIVE    Collection Time: 01/02/20  4:00 PM   Result Value Ref Range    Sodium 142 136 - 145 mmol/L    Potassium 4.1 3.5 - 5.5 mmol/L    Chloride 107 100 - 111 mmol/L    CO2 28 21 - 32 mmol/L    Anion gap 7 3.0 - 18 mmol/L    Glucose 83 74 - 99 mg/dL    BUN 15 7.0 - 18 MG/DL    Creatinine 0.69 0.6 - 1.3 MG/DL    BUN/Creatinine ratio 22 (H) 12 - 20      GFR est AA >60 >60 ml/min/1.73m2    GFR est non-AA >60 >60 ml/min/1.73m2    Calcium 8.7 8.5 - 10.1 MG/DL    Bilirubin, total 0.2 0.2 - 1.0 MG/DL    ALT (SGPT) 21 13 - 56 U/L    AST (SGOT) 19 10 - 38 U/L    Alk. phosphatase 108 45 - 117 U/L    Protein, total 7.5 6.4 - 8.2 g/dL    Albumin 3.8 3.4 - 5.0 g/dL    Globulin 3.7 2.0 - 4.0 g/dL    A-G Ratio 1.0 0.8 - 1.7     LIPASE    Collection Time: 01/02/20  4:00 PM   Result Value Ref Range    Lipase 108 73 - 393 U/L       Radiologic Studies -   No orders to display     CT Results  (Last 48 hours)    None        CXR Results  (Last 48 hours)    None            Medical Decision Making   I am the first provider for this patient. I reviewed the vital signs, available nursing notes, past medical history, past surgical history, family history and social history. Vital Signs-Reviewed the patient's vital signs. Records Reviewed: Betty Yang PA-C     Procedures:  Procedures    Provider Notes (Medical Decision Making): Impression:  UTI    IV inserted, IV fluids and rocephin given     Labs unremarkable, pt has no guarding or peritoneal signs noted. Will plan to d/c with keflex and pyridium with pcp follow-up recommended. Pt agrees. Betty Yang PA-C     MED RECONCILIATION:  Current Facility-Administered Medications   Medication Dose Route Frequency    sodium chloride 0.9 % bolus infusion 1,000 mL  1,000 mL IntraVENous ONCE     Current Outpatient Medications   Medication Sig    cephALEXin (KEFLEX) 500 mg capsule Take 1 Cap by mouth four (4) times daily for 7 days.  phenazopyridine (PYRIDIUM) 200 mg tablet Take 1 Tab by mouth three (3) times daily for 2 days.  DULoxetine (CYMBALTA) 30 mg capsule Take 1 Cap by mouth daily. Indications: Chronic Muscle or Bone Pain    carBAMazepine (TEGRETOL) 200 mg tablet One tab in AM, one tab at lunch, and two tabs before bed       Disposition:  D/c    DISCHARGE NOTE:   Patient is stable for discharge at this time. I have discussed all the findings from today's work up with the patient, including lab results and imaging. I have answered all questions.  Rx for pyridium and keflex given. Rest and close follow-up with the PCP recommended this week. Return to the ED immediately for any new or worsening symptoms. Betty Yang PA-C     Follow-up Information     Follow up With Specialties Details Why Contact Info    Lobito Quiroz NP Nurse Practitioner Schedule an appointment as soon as possible for a visit in 1 week  [address]  [address]  [phone] [address] [facility] EMERGENCY DEPT Emergency Medicine  As needed, If symptoms worsen [address] [facility]  [phone]          Current Discharge Medication List      START taking these medications    Details   cephALEXin (KEFLEX) 500 mg capsule Take 1 Cap by mouth four (4) times daily for 7 days. Qty: 28 Cap, Refills: 0      phenazopyridine (PYRIDIUM) 200 mg tablet Take 1 Tab by mouth three (3) times daily for 2 days. Qty: 6 Tab, Refills: 0             Diagnosis     Clinical Impression:   1.  Acute cystitis with hematuria

## 2020-01-02 NOTE — ROUTINE PROCESS
Esha Bowling is a 62 y.o. female that was discharged in stable. Pt was accompanied by partner. Pt is not driving. The patients diagnosis, condition and treatment were explained to  patient and aftercare instructions were given. The patient verbalized understanding. Patient armband removed and shredded.

## 2020-01-04 LAB
BACTERIA SPEC CULT: ABNORMAL
SERVICE CMNT-IMP: ABNORMAL

## 2020-01-09 ENCOUNTER — OFFICE VISIT (OUTPATIENT)
Dept: FAMILY MEDICINE CLINIC | Age: 55
End: 2020-01-09

## 2020-01-09 VITALS
DIASTOLIC BLOOD PRESSURE: 74 MMHG | OXYGEN SATURATION: 99 % | HEART RATE: 82 BPM | TEMPERATURE: 98.1 F | BODY MASS INDEX: 36.86 KG/M2 | HEIGHT: 63 IN | SYSTOLIC BLOOD PRESSURE: 122 MMHG | WEIGHT: 208 LBS | RESPIRATION RATE: 17 BRPM

## 2020-01-09 DIAGNOSIS — R63.2 APPETITE INCREASE: ICD-10-CM

## 2020-01-09 DIAGNOSIS — Z87.440 HISTORY OF UTI: ICD-10-CM

## 2020-01-09 DIAGNOSIS — G89.29 CHRONIC NECK PAIN: Primary | ICD-10-CM

## 2020-01-09 DIAGNOSIS — M54.2 CHRONIC NECK PAIN: Primary | ICD-10-CM

## 2020-01-09 DIAGNOSIS — E66.01 SEVERE OBESITY (HCC): ICD-10-CM

## 2020-01-09 DIAGNOSIS — Z12.39 BREAST CANCER SCREENING: ICD-10-CM

## 2020-01-09 RX ORDER — DULOXETIN HYDROCHLORIDE 60 MG/1
60 CAPSULE, DELAYED RELEASE ORAL DAILY
Qty: 90 CAP | Refills: 1 | Status: SHIPPED | OUTPATIENT
Start: 2020-01-09 | End: 2020-06-10 | Stop reason: SDUPTHER

## 2020-01-09 RX ORDER — DULOXETIN HYDROCHLORIDE 30 MG/1
30 CAPSULE, DELAYED RELEASE ORAL DAILY
Qty: 90 CAP | Refills: 0 | Status: CANCELLED | OUTPATIENT
Start: 2020-01-09

## 2020-01-09 RX ORDER — PHENTERMINE HYDROCHLORIDE 37.5 MG/1
37.5 TABLET ORAL
Qty: 30 TAB | Refills: 1 | Status: SHIPPED | OUTPATIENT
Start: 2020-01-09 | End: 2020-06-10 | Stop reason: SDUPTHER

## 2020-01-09 NOTE — PROGRESS NOTES
Chief Complaint   Patient presents with    Pain (Chronic)    ED Follow-up       1. Have you been to the ER, urgent care clinic since your last visit? Hospitalized since your last visit? [facility] ED 01/02/2020 for abdominal pain    2. Have you seen or consulted any other health care providers outside of the [address] [place] [address] since your last visit? Include any pap smears or colon screening. No    [facility] today to follow up on chronic pain diagnosis. We discussed her chronic neck pain, cervical radiculopathy, lumbar back pain with radiculopathy affecting lower extremity. Her dose of duloxetine was increased at her last visit to 30 mg daily. She reports that she has been taking two pills (duloxetine) with relief of chronic neck and lumbar back pain. Significant changes since last visit: improvement in pain with increase in duloxetine dosage as above and she was seen on 1-2-2020 for UTI with hematuria. She was prescribed antibiotics (Keflex 500 mg po qid X 7 days) and she is on her last dose today. She offers no complaints of abdominal pain today . She is  able to do her normal daily activities. She reports the following adverse side effects: none. Least pain over the last week has been 0/10. Worst pain over the last week has been 0/10. Opioid Risk Tool Reviewed: YES    Aberrant behaviors: None. Urine Drug Screen: not applicable . Controlled substance agreement on file: NO: not applicable .  reviewed:n/a    Pill count is consistent with her prescription: n/a    Concomitant use of a benzodiazepine: no    Review of Systems   Constitutional: Negative for chills, fever and weight loss. +weight gain  Increase in appetite   Has tried to adhere to mediterranean type diet but reports that she is too hungry. She is asking for something to help with appetite surges. Respiratory: Negative. Cardiovascular: Negative. Gastrointestinal: Negative.     Genitourinary: Negative for dysuria, flank pain, frequency, hematuria and urgency. Musculoskeletal: Negative for back pain, falls and neck pain. Neurological: Positive for weakness. Negative for tingling. Wt Readings from Last 3 Encounters:   01/09/20 208 lb (94.3 kg)   01/02/20 206 lb (93.4 kg)   12/12/19 206 lb (93.4 kg)     BP Readings from Last 3 Encounters:   01/09/20 122/74   01/02/20 126/75   12/12/19 130/68     PMH: reviewed medications and allergy lists and medical and family history. OBJECTIVE:  Awake and alert in no acute distress  Lungs clear throughout  S1 S2 RRR without ectopy or murmur auscultated. Abdomen: normoactive bowel sounds all quadrants, no tenderness to abdomen upper and lower quadrants. No hepatosplenomegaly  Inspection: no erythema no edema no warmth to palpation  Cervical paraspinals no tenderness to palpation bilaterally  Thoracic paraspinals no tenderness to palpation bilaterally   Lumbar paraspinals no tenderness to palpation bilaterally   ROM no limitations to spine   Gait  No gait abnormalities stable gait  Visit Vitals  /74 (BP 1 Location: Left arm, BP Patient Position: Sitting)   Pulse 82   Temp 98.1 °F (36.7 °C) (Oral)   Resp 17   Ht 5' 3\" (1.6 m)   Wt 208 lb (94.3 kg)   SpO2 99%   BMI 36.85 kg/m²     Diagnoses and all orders for this visit:    Chronic neck pain Stable continue current treatment plan  -     Increase DULoxetine (CYMBALTA) 60 mg capsule; Take 1 Cap by mouth daily. , Normal, Disp-90 Cap, R-1    Severe obesity (HCC)  -     phentermine (ADIPEX-P) 37.5 mg tablet; Take 1 Tab by mouth daily as needed (appetite increases). , Normal, Disp-30 Tab, R-1    Appetite increase  -     phentermine (ADIPEX-P) 37.5 mg tablet; Take 1 Tab by mouth daily as needed (appetite increases). , Normal, Disp-30 Tab, R-1   Discussed with patient that phentermine is intended only as a short term adjunct for weight loss coupled with long term sound nutrition portion control and exercise. Patient will adhere to portion control and mediterranean type diet for nutrition and walking for exercise  Patient verbalizes understanding. BMI 36.0-36.9,adult    Other orders  -     Cancel: DULoxetine (CYMBALTA) 30 mg capsule; Take 1 Cap by mouth daily. Indications: Chronic Muscle or Bone Pain, Normal, Disp-90 Cap, R-0    History of UTI  Finish antibiotics   Patient agrees with plan and verbalizes understanding. I have reviewed/discussed the above normal BMI with the patient. I have recommended the following interventions: dietary management education, guidance, and counseling, encourage exercise, monitor weight and prescribed dietary intake . Alonso Ly Reviewed risks and benefits and common side effects of new medication  I have discussed the diagnosis with the patient and the intended plan as seen in the above orders. The patient has received an after-visit summary and questions were answered concerning future plans. I have discussed medication side effects and warnings with the patient as well. Follow-up and Dispositions    · Return in about 3 months (around 4/9/2020), or if symptoms worsen or fail to improve, for chronic neck, BMI .

## 2020-01-09 NOTE — PATIENT INSTRUCTIONS
Body Mass Index: Care Instructions  Your Care Instructions    Body mass index (BMI) can help you see if your weight is raising your risk for health problems. It uses a formula to compare how much you weigh with how tall you are. · A BMI lower than 18.5 is considered underweight. · A BMI between 18.5 and 24.9 is considered healthy. · A BMI between 25 and 29.9 is considered overweight. A BMI of 30 or higher is considered obese. If your BMI is in the normal range, it means that you have a lower risk for weight-related health problems. If your BMI is in the overweight or obese range, you may be at increased risk for weight-related health problems, such as high blood pressure, heart disease, stroke, arthritis or joint pain, and diabetes. If your BMI is in the underweight range, you may be at increased risk for health problems such as fatigue, lower protection (immunity) against illness, muscle loss, bone loss, hair loss, and hormone problems. BMI is just one measure of your risk for weight-related health problems. You may be at higher risk for health problems if you are not active, you eat an unhealthy diet, or you drink too much alcohol or use tobacco products. Follow-up care is a key part of your treatment and safety. Be sure to make and go to all appointments, and call your doctor if you are having problems. It's also a good idea to know your test results and keep a list of the medicines you take. How can you care for yourself at home? · Practice healthy eating habits. This includes eating plenty of fruits, vegetables, whole grains, lean protein, and low-fat dairy. · If your doctor recommends it, get more exercise. Walking is a good choice. Bit by bit, increase the amount you walk every day. Try for at least 30 minutes on most days of the week. · Do not smoke. Smoking can increase your risk for health problems. If you need help quitting, talk to your doctor about stop-smoking programs and medicines. These can increase your chances of quitting for good. · Limit alcohol to 2 drinks a day for men and 1 drink a day for women. Too much alcohol can cause health problems. If you have a BMI higher than 25  · Your doctor may do other tests to check your risk for weight-related health problems. This may include measuring the distance around your waist. A waist measurement of more than 40 inches in men or 35 inches in women can increase the risk of weight-related health problems. · Talk with your doctor about steps you can take to stay healthy or improve your health. You may need to make lifestyle changes to lose weight and stay healthy, such as changing your diet and getting regular exercise. If you have a BMI lower than 18.5  · Your doctor may do other tests to check your risk for health problems. · Talk with your doctor about steps you can take to stay healthy or improve your health. You may need to make lifestyle changes to gain or maintain weight and stay healthy, such as getting more healthy foods in your diet and doing exercises to build muscle. Where can you learn more? Go to http://sylvie-angela.info/. Enter S176 in the search box to learn more about \"Body Mass Index: Care Instructions. \"  Current as of: March 28, 2019  Content Version: 12.2  © 7282-3927 [employer]. Care instructions adapted under license by [employer] (which disclaims liability or warranty for this information). If you have questions about a medical condition or this instruction, always ask your healthcare professional. [place] [address] any warranty or liability for your use of this information. Phentermine (By mouth)   Phentermine (FEN-ter-meen)  Helps you lose weight when used for a short time. Brand Name(s): Adipex-P, Lomaira   There may be other brand names for this medicine. When This Medicine Should Not Be Used: This medicine is not right for everyone.  Do not use it if you had an allergic reaction to phentermine or similar medicines, or if you are pregnant. How to Use This Medicine:   Dissolving Tablet, Capsule, Long Acting Capsule, Tablet, Dissolving Tablet  · Your doctor will tell you how much medicine to use. Do not use more than directed. · This medicine is not for long-term use. · To avoid trouble sleeping, always take this medicine in the morning and never at bedtime or late in the evening. ¨ Take the capsule 2 hours after breakfast.  ¨ Take the extended-release capsule before breakfast.  ¨ Take the disintegrating tablet in the morning, with or without food. ¨ Take the phentermine tablet before breakfast or 1 to 2 hours after breakfast.  ¨ Take Lomaira tablet 30 minutes before meals. · Swallow the extended-release capsule whole. Do not crush, break, or chew it. · If you are using the disintegrating tablet, make sure your hands are dry before you handle the tablet. Place the tablet on your tongue. It should melt quickly. After the tablet has melted, swallow or take a drink of water. · Tablet: Swallow whole. Do not crush, break, or chew it. · Carefully follow your doctor's instructions about any special diet. · Missed dose: Take a dose as soon as you remember. If it is almost time for your next dose, wait until then and take a regular dose. Do not take extra medicine to make up for a missed dose. · Store the medicine in a closed container at room temperature, away from heat, moisture, and direct light. Drugs and Foods to Avoid:   Ask your doctor or pharmacist before using any other medicine, including over-the-counter medicines, vitamins, and herbal products. · Do not use this medicine and an MAO inhibitor (MAOI) within 14 days of each other. · Some medicines can affect how phentermine works.  Tell your doctor if you are using any of the following:  ¨ Amphetamine medicine (including dextroamphetamine, methamphetamine)  ¨ Diet pills  ¨ Insulin or diabetes medicine  ¨ Medicine to treat depression (including fluoxetine, fluvoxamine, paroxetine, sertraline)  · Do not drink alcohol while you are using this medicine. Warnings While Using This Medicine:   · It is not safe to take this medicine during pregnancy. It could harm an unborn baby. Tell your doctor right away if you become pregnant. · Tell your doctor if you are breastfeeding, or if you have kidney disease, diabetes, glaucoma, congestive heart failure, heart or blood vessel disease, high blood pressure, overactive thyroid, or a history of stroke or drug abuse. Tell your doctor if have allergies to aspirin or tartrazine. · This medicine may cause the following problems:  ¨ Primary pulmonary hypertension (a serious lung problem)  ¨ Heart valve disease  ¨ Changes in blood sugar levels  · This medicine may make you dizzy or drowsy. Do not drive or do anything else that could be dangerous until you know how this medicine affects you. · This medicine can be habit-forming. Do not use more than your prescribed dose. Call your doctor if you think your medicine is not working. · Do not stop using this medicine suddenly. Your doctor will need to slowly decrease your dose before you stop it completely. · Your doctor will check your progress and the effects of this medicine at regular visits. Keep all appointments. · Keep all medicine out of the reach of children. Never share your medicine with anyone.   Possible Side Effects While Using This Medicine:   Call your doctor right away if you notice any of these side effects:  · Allergic reaction: Itching or hives, swelling in your face or hands, swelling or tingling in your mouth or throat, chest tightness, trouble breathing  · Chest pain, fainting, trouble breathing  · Fast, slow, pounding, or uneven heartbeat  · Seizures or tremors  · Severe headache  · Swelling of your feet or lower legs  If you notice these less serious side effects, talk with your doctor:   · Changes in sex drive  · Dizziness, drowsiness, mild headache  · Dry mouth or a bad taste in your mouth  · Nausea, vomiting, diarrhea, constipation, stomach cramps  · Restlessness or nervousness, trouble sleeping  If you notice other side effects that you think are caused by this medicine, tell your doctor. Call your doctor for medical advice about side effects. You may report side effects to FDA at 2-897-JNC-2637  © 2017 [facility] Information is for End User's use only and may not be sold, redistributed or otherwise used for commercial purposes. The above information is an  only. It is not intended as medical advice for individual conditions or treatments. Talk to your doctor, nurse or pharmacist before following any medical regimen to see if it is safe and effective for you.

## 2020-01-23 ENCOUNTER — HOSPITAL ENCOUNTER (OUTPATIENT)
Dept: MAMMOGRAPHY | Age: 55
Discharge: HOME | End: 2020-01-23
Attending: NURSE PRACTITIONER
Payer: COMMERCIAL

## 2020-01-23 DIAGNOSIS — Z12.39 BREAST CANCER SCREENING: ICD-10-CM

## 2020-01-23 PROCEDURE — 77063 BREAST TOMOSYNTHESIS BI: CPT

## 2020-01-24 DIAGNOSIS — R92.8 ABNORMAL MAMMOGRAM OF LEFT BREAST: Primary | ICD-10-CM

## 2020-01-27 ENCOUNTER — TELEPHONE (OUTPATIENT)
Dept: FAMILY MEDICINE CLINIC | Age: 55
End: 2020-01-27

## 2020-01-27 NOTE — TELEPHONE ENCOUNTER
Called and spoke with patient regarding abnormal mammogram and the need for left diagnostic mammogram. Patient verbalized understanding and was given the number for central scheduling.

## 2020-01-27 NOTE — TELEPHONE ENCOUNTER
----- Message from Lelia Timmons NP sent at 1/24/2020 11:14 AM EST -----  Please call patient additional views of left breast.  Lizbeth Khalil DNP, FNP-BC

## 2020-01-29 ENCOUNTER — HOSPITAL ENCOUNTER (OUTPATIENT)
Dept: ULTRASOUND IMAGING | Age: 55
Discharge: HOME | End: 2020-01-29
Attending: NURSE PRACTITIONER
Payer: COMMERCIAL

## 2020-01-29 ENCOUNTER — HOSPITAL ENCOUNTER (OUTPATIENT)
Dept: MAMMOGRAPHY | Age: 55
Discharge: HOME | End: 2020-01-29
Attending: NURSE PRACTITIONER
Payer: COMMERCIAL

## 2020-01-29 DIAGNOSIS — R92.8 ABNORMAL MAMMOGRAM OF LEFT BREAST: ICD-10-CM

## 2020-01-29 PROCEDURE — 76642 ULTRASOUND BREAST LIMITED: CPT

## 2020-01-29 PROCEDURE — 77061 BREAST TOMOSYNTHESIS UNI: CPT

## 2020-02-10 DIAGNOSIS — R92.8 ABNORMAL MAMMOGRAM OF LEFT BREAST: Primary | ICD-10-CM

## 2020-06-10 ENCOUNTER — VIRTUAL VISIT (OUTPATIENT)
Dept: FAMILY MEDICINE CLINIC | Age: 55
End: 2020-06-10

## 2020-06-10 DIAGNOSIS — M54.2 CHRONIC NECK PAIN: ICD-10-CM

## 2020-06-10 DIAGNOSIS — E66.01 SEVERE OBESITY (HCC): ICD-10-CM

## 2020-06-10 DIAGNOSIS — G89.29 CHRONIC NECK PAIN: ICD-10-CM

## 2020-06-10 DIAGNOSIS — R63.2 APPETITE INCREASE: ICD-10-CM

## 2020-06-10 RX ORDER — NAPROXEN 500 MG/1
500 TABLET ORAL
Qty: 60 TAB | Refills: 1 | OUTPATIENT
Start: 2020-06-10 | End: 2020-09-23

## 2020-06-10 RX ORDER — PHENTERMINE HYDROCHLORIDE 37.5 MG/1
37.5 TABLET ORAL
Qty: 30 TAB | Refills: 0 | Status: SHIPPED | OUTPATIENT
Start: 2020-06-10 | End: 2020-07-08 | Stop reason: SDUPTHER

## 2020-06-10 RX ORDER — DULOXETIN HYDROCHLORIDE 60 MG/1
60 CAPSULE, DELAYED RELEASE ORAL DAILY
Qty: 90 CAP | Refills: 1 | Status: SHIPPED | OUTPATIENT
Start: 2020-06-10 | End: 2020-12-04 | Stop reason: ALTCHOICE

## 2020-06-10 NOTE — PROGRESS NOTES
Adam Zuleta is a 62 y.o. female who was seen by synchronous (real-time) audio-video technology on 6/10/2020. Consent: Adam Zuleta, who was seen by synchronous (real-time) audio-video technology, and/or her healthcare decision maker, is aware that this patient-initiated, Telehealth encounter on 6/10/2020 is a billable service, with coverage as determined by her insurance carrier. She is aware that she may receive a bill and has provided verbal consent to proceed: Yes. Assessment & Plan:   Diagnoses and all orders for this visit:    1. Chronic neck pain  -     DULoxetine (CYMBALTA) 60 mg capsule; Take 1 Cap by mouth daily. 2. Severe obesity (HCC)  -     phentermine (ADIPEX-P) 37.5 mg tablet; Take 1 Tab by mouth daily as needed (appetite increases). 3. Appetite increase  -     phentermine (ADIPEX-P) 37.5 mg tablet; Take 1 Tab by mouth daily as needed (appetite increases). Other orders  -     naproxen (NAPROSYN) 500 mg tablet; Take 1 Tab by mouth two (2) times daily as needed for Pain (with meals). I spent at least 16 minutes on this visit with this established patient. [phone]  Subjective:   Adam Zuleta is a 62 y.o. female who was seen for Neck Pain (chronic)    Patient has a history of chronic neck pain. Patient currently taking cymbalta. States pain was so severe last night she took 2 tabs today along with otc Aleve. Comments pain is worse with weather changes. Instructed not to make mediation adjustments without first speaking with provider. Weight: comments she was taking phentermine for weight with improvement. 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 She has not been exercising. Prior to Admission medications    Medication Sig Start Date End Date Taking? Authorizing Provider   DULoxetine (CYMBALTA) 60 mg capsule Take 1 Cap by mouth daily.  1/9/20   Ethan Conrad, JARED phentermine (ADIPEX-P) 37.5 mg tablet Take 1 Tab by mouth daily as needed (appetite increases).  20   Matilde Coronel, NP   carBAMazepine (TEGRETOL) 200 mg tablet One tab in AM, one tab at lunch, and two tabs before bed 18   Trisha Zimmerman MD     Allergies   Allergen Reactions    Etodolac Other (comments)     headache       Patient Active Problem List   Diagnosis Code    Chronic bronchitis ([address] J42    SBO (small bowel obstruction) ([address] K56.609    Intractable epilepsy without status epilepticus ([address] G40.919    Severe obesity ([address] E66.01     Patient Active Problem List    Diagnosis Date Noted    Severe obesity ([address] 2020    Intractable epilepsy without status epilepticus ([address] 2018    SBO (small bowel obstruction) ([address] 2015    Chronic bronchitis ([address] 2013     Allergies   Allergen Reactions    Etodolac Other (comments)     headache     Past Medical History:   Diagnosis Date    Bronchitis     Carpal tunnel syndrome on right     Chronic bronchitis (HCC)     De Quervain's syndrome (tenosynovitis)     Depression     resolved    Ectopic pregnancy     two times    Epilepsy ([address]     Seizure ([address]     Sinus infection     Stenosing tenosynovitis of thumb     Right side    Trigger thumb of left hand      Past Surgical History:   Procedure Laterality Date    COLONOSCOPY N/A 10/21/2016    COLONOSCOPY w/ biopsy performed by Rani Noonan MD at [address] HX APPENDECTOMY  1/12/15    lysis of adhesions    HX CHOLECYSTECTOMY      HX HYSTERECTOMY      HX OTHER SURGICAL      scar tissue removed from intestines    HX PARTIAL HYSTERECTOMY  2009    HX TONSILLECTOMY       Family History   Problem Relation Age of Onset    Heart Attack Brother          in 62s    Diabetes Father     Hypertension Father     Cancer Father         colon CA    Heart Disease Sister         stent placement    Hypertension Brother     Other Brother         Stomach problems/ulcers    Cancer Sister         Breast CA    Stroke Sister     Diabetes Mother     Hypertension Mother      Social History     Tobacco Use    Smoking status: Current Every Day Smoker     Packs/day: 0.50     Types: Cigarettes    Smokeless tobacco: Never Used   Substance Use Topics    Alcohol use: No       ROS      Objective: There were no vitals taken for this visit. General: alert, cooperative, no distress   Mental  status: normal mood, behavior, speech, dress, motor activity, and thought processes, able to follow commands   HENT: NCAT   Neck: no visualized mass   Resp: no respiratory distress   Neuro: no gross deficits   Skin: no discoloration or lesions of concern on visible areas   Psychiatric: normal affect, consistent with stated mood, no evidence of hallucinations     Additional exam findings: We discussed the expected course, resolution and complications of the diagnosis(es) in detail. Medication risks, benefits, costs, interactions, and alternatives were discussed as indicated. I advised her to contact the office if her condition worsens, changes or fails to improve as anticipated. She expressed understanding with the diagnosis(es) and plan. Michael Smith is a 62 y.o. female who was evaluated by a video visit encounter for concerns as above. Patient identification was verified prior to start of the visit. A caregiver was present when appropriate. Due to this being a TeleHealth encounter (During [facility]- public health emergency), evaluation of the following organ systems was limited: Vitals/Constitutional/EENT/Resp/CV/GI//MS/Neuro/Skin/Heme-Lymph-Imm.   Pursuant to the emergency declaration under the [facility], [address] [place] [address] authority and the [employer] Act, this Virtual  Visit was conducted, with patient's (and/or legal guardian's) consent, to reduce the patient's risk of exposure to COVID-19 and provide necessary medical care. Services were provided through a video synchronous discussion virtually to substitute for in-person clinic visit. Patient was located in their home and I was in the office while conducting this encounter.     Mathew Riggins NP

## 2020-07-03 ENCOUNTER — HOSPITAL ENCOUNTER (EMERGENCY)
Age: 55
Discharge: HOME | End: 2020-07-03
Attending: EMERGENCY MEDICINE
Payer: COMMERCIAL

## 2020-07-03 VITALS
BODY MASS INDEX: 32.18 KG/M2 | WEIGHT: 188.5 LBS | OXYGEN SATURATION: 97 % | RESPIRATION RATE: 16 BRPM | HEART RATE: 79 BPM | HEIGHT: 64 IN | DIASTOLIC BLOOD PRESSURE: 82 MMHG | SYSTOLIC BLOOD PRESSURE: 129 MMHG | TEMPERATURE: 99.1 F

## 2020-07-03 DIAGNOSIS — M62.830 BACK MUSCLE SPASM: ICD-10-CM
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
## 2020-07-03 DIAGNOSIS — M62.838 CERVICAL PARASPINAL MUSCLE SPASM: ICD-10-CM

## 2020-07-03 PROCEDURE — 99283 EMERGENCY DEPT VISIT LOW MDM: CPT

## 2020-07-03 PROCEDURE — 74011250637 HC RX REV CODE- 250/637: Performed by: EMERGENCY MEDICINE

## 2020-07-03 RX ORDER — DIAZEPAM 10 MG/1
10 TABLET ORAL EVERY 8 HOURS
Qty: 9 TAB | Refills: 0 | Status: SHIPPED | OUTPATIENT
Start: 2020-07-03 | End: 2020-07-08

## 2020-07-03 RX ORDER — DIAZEPAM 5 MG/1
10 TABLET ORAL
Status: COMPLETED | OUTPATIENT
Start: 2020-07-03 | End: 2020-07-03

## 2020-07-03 RX ORDER — IBUPROFEN 600 MG/1
600 TABLET ORAL
Qty: 20 TAB | Refills: 0 | OUTPATIENT
Start: 2020-07-03 | End: 2020-09-23

## 2020-07-03 RX ORDER — IBUPROFEN 600 MG/1
600 TABLET ORAL ONCE
Status: COMPLETED | OUTPATIENT
Start: 2020-07-03 | End: 2020-07-03

## 2020-07-03 RX ADMIN — IBUPROFEN 600 MG: 600 TABLET, FILM COATED ORAL at 21:05

## 2020-07-03 RX ADMIN — DIAZEPAM 10 MG: 5 TABLET ORAL at 21:05

## 2020-07-03 NOTE — LETTER
NOTIFICATION RETURN TO WORK / SCHOOL 
 
7/3/2020 9:01 PM 
 
Ms. Kathryn Evans [address] [facility] [address] To Whom It May Concern: 
 
Kathryn Evans is currently under the care of [facility]. She will return to work/school on: 7/7 If there are questions or concerns please have the patient contact our office. Sincerely, Yoko Poole MD

## 2020-07-04 NOTE — DISCHARGE INSTRUCTIONS
Patient Education        Back Spasm: Care Instructions  Your Care Instructions  A back spasm is sudden tightness and pain in your back muscles. It may happen from overuse or an injury. Things like sleeping in an awkward way, bending, lifting, standing, or sitting can sometimes cause a spasm. But the cause isn't always clear. Home treatment includes using heat or ice, taking over-the-counter (OTC) pain medicines, and avoiding activities that may cause back pain. For a back spasm that doesn't get better with home care, your doctor may prescribe medicine. Treatments such as massage or manipulation may also help ease a back spasm. Your doctor may also suggest exercise or physical therapy to help improve strength and flexibility in your back muscles. In most cases, getting back to your normal activities is good for your back. Just make sure to avoid doing things that make your pain worse. Follow-up care is a key part of your treatment and safety. Be sure to make and go to all appointments, and call your doctor if you are having problems. It's also a good idea to know your test results and keep a list of the medicines you take. How can you care for yourself at home? Heat, ice, and medicines  · To relieve pain, use heat or ice (whichever feels better) on the affected area. ? Put a warm water bottle, a heating pad set on low, or a warm cloth on your back. Put a thin cloth between the heating pad and your skin. Do not go to sleep with a heating pad on your skin. ? Try ice or a cold pack on the area for 10 to 20 minutes at a time. Put a thin cloth between the ice and your skin. · For most back pain you can take over-the-counter pain medicine. Nonsteroidal anti-inflammatory drugs (NSAIDs) such as ibuprofen or naproxen seem to work best. But if you can't take NSAIDs you can try acetaminophen. Your doctor can prescribe stronger medicines if needed. Be safe with medicines.  Read and follow all instructions on the label.  Body positions and posture  · Sit or lie in positions that are most comfortable for you and that reduce pain. Try one of these positions when you lie down:  ? Lie on your back with your knees bent and supported by large pillows. ? Lie on the floor with your legs on the seat of a sofa or chair. ? Lie on your side with your knees and hips bent and a pillow between your legs. ? Lie on your stomach if it does not make pain worse. · Do not sit up in bed. Avoid soft couches and twisted positions. · Avoid bed rest after the first day of back pain. Bed rest can help relieve pain at first, but it delays healing. Continued rest without activity is usually not good for your back. · If you must sit for long periods of time, take breaks from sitting. Change positions every 30 minutes. Get up and walk around, or lie in a comfortable position. Activity  · Take short walks several times a day. You can start with 5 to 10 minutes, 3 or 4 times a day, and work up to longer walks. Walk on level surfaces and avoid hills and stairs until your back starts to feel better. · After your back spasm starts to feel better, try to stretch your muscles every day, especially before and after exercise and at bedtime. Regular stretching can help relax your muscles. · To prevent future back pain, do exercises to stretch and strengthen your back and stomach. Learn to use good posture, safe lifting techniques, and other ways to move to help you avoid back pain. When should you call for help? [id number] anytime you think you may need emergency care. For example, call if:  · You are unable to move an arm or a leg at all. Call your doctor now or seek immediate medical care if:  · You have new or worse symptoms in your legs, belly, or buttocks. Symptoms may include:  ? Numbness or tingling. ? Weakness. ? Pain. · You lose bladder or bowel control.   Watch closely for changes in your health, and be sure to contact your doctor if:  · You have a fever, lose weight, or don't feel well. · You do not get better as expected. Where can you learn more? Go to http://www.gray.com/  Enter E232 in the search box to learn more about \"Back Spasm: Care Instructions. \"  Current as of: March 2, 2020               Content Version: 12.5  © 2006-2020 [email or web address]. Care instructions adapted under license by [employer] (which disclaims liability or warranty for this information). If you have questions about a medical condition or this instruction, always ask your healthcare professional. [place] [address] any warranty or liability for your use of this information. Patient Education        Neck Spasm: Exercises  Introduction  Here are some examples of exercises for you to try. The exercises may be suggested for a condition or for rehabilitation. Start each exercise slowly. Ease off the exercises if you start to have pain. You will be told when to start these exercises and which ones will work best for you. How to do the exercises  Levator scapula stretch   1. Sit in a firm chair, or stand up straight. 2. Gently tilt your head toward your left shoulder. 3. Turn your head to look down into your armpit, bending your head slightly forward. Let the weight of your head stretch your neck muscles. 4. Hold for 15 to 30 seconds. 5. Return to your starting position. 6. Follow the same instructions above, but tilt your head toward your right shoulder. 7. Repeat 2 to 4 times toward each shoulder. Upper trapezius stretch   1. Sit in a firm chair, or stand up straight. 2. This stretch works best if you keep your shoulder down as you lean away from it. To help you remember to do this, start by relaxing your shoulders and lightly holding on to your thighs or your chair. 3. Tilt your head toward your shoulder and hold for 15 to 30 seconds. Let the weight of your head stretch your muscles.   4. If you would like a little added stretch, place your arm behind your back. Use the arm opposite of the direction you are tilting your head. For example, if you are tilting your head to the left, place your right arm behind your back. 5. Repeat 2 to 4 times toward each shoulder. Neck rotation   1. Sit in a firm chair, or stand up straight. 2. Keeping your chin level, turn your head to the right, and hold for 15 to 30 seconds. 3. Turn your head to the left, and hold for 15 to 30 seconds. 4. Repeat 2 to 4 times to each side. Chin tuck   1. Lie on the floor with a rolled-up towel under your neck. Your head should be touching the floor. 2. Slowly bring your chin toward the front of your neck. 3. Hold for a count of 6, and then relax for up to 10 seconds. 4. Repeat 8 to 12 times. Forward neck flexion   1. Sit in a firm chair, or stand up straight. 2. Bend your head forward. 3. Hold for 15 to 30 seconds, then return to your starting position. 4. Repeat 2 to 4 times. Follow-up care is a key part of your treatment and safety. Be sure to make and go to all appointments, and call your doctor if you are having problems. It's also a good idea to know your test results and keep a list of the medicines you take. Where can you learn more? Go to http://sylvie-angela.info/  Enter P962 in the search box to learn more about \"Neck Spasm: Exercises. \"  Current as of: March 2, 2020               Content Version: 12.5  © 2006-2020 [employer]. Care instructions adapted under license by [employer] (which disclaims liability or warranty for this information). If you have questions about a medical condition or this instruction, always ask your healthcare professional. [address] any warranty or liability for your use of this information.
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
## 2020-07-08 ENCOUNTER — OFFICE VISIT (OUTPATIENT)
Dept: FAMILY MEDICINE CLINIC | Age: 55
End: 2020-07-08

## 2020-07-08 VITALS
OXYGEN SATURATION: 98 % | BODY MASS INDEX: 32.27 KG/M2 | HEIGHT: 64 IN | SYSTOLIC BLOOD PRESSURE: 131 MMHG | RESPIRATION RATE: 18 BRPM | WEIGHT: 189 LBS | TEMPERATURE: 96.9 F | HEART RATE: 80 BPM | DIASTOLIC BLOOD PRESSURE: 85 MMHG

## 2020-07-08 DIAGNOSIS — M54.2 CERVICAL MUSCLE PAIN: ICD-10-CM

## 2020-07-08 DIAGNOSIS — E66.01 SEVERE OBESITY (HCC): Primary | ICD-10-CM

## 2020-07-08 DIAGNOSIS — R63.2 APPETITE INCREASE: ICD-10-CM

## 2020-07-08 DIAGNOSIS — M54.6 ACUTE BILATERAL THORACIC BACK PAIN: ICD-10-CM

## 2020-07-08 RX ORDER — PHENTERMINE HYDROCHLORIDE 37.5 MG/1
37.5 TABLET ORAL
Qty: 30 TAB | Refills: 1 | Status: SHIPPED | OUTPATIENT
Start: 2020-07-08 | End: 2020-12-04 | Stop reason: ALTCHOICE

## 2020-07-08 NOTE — PATIENT INSTRUCTIONS
Back Pain: Care Instructions Your Care Instructions Back pain has many possible causes. It is often related to problems with muscles and ligaments of the back. It may also be related to problems with the nerves, discs, or bones of the back. Moving, lifting, standing, sitting, or sleeping in an awkward way can strain the back. Sometimes you don't notice the injury until later. Arthritis is another common cause of back pain. Although it may hurt a lot, back pain usually improves on its own within several weeks. Most people recover in 12 weeks or less. Using good home treatment and being careful not to stress your back can help you feel better sooner. Follow-up care is a key part of your treatment and safety. Be sure to make and go to all appointments, and call your doctor if you are having problems. It's also a good idea to know your test results and keep a list of the medicines you take. How can you care for yourself at home? · Sit or lie in positions that are most comfortable and reduce your pain. Try one of these positions when you lie down: ? Lie on your back with your knees bent and supported by large pillows. ? Lie on the floor with your legs on the seat of a sofa or chair. ? Lie on your side with your knees and hips bent and a pillow between your legs. ? Lie on your stomach if it does not make pain worse. · Do not sit up in bed, and avoid soft couches and twisted positions. Bed rest can help relieve pain at first, but it delays healing. Avoid bed rest after the first day of back pain. · Change positions every 30 minutes. If you must sit for long periods of time, take breaks from sitting. Get up and walk around, or lie in a comfortable position. · Try using a heating pad on a low or medium setting for 15 to 20 minutes every 2 or 3 hours. Try a warm shower in place of one session with the heating pad. · You can also try an ice pack for 10 to 15 minutes every 2 to 3 hours. Put a thin cloth between the ice pack and your skin. · Take pain medicines exactly as directed. ? If the doctor gave you a prescription medicine for pain, take it as prescribed. ? If you are not taking a prescription pain medicine, ask your doctor if you can take an over-the-counter medicine. · Take short walks several times a day. You can start with 5 to 10 minutes, 3 or 4 times a day, and work up to longer walks. Walk on level surfaces and avoid hills and stairs until your back is better. · Return to work and other activities as soon as you can. Continued rest without activity is usually not good for your back. · To prevent future back pain, do exercises to stretch and strengthen your back and stomach. Learn how to use good posture, safe lifting techniques, and proper body mechanics. When should you call for help? Call your doctor now or seek immediate medical care if: 
· You have new or worsening numbness in your legs. · You have new or worsening weakness in your legs. (This could make it hard to stand up.) · You lose control of your bladder or bowels. Watch closely for changes in your health, and be sure to contact your doctor if: 
· You have a fever, lose weight, or don't feel well. · You do not get better as expected. Where can you learn more? Go to http://sylvie-angela.info/ Enter V184 in the search box to learn more about \"Back Pain: Care Instructions. \" Current as of: March 2, 2020               Content Version: 12.5 © 7156-5331 Healthwise, Incorporated. Care instructions adapted under license by [employer] (which disclaims liability or warranty for this information). If you have questions about a medical condition or this instruction, always ask your healthcare professional. [place] [address] any warranty or liability for your use of this information.
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
## 2020-07-09 ENCOUNTER — HOSPITAL ENCOUNTER (OUTPATIENT)
Dept: ULTRASOUND IMAGING | Age: 55
Discharge: HOME | End: 2020-07-09
Attending: NURSE PRACTITIONER
Payer: COMMERCIAL

## 2020-07-09 ENCOUNTER — HOSPITAL ENCOUNTER (OUTPATIENT)
Dept: MAMMOGRAPHY | Age: 55
Discharge: HOME | End: 2020-07-09
Attending: NURSE PRACTITIONER
Payer: COMMERCIAL

## 2020-07-09 DIAGNOSIS — N63.0 BREAST MASS: ICD-10-CM

## 2020-07-09 DIAGNOSIS — R92.8 ABNORMAL MAMMOGRAM OF LEFT BREAST: ICD-10-CM

## 2020-07-09 DIAGNOSIS — Z09 FOLLOW-UP EXAM: ICD-10-CM

## 2020-07-09 PROCEDURE — 76642 ULTRASOUND BREAST LIMITED: CPT

## 2020-07-09 PROCEDURE — 77061 BREAST TOMOSYNTHESIS UNI: CPT

## 2020-07-10 ENCOUNTER — APPOINTMENT (OUTPATIENT)
Dept: CT IMAGING | Age: 55
End: 2020-07-10
Attending: NURSE PRACTITIONER
Payer: COMMERCIAL

## 2020-07-10 ENCOUNTER — HOSPITAL ENCOUNTER (EMERGENCY)
Age: 55
Discharge: HOME | End: 2020-07-10
Attending: EMERGENCY MEDICINE
Payer: COMMERCIAL

## 2020-07-10 VITALS
OXYGEN SATURATION: 100 % | HEART RATE: 90 BPM | WEIGHT: 189 LBS | SYSTOLIC BLOOD PRESSURE: 151 MMHG | TEMPERATURE: 99 F | BODY MASS INDEX: 32.44 KG/M2 | DIASTOLIC BLOOD PRESSURE: 82 MMHG | RESPIRATION RATE: 16 BRPM

## 2020-07-10 DIAGNOSIS — M54.2 NECK PAIN: Primary | ICD-10-CM

## 2020-07-10 DIAGNOSIS — M54.6 ACUTE MIDLINE THORACIC BACK PAIN: ICD-10-CM

## 2020-07-10 DIAGNOSIS — T14.8XXA MUSCLE STRAIN: ICD-10-CM

## 2020-07-10 PROCEDURE — 99282 EMERGENCY DEPT VISIT SF MDM: CPT

## 2020-07-10 PROCEDURE — 72128 CT CHEST SPINE W/O DYE: CPT

## 2020-07-10 PROCEDURE — 72125 CT NECK SPINE W/O DYE: CPT

## 2020-07-10 RX ORDER — CYCLOBENZAPRINE HCL 10 MG
TABLET ORAL
COMMUNITY
End: 2020-09-23
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
## 2020-07-10 NOTE — DISCHARGE INSTRUCTIONS
Back Pain, Emergency or Urgent Symptoms: Care Instructions  Your Care Instructions     Many people have back pain at one time or another. In most cases, pain gets better with self-care that includes over-the-counter pain medicine, ice, heat, and exercises. Unless you have symptoms of a severe injury or heart attack, you may be able to give yourself a few days before you call a doctor. But some back problems are very serious. Do not ignore symptoms that need to be checked right away. Follow-up care is a key part of your treatment and safety. Be sure to make and go to all appointments, and call your doctor if you are having problems. It's also a good idea to know your test results and keep a list of the medicines you take. How can you care for yourself at home? · Sit or lie in positions that are most comfortable and that reduce your pain. Try one of these positions when you lie down:  ? Lie on your back with your knees bent and supported by large pillows. ? Lie on the floor with your legs on the seat of a sofa or chair. ? Lie on your side with your knees and hips bent and a pillow between your legs. ? Lie on your stomach if it does not make pain worse. · Do not sit up in bed, and avoid soft couches and twisted positions. Bed rest can help relieve pain at first, but it delays healing. Avoid bed rest after the first day. · Change positions every 30 minutes. If you must sit for long periods of time, take breaks from sitting. Get up and walk around, or lie flat. · Try using a heating pad on a low or medium setting, for 15 to 20 minutes every 2 or 3 hours. Try a warm shower in place of one session with the heating pad. You can also buy single-use heat wraps that last up to 8 hours. You can also try ice or cold packs on your back for 10 to 20 minutes at a time, several times a day. (Put a thin cloth between the ice pack and your skin.) This reduces pain and makes it easier to be active and exercise.   · Take pain medicines exactly as directed. ? If the doctor gave you a prescription medicine for pain, take it as prescribed. ? If you are not taking a prescription pain medicine, ask your doctor if you can take an over-the-counter medicine. When should you call for help? [id number] anytime you think you may need emergency care. For example, call if:  · You are unable to move a leg at all. · You have back pain with severe belly pain. · You have symptoms of a heart attack. These may include:  ? Chest pain or pressure, or a strange feeling in the chest.  ? Sweating. ? Shortness of breath. ? Nausea or vomiting. ? Pain, pressure, or a strange feeling in the back, neck, jaw, or upper belly or in one or both shoulders or arms. ? Lightheadedness or sudden weakness. ? A fast or irregular heartbeat. After you call 911, the  may tell you to chew 1 adult-strength or 2 to 4 low-dose aspirin. Wait for an ambulance. Do not try to drive yourself. Call your doctor now or seek immediate medical care if:  · You have new or worse symptoms in your arms, legs, chest, belly, or buttocks. Symptoms may include:  ? Numbness or tingling. ? Weakness. ? Pain. · You lose bladder or bowel control. · You have back pain and:  ? You have injured your back while lifting or doing some other activity. Call if the pain is severe, has not gone away after 1 or 2 days, and you cannot do your normal daily activities. ? You have had a back injury before that needed treatment. ? Your pain has lasted longer than 4 weeks. ? You have had weight loss you cannot explain. ? You have a fever. ? You are age 48 or older. ? You have cancer now or have had it before. Watch closely for changes in your health, and be sure to contact your doctor if you are not getting better as expected. Where can you learn more?   Go to http://www.gray.com/  Enter Y633 in the search box to learn more about \"Back Pain, Emergency or Urgent Symptoms: Care Instructions. \"  Current as of: June 26, 2019               Content Version: 12.5  © 5160-6305 [employer]. Care instructions adapted under license by [employer] (which disclaims liability or warranty for this information). If you have questions about a medical condition or this instruction, always ask your healthcare professional. [address] any warranty or liability for your use of this information. Patient Education        Learning About Relief for Back Pain  What is back tension and strain? Back strain happens when you overstretch, or pull, a muscle in your back. You may hurt your back in an accident or when you exercise or lift something. Most back pain will get better with rest and time. You can take care of yourself at home to help your back heal.  What can you do first to relieve back pain? When you first feel back pain, try these steps:  · Walk. Take a short walk (10 to 20 minutes) on a level surface (no slopes, hills, or stairs) every 2 to 3 hours. Walk only distances you can manage without pain, especially leg pain. · Relax. Find a comfortable position for rest. Some people are comfortable on the floor or a medium-firm bed with a small pillow under their head and another under their knees. Some people prefer to lie on their side with a pillow between their knees. Don't stay in one position for too long. · Try heat or ice. Try using a heating pad on a low or medium setting, or take a warm shower, for 15 to 20 minutes every 2 to 3 hours. Or you can buy single-use heat wraps that last up to 8 hours. You can also try an ice pack for 10 to 15 minutes every 2 to 3 hours. You can use an ice pack or a bag of frozen vegetables wrapped in a thin towel. There is not strong evidence that either heat or ice will help, but you can try them to see if they help. You may also want to try switching between heat and cold.   · Take pain medicine exactly as directed. ¨ If the doctor gave you a prescription medicine for pain, take it as prescribed. ¨ If you are not taking a prescription pain medicine, ask your doctor if you can take an over-the-counter medicine. What else can you do? · Stretch and exercise. Exercises that increase flexibility may relieve your pain and make it easier for your muscles to keep your spine in a good, neutral position. And don't forget to keep walking. · Do self-massage. You can use self-massage to unwind after work or school or to energize yourself in the morning. You can easily massage your feet, hands, or neck. Self-massage works best if you are in comfortable clothes and are sitting or lying in a comfortable position. Use oil or lotion to massage bare skin. · Reduce stress. Back pain can lead to a vicious [place]: Distress about the pain tenses the muscles in your back, which in turn causes more pain. Learn how to relax your mind and your muscles to lower your stress. Where can you learn more? Go to http://sylvie-angela.info/. Enter A030 in the search box to learn more about \"Learning About Relief for Back Pain. \"  Current as of: March 21, 2017  Content Version: 11.5  © 4585-3296 Healthwise, [employer]. Care instructions adapted under license by [employer] (which disclaims liability or warranty for this information). If you have questions about a medical condition or this instruction, always ask your healthcare professional. [facility] any warranty or liability for your use of this information. Return to ED if symptoms worsen.  Follow up with spine specialist.
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
## 2020-07-10 NOTE — LETTER
[address] [place] [address] Dr [facility] EMERGENCY DEPT 
[address] [facility] [address] [phone] Work/School Note Date: 7/10/2020 To Whom It May concern: 
 
Robel Otero was seen and treated today in the emergency room by the following provider(s): 
Attending Provider: Polina Lucero MD 
Nurse Practitioner: Vu Gomez NP. Robel Otero may return to work on 07/13/2020. Sincerely, Sarah Iglesias NP

## 2020-07-11 NOTE — ROUTINE PROCESS
Samira Wahl is a 62 y.o. female that was discharged in stable. Pt was accompanied by spouse. Pt is not driving. The patients diagnosis, condition and treatment were explained to  patient and aftercare instructions were given. The patient verbalized understanding. Patient armband removed and shredded.

## 2020-07-23 ENCOUNTER — OFFICE VISIT (OUTPATIENT)
Dept: PULMONOLOGY | Age: 55
End: 2020-07-23

## 2020-07-23 VITALS — BODY MASS INDEX: 32.44 KG/M2 | HEIGHT: 64 IN

## 2020-07-23 DIAGNOSIS — E66.9 OBESITY (BMI 30.0-34.9): ICD-10-CM

## 2020-07-23 DIAGNOSIS — R91.1 LUNG NODULE: Primary | ICD-10-CM

## 2020-07-23 DIAGNOSIS — G40.909 SEIZURE DISORDER (HCC): ICD-10-CM

## 2020-07-23 DIAGNOSIS — F17.200 CURRENT SMOKER: ICD-10-CM

## 2020-07-23 RX ORDER — CHOLECALCIFEROL (VITAMIN D3) 125 MCG
1 CAPSULE ORAL
COMMUNITY
End: 2022-03-23
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
## 2020-07-23 NOTE — PROGRESS NOTES
Jorge L Dexter presents today for   Chief Complaint   Patient presents with    Lung Nodule     Per NP [facility]. Is someone accompanying this pt? No    Is the patient using any DME equipment during OV? No    -DME Company NA    Depression Screening:  3 most recent PHQ Screens 8/16/2018   Little interest or pleasure in doing things Not at all   Feeling down, depressed, irritable, or hopeless Not at all   Total Score PHQ 2 0   Trouble falling or staying asleep, or sleeping too much Not at all   Feeling tired or having little energy Not at all   Poor appetite, weight loss, or overeating Not at all   Feeling bad about yourself - or that you are a failure or have let yourself or your family down Not at all   Trouble concentrating on things such as school, work, reading, or watching TV Not at all   Moving or speaking so slowly that other people could have noticed; or the opposite being so fidgety that others notice Not at all   Thoughts of being better off dead, or hurting yourself in some way Not at all   PHQ 9 Score 0   How difficult have these problems made it for you to do your work, take care of your home and get along with others Not difficult at all       Learning Assessment:  Learning Assessment 6/22/2017   PRIMARY LEARNER Patient   PRIMARY LANGUAGE ENGLISH   LEARNER PREFERENCE PRIMARY DEMONSTRATION   ANSWERED BY patient   RELATIONSHIP SELF       Abuse Screening:  Abuse Screening Questionnaire 8/16/2018   Do you ever feel afraid of your partner? N   Are you in a relationship with someone who physically or mentally threatens you? N   Is it safe for you to go home? Y       Fall Risk  No flowsheet data found. Coordination of Care:  1. Have you been to the ER, urgent care clinic since your last visit? Hospitalized since your last visit? Yes; Name: [facility] 7/10 [facility]    2. Have you seen or consulted any other health care providers outside of the [facility] since your last visit? Include any pap smears or colon screening.  No.

## 2020-07-28 ENCOUNTER — OFFICE VISIT (OUTPATIENT)
Dept: ORTHOPEDIC SURGERY | Age: 55
End: 2020-07-28

## 2020-07-28 VITALS
TEMPERATURE: 97.4 F | HEIGHT: 64 IN | HEART RATE: 91 BPM | RESPIRATION RATE: 24 BRPM | OXYGEN SATURATION: 99 % | SYSTOLIC BLOOD PRESSURE: 142 MMHG | BODY MASS INDEX: 30.39 KG/M2 | DIASTOLIC BLOOD PRESSURE: 75 MMHG | WEIGHT: 178 LBS
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
## 2020-07-28 DIAGNOSIS — M54.2 NECK PAIN: ICD-10-CM

## 2020-07-28 DIAGNOSIS — M54.50 ACUTE BILATERAL LOW BACK PAIN WITHOUT SCIATICA: Primary | ICD-10-CM

## 2020-07-28 RX ORDER — CYCLOBENZAPRINE HCL 10 MG
10 TABLET ORAL
Qty: 90 TAB | Refills: 0 | OUTPATIENT
Start: 2020-07-28 | End: 2020-09-23

## 2020-07-28 RX ORDER — METHYLPREDNISOLONE 4 MG/1
TABLET ORAL
Qty: 1 DOSE PACK | Refills: 0 | Status: SHIPPED | OUTPATIENT
Start: 2020-07-28 | End: 2020-12-04 | Stop reason: ALTCHOICE

## 2020-07-28 NOTE — PROGRESS NOTES
Lakshmi Emma presents today for   Chief Complaint   Patient presents with    Neck Pain       Is someone accompanying this pt? no    Is the patient using any DME equipment during OV? no    Depression Screening:  3 most recent PHQ Screens 8/16/2018   Little interest or pleasure in doing things Not at all   Feeling down, depressed, irritable, or hopeless Not at all   Total Score PHQ 2 0   Trouble falling or staying asleep, or sleeping too much Not at all   Feeling tired or having little energy Not at all   Poor appetite, weight loss, or overeating Not at all   Feeling bad about yourself - or that you are a failure or have let yourself or your family down Not at all   Trouble concentrating on things such as school, work, reading, or watching TV Not at all   Moving or speaking so slowly that other people could have noticed; or the opposite being so fidgety that others notice Not at all   Thoughts of being better off dead, or hurting yourself in some way Not at all   PHQ 9 Score 0   How difficult have these problems made it for you to do your work, take care of your home and get along with others Not difficult at all       Learning Assessment:  Learning Assessment 6/22/2017   PRIMARY LEARNER Patient   PRIMARY LANGUAGE ENGLISH   LEARNER PREFERENCE PRIMARY DEMONSTRATION   ANSWERED BY patient   RELATIONSHIP SELF       Abuse Screening:  Abuse Screening Questionnaire 8/16/2018   Do you ever feel afraid of your partner? N   Are you in a relationship with someone who physically or mentally threatens you? N   Is it safe for you to go home? Y       Coordination of Care:  1. Have you been to the ER, urgent care clinic since your last visit? Yes, pt went to the ER on 07/10/2020. Notes in [facility]  Hospitalized since your last visit? no    2. Have you seen or consulted any other health care providers outside of the [address] [place] [address] since your last visit?  Yes, pulmonology Include any pap smears or colon screening.  no
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
## 2020-08-03 ENCOUNTER — HOSPITAL ENCOUNTER (OUTPATIENT)
Dept: CT IMAGING | Age: 55
Discharge: HOME | End: 2020-08-03
Attending: INTERNAL MEDICINE
Payer: COMMERCIAL

## 2020-08-03 DIAGNOSIS — F17.200 CURRENT SMOKER: ICD-10-CM

## 2020-08-03 DIAGNOSIS — R91.1 LUNG NODULE: ICD-10-CM

## 2020-08-03 PROCEDURE — 71250 CT THORAX DX C-: CPT

## 2020-08-05 ENCOUNTER — TELEPHONE (OUTPATIENT)
Dept: PULMONOLOGY | Age: 55
End: 2020-08-05

## 2020-08-05 ENCOUNTER — APPOINTMENT (OUTPATIENT)
Dept: PHYSICAL THERAPY | Age: 55
End: 2020-08-05

## 2020-08-05 DIAGNOSIS — R91.1 LUNG NODULE: Primary | ICD-10-CM

## 2020-08-10 ENCOUNTER — HOSPITAL ENCOUNTER (OUTPATIENT)
Dept: PHYSICAL THERAPY | Age: 55
Discharge: HOME | End: 2020-08-10
Payer: COMMERCIAL

## 2020-08-10 PROCEDURE — 97162 PT EVAL MOD COMPLEX 30 MIN: CPT

## 2020-08-10 PROCEDURE — 97110 THERAPEUTIC EXERCISES: CPT
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
## 2020-08-13 ENCOUNTER — APPOINTMENT (OUTPATIENT)
Dept: PHYSICAL THERAPY | Age: 55
End: 2020-08-13
Payer: COMMERCIAL

## 2020-08-17 ENCOUNTER — HOSPITAL ENCOUNTER (OUTPATIENT)
Dept: PHYSICAL THERAPY | Age: 55
Discharge: HOME | End: 2020-08-17
Payer: COMMERCIAL

## 2020-08-17 PROCEDURE — 97110 THERAPEUTIC EXERCISES: CPT

## 2020-08-17 PROCEDURE — 20560 NDL INSJ W/O NJX 1 OR 2 MUSC: CPT

## 2020-08-17 PROCEDURE — 97112 NEUROMUSCULAR REEDUCATION: CPT

## 2020-08-17 PROCEDURE — 97140 MANUAL THERAPY 1/> REGIONS: CPT

## 2020-08-17 NOTE — PROGRESS NOTES
PT DAILY TREATMENT NOTE 10-18    Patient Name: Francesca Kennedy  Date:2020  : 1965  [x]  Patient  Verified  Payor: [employer]UE [employer] / Plan: [address] [place] [address] / Product Type: PPO /    In time:813  Out time:912  Total Treatment Time (min): 61  Visit #: 2 of     Medicare/BCBS Only   Total Timed Codes (min):  49 1:1 Treatment Time:  49       Treatment Area: Low back pain [M54.5]  Cervicalgia [M54.2]    SUBJECTIVE  Pain Level (0-10 scale): 0  Any medication changes, allergies to medications, adverse drug reactions, diagnosis change, or new procedure performed?: [x] No    [] Yes (see summary sheet for update)  Subjective functional status/changes:   [] No changes reported  My neck feels so tight.      OBJECTIVE    Modality rationale: decrease pain to improve the patients ability to tolerate post needle soreness   Min Type Additional Details    [] Estim:  []Unatt       []IFC  []Premod                        []Other:  []w/ice   []w/heat  Position:  Location:    [] Estim: []Att    []TENS instruct  []NMES                    []Other:  []w/US   []w/ice   []w/heat  Position:  Location:    []  Traction: [] Cervical       []Lumbar                       [] Prone          []Supine                       []Intermittent   []Continuous Lbs:  [] before manual  [] after manual    []  Ultrasound: []Continuous   [] Pulsed                           []1MHz   []3MHz W/cm2:  Location:    []  Iontophoresis with dexamethasone         Location: [] Take home patch   [] In clinic   10 [x]  Ice     [x]  heat  []  Ice massage  []  Laser   []  Anodyne Position:supine    Location:CP C/S; MHP L/S    []  Laser with stim  []  Other:  Position:  Location:    []  Vasopneumatic Device Pressure:       [] lo [] med [] hi   Temperature: [] lo [] med [] hi   [] Skin assessment post-treatment:  []intact []redness- no adverse reaction    []redness  adverse reaction:       16 min Therapeutic Exercise:  [] See flow sheet :   Rationale: increase ROM and increase strength to improve the patients ability to perform daily activities      8 min Neuromuscular Re-education:  []  See flow sheet :   Rationale: increase ROM, increase strength and improve coordination  to improve the patients ability to recruit TA for daily activities and work tasks    23 min Manual Therapy:  IMDN to include education, assessment, set-up, palpation, hemostasis, STM/stretch to treated areas and reassessment only; SOR; B lateral glides   Rationale: decrease pain, increase ROM, increase tissue extensibility and decrease trigger points to perform daily activities   2 min Dry Needling:   [x]  CPT 34852:  needle insertion(s) without injection(s); 1 or 2 muscle(s)  []  CPT 63751:  needle insertion(s) without injection(s); 3 or more muscles. Rationale: decrease pain, increase ROM and increase tissue extensibility to perform work tasks    Dry Needling Procedure Note    Procedure: An intramuscular manual therapy (dry needling) and a neuro-muscular re-education treatment was done to deactivate myofascial trigger points with a 30 gauge filament needle under aseptic technique. Indications:  [x] Myofascial pain and dysfunction [] Muscled spasms  [x] Myalgia/myositis   [] Muscle cramps  [x] Muscle imbalances  [] Temporomandibular Dysfunction  [] Other:    Chart reviewed for the following:  Miladys ELAM PT, have reviewed the medical history, summary list and precautions/contraindications for [facility].   TIME OUT performed immediately prior to start of procedure:  Miladys ELAM PT, have performed the following reviews on [facility] prior to the start of the session:      [x] Verified patient identification by name and date of birth    [x] Agreement on all muscles being treated was verified   [x] Purpose of dry needling, side effects, possible complications, risks and benefits were explained to the patient   [x] Procedure site(s) verified  [x] Patient was positioned for comfort and draped for privacy  [x] Informed Consent was signed (initial visit) and verified verbally (subsequent visits)  [x] Patient was instructed on the need to report the use of blood thinners and/or immunosuppressant medications  [x] How to respond to possible adverse effects of treatment  [x] Self treatment of post needling soreness: ice, heat (moist heat, heat wraps) and stretching  [x] Opportunity was given to ask any questions, all questions were answered            Time: 825  Date of procedure: 8/17/2020  Treatment: The following muscles were treated today with intramuscular dry needling  [] Left [] Right Masster  [] Left [] Right Temporalis  [] Left [] Right Zygomaticus Major / Minor  [] Left [] Right Lateral Pterygoid  [] Left [] Right Medial Pterygoid  [] Left [] Right Digastric Post / Anterior Belly  [] Left [] Right Sternocleidomastoid  [] Left [] Right Scalene Anterior / Medial / Posterior  [] Left [] Right Extra Laryngeal Muscles  [] Left [] Right Upper Trapezius  [] Left [] Right Middle Trapezius  [] Left [] Right Lower Trapezius  [] Left [] Right Oblique Capitis Inferior  [x] Left [x] Right Splenius Capitis / Cervicis  [] Left [] Right Semispinalis: Capitis / Cervicis  [] Left [] Right Multifidi / Rotatores Cervicis / Thoracic  [] Left [] Right Longissimus Thoracis / Illiocostalis  [] Left [] Right Levator Scapulae  [] Left [] Right Supraspinatus / Infraspinatus  [] Left [] Right Teres Major / Minor  [] Left [] Right Rhomboids / Serratus posterior superior  [] Left [] Right Pectoralis Major / Minor  [] Left [] Right Serratus Anterior  [] Left [] Right Latissimus Dorsi  [] Left [] Right Subscapularis  [] Left [] Right Coracobrachialis  [] Left [] Right Biceps Brachii  [] Left [] Right Deltoid: Anterior / Medial / Posterior  [] Left [] Right Brachialis  [] Left [] Right Triceps  [] Left [] Right Brachioradialis  [] Left [] Right Extensor Carpi Radialis Brevis / Extensor Carpi Radialis Longus    [] Left [] Right  Extensor digitorum  [] Left [] Right Supinator / Pronator Teres  [] Left [] Right Flexor Carpi Radialis/ Flexor Carpi Ulnaris   [] Left [] Right  Flexor Digitorum Superficialis/ Flexor Digitorum Profundus  [] Left [] Right Flexor Pollicis Longus / Flexor Pollicis Brevis / Palmaris Longus  [] Left [] Right Abductor Pollicis Longus / Abductor Pollicis Brevis  [] Left [] Right Opponens Pollicis / Adductor Pollicis  [] Left [] Right Dorsal / Palmar Interossei / Lumbricalis  [] Left [] Right Abductor Digiti Minimi / Opponens Digiti Minimi    Patient's response to today's treatment:  [x] Latent Twitch Response     [x] Muscle relaxation [] Pain Relief  [x] Post needling soreness    [x] without complications  [] Increased Range of Motion   [] Decreased headaches    [] Decreased Tinnitus  [] Other:     Performed by: Melchor Francois, PT          With   [] TE   [] TA   [] neuro   [] other: Patient Education: [x] Review HEP    [] Progressed/Changed HEP based on:   [] positioning   [] body mechanics   [] transfers   [] heat/ice application    [] other:      Other Objective/Functional Measures:      Pain Level (0-10 scale) post treatment: 0    ASSESSMENT/Changes in Function: Good tolerance to DN; able to elicit twitches in all mm needled. Challenged with PPT and maintainingTA contraction. Patient will continue to benefit from skilled PT services to modify and progress therapeutic interventions, address functional mobility deficits, address ROM deficits, address strength deficits, analyze and address soft tissue restrictions and analyze and cue movement patterns to attain remaining goals. []  See Plan of Care  []  See progress note/recertification  []  See Discharge Summary         Progress towards goals / Updated goals:  Short Term Goals: To be accomplished in 2 weeks:              1. Patient will be independent with HEP in order to decrease pain and improve quality of life. 2. Patient will report less than or equal to 4/10 cervical pain at worst to allow increased activity tolerance     Long Term Goals: To be accomplished in 4-6 weeks:              1. Patient will improve left cervical rotation to 50 deg in order to improve ease of looking over shoulder while driving. 2. Patient will improve cervical extension to 30 degrees in order to improve ease of overhead activity.                3. Patient will improve FOTO score to 68% from 57% to indicate improved functional status.       PLAN  []  Upgrade activities as tolerated     [x]  Continue plan of care  []  Update interventions per flow sheet       []  Discharge due to:_  []  Other:_      ALLAN Galan, CMTPT 8/17/2020  8:23 AM    Future Appointments   Date Time Provider Dilma Tania   8/20/2020  9:30 AM HBV PET CT RM 1 [facility]   8/21/2020  7:45 AM Christi Alpers, PT [facility]   8/24/2020  7:45 AM Christi Alpers, PT [facility]   8/27/2020  7:45 AM Gavi Bodamon, PTA [facility]   8/31/2020  7:45 AM Christi Alpers, PT [facility]   9/3/2020  7:45 AM Gavi Bodamon, PTA [facility]   9/8/2020 10:30 AM Jaye Bustillos  [address]   10/8/2020  8:00 AM Dane Gaviria NP 11 [facility]

## 2020-08-20 ENCOUNTER — HOSPITAL ENCOUNTER (OUTPATIENT)
Dept: PET IMAGING | Age: 55
Discharge: HOME | End: 2020-08-20
Attending: INTERNAL MEDICINE
Payer: COMMERCIAL

## 2020-08-20 DIAGNOSIS — R91.1 LUNG NODULE: ICD-10-CM

## 2020-08-20 PROCEDURE — A9552 F18 FDG: HCPCS

## 2020-08-21 ENCOUNTER — HOSPITAL ENCOUNTER (OUTPATIENT)
Dept: PHYSICAL THERAPY | Age: 55
Discharge: HOME | End: 2020-08-21
Payer: COMMERCIAL

## 2020-08-21 PROCEDURE — 97110 THERAPEUTIC EXERCISES: CPT

## 2020-08-21 PROCEDURE — 97140 MANUAL THERAPY 1/> REGIONS: CPT

## 2020-08-21 PROCEDURE — 97112 NEUROMUSCULAR REEDUCATION: CPT

## 2020-08-21 NOTE — PROGRESS NOTES
PT DAILY TREATMENT NOTE 10-18    Patient Name: Annamarie De León  Date:2020  : 1965  [x]  Patient  Verified  Payor: [employer] / Plan: [address] [place] [address] / Product Type: PPO /    In [id number]  Out time:0825  Total Treatment Time (min): 38  Visit #: 3 of     Medicare/BCBS Only   Total Timed Codes (min):  38 1:1 Treatment Time:  38       Treatment Area: Low back pain [M54.5]  Cervicalgia [M54.2]    SUBJECTIVE  Pain Level (0-10 scale): 0  Any medication changes, allergies to medications, adverse drug reactions, diagnosis change, or new procedure performed?: [x] No    [] Yes (see summary sheet for update)  Subjective functional status/changes:   [] No changes reported   The pt reports she is not really having pain curently    OBJECTIVE    Modality rationale:    Min Type Additional Details    [] Estim:  []Unatt       []IFC  []Premod                        []Other:  []w/ice   []w/heat  Position:  Location:    [] Estim: []Att    []TENS instruct  []NMES                    []Other:  []w/US   []w/ice   []w/heat  Position:  Location:    []  Traction: [] Cervical       []Lumbar                       [] Prone          []Supine                       []Intermittent   []Continuous Lbs:  [] before manual  [] after manual    []  Ultrasound: []Continuous   [] Pulsed                           []1MHz   []3MHz W/cm2:  Location:    []  Iontophoresis with dexamethasone         Location: [] Take home patch   [] In clinic    []  Ice     []  heat  []  Ice massage  []  Laser   []  Anodyne Position:  Location:    []  Laser with stim  []  Other:  Position:  Location:    []  Vasopneumatic Device Pressure:       [] lo [] med [] hi   Temperature: [] lo [] med [] hi   [] Skin assessment post-treatment:  []intact []redness- no adverse reaction    []redness  adverse reaction:       22 min Therapeutic Exercise:  [x] See flow sheet :   Rationale: increase ROM and increase strength to improve the patients ability to perform functional tasks. 8 min Neuromuscular Re-education:  [x]  See flow sheet :   Rationale: increase strength and increase proprioception  to improve the patients ability to perform    8 min Manual Therapy:  SOR, STM c/s paraspinals (left focus), TPR left c/s paraspinals   Rationale: decrease pain, increase ROM and increase tissue extensibility to improve ability for functional tasks. With   [] TE   [] TA   [] neuro   [] other: Patient Education: [x] Review HEP    [] Progressed/Changed HEP based on:   [] positioning   [] body mechanics   [] transfers   [] heat/ice application    [] other:      Other Objective/Functional Measures: + Trigger point left c/s paraspinals     Pain Level (0-10 scale) post treatment: 0    ASSESSMENT/Changes in Function:  The pt is progressing with PT and reports diminished overall pain. She reported improved mobility following today's treatment. Patient will continue to benefit from skilled PT services to modify and progress therapeutic interventions, address functional mobility deficits, address ROM deficits, address strength deficits, analyze and address soft tissue restrictions, analyze and cue movement patterns, analyze and modify body mechanics/ergonomics and assess and modify postural abnormalities to attain remaining goals. []  See Plan of Care  []  See progress note/recertification  []  See Discharge Summary         Progress towards goals / Updated goals:  Short Term Goals: To be accomplished in 2 weeks:              9. Patient will be independent with HEP in order to decrease pain and improve quality of life. IE- issued HEP   Current: pt reports compliance on 8-21-20              2. Patient will report less than or equal to 4/10 cervical pain at worst to allow increased activity tolerance     Long Term Goals: To be accomplished in 4-6 weeks:              1.  Patient will improve left cervical rotation to 50 deg in order to improve ease of looking over shoulder while driving.               2. Patient will improve cervical extension to 30 degrees in order to improve ease of overhead activity.                3. Patient will improve FOTO score to 68% from 57% to indicate improved functional status.       PLAN  []  Upgrade activities as tolerated     [x]  Continue plan of care  []  Update interventions per flow sheet       []  Discharge due to:_  []  Other:_      Arturo Alaniz, PT 8/21/2020  7:52 AM    Future Appointments   Date Time Provider Dilma Marin   8/24/2020  7:45 AM Severa Llanos, PT [facility]   8/27/2020  7:45 AM Angela Merck, PTA [facility]   8/31/2020  7:45 AM Severa Llanos, PT [facility]   9/3/2020  7:45 AM Angela Merck, PTA [facility]   9/8/2020 10:30 AM Wanad Gonzalez  [address]    10/8/2020  8:00 AM Philippe Schneider NP 11 [facility]

## 2020-08-24 ENCOUNTER — HOSPITAL ENCOUNTER (OUTPATIENT)
Dept: PHYSICAL THERAPY | Age: 55
Discharge: HOME | End: 2020-08-24
Payer: COMMERCIAL

## 2020-08-24 PROCEDURE — 97035 APP MDLTY 1+ULTRASOUND EA 15: CPT

## 2020-08-24 PROCEDURE — 97110 THERAPEUTIC EXERCISES: CPT

## 2020-08-24 PROCEDURE — 97140 MANUAL THERAPY 1/> REGIONS: CPT

## 2020-08-24 NOTE — PROGRESS NOTES
PT DAILY TREATMENT NOTE 10-18    Patient Name: Regan Looney  Date:2020  : 1965  [x]  Patient  Verified  Payor: VL[employer]IS[employer] / Plan: [address] [place] [address] / Product Type: PPO /    In AFE  Out time:823  Total Treatment Time (min): 38  Visit #: 4 of     Medicare/BCBS Only   Total Timed Codes (min):  38 1:1 Treatment Time:  38       Treatment Area: Low back pain [M54.5]  Cervicalgia [M54.2]    SUBJECTIVE  Pain Level (0-10 scale): 0 lumbar, upper trap/neck -10/10  Any medication changes, allergies to medications, adverse drug reactions, diagnosis change, or new procedure performed?: [x] No    [] Yes (see summary sheet for update)  Subjective functional status/changes:   [] No changes reported  The pt reports increased pain from working last night.  Pain is in the UT and down the left UE.     OBJECTIVE    Modality rationale: decrease pain and increase tissue extensibility to improve the patients ability to perform ADL   Min Type Additional Details    [] Estim:  []Unatt       []IFC  []Premod                        []Other:  []w/ice   []w/heat  Position:  Location:    [] Estim: []Att    []TENS instruct  []NMES                    []Other:  []w/US   []w/ice   []w/heat  Position:  Location:    []  Traction: [] Cervical       []Lumbar                       [] Prone          []Supine                       []Intermittent   []Continuous Lbs:  [] before manual  [] after manual   5'+3' set-up [x]  Ultrasound: [x]Continuous   [] Pulsed                           [x]1MHz   []3MHz W/cm2: 1.7  Location: left UT    []  Iontophoresis with dexamethasone         Location: [] Take home patch   [] In clinic    []  Ice     []  heat  []  Ice massage  []  Laser   []  Anodyne Position:  Location:    []  Laser with stim  []  Other:  Position:  Location:    []  Vasopneumatic Device Pressure:       [] lo [] med [] hi   Temperature: [] lo [] med [] hi   [] Skin assessment post-treatment:  []intact []redness- no adverse reaction    []redness  adverse reaction:       12 min Therapeutic Exercise:  [x] See flow sheet :   Rationale: increase ROM and increase strength to improve the patients ability to perform ADL      18 min Manual Therapy:  STM, MFR to the left c/s paraspinals and UT. Manual traction   Rationale: decrease pain, increase ROM and increase tissue extensibility to improve ability for daily tasks. With   [] TE   [] TA   [] neuro   [] other: Patient Education: [x] Review HEP    [] Progressed/Changed HEP based on:   [] positioning   [] body mechanics   [] transfers   [] heat/ice application    [] other:      Other Objective/Functional Measures: Added US     Pain Level (0-10 scale) post treatment: 0    ASSESSMENT/Changes in Function: The pt with significant pain relief post treatment. LBP appears to be well controlled to date but left c/s symptoms vary with pt's activity. Patient will continue to benefit from skilled PT services to modify and progress therapeutic interventions, address functional mobility deficits, address ROM deficits, address strength deficits, analyze and address soft tissue restrictions, analyze and cue movement patterns, analyze and modify body mechanics/ergonomics and assess and modify postural abnormalities to attain remaining goals. []  See Plan of Care  []  See progress note/recertification  []  See Discharge Summary         Progress towards goals / Updated goals:  Short Term Goals: To be accomplished in 2 weeks:              5. Patient will be independent with HEP in order to decrease pain and improve quality of life. IE- issued HEP              Current: pt reports compliance on 8-21-20              2. Patient will report less than or equal to 4/10 cervical pain at worst to allow increased activity tolerance   Current: pain 9-10/10 on 8-24-20     Long Term Goals: To be accomplished in 4-6 weeks:              1.  Patient will improve left cervical rotation to 50 deg in order to improve ease of looking over shoulder while driving.               2. Patient will improve cervical extension to 30 degrees in order to improve ease of overhead activity.             3. Patient will improve FOTO score to 68% from 57% to indicate improved functional status.     PLAN  []  Upgrade activities as tolerated     [x]  Continue plan of care  []  Update interventions per flow sheet       []  Discharge due to:_  []  Other:_      Yeny Scott, PT 8/24/2020  7:49 AM    Future Appointments   Date Time Provider Dilma Dohertyi   8/27/2020  7:45 AM Deonna Nicole PTA [facility]   8/31/2020  7:45 AM Princess Marcelo PT [facility]   9/3/2020  7:45 AM Deonna Nicole PTA [facility]   9/8/2020 10:30 AM Mikayla Mahajan  [address]    10/8/2020  8:00 AM Maris Hayden NP 11 [facility]

## 2020-08-25 ENCOUNTER — TELEPHONE (OUTPATIENT)
Dept: PULMONOLOGY | Age: 55
End: 2020-08-25

## 2020-08-27 ENCOUNTER — APPOINTMENT (OUTPATIENT)
Dept: PHYSICAL THERAPY | Age: 55
End: 2020-08-27
Payer: COMMERCIAL

## 2020-08-28 NOTE — PROGRESS NOTES
[facility]  [address]  [place], [address]  [phone] fax    Physical Therapy Discharge Summary  Patient name: Kimmie Martin Start of Care: 8/10/2020   Referral source: Morris Raza NP : 1965   Medical/Treatment Diagnosis: Low back pain [M54.5]  Cervicalgia [M54.2]  Payor: Ch[employer]o Hyatt / Plan: [address] [place] [address] / Product Type: PPO /  Onset Date:7/3/2020                 Prior Hospitalization: see medical history Provider#: [id number]   Medications: Verified on Patient Summary List    Comorbidities: epilepsy, low back pain, hx of herniated disc in cervical region, arthritis  Prior Level of Function:independent. Working full-time as a CNA at [employer]. Visits from Start of Care: 4    Missed Visits: 1  Reporting Period : 8/10/20 to 20      Progress towards goals / Updated goals:  Short Term Goals: To be accomplished in 2 weeks:              1. Patient will be independent with HEP in order to decrease pain and improve quality of life.               - issued HEP              SZNGCCP: pt reports compliance on 20              2. Patient will report less than or equal to 4/10 cervical pain at worst to allow increased activity tolerance              Current: pain 9-10/10 on 20     Long Term Goals: To be accomplished in 4-6 weeks:              1. Patient will improve left cervical rotation to 50 deg in order to improve ease of looking over shoulder while driving. Current: NT due to pt self d/c              2. Patient will improve cervical extension to 30 degrees in order to improve ease of overhead activity. Current: NT due to pt self d/c              3. Patient will improve FOTO score to 68% from 57% to indicate improved functional status.   Current: NT due to pt self d/c      ASSESSMENT/RECOMMENDATIONS:  Pt was progressing well with PT noting her LBP appearing to be well controlled but left c/s symptoms varying with activity. Pt called to cx her last appointment and  requested to be discharged at this time due to Covid-19 and work related issues. Pt did not receive an updated HEP due to cancelling her latest scheduled appointment.      [x]Discontinue therapy: []Patient has reached or is progressing toward set goals      [x]Patient is non-compliant or has abdicated      []Due to lack of appreciable progress towards set goals    Clemencia Medina, PTA 8/28/2020 11:50 AM

## 2020-08-31 ENCOUNTER — APPOINTMENT (OUTPATIENT)
Dept: PHYSICAL THERAPY | Age: 55
End: 2020-08-31
Payer: COMMERCIAL

## 2020-09-03 ENCOUNTER — APPOINTMENT (OUTPATIENT)
Dept: PHYSICAL THERAPY | Age: 55
End: 2020-09-03

## 2020-09-08 ENCOUNTER — TELEPHONE (OUTPATIENT)
Dept: PULMONOLOGY | Age: 55
End: 2020-09-08

## 2020-09-08 DIAGNOSIS — R91.1 LUNG NODULE: Primary | ICD-10-CM

## 2020-09-08 NOTE — TELEPHONE ENCOUNTER
Pt is requesting results of Pet CT done at [facility]. Please call [phone].

## 2020-09-22 ENCOUNTER — DOCUMENTATION ONLY (OUTPATIENT)
Dept: PULMONOLOGY | Age: 55
End: 2020-09-22

## 2020-09-22 ENCOUNTER — TELEPHONE (OUTPATIENT)
Dept: PULMONOLOGY | Age: 55
End: 2020-09-22

## 2020-09-22 NOTE — PROGRESS NOTES
Lf vm for pt to call-pt was scheduled for 9/24 for Ct, but Dr. James Mendez didn't want another Ct until Nov 2020. Will poli around that time.

## 2020-09-22 NOTE — TELEPHONE ENCOUNTER
[facility][phone]), pt's insurance is denying Ct scheduled for 9/24/20 d/t pt just had Ct in August.  If you would like to do p2p, you can call [phone] and use pt's TC#[id number] as reference#.

## 2020-09-23 ENCOUNTER — HOSPITAL ENCOUNTER (EMERGENCY)
Age: 55
Discharge: HOME | End: 2020-09-23
Attending: EMERGENCY MEDICINE | Admitting: EMERGENCY MEDICINE
Payer: COMMERCIAL

## 2020-09-23 VITALS
HEART RATE: 83 BPM | SYSTOLIC BLOOD PRESSURE: 130 MMHG | TEMPERATURE: 99.4 F | OXYGEN SATURATION: 100 % | RESPIRATION RATE: 18 BRPM | DIASTOLIC BLOOD PRESSURE: 85 MMHG

## 2020-09-23 DIAGNOSIS — R20.2 PARESTHESIA: ICD-10-CM

## 2020-09-23 DIAGNOSIS — S46.812A STRAIN OF LEFT TRAPEZIUS MUSCLE, INITIAL ENCOUNTER: Primary | ICD-10-CM

## 2020-09-23 PROCEDURE — 99281 EMR DPT VST MAYX REQ PHY/QHP: CPT

## 2020-09-23 RX ORDER — MELOXICAM 15 MG/1
15 TABLET ORAL DAILY
Qty: 15 TAB | Refills: 0 | Status: SHIPPED | OUTPATIENT
Start: 2020-09-23 | End: 2020-12-04 | Stop reason: ALTCHOICE

## 2020-09-23 RX ORDER — CYCLOBENZAPRINE HCL 10 MG
10 TABLET ORAL
Qty: 10 TAB | Refills: 0 | Status: SHIPPED | OUTPATIENT
Start: 2020-09-23 | End: 2022-03-23

## 2020-09-23 NOTE — LETTER
[address] [place] [address] [facility] EMERGENCY DEPT 
[address] [facility] [address] [phone] Work/School Note Date: 9/23/2020 To Whom It May concern: 
 
Tuan Lewis was seen and treated today in the emergency room by the following provider(s): 
Attending Provider: Gertrude Prader, DO. Tuan Lewis may return to work on 9/29/2020. Sincerely, Obie Méndez DO

## 2020-09-23 NOTE — ED PROVIDER NOTES
EMERGENCY DEPARTMENT HISTORY AND PHYSICAL EXAM    5:51 PM      Date: 9/23/2020  Patient Name: Lakshmi Carter    History of Presenting Illness     Chief Complaint   Patient presents with    Shoulder Pain    Arm Pain    Numbness         History Provided By: Patient    Chief Complaint: left neck pain  Duration:  Days  Timing:  Worsening  Location: L neck/L arm  Quality: tingling, sharp pain in lateral neck/trap  Severity: Moderate  Modifying Factors: worse after working, lifting  Associated Symptoms: denies any other associated signs or symptoms      Additional History (Context): Lakshmi Carter is a 62 y.o. female with seizure who presents with left lateral neck pain and intermittent paresthesias in her arm. 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 Is been ongoing about a week but now been worsening. Denies any specific fall or trauma. Denies any head injury. Has been taking Aleve without improvement. No prior history of spinal surgery. No other complaints. Social: Denies tobacco, drugs or EtOH    PCP: Jemima Reyes NP    Current Outpatient Medications   Medication Sig Dispense Refill    cyclobenzaprine (FLEXERIL) 10 mg tablet Take 1 Tab by mouth three (3) times daily as needed for Muscle Spasm(s). 10 Tab 0    meloxicam (MOBIC) 15 mg tablet Take 1 Tab by mouth daily. 15 Tab 0    methylPREDNISolone (Medrol, Yassine,) 4 mg tablet Per dose pack instructions 1 Dose Pack 0    naproxen sodium (Aleve) 220 mg cap Take 1 Tab by mouth two (2) times daily as needed.  phentermine (ADIPEX-P) 37.5 mg tablet Take 1 Tab by mouth daily as needed (appetite increases). 30 Tab 1    DULoxetine (CYMBALTA) 60 mg capsule Take 1 Cap by mouth daily.  90 Cap 1    carBAMazepine (TEGRETOL) 200 mg tablet One tab in AM, one tab at lunch, and two tabs before bed 90 Tab 0       Past History     Past Medical History:  Past Medical History:   Diagnosis Date    Bronchitis     Carpal tunnel syndrome on right     Chronic bronchitis (HCC)     De Quervain's syndrome (tenosynovitis)     Depression     resolved    Ectopic pregnancy     two times    Epilepsy ([address]     Multinodular goiter     Seizure ([address]     Sinus infection     Stenosing tenosynovitis of thumb     Right side    Trigger thumb of left hand        Past Surgical History:  Past Surgical History:   Procedure Laterality Date    COLONOSCOPY N/A 10/21/2016    COLONOSCOPY w/ biopsy performed by Catrachita De Leon MD at [address] HX APPENDECTOMY  1/12/15    lysis of adhesions    HX CHOLECYSTECTOMY      HX HYSTERECTOMY      HX OTHER SURGICAL      scar tissue removed from intestines    HX PARTIAL HYSTERECTOMY  2009    HX TONSILLECTOMY         Family History:  Family History   Problem Relation Age of Onset    Heart Attack Brother          in 62s    Diabetes Father     Hypertension Father     Cancer Father         colon CA    Heart Disease Sister         stent placement    Hypertension Brother     Other Brother         Stomach problems/ulcers    Cancer Sister         Breast CA    Stroke Sister     Diabetes Mother     Hypertension Mother        Social History:  Social History     Tobacco Use    Smoking status: Current Every Day Smoker     Packs/day: 0.50     Years: 38.00     Pack years: 19.00     Types: Cigarettes    Smokeless tobacco: Never Used    Tobacco comment: second hand smoke from family   Substance Use Topics    Alcohol use: No    Drug use: No       Allergies: Allergies   Allergen Reactions    Etodolac Other (comments)     headache         Review of Systems       Review of Systems   Constitutional: Negative for fever. Musculoskeletal: Positive for neck pain. Negative for back pain. Neurological: Positive for numbness. Negative for weakness. All other systems reviewed and are negative.         Physical Exam     Visit Vitals  /85 (BP 1 Location: Left arm, BP Patient Position: At rest) Pulse 83   Temp 99.4 °F (37.4 °C)   Resp 18   SpO2 100%         Physical Exam  Constitutional:       General: She is not in acute distress. Appearance: She is well-developed. HENT:      Head: Normocephalic and atraumatic. Neck:      Musculoskeletal: Neck supple. Vascular: No JVD. Comments: No midline spinal tenderness or step-off, left paraspinal tenderness and trapezius spasm. Musculoskeletal:      Comments: Strength grossly intact bilateral upper, grossly intact bilateral upper, radial pulse 2+ bilaterally, cap refill 1 second bilateral upper. Full range of motion in the left upper extremity although moderate tenderness along the trapezius. No shoulder joint step-off or deformity. Skin:     General: Skin is warm and dry. Findings: No erythema. Neurological:      Mental Status: She is alert and oriented to person, place, and time. Diagnostic Study Results     Labs -  No results found for this or any previous visit (from the past 12 hour(s)). Radiologic Studies -   No orders to display         Medical Decision Making   I am the first provider for this patient. I reviewed the vital signs, available nursing notes, past medical history, past surgical history, family history and social history. Vital Signs-Reviewed the patient's vital signs. Pulse Oximetry Analysis -  100 on room air (Interpretation)nl      Records Reviewed: Nursing Notes (Time of Review: 5:51 PM)    ED Course: Progress Notes, Reevaluation, and Consults:      Provider Notes (Medical Decision Making): 49-year-old female presenting with left trapezius pain. Seems consistent with muscular strain and spasm. No red flags. No indication for imaging. Normal neuro exam.  Discussed supportive care with patient. Discussed expected course of injury. Stable for DC home. Diagnosis     Clinical Impression:   1. Strain of left trapezius muscle, initial encounter    2.  Paresthesia        Disposition: discharged    Follow-up Information     Follow up With Specialties Details Why Contact Cristina Lamb NP Nurse Practitioner Schedule an appointment as soon as possible for a visit in 1 week  [address]  [address] Lakhwinder [address]      [facility] EMERGENCY DEPT Emergency Medicine  As needed, If symptoms worsen [address] [facility] [address]  [phone]           Discharge Medication List as of 9/23/2020  5:51 PM      START taking these medications    Details   meloxicam (MOBIC) 15 mg tablet Take 1 Tab by mouth daily. , Normal, Disp-15 Tab,R-0         CONTINUE these medications which have CHANGED    Details   cyclobenzaprine (FLEXERIL) 10 mg tablet Take 1 Tab by mouth three (3) times daily as needed for Muscle Spasm(s). , Normal, Disp-10 Tab,R-0         CONTINUE these medications which have NOT CHANGED    Details   methylPREDNISolone (Medrol, Yassine,) 4 mg tablet Per dose pack instructions, Normal, Disp-1 Dose Pack,R-0      naproxen sodium (Aleve) 220 mg cap Take 1 Tab by mouth two (2) times daily as needed., Historical Med      phentermine (ADIPEX-P) 37.5 mg tablet Take 1 Tab by mouth daily as needed (appetite increases). , Normal, Disp-30 Tab,R-1      DULoxetine (CYMBALTA) 60 mg capsule Take 1 Cap by mouth daily. , Normal, Disp-90 Cap, R-1      carBAMazepine (TEGRETOL) 200 mg tablet One tab in AM, one tab at lunch, and two tabs before bed, No Print, Disp-90 Tab, R-0         STOP taking these medications       ibuprofen (MOTRIN) 600 mg tablet Comments:   Reason for Stopping:         naproxen (NAPROSYN) 500 mg tablet Comments:   Reason for Stopping:             _______________________________

## 2020-09-23 NOTE — ED NOTES
Pt discharged home, given discharge and follow up instructions, pt verbalized understanding, scripts sent to pharmacy.

## 2020-09-23 NOTE — DISCHARGE INSTRUCTIONS
Patient Education        Numbness and Tingling: Care Instructions  Your Care Instructions     Many things can cause numbness or tingling. Swelling may put pressure on a nerve. This could cause you to lose feeling or have a pins-and-needles sensation on part of your body. Nerves may be damaged from trauma, toxins, or diseases, such as diabetes or multiple sclerosis (MS). Sometimes, though, the cause is not clear. If there is no clear reason for your symptoms, and you are not having any other symptoms, your doctor may suggest watching and waiting for a while to see if the numbness or tingling goes away on its own. Your doctor may want you to have blood or nerve tests to find the cause of your symptoms. Follow-up care is a key part of your treatment and safety. Be sure to make and go to all appointments, and call your doctor if you are having problems. It's also a good idea to know your test results and keep a list of the medicines you take. How can you care for yourself at home? · If your doctor prescribes medicine, take it exactly as directed. Call your doctor if you think you are having a problem with your medicine. · If you have any swelling, put ice or a cold pack on the area for 10 to 20 minutes at a time. Put a thin cloth between the ice and your skin. When should you call for help? Call 911 anytime you think you may need emergency care. For example, call if:    · You have weakness, numbness, or tingling in both legs.     · You lose bowel or bladder control.     · You have symptoms of a stroke. These may include:  ? Sudden numbness, tingling, weakness, or loss of movement in your face, arm, or leg, especially on only one side of your body. ? Sudden vision changes. ? Sudden trouble speaking. ? Sudden confusion or trouble understanding simple statements. ? Sudden problems with walking or balance. ? A sudden, severe headache that is different from past headaches.    Watch closely for changes in your health, and be sure to contact your doctor if you have any problems, or if:    · You do not get better as expected. Where can you learn more? Go to http://www.[employer].com/  Enter U128 in the search box to learn more about \"Numbness and Tingling: Care Instructions. \"  Current as of: November 20, 2019               Content Version: 12.6  © 1333-1520 [employer] Care instructions adapted under license by [employer] (which disclaims liability or warranty for this information). If you have questions about a medical condition or this instruction, always ask your healthcare professional. [address] any warranty or liability for your use of this information.

## 2020-10-08 ENCOUNTER — VIRTUAL VISIT (OUTPATIENT)
Dept: FAMILY MEDICINE CLINIC | Age: 55
End: 2020-10-08

## 2020-10-08 DIAGNOSIS — Z91.199 NO-SHOW FOR APPOINTMENT: Primary | ICD-10-CM

## 2020-11-03 ENCOUNTER — OFFICE VISIT (OUTPATIENT)
Dept: ORTHOPEDIC SURGERY | Age: 55
End: 2020-11-03
Payer: COMMERCIAL

## 2020-11-03 DIAGNOSIS — M25.511 RIGHT SHOULDER PAIN, UNSPECIFIED CHRONICITY: ICD-10-CM

## 2020-11-03 DIAGNOSIS — M79.18 CERVICAL MYOFASCIAL PAIN SYNDROME: ICD-10-CM

## 2020-11-03 DIAGNOSIS — M54.2 NECK PAIN: Primary | ICD-10-CM

## 2020-11-03 DIAGNOSIS — M54.12 CERVICAL RADICULOPATHY: ICD-10-CM

## 2020-11-03 PROCEDURE — 99213 OFFICE O/P EST LOW 20 MIN: CPT | Performed by: PHYSICAL MEDICINE & REHABILITATION
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
## 2020-11-10 DIAGNOSIS — M79.18 CERVICAL MYOFASCIAL PAIN SYNDROME: ICD-10-CM

## 2020-11-10 DIAGNOSIS — M54.2 NECK PAIN: ICD-10-CM

## 2020-11-10 DIAGNOSIS — M54.12 CERVICAL RADICULOPATHY: ICD-10-CM

## 2020-11-12 ENCOUNTER — OFFICE VISIT (OUTPATIENT)
Dept: ORTHOPEDIC SURGERY | Age: 55
End: 2020-11-12
Payer: COMMERCIAL

## 2020-11-12 VITALS
HEART RATE: 103 BPM | WEIGHT: 195.2 LBS | TEMPERATURE: 96.7 F | OXYGEN SATURATION: 99 % | RESPIRATION RATE: 16 BRPM | HEIGHT: 64 IN | BODY MASS INDEX: 33.32 KG/M2 | DIASTOLIC BLOOD PRESSURE: 91 MMHG | SYSTOLIC BLOOD PRESSURE: 145 MMHG

## 2020-11-12 DIAGNOSIS — G89.29 CHRONIC LEFT SHOULDER PAIN: ICD-10-CM

## 2020-11-12 DIAGNOSIS — M25.512 CHRONIC LEFT SHOULDER PAIN: ICD-10-CM

## 2020-11-12 DIAGNOSIS — M75.52 CHRONIC BURSITIS OF LEFT SHOULDER: Primary | ICD-10-CM

## 2020-11-12 DIAGNOSIS — G89.29 CHRONIC RIGHT SHOULDER PAIN: ICD-10-CM

## 2020-11-12 DIAGNOSIS — S43.402A SPRAIN OF LEFT SHOULDER, UNSPECIFIED SHOULDER SPRAIN TYPE, INITIAL ENCOUNTER: ICD-10-CM

## 2020-11-12 DIAGNOSIS — M25.511 CHRONIC RIGHT SHOULDER PAIN: ICD-10-CM

## 2020-11-12 DIAGNOSIS — M75.51 CHRONIC BURSITIS OF RIGHT SHOULDER: ICD-10-CM

## 2020-11-12 PROCEDURE — 20610 DRAIN/INJ JOINT/BURSA W/O US: CPT | Performed by: SPECIALIST

## 2020-11-12 PROCEDURE — 99214 OFFICE O/P EST MOD 30 MIN: CPT | Performed by: SPECIALIST

## 2020-11-12 PROCEDURE — 73030 X-RAY EXAM OF SHOULDER: CPT | Performed by: SPECIALIST

## 2020-11-12 RX ORDER — BETAMETHASONE SODIUM PHOSPHATE AND BETAMETHASONE ACETATE 3; 3 MG/ML; MG/ML
3 INJECTION, SUSPENSION INTRA-ARTICULAR; INTRALESIONAL; INTRAMUSCULAR; SOFT TISSUE ONCE
Status: COMPLETED | OUTPATIENT
Start: 2020-11-12 | End: 2020-11-12

## 2020-11-12 RX ADMIN — BETAMETHASONE SODIUM PHOSPHATE AND BETAMETHASONE ACETATE 3 MG: 3; 3 INJECTION, SUSPENSION INTRA-ARTICULAR; INTRALESIONAL; INTRAMUSCULAR; SOFT TISSUE at 09:28
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
## 2020-11-19 ENCOUNTER — HOSPITAL ENCOUNTER (OUTPATIENT)
Age: 55
Discharge: HOME | End: 2020-11-19
Attending: PHYSICAL MEDICINE & REHABILITATION
Payer: COMMERCIAL

## 2020-11-19 PROCEDURE — 72141 MRI NECK SPINE W/O DYE: CPT

## 2020-12-01 ENCOUNTER — OFFICE VISIT (OUTPATIENT)
Dept: ORTHOPEDIC SURGERY | Age: 55
End: 2020-12-01
Payer: COMMERCIAL

## 2020-12-01 VITALS
HEART RATE: 74 BPM | HEIGHT: 64 IN | RESPIRATION RATE: 18 BRPM | SYSTOLIC BLOOD PRESSURE: 139 MMHG | DIASTOLIC BLOOD PRESSURE: 83 MMHG | BODY MASS INDEX: 33.51 KG/M2 | TEMPERATURE: 97.8 F

## 2020-12-01 DIAGNOSIS — M79.18 CERVICAL MYOFASCIAL PAIN SYNDROME: ICD-10-CM

## 2020-12-01 DIAGNOSIS — M54.12 CERVICAL RADICULOPATHY: Primary | ICD-10-CM

## 2020-12-01 DIAGNOSIS — M25.512 BILATERAL SHOULDER PAIN, UNSPECIFIED CHRONICITY: ICD-10-CM

## 2020-12-01 DIAGNOSIS — M54.2 NECK PAIN: ICD-10-CM

## 2020-12-01 DIAGNOSIS — M25.511 BILATERAL SHOULDER PAIN, UNSPECIFIED CHRONICITY: ICD-10-CM

## 2020-12-01 PROCEDURE — 99213 OFFICE O/P EST LOW 20 MIN: CPT | Performed by: PHYSICAL MEDICINE & REHABILITATION
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
## 2020-12-01 NOTE — LETTER
NOTIFICATION RETURN TO WORK / SCHOOL 
 
12/1/2020 12:44 PM 
 
Ms. Malissa Gaston [address]  [place] [address]   [address] [address] To Whom It May Concern: 
 
Malissa Gaston is currently under the care of [facility]. She will remain out of work until Friday 12/4/2020. If there are questions or concerns please have the patient contact our office.  
 
 
 
Sincerely, 
 
 
Alaina Matamoros MD

## 2020-12-04 ENCOUNTER — OFFICE VISIT (OUTPATIENT)
Dept: ORTHOPEDIC SURGERY | Age: 55
End: 2020-12-04
Payer: COMMERCIAL

## 2020-12-04 VITALS
BODY MASS INDEX: 34.38 KG/M2 | DIASTOLIC BLOOD PRESSURE: 69 MMHG | HEART RATE: 89 BPM | HEIGHT: 63 IN | OXYGEN SATURATION: 100 % | TEMPERATURE: 96.9 F | WEIGHT: 194 LBS | SYSTOLIC BLOOD PRESSURE: 128 MMHG

## 2020-12-04 DIAGNOSIS — R20.2 PARESTHESIA OF LEFT ARM: ICD-10-CM

## 2020-12-04 DIAGNOSIS — R20.2 PARESTHESIA OF RIGHT ARM: ICD-10-CM

## 2020-12-04 DIAGNOSIS — M54.12 CERVICAL RADICULOPATHY: ICD-10-CM

## 2020-12-04 DIAGNOSIS — M50.20 HNP (HERNIATED NUCLEUS PULPOSUS), CERVICAL: Primary | ICD-10-CM

## 2020-12-04 DIAGNOSIS — M79.18 CERVICAL MYOFASCIAL PAIN SYNDROME: ICD-10-CM

## 2020-12-04 PROCEDURE — 99204 OFFICE O/P NEW MOD 45 MIN: CPT | Performed by: ORTHOPAEDIC SURGERY

## 2020-12-04 RX ORDER — TOPIRAMATE 100 MG/1
100 TABLET, FILM COATED ORAL 2 TIMES DAILY
Qty: 60 TAB | Refills: 2 | Status: SHIPPED | OUTPATIENT
Start: 2020-12-04 | End: 2022-03-23

## 2020-12-04 NOTE — PROGRESS NOTES
[facility]  [address], [address]  [facility], [address]  Phone: [phone]  Fax: [phone]  INITIAL CONSULTATION  Patient: Kandice Vergara                MRN: [id number]       SSN: [id number]  YOB: 1965        AGE: 60 y.o. SEX: female  Body mass index is 34.37 kg/m². PCP: Francesco Magallanes NP  12/04/20    Chief Complaint   Patient presents with    Neck Pain    Arm Pain     bilateral         HISTORY OF PRESENT ILLNESS, RADIOGRAPHS, and PLAN:       Terese Andrea is seen today at the request of Dr. Mel Youssef. 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 She has had this progression of neck pain in her right and lower left arms that gives her pain somewhat from her neck down to her elbows. [place] through the day of work she will find herself unable to either do anything because of the severity of the pain. Is been through therapy and other interventions without help I believe she has had injections without help. Her physical exam demonstrates good strength sensation and reflexes. MRI demonstrates mild multilevel degenerative changes some areas of foraminal stenosis but nothing dramatic or fixed given the variance the nurse symptoms. At this point I do not see anything I would consider surgical I would like to obtain a bilateral upper extremity EMG to see if we can find any evidence of a focal radiculopathy that would allow 1 level of her neck to send out over another in terms of intervention to see if something is concordant. I would also start her on a neuropathic such as Topamax. This dictation was created utilizing voice recognition software. Errors may be present.      Past Medical History:   Diagnosis Date    Bronchitis     Carpal tunnel syndrome on right     Chronic bronchitis (HCC)     De Quervain's syndrome (tenosynovitis)     Depression     resolved    Ectopic pregnancy     two times    Epilepsy ([address]     Multinodular goiter     Seizure ([address]     Sinus infection     Stenosing tenosynovitis of thumb     Right side    Trigger thumb of left hand        Family History   Problem Relation Age of Onset    Heart Attack Brother          in 62s    Diabetes Father     Hypertension Father     Cancer Father         colon CA    Heart Disease Sister         stent placement    Hypertension Brother     Other Brother         Stomach problems/ulcers    Cancer Sister         Breast CA    Stroke Sister     Diabetes Mother     Hypertension Mother        Current Outpatient Medications   Medication Sig Dispense Refill    acetaminophen (TYLENOL EX STR RAPID RELEASE PO) Take  by mouth.  carBAMazepine (TEGRETOL) 200 mg tablet One tab in AM, one tab at lunch, and two tabs before bed 90 Tab 0    cyclobenzaprine (FLEXERIL) 10 mg tablet Take 1 Tab by mouth three (3) times daily as needed for Muscle Spasm(s). 10 Tab 0    naproxen sodium (Aleve) 220 mg cap Take 1 Tab by mouth two (2) times daily as needed.          Allergies   Allergen Reactions    Etodolac Other (comments)     headache       Past Surgical History:   Procedure Laterality Date    COLONOSCOPY N/A 10/21/2016    COLONOSCOPY w/ biopsy performed by Michelle Yost MD at [address] [place] [address] HX APPENDECTOMY  1/12/15    lysis of adhesions    HX CHOLECYSTECTOMY      HX HYSTERECTOMY      HX OTHER SURGICAL      scar tissue removed from intestines    HX PARTIAL HYSTERECTOMY  2009    HX TONSILLECTOMY         Past Medical History:   Diagnosis Date    Bronchitis     Carpal tunnel syndrome on right     Chronic bronchitis (HCC)     De Quervain's syndrome (tenosynovitis)     Depression     resolved    Ectopic pregnancy     two times    Epilepsy ([address]     Multinodular goiter     Seizure ([address]     Sinus infection     Stenosing tenosynovitis of thumb     Right side    Trigger thumb of left hand        Social History Socioeconomic History    Marital status: LEGALLY      Spouse name: Not on file    Number of children: 1    Years of education: Not on file    Highest education level: Not on file   Occupational History    Occupation: CNA     Employer: [employer]     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   Social Needs    Financial resource strain: Not on file    Food insecurity     Worry: Not on file     Inability: Not on file   [employer] needs     Medical: Not on file     Non-medical: Not on file   Tobacco Use    Smoking status: Current Every Day Smoker     Packs/day: 0.50     Years: 38.00     Pack years: 19.00     Types: Cigarettes    Smokeless tobacco: Never Used    Tobacco comment: second hand smoke from family   Substance and Sexual Activity    Alcohol use: No    Drug use: No    Sexual activity: Not Currently   Lifestyle    Physical activity     Days per week: Not on file     Minutes per session: Not on file    Stress: Not on file   Relationships    Social connections     Talks on phone: Not on file     Gets together: Not on file     Attends Confucianism service: Not on file     Active member of club or organization: Not on file     Attends meetings of clubs or organizations: Not on file     Relationship status: Not on file    Intimate partner violence     Fear of current or ex partner: Not on file     Emotionally abused: Not on file     Physically abused: Not on file     Forced sexual activity: Not on file   Other Topics Concern    Not on file   Social History Narrative    Not on file       REVIEW OF SYSTEMS:   CONSTITUTIONAL SYMPTOMS:  Negative. EYES:  Negative. EARS, NOSE, THROAT AND MOUTH:  Negative. CARDIOVASCULAR:  Negative. RESPIRATORY:  Negative. GENITOURINARY: Per HPI. GASTROINTESTINAL:  Per HPI. INTEGUMENTARY (SKIN AND/OR BREAST):  Negative. MUSCULOSKELETAL: Per HPI.   ENDOCRINE/RHEUMATOLOGIC:  Negative. NEUROLOGICAL:  Per HPI.    HEMATOLOGIC/LYMPHATIC: Negative. ALLERGIC/IMMUNOLOGIC:  Negative. PSYCHIATRIC:  Negative. PHYSICAL EXAMINATION:   Visit Vitals  /69 (BP 1 Location: Left arm, BP Patient Position: Sitting)   Pulse 89   Temp 96.9 °F (36.1 °C) (Oral)   Ht 5' 3\" (1.6 m)   Wt 194 lb (88 kg)   SpO2 100%   BMI 34.37 kg/m²    PAIN SCALE: 7/10    CONSTITUTIONAL: The patient is in no apparent distress and is alert and oriented x 3. HEENT: Normocephalic. Hearing grossly intact. NECK: Supple and symmetric. no tenderness, or masses were felt. RESPIRATORY: No labored breathing. CARDIOVASCULAR: The carotid pulses were normal. Peripheral pulses were 2+. CHEST: Normal AP diameter and normal contour without any kyphoscoliosis. LYMPHATIC: No lymphadenopathy was appreciated in the neck, axillae or groin. SKIN:  Negative for scars, rashes, lesions, or ulcers on the right upper, right lower, left upper, left lower and trunk. NEUROLOGICAL: Alert and oriented x 3. Ambulation without assistive device. FWB. EXTREMITIES:  See musculoskeletal.  MUSCULOSKELETAL:   Head and Neck: Neck pain radiating to BUE. Burning sensations. Negative for misalignment, asymmetry, crepitation, defects, tenderness masses or effusions.  Left Upper Extremity: Radicular pain to elbow. Paresthesias. Inspection, percussion and palpation performed. Nortons sign is negative.  Right Upper Extremity: Radicular pain to elbow. Paresthesias. Inspection, percussion and palpation performed. Nortons sign is negative.  Spine, Ribs and Pelvis: Inspection, percussion and palpation performed. Negative for misalignment, asymmetry, crepitation, defects, tenderness masses or effusions.  Left Lower Extremity: Inspection, percussion and palpation performed. Negative straight leg raise.  Right Lower Extremity: Inspection, percussion and palpation performed. Negative straight leg raise. SPINE EXAM:     Cervical spine: Neck is midline. Normal muscle tone.  No focal atrophy is noted.    Lumbar spine: No rash, ecchymosis, or gross obliquity. No focal atrophy is noted. ASSESSMENT    ICD-10-CM ICD-9-CM    1. HNP (herniated nucleus pulposus), cervical  M50.20 722.0    2. Cervical radiculopathy  M54.12 723.4    3. Cervical myofascial pain syndrome  M79.18 729.1    4. Paresthesia of left arm  R20.2 782.0 topiramate (TOPAMAX) 100 mg tablet      EMG TWO EXTREMITIES UPPER   5. Paresthesia of right arm  R20.2 782.0 topiramate (TOPAMAX) 100 mg tablet      EMG TWO EXTREMITIES UPPER       Written by Jose Iraheta, as dictated by Talha Friedman MD.    I, Dr. Talha Friedman MD, confirm that all documentation is accurate.

## 2020-12-04 NOTE — LETTER
12/4/20 Patient: Shimon Smith YOB: 1965 Date of Visit: 12/4/2020 Burnette Najjar, NP 
[address] [facility] [address] VIA In Basket Dear Burnette Najjar, NP, Thank you for referring Ms. Shimon Smith to  ORTHOPAEDICS for evaluation. My notes for this consultation are attached. If you have questions, please do not hesitate to call me. I look forward to following your patient along with you.  
 
 
Sincerely, 
 
Ty Diaz MD

## 2020-12-14 ENCOUNTER — OFFICE VISIT (OUTPATIENT)
Dept: ORTHOPEDIC SURGERY | Age: 55
End: 2020-12-14
Payer: COMMERCIAL

## 2020-12-14 VITALS
WEIGHT: 198 LBS | TEMPERATURE: 98.3 F | HEIGHT: 63 IN | RESPIRATION RATE: 18 BRPM | SYSTOLIC BLOOD PRESSURE: 153 MMHG | BODY MASS INDEX: 35.08 KG/M2 | DIASTOLIC BLOOD PRESSURE: 90 MMHG | HEART RATE: 80 BPM

## 2020-12-14 DIAGNOSIS — R20.2 PARESTHESIA OF LEFT ARM: Primary | ICD-10-CM

## 2020-12-14 DIAGNOSIS — G56.03 CARPAL TUNNEL SYNDROME, BILATERAL: ICD-10-CM

## 2020-12-14 DIAGNOSIS — R20.2 PARESTHESIA OF RIGHT ARM: ICD-10-CM

## 2020-12-14 DIAGNOSIS — R94.131 ABNORMAL EMG: ICD-10-CM

## 2020-12-14 PROCEDURE — 95886 MUSC TEST DONE W/N TEST COMP: CPT | Performed by: PHYSICAL MEDICINE & REHABILITATION

## 2020-12-14 PROCEDURE — 95911 NRV CNDJ TEST 9-10 STUDIES: CPT | Performed by: PHYSICAL MEDICINE & REHABILITATION
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
## 2020-12-18 ENCOUNTER — OFFICE VISIT (OUTPATIENT)
Dept: ORTHOPEDIC SURGERY | Age: 55
End: 2020-12-18
Payer: COMMERCIAL

## 2020-12-18 VITALS
WEIGHT: 199 LBS | BODY MASS INDEX: 35.26 KG/M2 | DIASTOLIC BLOOD PRESSURE: 79 MMHG | HEIGHT: 63 IN | HEART RATE: 85 BPM | SYSTOLIC BLOOD PRESSURE: 150 MMHG | TEMPERATURE: 98 F | OXYGEN SATURATION: 99 % | RESPIRATION RATE: 14 BRPM

## 2020-12-18 DIAGNOSIS — M54.12 CERVICAL RADICULOPATHY: ICD-10-CM

## 2020-12-18 DIAGNOSIS — G56.22 CUBITAL TUNNEL SYNDROME, LEFT: ICD-10-CM

## 2020-12-18 DIAGNOSIS — M79.18 CERVICAL MYOFASCIAL PAIN SYNDROME: ICD-10-CM

## 2020-12-18 DIAGNOSIS — M25.512 ACUTE PAIN OF LEFT SHOULDER: ICD-10-CM

## 2020-12-18 DIAGNOSIS — M50.20 HNP (HERNIATED NUCLEUS PULPOSUS), CERVICAL: Primary | ICD-10-CM

## 2020-12-18 DIAGNOSIS — G56.03 CARPAL TUNNEL SYNDROME, BILATERAL: ICD-10-CM

## 2020-12-18 PROCEDURE — 99214 OFFICE O/P EST MOD 30 MIN: CPT | Performed by: ORTHOPAEDIC SURGERY

## 2020-12-18 RX ORDER — METHYLPREDNISOLONE 4 MG/1
TABLET ORAL
Qty: 1 DOSE PACK | Refills: 0 | Status: SHIPPED | OUTPATIENT
Start: 2020-12-18 | End: 2021-07-26

## 2020-12-18 NOTE — LETTER
12/18/2020 Patient: Shirley Doyle YOB: 1965 Date of Visit: 12/18/2020 Ravinder Culver NP 
[address] [facility] [address] [facility] [address] [address] [employer] Dear Ravinder Culver NP, Thank you for referring Ms. Shirley Doyle to  ORTHOPAEDICS for evaluation. My notes for this consultation are attached. If you have questions, please do not hesitate to call me. I look forward to following your patient along with you.  
 
 
Sincerely, 
 
Orlando Paredes MD

## 2020-12-18 NOTE — PROGRESS NOTES
[facility]  [address]  [place], [address] [place] [address] Street  Phone: [phone]  Fax: [phone]  PROGRESS NOTE  Patient: Malissa Gaston                MRN: [id number]       SSN: [id number]  YOB: 1965        AGE: 60 y.o. SEX: female  Body mass index is 35.25 kg/m². PCP: Qi Osborne NP  12/18/20    Chief Complaint   Patient presents with    Neck Pain    Arm Pain     bilateral    Hand Pain     bilateral    Follow-up     EMG       HISTORY OF PRESENT ILLNESS, RADIOGRAPHS, and PLAN:     Turns today. She is in a lot of discomfort. Slight left shoulder pain and popping sensation instability sound arthritic sample left shoulder. She has had her nerve conduction studies done. They demonstrated no cervically mediated findings they found the issues with ulnar nerve and median nerve compression bilaterally in her extremities this would explain the pain she gets in her hands wrists forearms. She does get some upper arm pain is hard for us to localize it. Looking at the MRI of her cervical spine the only significant pathology I truly see would be possibly C7-T1 foraminal stenosis bilaterally due to degenerative changes. Nothing showed up on her EMG however. I be hesitant to address it. At this point I am a try her on a Medrol Dosepak neuropathic's have not been helpful on a forniceal bolus of anti-inflammatories we will cut down her pain. And have her see a shoulder specialist for her left shoulder and right hand surgeon for her elbows and hands . This dictation was created utilizing voice recognition software. Errors may be present.        Past Medical History:   Diagnosis Date    Bronchitis     Carpal tunnel syndrome on right     Chronic bronchitis (HCC)     De Quervain's syndrome (tenosynovitis)     Depression     resolved    Ectopic pregnancy     two times    Epilepsy ([address]     Multinodular goiter     Seizure ([address]  Sinus infection     Stenosing tenosynovitis of thumb     Right side    Trigger thumb of left hand        Family History   Problem Relation Age of Onset    Heart Attack Brother          in 62s    Diabetes Father     Hypertension Father     Cancer Father         colon CA    Heart Disease Sister         stent placement    Hypertension Brother     Other Brother         Stomach problems/ulcers    Cancer Sister         Breast CA    Stroke Sister     Diabetes Mother     Hypertension Mother        Current Outpatient Medications   Medication Sig Dispense Refill    acetaminophen (TYLENOL EX STR RAPID RELEASE PO) Take  by mouth.  topiramate (TOPAMAX) 100 mg tablet Take 1 Tab by mouth two (2) times a day. 60 Tab 2    cyclobenzaprine (FLEXERIL) 10 mg tablet Take 1 Tab by mouth three (3) times daily as needed for Muscle Spasm(s). 10 Tab 0    naproxen sodium (Aleve) 220 mg cap Take 1 Tab by mouth two (2) times daily as needed.       carBAMazepine (TEGRETOL) 200 mg tablet One tab in AM, one tab at lunch, and two tabs before bed 90 Tab 0       Allergies   Allergen Reactions    Etodolac Other (comments)     headache       Past Surgical History:   Procedure Laterality Date    COLONOSCOPY N/A 10/21/2016    COLONOSCOPY w/ biopsy performed by Av Pope MD at  [address] HX APPENDECTOMY  1/12/15    lysis of adhesions    HX CHOLECYSTECTOMY      HX HYSTERECTOMY      HX OTHER SURGICAL      scar tissue removed from intestines    HX PARTIAL HYSTERECTOMY  2009    HX TONSILLECTOMY         Past Medical History:   Diagnosis Date    Bronchitis     Carpal tunnel syndrome on right     Chronic bronchitis (HCC)     De Quervain's syndrome (tenosynovitis)     Depression     resolved    Ectopic pregnancy     two times    Epilepsy ([address]     Multinodular goiter     Seizure ([address]     Sinus infection     Stenosing tenosynovitis of thumb     Right side    Trigger thumb of left hand        Social History     Socioeconomic History    Marital status: LEGALLY      Spouse name: Not on file    Number of children: 1    Years of education: Not on file    Highest education level: Not on file   Occupational History    Occupation: CNA     Employer: [employer]     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   Social Needs    Financial resource strain: Not on file    Food insecurity     Worry: Not on file     Inability: Not on file   [employer] needs     Medical: Not on file     Non-medical: Not on file   Tobacco Use    Smoking status: Current Every Day Smoker     Packs/day: 0.50     Years: 38.00     Pack years: 19.00     Types: Cigarettes    Smokeless tobacco: Never Used    Tobacco comment: second hand smoke from family   Substance and Sexual Activity    Alcohol use: No    Drug use: No    Sexual activity: Not Currently   Lifestyle    Physical activity     Days per week: Not on file     Minutes per session: Not on file    Stress: Not on file   Relationships    Social connections     Talks on phone: Not on file     Gets together: Not on file     Attends Mosque service: Not on file     Active member of club or organization: Not on file     Attends meetings of clubs or organizations: Not on file     Relationship status: Not on file    Intimate partner violence     Fear of current or ex partner: Not on file     Emotionally abused: Not on file     Physically abused: Not on file     Forced sexual activity: Not on file   Other Topics Concern    Not on file   Social History Narrative    Not on file       REVIEW OF SYSTEMS:   CONSTITUTIONAL SYMPTOMS:  Negative. EYES:  Negative. EARS, NOSE, THROAT AND MOUTH:  Negative. CARDIOVASCULAR:  Negative. RESPIRATORY:  Negative. GENITOURINARY: Per HPI. GASTROINTESTINAL:  Per HPI. INTEGUMENTARY (SKIN AND/OR BREAST):  Negative. MUSCULOSKELETAL: Per HPI.   ENDOCRINE/RHEUMATOLOGIC:  Negative. NEUROLOGICAL:  Per HPI.    HEMATOLOGIC/LYMPHATIC: Negative. ALLERGIC/IMMUNOLOGIC:  Negative. PSYCHIATRIC:  Negative. PHYSICAL EXAMINATION:   Visit Vitals  BP (!) 150/79   Pulse 85   Temp 98 °F (36.7 °C) (Oral)   Resp 14   Ht 5' 3\" (1.6 m)   Wt 199 lb (90.3 kg)   SpO2 99%   BMI 35.25 kg/m²    PAIN SCALE: 7/10    CONSTITUTIONAL: The patient is in no apparent distress and is alert and oriented x 3. HEENT: Normocephalic. Hearing grossly intact. NECK: Supple and symmetric. no tenderness, or masses were felt. RESPIRATORY: No labored breathing. CARDIOVASCULAR: The carotid pulses were normal. Peripheral pulses were 2+. CHEST: Normal AP diameter and normal contour without any kyphoscoliosis. LYMPHATIC: No lymphadenopathy was appreciated in the neck, axillae or groin. SKIN: Negative for scars, rashes, lesions, or ulcers on the right upper, right lower, left upper, left lower and trunk. NEUROLOGICAL: Alert and oriented x 3. Ambulation without assistive device. FWB. EXTREMITIES: See musculoskeletal.  MUSCULOSKELETAL:   Head and Neck: Sharp neck pain. Limited ROM. Negative for misalignment, asymmetry, crepitation, defects, tenderness masses or effusions.  Left Upper Extremity: Inspection, percussion and palpation performed. Nortons sign is negative.  Right Upper Extremity: Burning sensation from hand to elbow. Inspection, percussion and palpation performed. Nortons sign is negative.  Spine, Ribs and Pelvis: Inspection, percussion and palpation performed. Negative for misalignment, asymmetry, crepitation, defects, tenderness masses or effusions.  Left Lower Extremity: Inspection, percussion and palpation performed. Negative straight leg raise.  Right Lower Extremity: Inspection, percussion and palpation performed. Negative straight leg raise. SPINE EXAM:     Cervical spine: Neck is midline. Normal muscle tone. No focal atrophy is noted. ASSESSMENT    ICD-10-CM ICD-9-CM    1.  HNP (herniated nucleus pulposus), cervical M50.20 722.0    2. Cervical myofascial pain syndrome  M79.18 729.1 methylPREDNISolone (MEDROL DOSEPACK) 4 mg tablet   3. Cervical radiculopathy  M54.12 723.4    4. Carpal tunnel syndrome, bilateral  G56.03 354.0 REFERRAL TO ORTHOPEDICS   5. Cubital tunnel syndrome, left  G56.22 354.2 REFERRAL TO ORTHOPEDICS   6. Acute pain of left shoulder  M25.512 719.41 REFERRAL TO ORTHOPEDICS       Written by Janis Deutsch, as dictated by Beba Shin MD.    I, Dr. Beba Shin MD, confirm that all documentation is accurate.

## 2021-01-04 ENCOUNTER — OFFICE VISIT (OUTPATIENT)
Dept: ORTHOPEDIC SURGERY | Age: 56
End: 2021-01-04
Payer: COMMERCIAL

## 2021-01-04 VITALS
TEMPERATURE: 96.8 F | DIASTOLIC BLOOD PRESSURE: 84 MMHG | BODY MASS INDEX: 35.61 KG/M2 | SYSTOLIC BLOOD PRESSURE: 150 MMHG | HEIGHT: 63 IN | HEART RATE: 85 BPM | WEIGHT: 201 LBS

## 2021-01-04 DIAGNOSIS — G56.03 CARPAL TUNNEL SYNDROME, BILATERAL: Primary | ICD-10-CM

## 2021-01-04 DIAGNOSIS — G56.22 CUBITAL TUNNEL SYNDROME, LEFT: ICD-10-CM

## 2021-01-04 PROCEDURE — 20526 THER INJECTION CARP TUNNEL: CPT | Performed by: ORTHOPAEDIC SURGERY

## 2021-01-04 PROCEDURE — 99214 OFFICE O/P EST MOD 30 MIN: CPT | Performed by: ORTHOPAEDIC SURGERY

## 2021-01-04 NOTE — PROGRESS NOTES
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 Worker's Compensation and legal considerations: none filed. Vitals:    01/04/21 0929   BP: (!) 150/84   Pulse: 85   Temp: 96.8 °F (36 °C)   Weight: 201 lb (91.2 kg)   Height: 5' 3\" (1.6 m)   PainSc:   5   PainLoc: Hand           Chief Complaint   Patient presents with    Hand Pain     bilateral         HPI: Patient presents today after an EMG positive for bilateral mononeuropathies. She reports the right side to be worse and has numbness most of the time. She has a history of cervical radiculopathy as well.     Date of onset: 2020 or earlier    Injury: No    Prior Treatment:  No    Numbness/ Tingling: Yes: Comment: Right much worse than left      ROS: Review of Systems - General ROS: negative  Psychological ROS: negative  ENT ROS: negative  Allergy and Immunology ROS: negative  Hematological and Lymphatic ROS: negative  Respiratory ROS: no cough, shortness of breath, or wheezing  Cardiovascular ROS: no chest pain or dyspnea on exertion  Gastrointestinal ROS: no abdominal pain, change in bowel habits, or black or bloody stools  Musculoskeletal ROS: positive for - pain in hand - right  Neurological ROS: positive for - numbness/tingling  Dermatological ROS: negative    Past Medical History:   Diagnosis Date    Bronchitis     Carpal tunnel syndrome on right     Chronic bronchitis (HCC)     De Quervain's syndrome (tenosynovitis)     Depression     resolved    Ectopic pregnancy     two times    Epilepsy ([address]     Multinodular goiter     Seizure ([address]     Sinus infection     Stenosing tenosynovitis of thumb     Right side    Trigger thumb of left hand        Past Surgical History:   Procedure Laterality Date    COLONOSCOPY N/A 10/21/2016    COLONOSCOPY w/ biopsy performed by Campbell Simmons MD [address] HX APPENDECTOMY  1/12/15    lysis of adhesions    HX CHOLECYSTECTOMY      HX HYSTERECTOMY      HX OTHER SURGICAL      scar tissue removed from intestines    HX PARTIAL HYSTERECTOMY  11/2009    HX TONSILLECTOMY         Current Outpatient Medications   Medication Sig Dispense Refill    acetaminophen (TYLENOL EX STR RAPID RELEASE PO) Take  by mouth.  carBAMazepine (TEGRETOL) 200 mg tablet One tab in AM, one tab at lunch, and two tabs before bed 90 Tab 0    methylPREDNISolone (MEDROL DOSEPACK) 4 mg tablet Take as directed. 1 Dose Pack 0    topiramate (TOPAMAX) 100 mg tablet Take 1 Tab by mouth two (2) times a day. 60 Tab 2    cyclobenzaprine (FLEXERIL) 10 mg tablet Take 1 Tab by mouth three (3) times daily as needed for Muscle Spasm(s). 10 Tab 0    naproxen sodium (Aleve) 220 mg cap Take 1 Tab by mouth two (2) times daily as needed. Allergies   Allergen Reactions    Etodolac Other (comments)     headache           PE:     Physical Exam  Vitals signs and nursing note reviewed. Constitutional:       General: She is not in acute distress. Appearance: Normal appearance. She is not ill-appearing. Eyes:      Extraocular Movements: Extraocular movements intact. Pupils: Pupils are equal, round, and reactive to light. Neck:      Musculoskeletal: Normal range of motion and neck supple. Cardiovascular:      Pulses: Normal pulses. Pulmonary:      Effort: Pulmonary effort is normal. No respiratory distress. Abdominal:      General: Abdomen is flat. There is no distension. Musculoskeletal: Normal range of motion. General: No swelling, tenderness, deformity or signs of injury. Right lower leg: No edema. Left lower leg: No edema. Skin:     General: Skin is warm and dry. Capillary Refill: Capillary refill takes less than 2 seconds. Findings: No bruising or erythema. Neurological:      General: No focal deficit present. Mental Status: She is alert and oriented to person, place, and time. Cranial Nerves: No cranial nerve deficit. Sensory: No sensory deficit.    Psychiatric: Mood and Affect: Mood normal.         Behavior: Behavior normal.            NEUROVASCULAR    Examination L R Examination L R   Carpal Comp. +- + Pronator Comp. - -   Carpal Tinel +- ++ Pronator Tinel - -   Phalen's - + Pronator Stress - -   Cubital Comp. - - Guyon Comp. - -   Cubital Tinel - - Guyon Tinel - -   Elbow Hyperflexion - - Adson's - -   Spurling's - - SC Comp. - -   PCB Median abn - - SC Tinel - -   Radial Tinel - - IC Comp. - -   Digital Tinel - - IC Tinel - -   Radial 2-Pt WNL WNL Ulnar 2-Pt WNL WNL     Radial Pulse: 2+  Capillary Refill: < 2 sec  [employer]: Not Performed  [employer]: Not Performed        NCV & EMG Findings:  Evaluation of the right median motor nerve showed prolonged distal onset latency (4.8 ms). The left ulnar motor nerve showed decreased conduction velocity (A Elbow-B Elbow, 42 m/s). The left median sensory and the right median sensory nerves showed prolonged distal peak latency (L3.9, R4.0 ms) and decreased conduction velocity (Wrist-2nd Digit, L36, R35 m/s). All remaining nerves (as indicated in the following tables) were within normal limits. Left vs. Right side comparison data for the median motor nerve indicates abnormal L-R velocity difference (Elbow-Wrist, 10 m/s). All remaining left vs. right side differences were within normal limits.       All examined muscles (as indicated in the following table) showed no evidence of electrical instability.       INTERPRETATION     This is an abnormal electrodiagnostic examination. These findings may be consistent with:   1. Moderate median mononeuropathy at the right wrist (carpal tunnel syndrome)   2. Mild median mononeuropathy at the left wrist (carpal tunnel syndrome)   3.  Mild ulnar mononeuropathy at the left elbow (cubital tunnel syndrome)     There is no electrodiagnostic evidence of any cervical radiculopathy, brachial plexopathy, peripheral polyneuropathy, or any other mononeuropathy.     CLINICAL INTERPRETATION  Her electrodiagnostic findings of bilateral carpal tunnel syndrome and left cubital tunnel syndrome are consistent with some of her symptoms. There is no evidence of radiculopathy to explain her shoulder symptoms. Imaging:     None indicated today        ICD-10-CM ICD-9-CM    1. Carpal tunnel syndrome, bilateral  G56.03 354.0 AMB SUPPLY ORDER      INJECT CARPAL TUNNEL      triamcinolone acetonide (KENALOG) 10 mg/mL injection 10 mg   2. Cubital tunnel syndrome, left  G56.22 354.2          Plan:     Bilateral carpal tunnel injections and bilateral carpal tunnel braces. I had a discussion with patient regarding operative versus nonoperative treatment and the likely need for carpal tunnel release on the right side in the future. She would like to pursue nonoperative treatment at this time. Follow-up and Dispositions    · Return if symptoms worsen or fail to improve. Plan was reviewed with patient, who verbalized agreement and understanding of the plan    [address] [facility] NOTE        Chart reviewed for the following:   Mauro ELAM DO, have reviewed the History, Physical and updated the Allergic reactions for [address] [place] [address] performed immediately prior to start of procedure:   IRadha DO, have performed the following reviews on [facility] prior to the start of the procedure:            * Patient was identified by name and date of birth   * Agreement on procedure being performed was verified  * Risks and Benefits explained to the patient  * Procedure site verified and marked as necessary  * Patient was positioned for comfort  * Consent was signed and verified     Time: 09:45 AM      Date of procedure: 1/4/2021    Procedure performed by:   Radha Davidson DO    Provider assisted by: Maylin Pittman LPN    Patient assisted by: self    How tolerated by patient: tolerated the procedure well with no complications    Post Procedural Pain Scale: 0 - No Hurt    Comments: none    Procedure:  After consent was obtained, using sterile technique the carpal tunnel was prepped. Local anesthetic used: 1% lidocaine. Kenalog 5 mg X2 and was then injected and the needle withdrawn. The procedure was well tolerated. The patient is asked to continue to rest the area for a few more days before resuming regular activities. It may be more painful for the first 1-2 days. Watch for fever, or increased swelling or persistent pain in the joint. Call or return to clinic prn if such symptoms occur or there is failure to improve as anticipated.

## 2021-01-04 NOTE — LETTER
NOTIFICATION RETURN TO WORK / SCHOOL 
 
1/4/2021 9:57 AM 
 
Ms. Adrianna Perez [address]  [facility] [address]   [address] [address] To Whom It May Concern: 
 
Adrianna Perez is currently under the care of [facility]. Patient was seen in our office today, she may return to work tomorrow 01/5/2021. If there are questions or concerns please have the patient contact our office. Sincerely, Bud Middleton, DO

## 2021-01-05 ENCOUNTER — OFFICE VISIT (OUTPATIENT)
Dept: ORTHOPEDIC SURGERY | Age: 56
End: 2021-01-05
Payer: COMMERCIAL

## 2021-01-05 VITALS
DIASTOLIC BLOOD PRESSURE: 77 MMHG | TEMPERATURE: 96 F | OXYGEN SATURATION: 100 % | SYSTOLIC BLOOD PRESSURE: 136 MMHG | RESPIRATION RATE: 16 BRPM | HEART RATE: 67 BPM | HEIGHT: 63 IN | BODY MASS INDEX: 35.44 KG/M2 | WEIGHT: 200 LBS

## 2021-01-05 DIAGNOSIS — M75.102 TEAR OF LEFT ROTATOR CUFF, UNSPECIFIED TEAR EXTENT, UNSPECIFIED WHETHER TRAUMATIC: Primary | ICD-10-CM

## 2021-01-05 PROCEDURE — 99214 OFFICE O/P EST MOD 30 MIN: CPT | Performed by: ORTHOPAEDIC SURGERY
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
## 2021-01-06 DIAGNOSIS — M75.102 TEAR OF LEFT ROTATOR CUFF, UNSPECIFIED TEAR EXTENT, UNSPECIFIED WHETHER TRAUMATIC: ICD-10-CM

## 2021-01-08 DIAGNOSIS — G89.29 CHRONIC NECK PAIN: ICD-10-CM

## 2021-01-08 DIAGNOSIS — M54.2 CHRONIC NECK PAIN: ICD-10-CM

## 2021-01-15 RX ORDER — DULOXETIN HYDROCHLORIDE 60 MG/1
CAPSULE, DELAYED RELEASE ORAL
Qty: 90 CAP | Refills: 1 | Status: SHIPPED | OUTPATIENT
Start: 2021-01-15 | End: 2022-03-23

## 2021-01-21 ENCOUNTER — TELEPHONE (OUTPATIENT)
Dept: ORTHOPEDIC SURGERY | Age: 56
End: 2021-01-21

## 2021-01-21 NOTE — TELEPHONE ENCOUNTER
Michelet Yanes from [address] called and said that a Peer to Peer is needed for the Mri of the Left Shoulder that was ordered by Najma Pisano. Michelet Yanes said to call for Peer to Peer  Tel. [phone]. Michelet Yanes said that the patient is supposed to get her mri done this Saturday 01/23/21. José Venegas. [phone].

## 2021-01-22 NOTE — TELEPHONE ENCOUNTER
Carlene Lewis from [employer] called reiterating this same message with the same phone number. Please advise.

## 2021-01-22 NOTE — TELEPHONE ENCOUNTER
Spoke with Chacha Echevarria from [facility] to inform of approval, she states she already received the authorization.

## 2021-01-23 ENCOUNTER — HOSPITAL ENCOUNTER (OUTPATIENT)
Age: 56
Discharge: HOME | End: 2021-01-23
Attending: ORTHOPAEDIC SURGERY
Payer: COMMERCIAL

## 2021-01-23 PROCEDURE — 73221 MRI JOINT UPR EXTREM W/O DYE: CPT

## 2021-02-24 ENCOUNTER — OFFICE VISIT (OUTPATIENT)
Dept: ORTHOPEDIC SURGERY | Age: 56
End: 2021-02-24
Payer: COMMERCIAL

## 2021-02-24 VITALS
DIASTOLIC BLOOD PRESSURE: 56 MMHG | RESPIRATION RATE: 16 BRPM | BODY MASS INDEX: 35.37 KG/M2 | HEIGHT: 63 IN | HEART RATE: 64 BPM | TEMPERATURE: 96.8 F | OXYGEN SATURATION: 98 % | WEIGHT: 199.6 LBS | SYSTOLIC BLOOD PRESSURE: 117 MMHG

## 2021-02-24 DIAGNOSIS — M19.012 GLENOHUMERAL ARTHRITIS, LEFT: ICD-10-CM

## 2021-02-24 DIAGNOSIS — M19.019 AC JOINT ARTHROPATHY: Primary | ICD-10-CM

## 2021-02-24 PROCEDURE — 20611 DRAIN/INJ JOINT/BURSA W/US: CPT | Performed by: ORTHOPAEDIC SURGERY

## 2021-02-24 PROCEDURE — 99214 OFFICE O/P EST MOD 30 MIN: CPT | Performed by: ORTHOPAEDIC SURGERY

## 2021-02-24 RX ORDER — TRIAMCINOLONE ACETONIDE 40 MG/ML
40 INJECTION, SUSPENSION INTRA-ARTICULAR; INTRAMUSCULAR ONCE
Status: COMPLETED | OUTPATIENT
Start: 2021-02-24 | End: 2021-02-24

## 2021-02-24 RX ADMIN — TRIAMCINOLONE ACETONIDE 40 MG: 40 INJECTION, SUSPENSION INTRA-ARTICULAR; INTRAMUSCULAR at 13:16
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
## 2021-03-30 ENCOUNTER — OFFICE VISIT (OUTPATIENT)
Dept: ORTHOPEDIC SURGERY | Age: 56
End: 2021-03-30
Payer: COMMERCIAL

## 2021-03-30 VITALS
HEIGHT: 60 IN | WEIGHT: 199.8 LBS | OXYGEN SATURATION: 100 % | HEART RATE: 58 BPM | TEMPERATURE: 96.9 F | BODY MASS INDEX: 39.23 KG/M2

## 2021-03-30 DIAGNOSIS — M19.012 GLENOHUMERAL ARTHRITIS, LEFT: ICD-10-CM

## 2021-03-30 DIAGNOSIS — M19.019 AC JOINT ARTHROPATHY: Primary | ICD-10-CM

## 2021-03-30 PROCEDURE — 99214 OFFICE O/P EST MOD 30 MIN: CPT | Performed by: ORTHOPAEDIC SURGERY

## 2021-04-05 ENCOUNTER — TELEPHONE (OUTPATIENT)
Dept: ORTHOPEDIC SURGERY | Age: 56
End: 2021-04-05

## 2021-04-05 NOTE — TELEPHONE ENCOUNTER
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  If she can not use arm at all outside of typing it will be 2 weeks

## 2021-05-03 ENCOUNTER — TELEPHONE (OUTPATIENT)
Dept: FAMILY MEDICINE CLINIC | Age: 56
End: 2021-05-03

## 2021-05-21 ENCOUNTER — VIRTUAL VISIT (OUTPATIENT)
Dept: FAMILY MEDICINE CLINIC | Age: 56
End: 2021-05-21
Payer: COMMERCIAL

## 2021-05-21 DIAGNOSIS — Z86.010 PERSONAL HISTORY OF COLONIC POLYPS: Primary | ICD-10-CM

## 2021-05-21 DIAGNOSIS — Z12.31 VISIT FOR SCREENING MAMMOGRAM: ICD-10-CM

## 2021-05-21 DIAGNOSIS — N64.4 BREAST TENDERNESS IN FEMALE: ICD-10-CM

## 2021-05-21 PROCEDURE — 99212 OFFICE O/P EST SF 10 MIN: CPT | Performed by: NURSE PRACTITIONER

## 2021-05-21 NOTE — PROGRESS NOTES
Donna Bailey is a 60 y.o. female who was seen by synchronous (real-time) audio-video technology on 5/21/2021 for No chief complaint on file. Assessment & Plan:   Diagnoses and all orders for this visit:    1. Personal history of colonic polyps  -     REFERRAL TO GASTROENTEROLOGY    2. Breast tenderness in female  -     POLA MAMMO BI DX INCL CAD; Future  -     US BREASTS LIMITED=<3 QUAD; Future    3. Visit for screening mammogram  -     [facility] MAMMO BI DX INCL CAD; Future  -     US BREASTS LIMITED=<3 QUAD; Future        I spent at least 15 minutes on this visit with this established patient. 712  Subjective:   Patient reports she needs 6 month mammogram.  Patient states she is having bilateral breast tenderness. Admits to increase caffeine intake as of late. Patient further reports this time for her colonoscopy. States she has a personal history of polyps. Upon chart review last colonoscopy was 10/2016 with GLST. Prior to Admission medications    Medication Sig Start Date End Date Taking? Authorizing Provider   DULoxetine (CYMBALTA) 60 mg capsule take 1 capsule by mouth once daily 1/15/21   Capri BUSTILLO NP   methylPREDNISolone (MEDROL DOSEPACK) 4 mg tablet Take as directed. 12/18/20   Consuelo Scruggs MD   acetaminophen (TYLENOL EX STR RAPID RELEASE PO) Take  by mouth. Provider, Historical   topiramate (TOPAMAX) 100 mg tablet Take 1 Tab by mouth two (2) times a day. 12/4/20   Consuelo Scruggs MD   cyclobenzaprine (FLEXERIL) 10 mg tablet Take 1 Tab by mouth three (3) times daily as needed for Muscle Spasm(s). 9/23/20   Summer Myers, DO   naproxen sodium (Aleve) 220 mg cap Take 1 Tab by mouth two (2) times daily as needed.     Provider, Historical   carBAMazepine (TEGRETOL) 200 mg tablet One tab in AM, one tab at lunch, and two tabs before bed 2/27/18   Deya Mota MD     Patient Active Problem List   Diagnosis Code    Chronic bronchitis ([facility] J42    SBO (small bowel obstruction) ([facility] K56.609    Intractable epilepsy without status epilepticus ([facility] [address] G40.919    Severe obesity ([facility] [address] E66.01    Depression F32.9     Patient Active Problem List    Diagnosis Date Noted    Depression     Severe obesity ([facility] [address] 01/09/2020    Intractable epilepsy without status epilepticus ([facility] [address] 04/13/2018    SBO (small bowel obstruction) ([facility]) 01/09/2015    Chronic bronchitis ([facility]) 01/08/2013     Current Outpatient Medications   Medication Sig Dispense Refill    DULoxetine (CYMBALTA) 60 mg capsule take 1 capsule by mouth once daily 90 Cap 1    methylPREDNISolone (MEDROL DOSEPACK) 4 mg tablet Take as directed. 1 Dose Pack 0    acetaminophen (TYLENOL EX STR RAPID RELEASE PO) Take  by mouth.  topiramate (TOPAMAX) 100 mg tablet Take 1 Tab by mouth two (2) times a day. 60 Tab 2    cyclobenzaprine (FLEXERIL) 10 mg tablet Take 1 Tab by mouth three (3) times daily as needed for Muscle Spasm(s). 10 Tab 0    naproxen sodium (Aleve) 220 mg cap Take 1 Tab by mouth two (2) times daily as needed.       carBAMazepine (TEGRETOL) 200 mg tablet One tab in AM, one tab at lunch, and two tabs before bed 90 Tab 0     Allergies   Allergen Reactions    Etodolac Other (comments)     headache     Past Medical History:   Diagnosis Date    Arthritis     left shoulder    Bronchitis     Carpal tunnel syndrome on right     Chronic bronchitis (HCC)     De Quervain's syndrome (tenosynovitis)     Depression     resolved    Ectopic pregnancy     two times    Epilepsy ([facility])     Multinodular goiter     Seizure ([facility] [address]     Sinus infection     Stenosing tenosynovitis of thumb     Right side    Trigger thumb of left hand      Past Surgical History:   Procedure Laterality Date    COLONOSCOPY N/A 10/21/2016    COLONOSCOPY w/ biopsy performed by Nilda Esparza MD at [address] HX APPENDECTOMY  1/12/15    lysis of adhesions    HX CHOLECYSTECTOMY      HX HYSTERECTOMY      HX OTHER SURGICAL      scar tissue removed from intestines    HX PARTIAL HYSTERECTOMY  2009    HX TONSILLECTOMY       Family History   Problem Relation Age of Onset    Heart Attack Brother          in 58s    Diabetes Father     Hypertension Father     Cancer Father         colon CA    Heart Disease Sister         stent placement    Hypertension Brother     Other Brother         Stomach problems/ulcers    Cancer Sister         Breast CA    Stroke Sister     Diabetes Mother     Hypertension Mother      Social History     Tobacco Use    Smoking status: Current Every Day Smoker     Packs/day: 0.50     Years: 38.00     Pack years: 19.00     Types: Cigarettes    Smokeless tobacco: Never Used    Tobacco comment: second hand smoke from family   Substance Use Topics    Alcohol use: No     Review of Systems   Constitutional: Negative for chills and fever. Respiratory: Negative for shortness of breath. Cardiovascular: Negative for chest pain and palpitations. Genitourinary:        Bilateral breast tenderness   Neurological: Negative for dizziness and headaches. Objective:   No flowsheet data found. General: alert, cooperative, no distress   Mental  status: normal mood, behavior, speech, dress, motor activity, and thought processes, able to follow commands   HENT: NCAT   Neck: no visualized mass   Resp: no respiratory distress   Neuro: no gross deficits   Skin: no discoloration or lesions of concern on visible areas   Psychiatric: normal affect, consistent with stated mood, no evidence of hallucinations     Additional exam findings: We discussed the expected course, resolution and complications of the diagnosis(es) in detail. Medication risks, benefits, costs, interactions, and alternatives were discussed as indicated. I advised her to contact the office if her condition worsens, changes or fails to improve as anticipated. She expressed understanding with the diagnosis(es) and plan.      Chong Powell, was evaluated through a synchronous (real-time) audio-video encounter. The patient (or guardian if applicable) is aware that this is a billable service. Verbal consent to proceed has been obtained within the past 12 months. The visit was conducted pursuant to the emergency declaration under the [address] [place] [address] authority and the [employer] and [employer] General Act. Patient identification was verified, and a caregiver was present when appropriate. The patient was located in a state where the provider was credentialed to provide care.     Deidra Andre NP

## 2021-05-27 ENCOUNTER — HOSPITAL ENCOUNTER (OUTPATIENT)
Dept: MAMMOGRAPHY | Age: 56
Discharge: HOME | End: 2021-05-27
Attending: NURSE PRACTITIONER
Payer: COMMERCIAL

## 2021-05-27 ENCOUNTER — HOSPITAL ENCOUNTER (OUTPATIENT)
Dept: ULTRASOUND IMAGING | Age: 56
Discharge: HOME | End: 2021-05-27
Attending: NURSE PRACTITIONER
Payer: COMMERCIAL

## 2021-05-27 DIAGNOSIS — Z12.31 VISIT FOR SCREENING MAMMOGRAM: ICD-10-CM

## 2021-05-27 DIAGNOSIS — N64.4 BREAST TENDERNESS IN FEMALE: ICD-10-CM

## 2021-05-27 PROCEDURE — 77062 BREAST TOMOSYNTHESIS BI: CPT

## 2021-05-27 PROCEDURE — 76642 ULTRASOUND BREAST LIMITED: CPT

## 2021-07-26 ENCOUNTER — HOSPITAL ENCOUNTER (EMERGENCY)
Age: 56
Discharge: HOME | End: 2021-07-26
Attending: EMERGENCY MEDICINE
Payer: COMMERCIAL

## 2021-07-26 ENCOUNTER — APPOINTMENT (OUTPATIENT)
Dept: CT IMAGING | Age: 56
End: 2021-07-26
Attending: PHYSICIAN ASSISTANT
Payer: COMMERCIAL

## 2021-07-26 VITALS
RESPIRATION RATE: 16 BRPM | DIASTOLIC BLOOD PRESSURE: 82 MMHG | WEIGHT: 180 LBS | TEMPERATURE: 98.9 F | SYSTOLIC BLOOD PRESSURE: 133 MMHG | OXYGEN SATURATION: 98 % | HEART RATE: 88 BPM | BODY MASS INDEX: 31.89 KG/M2 | HEIGHT: 63 IN

## 2021-07-26 DIAGNOSIS — R35.0 URINARY FREQUENCY: Primary | ICD-10-CM

## 2021-07-26 DIAGNOSIS — R10.31 ABDOMINAL PAIN, RIGHT LOWER QUADRANT: ICD-10-CM

## 2021-07-26 DIAGNOSIS — R10.9 RIGHT FLANK PAIN: ICD-10-CM

## 2021-07-26 LAB
ALBUMIN SERPL-MCNC: 3.7 G/DL (ref 3.4–5)
ALBUMIN/GLOB SERPL: 0.9 {RATIO} (ref 0.8–1.7)
ALP SERPL-CCNC: 99 U/L (ref 45–117)
ALT SERPL-CCNC: 25 U/L (ref 13–56)
ANION GAP SERPL CALC-SCNC: 7 MMOL/L (ref 3–18)
APPEARANCE UR: ABNORMAL
AST SERPL-CCNC: 16 U/L (ref 10–38)
BASOPHILS # BLD: 0 K/UL (ref 0–0.1)
BASOPHILS NFR BLD: 0 % (ref 0–2)
BILIRUB SERPL-MCNC: 0.3 MG/DL (ref 0.2–1)
BILIRUB UR QL: NEGATIVE
BUN SERPL-MCNC: 13 MG/DL (ref 7–18)
BUN/CREAT SERPL: 19 (ref 12–20)
CALCIUM SERPL-MCNC: 8.7 MG/DL (ref 8.5–10.1)
CHLORIDE SERPL-SCNC: 103 MMOL/L (ref 100–111)
CO2 SERPL-SCNC: 27 MMOL/L (ref 21–32)
COLOR UR: YELLOW
CREAT SERPL-MCNC: 0.67 MG/DL (ref 0.6–1.3)
DIFFERENTIAL METHOD BLD: ABNORMAL
EOSINOPHIL # BLD: 0 K/UL (ref 0–0.4)
EOSINOPHIL NFR BLD: 1 % (ref 0–5)
ERYTHROCYTE [DISTWIDTH] IN BLOOD BY AUTOMATED COUNT: 14.5 % (ref 11.6–14.5)
GLOBULIN SER CALC-MCNC: 4.3 G/DL (ref 2–4)
GLUCOSE SERPL-MCNC: 86 MG/DL (ref 74–99)
GLUCOSE UR STRIP.AUTO-MCNC: NEGATIVE MG/DL
HCT VFR BLD AUTO: 35.7 % (ref 35–45)
HGB BLD-MCNC: 11.9 G/DL (ref 12–16)
HGB UR QL STRIP: NEGATIVE
KETONES UR QL STRIP.AUTO: ABNORMAL MG/DL
LEUKOCYTE ESTERASE UR QL STRIP.AUTO: NEGATIVE
LIPASE SERPL-CCNC: 102 U/L (ref 73–393)
LYMPHOCYTES # BLD: 1.9 K/UL (ref 0.9–3.6)
LYMPHOCYTES NFR BLD: 32 % (ref 21–52)
MCH RBC QN AUTO: 26.9 PG (ref 24–34)
MCHC RBC AUTO-ENTMCNC: 33.3 G/DL (ref 31–37)
MCV RBC AUTO: 80.8 FL (ref 74–97)
MONOCYTES # BLD: 0.3 K/UL (ref 0.05–1.2)
MONOCYTES NFR BLD: 5 % (ref 3–10)
NEUTS SEG # BLD: 3.8 K/UL (ref 1.8–8)
NEUTS SEG NFR BLD: 63 % (ref 40–73)
NITRITE UR QL STRIP.AUTO: NEGATIVE
PH UR STRIP: 5.5 [PH] (ref 5–8)
PLATELET # BLD AUTO: 291 K/UL (ref 135–420)
PMV BLD AUTO: 9.8 FL (ref 9.2–11.8)
POTASSIUM SERPL-SCNC: 3.6 MMOL/L (ref 3.5–5.5)
PROT SERPL-MCNC: 8 G/DL (ref 6.4–8.2)
PROT UR STRIP-MCNC: NEGATIVE MG/DL
RBC # BLD AUTO: 4.42 M/UL (ref 4.2–5.3)
SODIUM SERPL-SCNC: 137 MMOL/L (ref 136–145)
SP GR UR REFRACTOMETRY: 1.03 (ref 1–1.03)
UROBILINOGEN UR QL STRIP.AUTO: 1 EU/DL (ref 0.2–1)
WBC # BLD AUTO: 6 K/UL (ref 4.6–13.2)

## 2021-07-26 PROCEDURE — 83690 ASSAY OF LIPASE: CPT

## 2021-07-26 PROCEDURE — 74011000636 HC RX REV CODE- 636: Performed by: EMERGENCY MEDICINE

## 2021-07-26 PROCEDURE — 81003 URINALYSIS AUTO W/O SCOPE: CPT

## 2021-07-26 PROCEDURE — 74177 CT ABD & PELVIS W/CONTRAST: CPT

## 2021-07-26 PROCEDURE — 87086 URINE CULTURE/COLONY COUNT: CPT

## 2021-07-26 PROCEDURE — 80053 COMPREHEN METABOLIC PANEL: CPT

## 2021-07-26 PROCEDURE — 85025 COMPLETE CBC W/AUTO DIFF WBC: CPT

## 2021-07-26 PROCEDURE — 99283 EMERGENCY DEPT VISIT LOW MDM: CPT

## 2021-07-26 RX ORDER — CEPHALEXIN 500 MG/1
500 CAPSULE ORAL 2 TIMES DAILY
Qty: 10 CAPSULE | Refills: 0 | Status: SHIPPED | OUTPATIENT
Start: 2021-07-26 | End: 2021-07-31

## 2021-07-26 RX ORDER — ONDANSETRON 4 MG/1
TABLET, ORALLY DISINTEGRATING ORAL
Qty: 10 TABLET | Refills: 0 | Status: SHIPPED | OUTPATIENT
Start: 2021-07-26 | End: 2022-03-23

## 2021-07-26 RX ORDER — ACETAMINOPHEN 325 MG/1
650 TABLET ORAL
Qty: 20 TABLET | Refills: 0 | Status: SHIPPED | OUTPATIENT
Start: 2021-07-26 | End: 2022-03-23

## 2021-07-26 RX ADMIN — IOPAMIDOL 100 ML: 612 INJECTION, SOLUTION INTRAVENOUS at 12:27

## 2021-07-26 NOTE — ED PROVIDER NOTES
[facility] EMERGENCY DEPT    Date: 2021  Patient Name: Irene Soulier    History of Presenting Illness     Chief Complaint   Patient presents with    Urinary Pain     60 y.o. female with noted past medical history presents the ED complaining of urinary frequency onset 4 days ago. Patient states she is up all night having to use the restroom. She also has a constant aching pain in her right flank and RLQ. States she has also had a few episodes of diarrhea. Prior history of , hysterectomy, tubal ligation. Denies any fever, nausea or vomiting, change in p.o. intake, hematuria, dysuria, vaginal discharge, other symptoms. Patient denies any other associated signs or symptoms. Patient denies any other complaints. Nursing notes regarding the HPI and triage nursing notes were reviewed. Prior medical records were reviewed. Current Outpatient Medications   Medication Sig Dispense Refill    cephALEXin (Keflex) 500 mg capsule Take 1 Capsule by mouth two (2) times a day for 5 days. 10 Capsule 0    ondansetron (Zofran ODT) 4 mg disintegrating tablet Take 1-2 tablets every 6-8 hours as needed for nausea and vomiting. 10 Tablet 0    acetaminophen (TYLENOL) 325 mg tablet Take 2 Tablets by mouth every four (4) hours as needed for Pain. 20 Tablet 0    DULoxetine (CYMBALTA) 60 mg capsule take 1 capsule by mouth once daily 90 Cap 1    topiramate (TOPAMAX) 100 mg tablet Take 1 Tab by mouth two (2) times a day. 60 Tab 2    cyclobenzaprine (FLEXERIL) 10 mg tablet Take 1 Tab by mouth three (3) times daily as needed for Muscle Spasm(s). 10 Tab 0    naproxen sodium (Aleve) 220 mg cap Take 1 Tab by mouth two (2) times daily as needed.       carBAMazepine (TEGRETOL) 200 mg tablet One tab in AM, one tab at lunch, and two tabs before bed 90 Tab 0       Past History     Past Medical History:  Past Medical History:   Diagnosis Date    Arthritis     left shoulder    Bronchitis     Carpal tunnel syndrome on right     Chronic bronchitis (HCC)     De Quervain's syndrome (tenosynovitis)     Depression     resolved    Ectopic pregnancy     two times    Epile[address]     Multinodular goiter     Seizure ([address]     Sinus infection     Stenosing tenosynovitis of thumb     Right side    Trigger thumb of left hand        Past Surgical History:  Past Surgical History:   Procedure Laterality Date    COLONOSCOPY N/A 10/21/2016    COLONOSCOPY w/ biopsy performed by Marzette Landau, MD at [address] HX APPENDECTOMY  1/12/15    lysis of adhesions    HX CHOLECYSTECTOMY      HX HYSTERECTOMY      HX OTHER SURGICAL      scar tissue removed from intestines    HX PARTIAL HYSTERECTOMY  2009    HX TONSILLECTOMY         Family History:  Family History   Problem Relation Age of Onset    Heart Attack Brother          in 62s    Diabetes Father     Hypertension Father     Cancer Father         colon CA    Heart Disease Sister         stent placement    Hypertension Brother     Other Brother         Stomach problems/ulcers    Cancer Sister         Breast CA    Stroke Sister     Diabetes Mother     Hypertension Mother        Social History:  Social History     Tobacco Use    Smoking status: Current Every Day Smoker     Packs/day: 0.50     Years: 38.00     Pack years: 19.00     Types: Cigarettes    Smokeless tobacco: Never Used    Tobacco comment: second hand smoke from family   Substance Use Topics    Alcohol use: No    Drug use: No       Allergies: Allergies   Allergen Reactions    Etodolac Other (comments)     headache       Patient's primary care provider (as noted in EPIC):  Navid Elizabeth NP    Review of Systems   Constitutional:  Denies malaise, fever, chills. Respiratory:  Denies cough, wheezing, difficulty breathing, shortness of breath. GI/ABD: + RLQ pain, diarrhea. : + urinary frequency. Denies injury, pain, dysuria or urgency. Back: + right flank pain. Pelvis:  Denies injury or pain. Extremity/MS:  Denies injury or pain. Neuro:  Denies headache, LOC, dizziness, neurologic symptoms/deficits/paresthesias. Skin: Denies injury, rash, itching or skin changes. All other systems negative as reviewed. Visit Vitals  /82   Pulse 88   Temp 98.9 °F (37.2 °C)   Resp 16   Ht 5' 3\" (1.6 m)   Wt 81.6 kg (180 lb)   SpO2 98%   BMI 31.89 kg/m²       PHYSICAL EXAM:    CONSTITUTIONAL:  Alert, in no apparent distress;  well developed;  well nourished. HEAD:  Normocephalic, atraumatic. EYES:  EOMI. Non-icteric sclera. Normal conjunctiva. ENTM:  Nose:  no rhinorrhea. Throat:  no erythema or exudate, mucous membranes moist.  NECK:  Supple  RESPIRATORY:  Chest clear, equal breath sounds, good air movement. CARDIOVASCULAR:  Regular rate and rhythm. No murmurs, rubs, or gallops. GI:  Normal bowel sounds, abdomen soft with TTP to RLQ. No rebound or guarding. BACK:  Right CVAT. NEURO:  Moves all four extremities, and grossly normal motor exam.  SKIN:  No rashes;  Normal for age. PSYCH:  Alert and normal affect.     ED COURSE:      Recent Results (from the past 12 hour(s))   URINALYSIS W/ RFLX MICROSCOPIC    Collection Time: 07/26/21 11:00 AM   Result Value Ref Range    Color YELLOW      Appearance CLOUDY      Specific gravity 1.026 1.005 - 1.030      pH (UA) 5.5 5.0 - 8.0      Protein Negative NEG mg/dL    Glucose Negative NEG mg/dL    Ketone TRACE (A) NEG mg/dL    Bilirubin Negative NEG      Blood Negative NEG      Urobilinogen 1.0 0.2 - 1.0 EU/dL    Nitrites Negative NEG      Leukocyte Esterase Negative NEG     CBC WITH AUTOMATED DIFF    Collection Time: 07/26/21 11:30 AM   Result Value Ref Range    WBC 6.0 4.6 - 13.2 K/uL    RBC 4.42 4.20 - 5.30 M/uL    HGB 11.9 (L) 12.0 - 16.0 g/dL    HCT 35.7 35.0 - 45.0 %    MCV 80.8 74.0 - 97.0 FL    MCH 26.9 24.0 - 34.0 PG    MCHC 33.3 31.0 - 37.0 g/dL    RDW 14.5 11.6 - 14.5 %    PLATELET 292 753 - 810 K/uL    MPV 9.8 9.2 - 11.8 FL    NEUTROPHILS 63 40 - 73 %    LYMPHOCYTES 32 21 - 52 %    MONOCYTES 5 3 - 10 %    EOSINOPHILS 1 0 - 5 %    BASOPHILS 0 0 - 2 %    ABS. NEUTROPHILS 3.8 1.8 - 8.0 K/UL    ABS. LYMPHOCYTES 1.9 0.9 - 3.6 K/UL    ABS. MONOCYTES 0.3 0.05 - 1.2 K/UL    ABS. EOSINOPHILS 0.0 0.0 - 0.4 K/UL    ABS. BASOPHILS 0.0 0.0 - 0.1 K/UL    DF AUTOMATED     METABOLIC PANEL, COMPREHENSIVE    Collection Time: 07/26/21 11:30 AM   Result Value Ref Range    Sodium 137 136 - 145 mmol/L    Potassium 3.6 3.5 - 5.5 mmol/L    Chloride 103 100 - 111 mmol/L    CO2 27 21 - 32 mmol/L    Anion gap 7 3.0 - 18 mmol/L    Glucose 86 74 - 99 mg/dL    BUN 13 7.0 - 18 MG/DL    Creatinine 0.67 0.6 - 1.3 MG/DL    BUN/Creatinine ratio 19 12 - 20      GFR est AA >60 >60 ml/min/1.73m2    GFR est non-AA >60 >60 ml/min/1.73m2    Calcium 8.7 8.5 - 10.1 MG/DL    Bilirubin, total 0.3 0.2 - 1.0 MG/DL    ALT (SGPT) 25 13 - 56 U/L    AST (SGOT) 16 10 - 38 U/L    Alk. phosphatase 99 45 - 117 U/L    Protein, total 8.0 6.4 - 8.2 g/dL    Albumin 3.7 3.4 - 5.0 g/dL    Globulin 4.3 (H) 2.0 - 4.0 g/dL    A-G Ratio 0.9 0.8 - 1.7     LIPASE    Collection Time: 07/26/21 11:30 AM   Result Value Ref Range    Lipase 102 73 - 393 U/L      CT ABD PELV W CONT    Result Date: 7/26/2021  CT ABDOMEN AND PELVIS ENHANCED CPT CODE: 30251 and 48768 INDICATION: Right lower quadrant pain. Right flank pain. . TECHNIQUE: 5 mm collimation axial images obtained from the diaphragm to the level of the pubic symphysis following the uneventful administration of 100 cc's nonionic intravenous contrast. All CT scans at this facility are performed using dose optimization technique as appropriate to this specific exam, to include automated exposure control, adjustment of the mA and/or KP according to patient size or use of iterative reconstruction techniques. COMPARISON: Prior CT 9/8/2018.   ABDOMEN FINDINGS: Lenticular subsegmental atelectasis or fibrotic change in the medial right lower lobe adjacent to thoracic endplate osteophytes. No pleural effusion. Liver is nonenlarged. Slightly low attenuation relative to spleen suggesting steatosis. No mass or biliary dilation. Gallbladder absent. Nonenlarged common bile duct. Pancreas unremarkable. Spleen unremarkable. Adrenal glands unremarkable. Abdominal aorta nonenlarged. No evidence of dissection. Small amount of peripheral atherosclerotic wall calcification. Kidneys demonstrate symmetric enhancement. No hydronephrosis. No calcified stones. No ascites. PELVIS FINDINGS: No small bowel distention to suggest obstruction. Moderate volume colonic fecal retention in the right transverse colon. Small volume otherwise. The appendix is not identified as a separate structure. No regional inflammatory change. No appreciable diverticular disease. No ventral hernia. Bladder empty and not well assessed. Uterus absent. No ascites. Mild disc space narrowing with disc bulge/protrusion L5-S1.     1.  No bowel distention to suggest obstruction. No inflammatory change or free fluid. 2. Mildly low-attenuation hepatic parenchyma suggests component of steatosis. IMPRESSION AND MEDICAL DECISION MAKING:  Patient presents complaining of urinary frequency, right flank, right lower quadrant pain onset 4 days ago. She notes it is a constant pain. States that this frequency is consistent with prior UTIs. She denies any hematuria, dysuria, fever. Labs look well, as do vitals. Patient does not have any signs of peritonitis on examination. CT does not show appendix, however, does not show any sign of inflammation to that site. Plan for discharge home, follow-up with PCP. Patient was counseled to return should she develop a fever, vomiting, worsening or persistent pain, any other concerning symptoms. Urine was cultured, rx for keflex. Rx also for zofran and tylenol. I discussed this patient with my attending who agrees with the assessment and plan. Diagnosis:   1. Urinary frequency    2. Abdominal pain, right lower quadrant    3. Right flank pain      Disposition: Discharge    Follow-up Information     Follow up With Specialties Details Why Contact Info    Dang Hightower NP Nurse Practitioner In 3 days  [address]  [address]  [address]  [phone] [address] [facility] EMERGENCY DEPT Emergency Medicine  If symptoms worsen [address]  [phone]          Discharge Medication List as of 7/26/2021  1:51 PM      START taking these medications    Details   cephALEXin (Keflex) 500 mg capsule Take 1 Capsule by mouth two (2) times a day for 5 days. , Normal, Disp-10 Capsule, R-0      ondansetron (Zofran ODT) 4 mg disintegrating tablet Take 1-2 tablets every 6-8 hours as needed for nausea and vomiting., Normal, Disp-10 Tablet, R-0         CONTINUE these medications which have CHANGED    Details   acetaminophen (TYLENOL) 325 mg tablet Take 2 Tablets by mouth every four (4) hours as needed for Pain., Normal, Disp-20 Tablet, R-0         CONTINUE these medications which have NOT CHANGED    Details   DULoxetine (CYMBALTA) 60 mg capsule take 1 capsule by mouth once daily, Normal, Disp-90 Cap, R-1      topiramate (TOPAMAX) 100 mg tablet Take 1 Tab by mouth two (2) times a day., Normal, Disp-60 Tab,R-2      cyclobenzaprine (FLEXERIL) 10 mg tablet Take 1 Tab by mouth three (3) times daily as needed for Muscle Spasm(s). , Normal, Disp-10 Tab,R-0      naproxen sodium (Aleve) 220 mg cap Take 1 Tab by mouth two (2) times daily as needed., Historical Med      carBAMazepine (TEGRETOL) 200 mg tablet One tab in AM, one tab at lunch, and two tabs before bed, No Print, Disp-90 Tab, R-0           SHANAE Quiñones

## 2021-07-26 NOTE — ED NOTES
Pt arrived to ER from home with c/o increased need to urinate, associated with bladder pain and lower back pain that onset Friday night. Pt denies any urinary burning at this time. Pt states the pain is on her Right flank area. Pt denies any fevers. Pt denies any blood in urine. Pt denies any hx of kidney stones.

## 2021-07-26 NOTE — ED NOTES
PA in to see pt. PA went over DC instructions with pt. Pt verbalized understanding. Pt DC'd to home, alert, oriented and in stable condition.

## 2021-07-26 NOTE — Clinical Note
[address] [facility] EMERGENCY DEPT  [address] [facility] [address]  [phone]    Work/School Note    Date: 7/26/2021    To Whom It May concern:    Charlotte Flynn was seen and treated today in the emergency room by the following provider(s):  Attending Provider: Zulma Arellano DO  Physician Assistant: SHANAE Tamayo. Charlotte Flynn is excused from work/school on 07/26/21 and 07/27/21. She is medically clear to return to work/school on 7/28/2021.        Sincerely,          SHANAE Tolentino

## 2021-07-27 ENCOUNTER — PATIENT OUTREACH (OUTPATIENT)
Dept: OTHER | Age: 56
End: 2021-07-27

## 2021-07-27 LAB
BACTERIA SPEC CULT: NORMAL
CC UR VC: NORMAL
SERVICE CMNT-IMP: NORMAL

## 2021-07-27 NOTE — PROGRESS NOTES
Patient on report as eligible for Case Management. Pt was seen at [facility] ER 7/26/21. Diagnosis: UTI. Left discreet message on voicemail with this CM contact information. Will attempt to contact again to offer [facility] Management services.

## 2021-07-28 ENCOUNTER — PATIENT OUTREACH (OUTPATIENT)
Dept: OTHER | Age: 56
End: 2021-07-28

## 2021-07-28 NOTE — PROGRESS NOTES
Patient identified as eligible for [address] [place] [address] services. Second telephone outreach attempted. Left discreet voicemail with this CM confidential contact information. Email sent to enroll in [address]

## 2021-08-17 ENCOUNTER — PATIENT OUTREACH (OUTPATIENT)
Dept: OTHER | Age: 56
End: 2021-08-17

## 2021-08-17 NOTE — PROGRESS NOTES
Resolving current episode for case management due to patient unable to reach. Patient has not been reached after repeated calls.

## 2021-12-03 ENCOUNTER — TELEPHONE (OUTPATIENT)
Dept: FAMILY MEDICINE CLINIC | Age: 56
End: 2021-12-03

## 2021-12-03 ENCOUNTER — TRANSCRIBE ORDER (OUTPATIENT)
Dept: SCHEDULING | Age: 56
End: 2021-12-03

## 2021-12-03 DIAGNOSIS — Z12.31 VISIT FOR SCREENING MAMMOGRAM: Primary | ICD-10-CM

## 2021-12-03 NOTE — TELEPHONE ENCOUNTER
----- Message from Jeanette Englishs sent at 12/3/2021  8:18 AM EST -----  Subject: Message to Provider    QUESTIONS  Information for Provider? PT states she needs order for yearly mammogram.   Pt is asking if you can possibly send the order to her via email or some   other way since she is unable to  - unless facility doing the test   is within the same system. Please put in and advise.   ---------------------------------------------------------------------------  --------------  CALL BACK INFO  What is the best way for the office to contact you? OK to leave message on   voicemail  Preferred Call Back Phone Number? [phone]  ---------------------------------------------------------------------------  --------------  SCRIPT ANSWERS  Relationship to Patient?  Self

## 2021-12-07 ENCOUNTER — HOSPITAL ENCOUNTER (EMERGENCY)
Age: 56
Discharge: HOME | End: 2021-12-07
Attending: STUDENT IN AN ORGANIZED HEALTH CARE EDUCATION/TRAINING PROGRAM
Payer: OTHER MISCELLANEOUS

## 2021-12-07 ENCOUNTER — APPOINTMENT (OUTPATIENT)
Dept: GENERAL RADIOLOGY | Age: 56
End: 2021-12-07
Attending: STUDENT IN AN ORGANIZED HEALTH CARE EDUCATION/TRAINING PROGRAM
Payer: OTHER MISCELLANEOUS

## 2021-12-07 ENCOUNTER — APPOINTMENT (OUTPATIENT)
Dept: CT IMAGING | Age: 56
End: 2021-12-07
Attending: STUDENT IN AN ORGANIZED HEALTH CARE EDUCATION/TRAINING PROGRAM
Payer: OTHER MISCELLANEOUS

## 2021-12-07 VITALS
OXYGEN SATURATION: 100 % | SYSTOLIC BLOOD PRESSURE: 139 MMHG | HEIGHT: 64 IN | WEIGHT: 200 LBS | BODY MASS INDEX: 34.15 KG/M2 | RESPIRATION RATE: 16 BRPM | DIASTOLIC BLOOD PRESSURE: 71 MMHG | HEART RATE: 73 BPM | TEMPERATURE: 98.2 F

## 2021-12-07 DIAGNOSIS — M79.641 BILATERAL HAND PAIN: ICD-10-CM

## 2021-12-07 DIAGNOSIS — M79.642 BILATERAL HAND PAIN: ICD-10-CM

## 2021-12-07 DIAGNOSIS — S00.03XA SCALP HEMATOMA, INITIAL ENCOUNTER: Primary | ICD-10-CM
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
## 2021-12-07 PROCEDURE — 70450 CT HEAD/BRAIN W/O DYE: CPT

## 2021-12-07 PROCEDURE — 74011250637 HC RX REV CODE- 250/637: Performed by: STUDENT IN AN ORGANIZED HEALTH CARE EDUCATION/TRAINING PROGRAM

## 2021-12-07 PROCEDURE — 73130 X-RAY EXAM OF HAND: CPT

## 2021-12-07 PROCEDURE — 90715 TDAP VACCINE 7 YRS/> IM: CPT | Performed by: STUDENT IN AN ORGANIZED HEALTH CARE EDUCATION/TRAINING PROGRAM

## 2021-12-07 PROCEDURE — 73030 X-RAY EXAM OF SHOULDER: CPT

## 2021-12-07 PROCEDURE — 74011250636 HC RX REV CODE- 250/636: Performed by: STUDENT IN AN ORGANIZED HEALTH CARE EDUCATION/TRAINING PROGRAM

## 2021-12-07 PROCEDURE — 99284 EMERGENCY DEPT VISIT MOD MDM: CPT

## 2021-12-07 PROCEDURE — 90471 IMMUNIZATION ADMIN: CPT

## 2021-12-07 PROCEDURE — 72125 CT NECK SPINE W/O DYE: CPT

## 2021-12-07 PROCEDURE — 96372 THER/PROPH/DIAG INJ SC/IM: CPT

## 2021-12-07 RX ORDER — ACETAMINOPHEN 500 MG
1000 TABLET ORAL
Status: COMPLETED | OUTPATIENT
Start: 2021-12-07 | End: 2021-12-07

## 2021-12-07 RX ADMIN — ACETAMINOPHEN 1000 MG: 500 TABLET ORAL at 19:56

## 2021-12-07 RX ADMIN — TETANUS TOXOID, REDUCED DIPHTHERIA TOXOID AND ACELLULAR PERTUSSIS VACCINE, ADSORBED 0.5 ML: 5; 2.5; 8; 8; 2.5 SUSPENSION INTRAMUSCULAR at 20:27

## 2021-12-07 NOTE — ED TRIAGE NOTES
Patient went outside to get some air and fell, patient with no LOC, patient with hematoma to foreheadMARCO

## 2021-12-07 NOTE — ED NOTES
EMERGENCY DEPARTMENT HISTORY AND PHYSICAL EXAM      Patient Name: Hannah Amanda    History of Presenting Illness     HPI:     73-year-old female with a history of epilepsy, presents to the ED for evaluation of a slip and fall that occurred just prior to arrival.  Patient states he was walking on the sidewalk, slipped on leaves, subsequently slipped forward and her forehead hit the ground, no LOC, and she braced herself with both of her hands and having bilateral hand pain, right shoulder pain, and right forearm pain. She states she somehow scratched her left fingers. Tetanus is not up-to-date. She denies any headache, blurred or double vision, nausea, vomiting, neck pain, extremity residual weakness. PCP: Luis Phillips NP    No current facility-administered medications on file prior to encounter. Current Outpatient Medications on File Prior to Encounter   Medication Sig Dispense Refill    ondansetron (Zofran ODT) 4 mg disintegrating tablet Take 1-2 tablets every 6-8 hours as needed for nausea and vomiting. 10 Tablet 0    acetaminophen (TYLENOL) 325 mg tablet Take 2 Tablets by mouth every four (4) hours as needed for Pain. 20 Tablet 0    DULoxetine (CYMBALTA) 60 mg capsule take 1 capsule by mouth once daily 90 Cap 1    topiramate (TOPAMAX) 100 mg tablet Take 1 Tab by mouth two (2) times a day. 60 Tab 2    cyclobenzaprine (FLEXERIL) 10 mg tablet Take 1 Tab by mouth three (3) times daily as needed for Muscle Spasm(s). 10 Tab 0    naproxen sodium (Aleve) 220 mg cap Take 1 Tab by mouth two (2) times daily as needed.       carBAMazepine (TEGRETOL) 200 mg tablet One tab in AM, one tab at lunch, and two tabs before bed 90 Tab 0       Past History     Past Medical History:  Past Medical History:   Diagnosis Date    Arthritis     left shoulder    Bronchitis     Carpal tunnel syndrome on right     Chronic bronchitis (HCC)     De Quervain's syndrome (tenosynovitis)     Depression     resolved  Ectopic pregnancy     two times    [address]     Multinodular goiter     Seizure ([facility]     Sinus infection     Stenosing tenosynovitis of thumb     Right side    Trigger thumb of left hand        Past Surgical History:  Past Surgical History:   Procedure Laterality Date    COLONOSCOPY N/A 10/21/2016    COLONOSCOPY w/ biopsy performed by Juanita Tyler MD at [address] HX APPENDECTOMY  1/12/15    lysis of adhesions    HX CHOLECYSTECTOMY      HX HYSTERECTOMY      HX OTHER SURGICAL      scar tissue removed from intestines    HX PARTIAL HYSTERECTOMY  2009    HX TONSILLECTOMY         Family History:  Family History   Problem Relation Age of Onset    Heart Attack Brother          in 62s    Diabetes Father     Hypertension Father     Cancer Father         colon CA    Heart Disease Sister         stent placement    Hypertension Brother     Other Brother         Stomach problems/ulcers    Cancer Sister         Breast CA    Stroke Sister     Diabetes Mother     Hypertension Mother        Social History:  Social History     Tobacco Use    Smoking status: Current Every Day Smoker     Packs/day: 0.50     Years: 38.00     Pack years: 19.00     Types: Cigarettes    Smokeless tobacco: Never Used    Tobacco comment: second hand smoke from family   Substance Use Topics    Alcohol use: No    Drug use: No       Allergies: Allergies   Allergen Reactions    Etodolac Other (comments)     headache       Review of Nursing Notes: I have reviewed the relevant nursing notes that were available at the time of this entry. Portions of the Family and Social history, as well as medications and allergies, may have been entered into my documentation by nursing or other ancillary staff; I have confirmed and may have supplemented that information to the best of my ability at the time the note was reviewed. There are some disagreements between the nursing notes and my evaluation at times.  Some of the above referenced nursing documentation appears in my note after completion of my review. Review of Systems     CONSTITUTIONAL: Denies fevers, chills, sweats, or weight changes. EYES: Denies any visual changes or symptoms  HENT: Denies headaches, changes to hearing, rhinitis, sore throat, dysphagia, or change in voice. CV: Denies chest pains or palpitations. Lungs/Chest: Denies dyspnea, wheezing, or cough. GI: Denies nausea, vomiting, diarrhea, constipation, abdominal pain, hematochezia, or melena. : Denies urinary retention or incontinence. No genital discharge. No dysuria. MSK: Denies myalgias. NEURO: Denies chronic headaches or seizures. No numbness, tingling or weakness. PSYCHIATRIC: Denies problems with mood disturbance and anxiety. DERMATOLOGIC: Denies any rashes or skin changes. Physical Exam     General: In no apparent distress. Well-nourished/well-developed. Head/Neck: Normocephalic, right forehead hematoma with overlying abrasion. Nontender midline neck, normal ROM. EENT: PERRLA. EOM intact bilaterally. Oropharyngeal mucosa is moist, lesions or erythema. No nasal discharge. Cardiovascular: RRR, no murmurs, gallops, or rubs. Peripheral pulses normal and intact in BUE and BLE. Lungs/Chest: CTAB, no wheezing, rhonchi, or rales. Abdomen: No distention. No organomegaly. No rebound, rigidity, or guarding. Nontender. Extremities/Skin: Warm distal extremities No deformities. No edema. No rashes. Abrasions to the dorsum of several of the patient's left fingers. Point tenderness to the right shoulder, limited ROM of the right shoulder secondary to pain. No snuffbox tenderness bilaterally. Neuro: A&O x 3. Grossly intact sensations and motor function in upper and lower extremities bilaterally. Psych: Appropriate mood and affect. Diagnostic Study Results     Labs -   No results found for this or any previous visit (from the past 12 hour(s)).     Radiologic Studies -   CT HEAD WO CONT (Results Pending)   CT SPINE CERV WO CONT    (Results Pending)   XR HAND LT MIN 3 V    (Results Pending)   XR HAND RT MIN 3 V    (Results Pending)   XR SHOULDER RT AP/LAT MIN 2 V    (Results Pending)     CT Results  (Last 48 hours)    None        CXR Results  (Last 48 hours)    None          Medical Decision Making     I am the first provider for this patient. Vital Signs- I personally reviewed and interpreted the patient's vital signs. Patient Vitals for the past 12 hrs:   Temp Pulse Resp BP SpO2   12/07/21 1840 98.2 °F (36.8 °C) 71 16 (!) 151/75 100 %       Records Reviewed: I reviewed the patient's records to interpret any previous medical data available to me including EKGs, previous medical records, previous images, previous labs. Provider Notes (Medical Decision Making):     Overall well-appearing 68-year-old female with a history of epilepsy presents to the ED for evaluation of a slip and fall that occurred just prior to arrival.  She struck her forehead against the sidewalk and landed on both of her hands. She is complaining of right forehead pain at the site where she has a scalp hematoma and overlying abrasion. Also planing of bilateral hand pain where she has abrasions and right shoulder/right forearm pain. Denies LOC. Denies any other complaints. Her tetanus was updated in the ED. Tylenol provided for pain. We obtained a CT of the brain, cervical spine, and x-rays of the bilateral hands, right shoulder, and right forearm. All imaging was negative for any acute intracranial or osseous findings. She was discharged home in stable condition with PCP follow-up and strict return precautions to the ED.     Tremaine Tinsley, DO  Date: 12/7/2021

## 2021-12-07 NOTE — Clinical Note
[address] [place] [address] [facility] EMERGENCY DEPT  [address] [address] [phone]    Work/School Note    Date: 12/7/2021    To Whom It May concern:    Cesario Tipton was seen and treated today in the emergency room by the following provider(s):  Attending Provider: Melva Apodaca DO. Cesario Tipton is excused from work/school on 12/07/21 and 12/08/21. She is medically clear to return to work/school on 12/9/2021.        Sincerely,          Jonathan Ambrose DO

## 2021-12-07 NOTE — LETTER
NOTIFICATION RETURN TO WORK / SCHOOL    12/7/2021 9:55 PM    [place] [id number]      To Whom It May Concern:    Lakshmi Agustin is currently under the care of [facility] EMERGENCY DEPT. She will return to work/school on: 12/10/2021  If there are questions or concerns please have the patient contact our office. Sincerely,      No name on file.

## 2021-12-08 ENCOUNTER — TELEPHONE (OUTPATIENT)
Dept: FAMILY MEDICINE CLINIC | Age: 56
End: 2021-12-08

## 2021-12-08 ENCOUNTER — PATIENT OUTREACH (OUTPATIENT)
Dept: OTHER | Age: 56
End: 2021-12-08

## 2021-12-08 DIAGNOSIS — R92.8 ABNORMAL MAMMOGRAM: Primary | ICD-10-CM

## 2021-12-08 NOTE — TELEPHONE ENCOUNTER
FOLLOW-UP RECOMMENDATIONS: Recommend short interval follow up in 6 months with  repeat diagnostic mammogram of the right breast and bilateral ultrasound. Diagnostic mammogram of right breast   Bilateral breast ultrasound   Dx abnormal mammogram   6 month follow up.    Per verbal order Pastor HADDAD

## 2021-12-08 NOTE — PROGRESS NOTES
Patient on report as eligible for Case Management. Left discreet message on voicemail with this CM contact information. Will attempt to contact again to offer [phone] Management services.

## 2021-12-08 NOTE — DISCHARGE INSTRUCTIONS
You had a CT brain, CT cervical spine, and x-rays of both hands and your right shoulder and right forearm, all imaging was negative for any fractures or intracranial injury. Please follow-up with your primary care physician within 24 to 48 hours. Return to the ER immediately if you develop any worsening headache, nausea, vomiting, difficulty ambulating.

## 2021-12-08 NOTE — TELEPHONE ENCOUNTER
[facility] calling [facility] of [facility] to request order to be placed for bilateral diagnostic mammogram and ultrasound.

## 2021-12-09 ENCOUNTER — HOSPITAL ENCOUNTER (OUTPATIENT)
Dept: MAMMOGRAPHY | Age: 56
Discharge: HOME | End: 2021-12-09
Attending: NURSE PRACTITIONER
Payer: COMMERCIAL

## 2021-12-09 ENCOUNTER — HOSPITAL ENCOUNTER (OUTPATIENT)
Dept: ULTRASOUND IMAGING | Age: 56
Discharge: HOME | End: 2021-12-09
Attending: NURSE PRACTITIONER
Payer: COMMERCIAL

## 2021-12-09 DIAGNOSIS — R92.8 ABNORMAL MAMMOGRAM: ICD-10-CM

## 2021-12-09 PROCEDURE — 76642 ULTRASOUND BREAST LIMITED: CPT

## 2021-12-09 PROCEDURE — 77065 DX MAMMO INCL CAD UNI: CPT

## 2021-12-09 PROCEDURE — 77061 BREAST TOMOSYNTHESIS UNI: CPT

## 2021-12-11 ENCOUNTER — NURSE TRIAGE (OUTPATIENT)
Dept: OTHER | Facility: CLINIC | Age: 56
End: 2021-12-11

## 2021-12-11 NOTE — TELEPHONE ENCOUNTER
Location of employment: [employer]     Location of injury (body part involved): head and right arm     Time of injury: 12/7 18:30    Last 4 of SSN: [id number]    Email: [email or web address]   Work email: [email or web address]    Triage indicates for caller to seen in ED on Tuesday. Called out of work, again today for continuing pain     Care advice provided, caller verbalizes understanding; denies any other questions or concerns. Reason for Disposition   Caller has already spoken with the PCP and has no further questions.     Protocols used: NO CONTACT OR DUPLICATE CONTACT CALL-ADULT-

## 2021-12-13 ENCOUNTER — PATIENT OUTREACH (OUTPATIENT)
Dept: OTHER | Age: 56
End: 2021-12-13

## 2021-12-13 NOTE — PROGRESS NOTES
[facility] progress note    Patient eligible for Albertosoumya Seth 994 care management    Two patient identifiers verified. Discussed the care management program.  Patient agrees to care management services at this time. Patient's primary care provider relationship reviewed with patient and modified, as applicable. Patient has significant diagnosis of head injury/hematoma and intractable epilepsy . Care management assessment completed:  Patient states that she has improved a great deal from her fall and trip to the ED. Patient fell off the side walk, she tried to lighten her fall, by putting her hands out, but they did prevent her head from hitting the sidewalk. She has a very tender area on her head, that is painful to touch. CT scans at the ED were unremarkable. She is rating her pain a 6/10, especially after experiencing stress or tension. She is taking Tylenol for pain. Patient also has a history of seizures and is concerned that the fall may cause a re-occurrence. She has not had a seizure since 2016. No symptoms of seizure activity have occurred. Patient stated problem: \"Tenderness on the forehead \"    Care manager identified primary concern : Pain and tenderness, possible related effects from fall. Emotional Status/Adjustment to Health State: Stable but concerned of pain and tenderness. Readiness to Change: []  Pre-contemplative    []  Contemplative  [x]  Preparation               []  Action                  []  Maintenance    Barriers/Challenges to Care: []  Decline in memory    []  Language barrier     []  Emotional                  []  Limited mobility  []  Lack of motivation     [] Vision, hearing or cognitive impairment [x]  Knowledge [] Financial Barriers  [x]  Other     Patient stated goal Get all my paperwork together from the incident and while I am at work. Care Manager/Provider identified medical goal: pain control and monitoring for symptoms.      Support System/Resources:     Advance Care Planning: Not on file      ADLS/DME: Able to complete all ADL's independently. Referrals: None at this time. Upcoming appointments: No future appointments. Patient verbalized understanding of all information discussed. Pt has my contact information for any further questions, concerns, or needs. Plan for next call:  Symptom management and assess progress.

## 2021-12-20 ENCOUNTER — TELEPHONE (OUTPATIENT)
Dept: FAMILY MEDICINE CLINIC | Age: 56
End: 2021-12-20

## 2021-12-20 NOTE — TELEPHONE ENCOUNTER
----- Message from Rody Kussmaul sent at 12/9/2021 11:52 AM EST -----  Subject: Appointment Request    Reason for Call: Urgent (Patient Request) Hospital Follow Up    QUESTIONS  Type of Appointment? Established Patient  Reason for appointment request? No appointments available during search  Additional Information for Provider? Pt needs to schedule a hospital f/u;   she had a fall at work on 12/7; pt is still having a lot of pain in her   right arm; some pain in her head as well; pt has a history seizures so she   is concerned and wants to be seen ASAP; please call pt to schedule   ---------------------------------------------------------------------------  --------------  CALL BACK INFO  What is the best way for the office to contact you? OK to leave message on   voicemail  Preferred Call Back Phone Number? [phone]  ---------------------------------------------------------------------------  --------------  SCRIPT ANSWERS  Relationship to Patient? Self  (Patient requests to see provider urgently. )? Yes  Have you been diagnosed with, awaiting test results for, or told that you   are suspected of having COVID-19 (Coronavirus)? (If patient has tested   negative or was tested as a requirement for work, school, or travel and   not based on symptoms, answer no)? No  Within the past two weeks have you developed any of the following symptoms   (answer no if symptoms have been present longer than 2 weeks or began   more than 2 weeks ago)? Fever or Chills, Cough, Shortness of breath or   difficulty breathing, Loss of taste or smell, Sore throat, Nasal   congestion, Sneezing or runny nose, Fatigue or generalized body aches   (answer no if pain is specific to a body part e.g. back pain), Diarrhea,   Headache? No  Have you had close contact with someone with COVID-19 in the last 14 days?    Yes

## 2021-12-28 ENCOUNTER — PATIENT OUTREACH (OUTPATIENT)
Dept: OTHER | Age: 56
End: 2021-12-28

## 2022-01-06 ENCOUNTER — PATIENT OUTREACH (OUTPATIENT)
Dept: OTHER | Age: 57
End: 2022-01-06

## 2022-01-19 ENCOUNTER — PATIENT OUTREACH (OUTPATIENT)
Dept: OTHER | Age: 57
End: 2022-01-19

## 2022-01-19 NOTE — PROGRESS NOTES
1/19/22 OSKAR Carl assisting ACM, Sada Romero, GARCIA by providing the patient with a list of in-network PCP providers via e-mail, [email or web address]   1/19/22 Emailed the list to [email or web address].

## 2022-01-19 NOTE — PROGRESS NOTES
Telephonic outreach to the patient, currently enrolled in care management services. Patient had an ED visit on 12/7, related to a fall. She injured her head and her right arm. She has a history of seizures. Verified two patient identifiers. Patient reports that she is continuing to have tenderness and discomfort on her head. She states that when she touches the area with her hand, she can feel the bump. She reports headaches, typically related to stress. She is taking Tylenol for pain relief. She stated that it has been a while since she has seen her doctor and that it is very hard to get into to see her, taking months to get an appointment scheduled. Patient requested assistance in identifying primary care providers that are in network and agreed to have CC/SS send her a list via her email address. Will follow up with patient in 1-2 weeks to discuss options and assess current pain levels.

## 2022-01-31 ENCOUNTER — PATIENT OUTREACH (OUTPATIENT)
Dept: OTHER | Age: 57
End: 2022-01-31

## 2022-01-31 NOTE — PROGRESS NOTES
Telephonic outreach to the patient to follow up with patient regarding recent provider list sent via email. Verified two patient identifiers. Patient stated that she was unsure of whether or not she got the email, she was very focused on one of the providers being in [place]. This ECM pulled up the provider list sent by Alf Byrd to review. There were 23 providers on the list, many of them in [place] or just a few miles from her home. Forwarded the email to the patient again and will follow up later this week or early next week to address options.

## 2022-02-03 ENCOUNTER — PATIENT OUTREACH (OUTPATIENT)
Dept: OTHER | Age: 57
End: 2022-02-03

## 2022-02-03 NOTE — PROGRESS NOTES
Attempted to contact the patient to discuss providers lists that were sent via email by both donell LLOYD and Hunter Espinal. Left a discreet VM with a request for a return call. Will continue outreach attempts.

## 2022-02-10 ENCOUNTER — PATIENT OUTREACH (OUTPATIENT)
Dept: OTHER | Age: 57
End: 2022-02-10

## 2022-02-24 ENCOUNTER — PATIENT OUTREACH (OUTPATIENT)
Dept: OTHER | Age: 57
End: 2022-02-24

## 2022-03-18 ENCOUNTER — PATIENT OUTREACH (OUTPATIENT)
Dept: OTHER | Age: 57
End: 2022-03-18

## 2022-03-18 PROBLEM — E66.01 SEVERE OBESITY (HCC): Status: ACTIVE | Noted: 2020-01-09

## 2022-03-18 NOTE — LETTER
3/18/2022 10:20 AM    Ms. Louise Hester [address] [address]    I have been trying to reach you for a follow up call for services with our Employee Care Management Program. Your wellbeing is very important to us. With continued partnership in the [facility] program, we hope to work with you to optimize your health and increase your quality of life. I look forward to continuing to work with you. Please contact me at your convenience and we can schedule a time that works best for you.      Sincerely,      Kalie Lucero RN    [address] [facility] [address] [address]  cell [phone] Fax [email or web address]

## 2022-03-18 NOTE — PROGRESS NOTES
3/18/22 CCSS CHERYL Joseph, Artur Cross RN, with outreach/UTR letter. Letter generated, printed, and mailed.

## 2022-03-19 PROBLEM — G40.919 INTRACTABLE EPILEPSY WITHOUT STATUS EPILEPTICUS (HCC): Status: ACTIVE | Noted: 2018-04-13

## 2022-03-23 ENCOUNTER — HOSPITAL ENCOUNTER (EMERGENCY)
Age: 57
Discharge: HOME | End: 2022-03-23
Attending: STUDENT IN AN ORGANIZED HEALTH CARE EDUCATION/TRAINING PROGRAM
Payer: COMMERCIAL

## 2022-03-23 ENCOUNTER — APPOINTMENT (OUTPATIENT)
Dept: CT IMAGING | Age: 57
End: 2022-03-23
Attending: STUDENT IN AN ORGANIZED HEALTH CARE EDUCATION/TRAINING PROGRAM
Payer: COMMERCIAL

## 2022-03-23 ENCOUNTER — NURSE TRIAGE (OUTPATIENT)
Dept: OTHER | Facility: CLINIC | Age: 57
End: 2022-03-23

## 2022-03-23 VITALS
DIASTOLIC BLOOD PRESSURE: 83 MMHG | SYSTOLIC BLOOD PRESSURE: 131 MMHG | HEIGHT: 64 IN | TEMPERATURE: 98.3 F | OXYGEN SATURATION: 95 % | BODY MASS INDEX: 32.44 KG/M2 | WEIGHT: 190 LBS | RESPIRATION RATE: 22 BRPM | HEART RATE: 63 BPM

## 2022-03-23 DIAGNOSIS — M54.50 ACUTE RIGHT-SIDED LOW BACK PAIN, UNSPECIFIED WHETHER SCIATICA PRESENT: Primary | ICD-10-CM

## 2022-03-23 LAB
ALBUMIN SERPL-MCNC: 3.7 G/DL (ref 3.4–5)
ALBUMIN/GLOB SERPL: 1 {RATIO} (ref 0.8–1.7)
ALP SERPL-CCNC: 98 U/L (ref 45–117)
ALT SERPL-CCNC: 21 U/L (ref 13–56)
ANION GAP SERPL CALC-SCNC: 6 MMOL/L (ref 3–18)
APPEARANCE UR: CLEAR
AST SERPL-CCNC: 14 U/L (ref 10–38)
BACTERIA URNS QL MICRO: ABNORMAL /HPF
BASOPHILS # BLD: 0 K/UL (ref 0–0.1)
BASOPHILS NFR BLD: 0 % (ref 0–2)
BILIRUB SERPL-MCNC: 0.3 MG/DL (ref 0.2–1)
BILIRUB UR QL: NEGATIVE
BUN SERPL-MCNC: 14 MG/DL (ref 7–18)
BUN/CREAT SERPL: 23 (ref 12–20)
CALCIUM SERPL-MCNC: 8.3 MG/DL (ref 8.5–10.1)
CHLORIDE SERPL-SCNC: 108 MMOL/L (ref 100–111)
CO2 SERPL-SCNC: 26 MMOL/L (ref 21–32)
COLOR UR: YELLOW
CREAT SERPL-MCNC: 0.62 MG/DL (ref 0.6–1.3)
DIFFERENTIAL METHOD BLD: ABNORMAL
EOSINOPHIL # BLD: 0.1 K/UL (ref 0–0.4)
EOSINOPHIL NFR BLD: 1 % (ref 0–5)
EPITH CASTS URNS QL MICRO: ABNORMAL /LPF (ref 0–5)
ERYTHROCYTE [DISTWIDTH] IN BLOOD BY AUTOMATED COUNT: 14.9 % (ref 11.6–14.5)
GLOBULIN SER CALC-MCNC: 3.6 G/DL (ref 2–4)
GLUCOSE SERPL-MCNC: 82 MG/DL (ref 74–99)
GLUCOSE UR STRIP.AUTO-MCNC: NEGATIVE MG/DL
HCT VFR BLD AUTO: 36.3 % (ref 35–45)
HGB BLD-MCNC: 12 G/DL (ref 12–16)
HGB UR QL STRIP: NEGATIVE
IMM GRANULOCYTES # BLD AUTO: 0.1 K/UL (ref 0–0.04)
IMM GRANULOCYTES NFR BLD AUTO: 1 % (ref 0–0.5)
KETONES UR QL STRIP.AUTO: ABNORMAL MG/DL
LEUKOCYTE ESTERASE UR QL STRIP.AUTO: NEGATIVE
LIPASE SERPL-CCNC: 82 U/L (ref 73–393)
LYMPHOCYTES # BLD: 2.1 K/UL (ref 0.9–3.6)
LYMPHOCYTES NFR BLD: 31 % (ref 21–52)
MCH RBC QN AUTO: 27.7 PG (ref 24–34)
MCHC RBC AUTO-ENTMCNC: 33.1 G/DL (ref 31–37)
MCV RBC AUTO: 83.8 FL (ref 78–100)
MONOCYTES # BLD: 0.3 K/UL (ref 0.05–1.2)
MONOCYTES NFR BLD: 4 % (ref 3–10)
MUCOUS THREADS URNS QL MICRO: ABNORMAL /LPF
NEUTS SEG # BLD: 4.1 K/UL (ref 1.8–8)
NEUTS SEG NFR BLD: 63 % (ref 40–73)
NITRITE UR QL STRIP.AUTO: NEGATIVE
NRBC # BLD: 0 K/UL (ref 0–0.01)
NRBC BLD-RTO: 0 PER 100 WBC
PH UR STRIP: 5.5 [PH] (ref 5–8)
PLATELET # BLD AUTO: 280 K/UL (ref 135–420)
PLATELET COMMENTS,PCOM: ABNORMAL
PMV BLD AUTO: 12.1 FL (ref 9.2–11.8)
POTASSIUM SERPL-SCNC: 3.8 MMOL/L (ref 3.5–5.5)
PROT SERPL-MCNC: 7.3 G/DL (ref 6.4–8.2)
PROT UR STRIP-MCNC: ABNORMAL MG/DL
RBC # BLD AUTO: 4.33 M/UL (ref 4.2–5.3)
RBC #/AREA URNS HPF: ABNORMAL /HPF (ref 0–5)
RBC MORPH BLD: ABNORMAL
SODIUM SERPL-SCNC: 140 MMOL/L (ref 136–145)
SP GR UR REFRACTOMETRY: >1.03 (ref 1–1.03)
UROBILINOGEN UR QL STRIP.AUTO: 1 EU/DL (ref 0.2–1)
WBC # BLD AUTO: 6.7 K/UL (ref 4.6–13.2)
WBC URNS QL MICRO: ABNORMAL /HPF (ref 0–4)

## 2022-03-23 PROCEDURE — 81001 URINALYSIS AUTO W/SCOPE: CPT

## 2022-03-23 PROCEDURE — 96374 THER/PROPH/DIAG INJ IV PUSH: CPT

## 2022-03-23 PROCEDURE — 74177 CT ABD & PELVIS W/CONTRAST: CPT

## 2022-03-23 PROCEDURE — 80053 COMPREHEN METABOLIC PANEL: CPT

## 2022-03-23 PROCEDURE — 74011250636 HC RX REV CODE- 250/636: Performed by: STUDENT IN AN ORGANIZED HEALTH CARE EDUCATION/TRAINING PROGRAM

## 2022-03-23 PROCEDURE — 83690 ASSAY OF LIPASE: CPT

## 2022-03-23 PROCEDURE — 85025 COMPLETE CBC W/AUTO DIFF WBC: CPT

## 2022-03-23 PROCEDURE — 74011000636 HC RX REV CODE- 636: Performed by: STUDENT IN AN ORGANIZED HEALTH CARE EDUCATION/TRAINING PROGRAM

## 2022-03-23 PROCEDURE — 99285 EMERGENCY DEPT VISIT HI MDM: CPT

## 2022-03-23 RX ORDER — CYCLOBENZAPRINE HCL 10 MG
10 TABLET ORAL
Qty: 9 TABLET | Refills: 0 | Status: SHIPPED | OUTPATIENT
Start: 2022-03-23 | End: 2022-03-26

## 2022-03-23 RX ORDER — KETOROLAC TROMETHAMINE 15 MG/ML
15 INJECTION, SOLUTION INTRAMUSCULAR; INTRAVENOUS
Status: COMPLETED | OUTPATIENT
Start: 2022-03-23 | End: 2022-03-23

## 2022-03-23 RX ADMIN — KETOROLAC TROMETHAMINE 15 MG: 15 INJECTION, SOLUTION INTRAMUSCULAR; INTRAVENOUS at 15:00

## 2022-03-23 RX ADMIN — IOPAMIDOL 100 ML: 612 INJECTION, SOLUTION INTRAVENOUS at 12:58

## 2022-03-23 NOTE — ED TRIAGE NOTES
Patient states that symptoms began two weeks ago with lower back pain. She states initially thinking that symptoms were related to occupational lifting. She states pain localized to right flank and radiates into right lower abdomen. She states pain in abdomen relieves somewhat with urination, but advises that flank pain does not alleviate.

## 2022-03-23 NOTE — TELEPHONE ENCOUNTER
Received call from Joseph Garland at [facility] with [employer]. Subjective: Caller states \"Abdominal pain in lower part of my stomach. \"     Current Symptoms: Pain in RLQ of abdomen and wraps to her R flank area. Pressure in abdomen and back also and then has to void. Holds bladder while at work a lot. Can tell in patients voice in a lot of pain. Onset: 1 week ago; worsening    Associated Symptoms: reduced activity, reduced appetite    Pain Severity: 10/10; sharp; constant, severe    Temperature: denies fever     What has been tried: Tylenol    LMP: NA Pregnant: NA    Recommended disposition: Go to ED Now    Care advice provided, patient verbalizes understanding; denies any other questions or concerns; instructed to call back for any new or worsening symptoms. Patient/caller agrees to proceed to [facility] Emergency Department    Attention Provider: Thank you for allowing me to participate in the care of your patient. The patient was connected to triage in response to information provided to the [facility]. Please do not respond through this encounter as the response is not directed to a shared pool.     Reason for Disposition   SEVERE abdominal pain (e.g., excruciating)    Protocols used: ABDOMINAL PAIN - [facility] - JEANNETTE WISEMAN

## 2022-03-24 ENCOUNTER — PATIENT OUTREACH (OUTPATIENT)
Dept: OTHER | Age: 57
End: 2022-03-24

## 2022-03-24 NOTE — ED PROVIDER NOTES
EMERGENCY DEPARTMENT HISTORY AND PHYSICAL EXAM    10:22 PM      Date: 3/23/2022  Patient Name: Amado Rincon    History of Presenting Illness     Chief Complaint   Patient presents with    Flank Pain    Abdominal Pain         History Provided By: Patient  Location/Duration/Severity/Modifying factors   Patient is a 59 y/o female with a hx of arthritis presenting due to right flank and groin pain. Patient states that for almost two weeks she has been having back pain that at times radiates to her groin. Patient believes that it could be associated with lifting she has to do at work but since pain has been present for so long and associated with groin pain now she wanted to be evaluated. PCP: Nik Smith NP    Current Outpatient Medications   Medication Sig Dispense Refill    cyclobenzaprine (FLEXERIL) 10 mg tablet Take 1 Tablet by mouth three (3) times daily as needed for Muscle Spasm(s) for up to 3 days.  9 Tablet 0    carBAMazepine (TEGRETOL) 200 mg tablet One tab in AM, one tab at lunch, and two tabs before bed 90 Tab 0       Past History     Past Medical History:  Past Medical History:   Diagnosis Date    Arthritis     left shoulder    Bronchitis     Carpal tunnel syndrome on right     Chronic bronchitis (HCC)     De Quervain's syndrome (tenosynovitis)     Depression     resolved    Ectopic pregnancy     two times    Epilepsy ([address]     Multinodular goiter     Seizure ([address]     Sinus infection     Stenosing tenosynovitis of thumb     Right side    Trigger thumb of left hand        Past Surgical History:  Past Surgical History:   Procedure Laterality Date    COLONOSCOPY N/A 10/21/2016    COLONOSCOPY w/ biopsy performed by Crosby Phalen, MD at [address] HX APPENDECTOMY  1/12/15    lysis of adhesions    HX CHOLECYSTECTOMY      HX HYSTERECTOMY      HX OTHER SURGICAL      scar tissue removed from intestines    HX PARTIAL HYSTERECTOMY  11/2009    HX TONSILLECTOMY Family History:  Family History   Problem Relation Age of Onset    Heart Attack Brother          in 62s    Diabetes Father     Hypertension Father     Cancer Father         colon CA    Heart Disease Sister         stent placement    Hypertension Brother     Other Brother         Stomach problems/ulcers    Cancer Sister         Breast CA    Stroke Sister     Diabetes Mother     Hypertension Mother        Social History:  Social History     Tobacco Use    Smoking status: Current Every Day Smoker     Packs/day: 0.50     Years: 38.00     Pack years: 19.00     Types: Cigarettes    Smokeless tobacco: Never Used    Tobacco comment: second hand smoke from family   Substance Use Topics    Alcohol use: No    Drug use: No       Allergies: Allergies   Allergen Reactions    Etodolac Other (comments)     headache         Review of Systems       Review of Systems   Constitutional: Negative for fever. Respiratory: Negative for chest tightness and shortness of breath. Cardiovascular: Negative for chest pain. Gastrointestinal: Positive for abdominal pain. Negative for nausea and vomiting. Pain that radiates from right back to flank and groin   Genitourinary: Negative for difficulty urinating and dysuria. Musculoskeletal: Positive for back pain. Skin: Negative. Physical Exam     Visit Vitals  /83   Pulse 63   Temp 98.3 °F (36.8 °C)   Resp 22   Ht 5' 4\" (1.626 m)   Wt 86.2 kg (190 lb)   SpO2 95%   BMI 32.61 kg/m²       \  Physical Exam  Vitals and nursing note reviewed. Constitutional:       General: She is not in acute distress. Appearance: She is well-developed. HENT:      Head: Normocephalic and atraumatic. Right Ear: External ear normal.      Left Ear: External ear normal.      Nose: Nose normal.   Eyes:      Extraocular Movements: Extraocular movements intact. Conjunctiva/sclera: Conjunctivae normal.   Neck:      Thyroid: No thyromegaly. Vascular: No JVD. Trachea: No tracheal deviation. Cardiovascular:      Rate and Rhythm: Normal rate and regular rhythm. Heart sounds: Normal heart sounds. No murmur heard. No gallop. Pulmonary:      Effort: Pulmonary effort is normal.      Breath sounds: Normal breath sounds. Abdominal:      General: Bowel sounds are normal. There is no distension. Palpations: Abdomen is soft. Tenderness: There is abdominal tenderness in the right lower quadrant. There is right CVA tenderness. There is no guarding. Comments: No hernia noted on exam   Musculoskeletal:         General: No tenderness. Normal range of motion. Cervical back: Normal range of motion and neck supple. Lymphadenopathy:      Cervical: No cervical adenopathy. Skin:     General: Skin is warm and dry. Neurological:      General: No focal deficit present. Mental Status: She is alert and oriented to person, place, and time. Motor: No weakness. Coordination: Coordination normal.      Comments: No sensory loss, Gait normal, Motor 5/5   Psychiatric:         Behavior: Behavior normal.         Thought Content:  Thought content normal.         Judgment: Judgment normal.           Diagnostic Study Results     Labs -  Recent Results (from the past 12 hour(s))   URINALYSIS W/ RFLX MICROSCOPIC    Collection Time: 03/23/22 11:30 AM   Result Value Ref Range    Color YELLOW      Appearance CLEAR      Specific gravity >1.030 (H) 1.005 - 1.030    pH (UA) 5.5 5.0 - 8.0      Protein TRACE (A) NEG mg/dL    Glucose Negative NEG mg/dL    Ketone TRACE (A) NEG mg/dL    Bilirubin Negative NEG      Blood Negative NEG      Urobilinogen 1.0 0.2 - 1.0 EU/dL    Nitrites Negative NEG      Leukocyte Esterase Negative NEG     CBC WITH AUTOMATED DIFF    Collection Time: 03/23/22 11:30 AM   Result Value Ref Range    WBC 6.7 4.6 - 13.2 K/uL    RBC 4.33 4.20 - 5.30 M/uL    HGB 12.0 12.0 - 16.0 g/dL    HCT 36.3 35.0 - 45.0 %    MCV 83.8 78.0 - 100.0 FL    MCH 27.7 24.0 - 34.0 PG    MCHC 33.1 31.0 - 37.0 g/dL    RDW 14.9 (H) 11.6 - 14.5 %    PLATELET 189 606 - 661 K/uL    MPV 12.1 (H) 9.2 - 11.8 FL    NRBC 0.0 0  WBC    ABSOLUTE NRBC 0.00 0.00 - 0.01 K/uL    NEUTROPHILS 63 40 - 73 %    LYMPHOCYTES 31 21 - 52 %    MONOCYTES 4 3 - 10 %    EOSINOPHILS 1 0 - 5 %    BASOPHILS 0 0 - 2 %    IMMATURE GRANULOCYTES 1 (H) 0.0 - 0.5 %    ABS. NEUTROPHILS 4.1 1.8 - 8.0 K/UL    ABS. LYMPHOCYTES 2.1 0.9 - 3.6 K/UL    ABS. MONOCYTES 0.3 0.05 - 1.2 K/UL    ABS. EOSINOPHILS 0.1 0.0 - 0.4 K/UL    ABS. BASOPHILS 0.0 0.0 - 0.1 K/UL    ABS. IMM. GRANS. 0.1 (H) 0.00 - 0.04 K/UL    DF AUTOMATED      PLATELET COMMENTS ADEQUATE PLATELETS      RBC COMMENTS NORMOCYTIC, NORMOCHROMIC     METABOLIC PANEL, COMPREHENSIVE    Collection Time: 03/23/22 11:30 AM   Result Value Ref Range    Sodium 140 136 - 145 mmol/L    Potassium 3.8 3.5 - 5.5 mmol/L    Chloride 108 100 - 111 mmol/L    CO2 26 21 - 32 mmol/L    Anion gap 6 3.0 - 18 mmol/L    Glucose 82 74 - 99 mg/dL    BUN 14 7.0 - 18 MG/DL    Creatinine 0.62 0.6 - 1.3 MG/DL    BUN/Creatinine ratio 23 (H) 12 - 20      GFR est AA >60 >60 ml/min/1.73m2    GFR est non-AA >60 >60 ml/min/1.73m2    Calcium 8.3 (L) 8.5 - 10.1 MG/DL    Bilirubin, total 0.3 0.2 - 1.0 MG/DL    ALT (SGPT) 21 13 - 56 U/L    AST (SGOT) 14 10 - 38 U/L    Alk. phosphatase 98 45 - 117 U/L    Protein, total 7.3 6.4 - 8.2 g/dL    Albumin 3.7 3.4 - 5.0 g/dL    Globulin 3.6 2.0 - 4.0 g/dL    A-G Ratio 1.0 0.8 - 1.7     LIPASE    Collection Time: 03/23/22 11:30 AM   Result Value Ref Range    Lipase 82 73 - 393 U/L   URINE MICROSCOPIC ONLY    Collection Time: 03/23/22 11:30 AM   Result Value Ref Range    WBC NONE 0 - 4 /hpf    RBC NONE 0 - 5 /hpf    Epithelial cells 1+ 0 - 5 /lpf    Bacteria 2+ (A) NEG /hpf    Mucus 1+ (A) NEG /lpf       Radiologic Studies -   CT ABD PELV W CONT   Final Result   No acute findings. No hydronephrosis, nephrolithiasis.  No findings suspect for appendicitis. .            Medical Decision Making   I am the first provider for this patient. I reviewed the vital signs, available nursing notes, past medical history, past surgical history, family history and social history. Vital Signs-Reviewed the patient's vital signs. EKG:     Records Reviewed: Nursing Notes (Time of Review: 10:22 PM)    ED Course: Progress Notes, Reevaluation, and Consults:         Provider Notes (Medical Decision Making):   MDM  Number of Diagnoses or Management Options  Acute right-sided low back pain, unspecified whether sciatica present  Diagnosis management comments: Patient is a 59 y/o female with a hx of arthritis presenting due to right flank and groin pain. Differential includes muscle strain/spasm, UTI/pyleonephrits, kidney stone, less likely appendicitis. Patient will be assess with CMP, CBC, UA, and CTAP. Will try to treat pain with Toradol. If labs and CT with acute findings than will discharge with patient to follow up with her PCP. Procedures    Critical Care Time:       Diagnosis     Clinical Impression:   1. Acute right-sided low back pain, unspecified whether sciatica present        Disposition: Home    Follow-up Information     Follow up With Specialties Details Why Contact Info    James Melvin NP Nurse Practitioner Schedule an appointment as soon as possible for a visit   [address] [facility]  [address]  [address]  [phone]             Discharge Medication List as of 3/23/2022  2:54 PM      START taking these medications    Details   cyclobenzaprine (FLEXERIL) 10 mg tablet Take 1 Tablet by mouth three (3) times daily as needed for Muscle Spasm(s) for up to 3 days. , Normal, Disp-9 Tablet, R-0         CONTINUE these medications which have NOT CHANGED    Details   carBAMazepine (TEGRETOL) 200 mg tablet One tab in AM, one tab at lunch, and two tabs before bed, No Print, Disp-90 Tab, R-0           Disclaimer: Sections of this note are dictated using utilizing voice recognition software. Minor typographical errors may be present. If questions arise, please do not hesitate to contact me or call our department.

## 2022-03-25 ENCOUNTER — PATIENT OUTREACH (OUTPATIENT)
Dept: OTHER | Age: 57
End: 2022-03-25

## 2022-03-25 NOTE — PROGRESS NOTES
Patient identified as eligible for [address] [place] [address] services. Second telephone outreach attempted. Left discreet voicemail with this CM confidential contact information. Will send UTR letter via Mail. Next Outreach 4/8/22 f/u - ED Blayne Worthy Visit.

## 2022-03-25 NOTE — LETTER
3/25/2022 4:22 PM    Ms. Dario Hopson  [facility] [address]    Dear Rosas Schmidt    My name is Rock Johnson, Associate Care Coordinator for [facility] and I have been trying to reach you. The Associate Care Management (ACM) program is a free-of-charge confidential service provided to our associates and their family members covered by the [facility]. The program will provide an associate and his/her family with the [facility] expertise to assist in navigating the health care delivery system, provider services, and their overall care needsso as to assure and improve health care interactions and enhance the quality of life. This program is designed to provide you with the opportunity to have a [employer] partner with you for the following services:     1) when you come home from the hospital or emergency room   2) when help is needed to manage your disease   3) when you need assistance coordinating services or appointments  4) when you need additional education, resources or assistance reaching your Be Well Health Program goals/requirements such as Be Well With Diabetes    [facility] is dedicated to empowering the good health of its community and improving the quality of care and care experiences for associates and their families. We are committed to safeguarding patient confidentiality and privacy, assuring that every associate has the respect he or she deserves in managing their health. The information shared with your care manager will not be shared with anyone else aside from those you identify as part of your care team, and will only be used to assist you with any identified care needs. Please contact me if you would like this service provided to you.      Sincerely,    Ashleigh Jj LPN  [facility] Health Care Coordinator  Associate Care Management  [facility] [address] [facility] [phone]  F [phone]  [email or web address]

## 2022-03-29 ENCOUNTER — TELEPHONE (OUTPATIENT)
Dept: FAMILY MEDICINE CLINIC | Age: 57
End: 2022-03-29

## 2022-03-29 NOTE — TELEPHONE ENCOUNTER
Patient was contacted to r/s appt due to provider being out of the office. Appt has been r/s.  Left message for patient to contact office to confirm appt details

## 2022-04-05 ENCOUNTER — OFFICE VISIT (OUTPATIENT)
Dept: FAMILY MEDICINE CLINIC | Age: 57
End: 2022-04-05
Payer: COMMERCIAL

## 2022-04-05 VITALS
HEIGHT: 64 IN | SYSTOLIC BLOOD PRESSURE: 162 MMHG | HEART RATE: 87 BPM | DIASTOLIC BLOOD PRESSURE: 90 MMHG | WEIGHT: 198 LBS | TEMPERATURE: 97.2 F | OXYGEN SATURATION: 99 % | BODY MASS INDEX: 33.8 KG/M2 | RESPIRATION RATE: 16 BRPM

## 2022-04-05 DIAGNOSIS — J42 CHRONIC BRONCHITIS, UNSPECIFIED CHRONIC BRONCHITIS TYPE (HCC): ICD-10-CM

## 2022-04-05 DIAGNOSIS — M54.50 ACUTE RIGHT-SIDED LOW BACK PAIN WITHOUT SCIATICA: Primary | ICD-10-CM

## 2022-04-05 DIAGNOSIS — G40.909 SEIZURE DISORDER (HCC): ICD-10-CM

## 2022-04-05 DIAGNOSIS — R03.0 ELEVATED BLOOD-PRESSURE READING WITHOUT DIAGNOSIS OF HYPERTENSION: ICD-10-CM

## 2022-04-05 PROCEDURE — 99213 OFFICE O/P EST LOW 20 MIN: CPT | Performed by: NURSE PRACTITIONER

## 2022-04-05 RX ORDER — CYCLOBENZAPRINE HCL 10 MG
10 TABLET ORAL DAILY
COMMUNITY
End: 2022-04-05 | Stop reason: SDUPTHER

## 2022-04-05 RX ORDER — CYCLOBENZAPRINE HCL 10 MG
10 TABLET ORAL
Qty: 30 TABLET | Refills: 0 | Status: SHIPPED | OUTPATIENT
Start: 2022-04-05 | End: 2022-04-13

## 2022-04-05 NOTE — ASSESSMENT & PLAN NOTE
Stable, based on history, physical exam, and review of pertinent labs, studies and medications; medications reconciled; continue current treatment plan.

## 2022-04-05 NOTE — PATIENT INSTRUCTIONS
Low Sodium Diet (2,000 Milligram): Care Instructions  Overview     Limiting sodium can be an important part of managing some health problems. The most common source of sodium is salt. People get most of the salt in their diet from canned, prepared, and packaged foods. Fast food and restaurant meals also are very high in sodium. Your doctor will probably limit your sodium to less than 2,000 milligrams (mg) a day. This limit counts all the sodium in prepared and packaged foods and any salt you add to your food. Follow-up care is a key part of your treatment and safety. Be sure to make and go to all appointments, and call your doctor if you are having problems. It's also a good idea to know your test results and keep a list of the medicines you take. How can you care for yourself at home? Read food labels  · Read labels on cans and food packages. The labels tell you how much sodium is in each serving. Make sure that you look at the serving size. If you eat more than the serving size, you have eaten more sodium. · Food labels also tell you the Percent Daily Value for sodium. Choose products with low Percent Daily Values for sodium. · Be aware that sodium can come in forms other than salt, including monosodium glutamate (MSG), sodium citrate, and sodium bicarbonate (baking soda). MSG is often added to Asian food. When you eat out, you can sometimes ask for food without MSG or added salt. Buy low-sodium foods  · Buy foods that are labeled \"unsalted\" (no salt added), \"sodium-free\" (less than 5 mg of sodium per serving), or \"low-sodium\" (140 mg or less of sodium per serving). Foods labeled \"reduced-sodium\" and \"light sodium\" may still have too much sodium. Be sure to read the label to see how much sodium you are getting. · Buy fresh vegetables, or frozen vegetables without added sauces. Buy low-sodium versions of canned vegetables, soups, and other canned goods.   Prepare low-sodium meals  · Cut back on the amount of salt you use in cooking. This will help you adjust to the taste. Do not add salt after cooking. One teaspoon of salt has about 2,300 mg of sodium. · Take the salt shaker off the table. · Flavor your food with garlic, lemon juice, onion, vinegar, herbs, and spices. Do not use soy sauce, lite soy sauce, steak sauce, onion salt, garlic salt, celery salt, or ketchup on your food. · Use low-sodium salad dressings, sauces, and ketchup. Or make your own salad dressings and sauces without adding salt. · Use less salt (or none) when recipes call for it. You can often use half the salt a recipe calls for without losing flavor. Other foods such as rice, pasta, and grains do not need added salt. · Rinse canned vegetables, and cook them in fresh water. This removes somebut not allof the salt. · Avoid water that is naturally high in sodium or that has been treated with water softeners, which add sodium. If you buy bottled water, read the label and choose a sodium-free brand. Avoid high-sodium foods  · Avoid eating:  ? Smoked, cured, salted, and canned meat, fish, and poultry. ? Ham, west, hot dogs, and luncheon meats. ? Regular, hard, and processed cheese and regular peanut butter. ? Crackers with salted tops, and other salted snack foods such as pretzels, chips, and salted popcorn. ? Frozen prepared meals, unless labeled low-sodium. ? Canned and dried soups, broths, and bouillon, unless labeled sodium-free or low-sodium. ? Canned vegetables, unless labeled sodium-free or low-sodium. ? Western Sara fries, pizza, tacos, and other fast foods. ? Pickles, olives, ketchup, and other condiments, especially soy sauce, unless labeled sodium-free or low-sodium. Where can you learn more? Go to http://www.gray.com/  Enter V843 in the search box to learn more about \"Low Sodium Diet (2,000 Milligram): Care Instructions. \"  Current as of: September 8, 2021               Content Version: 13.2  © 9397-5157 Healthwise, [employer]. Care instructions adapted under license by [employer] (which disclaims liability or warranty for this information). If you have questions about a medical condition or this instruction, always ask your healthcare professional. [address] any warranty or liability for your use of this information. Learning About Low-Sodium Foods  What foods are low in sodium? The foods you eat contain nutrients, such as vitamins and minerals. Sodium is a nutrient. Your body needs the right amount to stay healthy and work as it should. You can use the list below to help you make choices about which foods to eat. Fruits  · Fresh, frozen, canned, or dried fruit  Vegetables  · Fresh or frozen vegetables, with no added salt  · Canned vegetables, low-sodium or with no added salt  Grains  · Bagels without salted tops  · Cereal with no added salt  · Corn tortillas  · Crackers with no added salt  · Oatmeal, cooked without salt  · Popcorn with no salt  · Pasta and noodles, cooked without salt  · Rice, cooked without salt  · Unsalted pretzels  Dairy and dairy alternatives  · Butter, unsalted  · Cream cheese  · Ice cream  · Milk  · Soy milk  Meats and other protein foods  · Beans and peas, canned with no salt  · Eggs  · Fresh fish (not smoked)  · Fresh meats (not smoked or cured)  · Nuts and nut butter, prepared without salt  · Poultry, not packaged with sodium solution  · Tofu, unseasoned  · Tuna, canned without salt  Seasonings  · Garlic  · Herbs and spices  · Lemon juice  · Mustard  · Olive oil  · Salt-free seasoning mixes  · Vinegar  Work with your doctor to find out how much of this nutrient you need. Depending on your health, you may need more or less of it in your diet. Where can you learn more? Go to http://www.gray.com/  Enter S460 in the search box to learn more about \"Learning About Low-Sodium Foods. \"  Current as of: September 8, 2021               Content Version: 13.2  © 2006-2022 [employer]. Care instructions adapted under license by [employer] (which disclaims liability or warranty for this information). If you have questions about a medical condition or this instruction, always ask your healthcare professional. [address] any warranty or liability for your use of this information. How to Read a Food Label to Limit Sodium: Care Instructions  Overview  Limiting sodium can be an important part of managing some health problems. Processed foods, fast food, and restaurant foods are the major sources of dietary sodium. The most common name for sodium is salt. Most packaged foods have a Nutrition Facts label. This will tell you how much sodium is in one serving of food. Follow-up care is a key part of your treatment and safety. Be sure to make and go to all appointments, and call your doctor if you are having problems. It's also a good idea to know your test results and keep a list of the medicines you take. How can you care for yourself at home? Read ingredient lists on food labels  · Read the list of ingredients on food labels to help you find how much sodium is in a food. The label lists the ingredients in a food in descending order (from the most to the least). If salt or sodium is high on the list, there may be a lot of sodium in the food. · Know that sodium has different names. Sodium is also called monosodium glutamate (MSG), sodium citrate, sodium alginate, and sodium phosphate. Read Nutrition Facts labels  · On most foods, there is a Nutrition Facts label. This will tell you how much sodium is in one serving of food. Look at both the serving size and the sodium amount. The serving size is located at the top of the label, usually right under the \"Nutrition Facts\" title. The amount of sodium is given in the list under the title.  It is given in milligrams (mg).  ? Check the serving size carefully. A single serving is often very small, and you may eat more than one serving. If this is the case, you will eat more sodium than listed on the label. For example, if the serving size for a canned soup is 1 cup and the sodium amount is 470 mg, if you have 2 cups you will eat 940 mg of sodium. · The nutrition facts for fresh fruits and vegetables are not listed on the food. They may be listed somewhere in the store. These foods usually have no sodium or low sodium. · The Nutrition Facts label also gives you the Percent Daily Value for sodium. This is how much of the recommended amount of sodium a serving contains. The daily value for sodium is 2,300 mg. So if the Percent Daily Value says 50%, this means one serving is giving you half of this, or 1,150 mg. Buy low-sodium foods  · Look for foods that are made with less sodium. Watch for the following words on the label. ? \"Unsalted\" means there is no sodium added to the food. But there may be sodium already in the food naturally. ? \"Sodium-free\" means a serving has less than 5 mg of sodium. ? \"Very low sodium\" means a serving has 35 mg or less of sodium. ? \"Low-sodium\" means a serving has 140 mg or less of sodium. · \"Reduced-sodium\" means that there is 25% less sodium than what the food normally has. This is still usually too much sodium. · Buy fresh vegetables, or frozen vegetables without added sauces. Buy low-sodium versions of canned vegetables, soups, and other canned goods. Where can you learn more? Go to http://www.gray.com/  Enter B757 in the search box to learn more about \"How to Read a Food Label to Limit Sodium: Care Instructions. \"  Current as of: September 8, 2021               Content Version: 13.2  © 6819-3936 [employer]. Care instructions adapted under license by [employer] (which disclaims liability or warranty for this information).  If you have questions about a medical condition or this instruction, always ask your healthcare professional. [place] [address] any warranty or liability for your use of this information.

## 2022-04-05 NOTE — PROGRESS NOTES
Megan Boyd is a 60 y.o. female who was seen in clinic today (4/5/2022) for No chief complaint on file. Assessment & Plan:   Diagnoses and all orders for this visit:    1. Acute right-sided low back pain without sciatica    2. Chronic bronchitis, unspecified chronic bronchitis type [facility])  Assessment & Plan:  Stable, based on history, physical exam, and review of pertinent labs, studies and medications; medications reconciled; continue current treatment plan. 3. Seizure disorder (HCC)    4. Elevated blood-pressure reading without diagnosis of hypertension    Other orders  -     cyclobenzaprine (FLEXERIL) 10 mg tablet; Take 1 Tablet by mouth three (3) times daily as needed for Muscle Spasm(s). Return to work note provided  Low sodium guidelines provided and reviewed for elevated blood pressure. Instructed to monitor blood pressure at home 2-3 times per week and bring bp log to follow up. I have discussed the diagnosis with the patient and the intended plan as seen in the above orders. The patient has received an after-visit summary and questions were answered concerning future plans. I have discussed medication side effects and warnings with the patient as well. Patient agreeable with above plan and verbalizes understanding. Follow-up and Dispositions    · Return in about 6 weeks (around 5/17/2022) for elevated blood pressure, in office follow up. Subjective:   Patient was seen in the ED on 3/23/2022 for the following:     Diagnosis Comment   Acute right-sided low back pain, unspecified whether sciatica present      History Provided By: Patient  Location/Duration/Severity/Modifying factors   Patient is a 61 y/o female with a hx of arthritis presenting due to right flank and groin pain. Patient states that for almost two weeks she has been having back pain that at times radiates to her groin.  Patient believes that it could be associated with lifting she has to do at work but since pain has been present for so long and associated with groin pain now she wanted to be evaluated. Patient was prescribed flexeril for back pain. States medication does help when she is relaxed along with tylenol rapid release. States she also has been using otc lidocaine patches with improvement. Denies pain radiating, further denies change in bowel/bladder. Patient works as a CNA at [employer]. Lab Results   Component Value Date/Time    Sodium 140 03/23/2022 11:30 AM    Potassium 3.8 03/23/2022 11:30 AM    Chloride 108 03/23/2022 11:30 AM    CO2 26 03/23/2022 11:30 AM    Anion gap 6 03/23/2022 11:30 AM    Glucose 82 03/23/2022 11:30 AM    BUN 14 03/23/2022 11:30 AM    Creatinine 0.62 03/23/2022 11:30 AM    BUN/Creatinine ratio 23 (H) 03/23/2022 11:30 AM    GFR est AA >60 03/23/2022 11:30 AM    GFR est non-AA >60 03/23/2022 11:30 AM    Calcium 8.3 (L) 03/23/2022 11:30 AM    Bilirubin, total 0.3 03/23/2022 11:30 AM    Alk.  phosphatase 98 03/23/2022 11:30 AM    Protein, total 7.3 03/23/2022 11:30 AM    Albumin 3.7 03/23/2022 11:30 AM    Globulin 3.6 03/23/2022 11:30 AM    A-G Ratio 1.0 03/23/2022 11:30 AM    ALT (SGPT) 21 03/23/2022 11:30 AM    AST (SGOT) 14 03/23/2022 11:30 AM     Lab Results   Component Value Date/Time    Cholesterol, total 207 (H) 04/13/2018 12:15 PM    HDL Cholesterol 64 (H) 04/13/2018 12:15 PM    LDL, calculated 123.8 (H) 04/13/2018 12:15 PM    VLDL, calculated 19.2 04/13/2018 12:15 PM    Triglyceride 96 04/13/2018 12:15 PM    CHOL/HDL Ratio 3.2 04/13/2018 12:15 PM     Lab Results   Component Value Date/Time    WBC 6.7 03/23/2022 11:30 AM    HGB 12.0 03/23/2022 11:30 AM    HCT 36.3 03/23/2022 11:30 AM    PLATELET 051 01/26/2181 11:30 AM    MCV 83.8 03/23/2022 11:30 AM     Wt Readings from Last 3 Encounters:   03/23/22 190 lb (86.2 kg)   12/07/21 200 lb (90.7 kg)   07/26/21 180 lb (81.6 kg)     Temp Readings from Last 3 Encounters:   03/23/22 98.3 °F (36.8 °C) 12/07/21 98.2 °F (36.8 °C)   07/26/21 98.9 °F (37.2 °C)     BP Readings from Last 3 Encounters:   03/23/22 131/83   12/07/21 139/71   07/26/21 133/82     Pulse Readings from Last 3 Encounters:   03/23/22 63   12/07/21 73   07/26/21 88     Prior to Admission medications    Medication Sig Start Date End Date Taking? Authorizing Provider   carBAMazepine (TEGRETOL) 200 mg tablet One tab in AM, one tab at lunch, and two tabs before bed 2/27/18   Jeanelle Favre, MD         The following sections were reviewed & updated as appropriate: PMH, PSH, FH, and SH. Review of Systems   Constitutional: Negative for activity change, appetite change, chills, fatigue and fever. Respiratory: Negative for chest tightness and shortness of breath. Cardiovascular: Negative for chest pain. Musculoskeletal: Positive for back pain (right lower). Neurological: Negative for dizziness and headaches. Objective:     Visit Vitals  BP (!) 162/90 (BP 1 Location: Right upper arm, BP Patient Position: Sitting, BP Cuff Size: Adult)   Pulse 87   Temp 97.2 °F (36.2 °C) (Temporal)   Resp 16   Ht 5' 4\" (1.626 m)   Wt 198 lb (89.8 kg)   SpO2 99%   BMI 33.99 kg/m²      Physical Exam  Constitutional:       General: She is not in acute distress. Appearance: She is well-developed. HENT:      Head: Normocephalic and atraumatic. Neck:      Vascular: No carotid bruit. Cardiovascular:      Rate and Rhythm: Normal rate and regular rhythm. Heart sounds: Normal heart sounds. No murmur heard. No friction rub. No gallop. Pulmonary:      Effort: Pulmonary effort is normal.      Breath sounds: Normal breath sounds. No decreased breath sounds, wheezing, rhonchi or rales. Musculoskeletal:      Cervical back: Normal range of motion and neck supple. Lumbar back: Tenderness (mild right paravertebral tenderness) present.  Negative right straight leg raise test and negative left straight leg raise test.   Lymphadenopathy: Cervical: No cervical adenopathy. Skin:     General: Skin is warm and dry. Neurological:      Mental Status: She is alert and oriented to person, place, and time. Disclaimer: The patient understands our medical plan. Alternatives have been explained and offered. The risks, benefits and significant side effects of all medications have been reviewed. Anticipated time course and progression of condition reviewed. All questions have been addressed. She is encouraged to employ the information provided in the after visit summary, which was reviewed. Where applicable, she is instructed to call the clinic if she has not been notified either by phone or through [address] with the results of her tests or with an appointment plan for any referrals within 1 week(s). No news is not good news; it's no news. The patient  is to call if her condition worsens or fails to improve or if significant side effects are experienced. Aspects of this note may have been generated using voice recognition software. Despite editing, there may be unrecognized errors.        Jeremy Sainz NP no

## 2022-04-05 NOTE — PROGRESS NOTES
1. \"Have you been to the ER, urgent care clinic since your last visit? Hospitalized since your last visit? \" Yes When: 1/23/2022    2. \"Have you seen or consulted any other health care providers outside of the [address] [place] [address] since your last visit? \" No     3. For patients aged 39-70: Has the patient had a colonoscopy / FIT/ Cologuard? Yes - no Care Gap present      If the patient is female:    4. For patients aged 41-77: Has the patient had a mammogram within the past 2 years? Yes - no Care Gap present      5. For patients aged 21-65: Has the patient had a pap smear?  Yes - no Care Gap present

## 2022-04-05 NOTE — LETTER
NOTIFICATION RETURN TO WORK    4/5/2022 1:17 PM    Ms. Nadira [facility] [address]      To Whom It May Concern:    Giovanna Duval [facility]. She will return to work on: 4/6/2022    If there are questions or concerns please have the patient contact our office.         Sincerely,      Contreras Egan NP General

## 2022-04-08 ENCOUNTER — PATIENT OUTREACH (OUTPATIENT)
Dept: OTHER | Age: 57
End: 2022-04-08

## 2022-04-08 NOTE — PROGRESS NOTES
[facility] f/u:  Telephone attempt to contact patient for Health Promotion and Risk Prevention. Left discreet message on voicemail with this CC contact information. Will follow for one month for transitions of care needs. Next outreach is 4/27/22 for discussion f/u - ED Visit and Resolve Episode.

## 2022-04-13 RX ORDER — CYCLOBENZAPRINE HCL 10 MG
TABLET ORAL
Qty: 30 TABLET | Refills: 0 | Status: SHIPPED | OUTPATIENT
Start: 2022-04-13

## 2022-04-27 ENCOUNTER — PATIENT OUTREACH (OUTPATIENT)
Dept: OTHER | Age: 57
End: 2022-04-27

## 2022-04-27 NOTE — LETTER
4/27/2022 4:54 PM    [facility] [address]    Dear Dilma Zamudio    My name is Elton Beck,  Associate Care Coordinator for [facility] [place], and I have been trying to reach you. The Associate Care Management (ACM) program is a free-of-charge, confidential service provided to our employees and their family members covered by the [facility]. I can help you with care transitions such as when you come home from the hospital, when help is needed to manage your disease, or when you need assistance coordinating services or appointments. As healthcare providers, we know that patients do better when they have close follow up with a primary care provider (PCP). I can help you find one that is convenient to you and covered by your insurance. I can also help you understand any after visit instructions, such as what symptoms to watch out for, or any new or changed medications. We can work together using your preferred communication -- telephone, email, [employer]. If you do not have a [employer] account, I can help you request access. Our program is designed to provide you with the opportunity to have a [address] [place] [address] care manager partner with you for your healthcare needs. Due to not being able to reach you, I am closing out the current program, but will remain available to you should you have any questions. Please contact me at the below number if I can provide you with assistance for any of the above services.     Sincerely,    Lani Gill LPN  [facility] Care Coordinator  Associate Care Management  [facility]  [address]  Avni Macdonald [phone]   [phone]  [email or web address]

## 2022-05-10 ENCOUNTER — HOSPITAL ENCOUNTER (OUTPATIENT)
Dept: LAB | Age: 57
Discharge: HOME | End: 2022-05-10
Payer: COMMERCIAL

## 2022-05-10 LAB
ALBUMIN SERPL-MCNC: 3.8 G/DL (ref 3.4–5)
ALBUMIN/GLOB SERPL: 1 {RATIO} (ref 0.8–1.7)
ALP SERPL-CCNC: 106 U/L (ref 45–117)
ALT SERPL-CCNC: 21 U/L (ref 13–56)
ANION GAP SERPL CALC-SCNC: 4 MMOL/L (ref 3–18)
AST SERPL-CCNC: 10 U/L (ref 10–38)
BASOPHILS # BLD: 0 K/UL (ref 0–0.1)
BASOPHILS NFR BLD: 0 % (ref 0–2)
BILIRUB DIRECT SERPL-MCNC: <0.1 MG/DL (ref 0–0.2)
BILIRUB SERPL-MCNC: 0.3 MG/DL (ref 0.2–1)
BUN SERPL-MCNC: 11 MG/DL (ref 7–18)
BUN/CREAT SERPL: 16 (ref 12–20)
CALCIUM SERPL-MCNC: 9 MG/DL (ref 8.5–10.1)
CARBAMAZEPINE SERPL-MCNC: 8.9 UG/ML (ref 4–12)
CHLORIDE SERPL-SCNC: 105 MMOL/L (ref 100–111)
CO2 SERPL-SCNC: 29 MMOL/L (ref 21–32)
CREAT SERPL-MCNC: 0.68 MG/DL (ref 0.6–1.3)
DIFFERENTIAL METHOD BLD: ABNORMAL
EOSINOPHIL # BLD: 0.1 K/UL (ref 0–0.4)
EOSINOPHIL NFR BLD: 2 % (ref 0–5)
ERYTHROCYTE [DISTWIDTH] IN BLOOD BY AUTOMATED COUNT: 14.5 % (ref 11.6–14.5)
GLOBULIN SER CALC-MCNC: 3.7 G/DL (ref 2–4)
GLUCOSE SERPL-MCNC: 83 MG/DL (ref 74–99)
HCT VFR BLD AUTO: 36.6 % (ref 35–45)
HGB BLD-MCNC: 11.7 G/DL (ref 12–16)
IMM GRANULOCYTES # BLD AUTO: 0 K/UL (ref 0–0.04)
IMM GRANULOCYTES NFR BLD AUTO: 0 % (ref 0–0.5)
LYMPHOCYTES # BLD: 2.7 K/UL (ref 0.9–3.6)
LYMPHOCYTES NFR BLD: 39 % (ref 21–52)
MCH RBC QN AUTO: 26.9 PG (ref 24–34)
MCHC RBC AUTO-ENTMCNC: 32 G/DL (ref 31–37)
MCV RBC AUTO: 84.1 FL (ref 78–100)
MONOCYTES # BLD: 0.4 K/UL (ref 0.05–1.2)
MONOCYTES NFR BLD: 6 % (ref 3–10)
NEUTS SEG # BLD: 3.7 K/UL (ref 1.8–8)
NEUTS SEG NFR BLD: 54 % (ref 40–73)
NRBC # BLD: 0 K/UL (ref 0–0.01)
NRBC BLD-RTO: 0 PER 100 WBC
PLATELET # BLD AUTO: 304 K/UL (ref 135–420)
PMV BLD AUTO: 9.8 FL (ref 9.2–11.8)
POTASSIUM SERPL-SCNC: 4.1 MMOL/L (ref 3.5–5.5)
PROT SERPL-MCNC: 7.5 G/DL (ref 6.4–8.2)
RBC # BLD AUTO: 4.35 M/UL (ref 4.2–5.3)
SODIUM SERPL-SCNC: 138 MMOL/L (ref 136–145)
WBC # BLD AUTO: 6.9 K/UL (ref 4.6–13.2)

## 2022-05-10 PROCEDURE — 80076 HEPATIC FUNCTION PANEL: CPT

## 2022-05-10 PROCEDURE — 80156 ASSAY CARBAMAZEPINE TOTAL: CPT

## 2022-05-10 PROCEDURE — 80048 BASIC METABOLIC PNL TOTAL CA: CPT

## 2022-05-10 PROCEDURE — 36415 COLL VENOUS BLD VENIPUNCTURE: CPT

## 2022-05-10 PROCEDURE — 85025 COMPLETE CBC W/AUTO DIFF WBC: CPT

## 2022-07-06 ENCOUNTER — APPOINTMENT (OUTPATIENT)
Dept: GENERAL RADIOLOGY | Age: 57
End: 2022-07-06
Attending: STUDENT IN AN ORGANIZED HEALTH CARE EDUCATION/TRAINING PROGRAM
Payer: COMMERCIAL

## 2022-07-06 ENCOUNTER — HOSPITAL ENCOUNTER (EMERGENCY)
Age: 57
Discharge: HOME | End: 2022-07-07
Attending: STUDENT IN AN ORGANIZED HEALTH CARE EDUCATION/TRAINING PROGRAM
Payer: COMMERCIAL

## 2022-07-06 VITALS
BODY MASS INDEX: 32.44 KG/M2 | DIASTOLIC BLOOD PRESSURE: 79 MMHG | RESPIRATION RATE: 19 BRPM | HEIGHT: 64 IN | SYSTOLIC BLOOD PRESSURE: 131 MMHG | WEIGHT: 190 LBS | TEMPERATURE: 99.4 F | OXYGEN SATURATION: 99 % | HEART RATE: 75 BPM

## 2022-07-06 DIAGNOSIS — R07.89 MUSCULOSKELETAL CHEST PAIN: Primary | ICD-10-CM

## 2022-07-06 LAB
ALBUMIN SERPL-MCNC: 3.8 G/DL (ref 3.4–5)
ALBUMIN/GLOB SERPL: 0.8 {RATIO} (ref 0.8–1.7)
ALP SERPL-CCNC: 103 U/L (ref 45–117)
ALT SERPL-CCNC: 19 U/L (ref 13–56)
ANION GAP SERPL CALC-SCNC: 10 MMOL/L (ref 3–18)
AST SERPL-CCNC: 18 U/L (ref 10–38)
BASOPHILS # BLD: 0 K/UL (ref 0–0.1)
BASOPHILS NFR BLD: 0 % (ref 0–2)
BILIRUB SERPL-MCNC: 0.1 MG/DL (ref 0.2–1)
BUN SERPL-MCNC: 8 MG/DL (ref 7–18)
BUN/CREAT SERPL: 9 (ref 12–20)
CALCIUM SERPL-MCNC: 9 MG/DL (ref 8.5–10.1)
CHLORIDE SERPL-SCNC: 106 MMOL/L (ref 100–111)
CO2 SERPL-SCNC: 25 MMOL/L (ref 21–32)
CREAT SERPL-MCNC: 0.85 MG/DL (ref 0.6–1.3)
D DIMER PPP FEU-MCNC: 0.3 UG/ML(FEU)
DIFFERENTIAL METHOD BLD: ABNORMAL
EOSINOPHIL # BLD: 0.1 K/UL (ref 0–0.4)
EOSINOPHIL NFR BLD: 2 % (ref 0–5)
ERYTHROCYTE [DISTWIDTH] IN BLOOD BY AUTOMATED COUNT: 15.1 % (ref 11.6–14.5)
GLOBULIN SER CALC-MCNC: 4.5 G/DL (ref 2–4)
GLUCOSE SERPL-MCNC: 111 MG/DL (ref 74–99)
HCT VFR BLD AUTO: 39.5 % (ref 35–45)
HGB BLD-MCNC: 12.8 G/DL (ref 12–16)
IMM GRANULOCYTES # BLD AUTO: 0 K/UL (ref 0–0.04)
IMM GRANULOCYTES NFR BLD AUTO: 0 % (ref 0–0.5)
LYMPHOCYTES # BLD: 3.8 K/UL (ref 0.9–3.6)
LYMPHOCYTES NFR BLD: 41 % (ref 21–52)
MAGNESIUM SERPL-MCNC: 2.5 MG/DL (ref 1.6–2.6)
MCH RBC QN AUTO: 26.9 PG (ref 24–34)
MCHC RBC AUTO-ENTMCNC: 32.4 G/DL (ref 31–37)
MCV RBC AUTO: 83 FL (ref 78–100)
MONOCYTES # BLD: 0.4 K/UL (ref 0.05–1.2)
MONOCYTES NFR BLD: 4 % (ref 3–10)
NEUTS SEG # BLD: 4.9 K/UL (ref 1.8–8)
NEUTS SEG NFR BLD: 53 % (ref 40–73)
NRBC # BLD: 0 K/UL (ref 0–0.01)
NRBC BLD-RTO: 0 PER 100 WBC
PLATELET # BLD AUTO: 316 K/UL (ref 135–420)
PMV BLD AUTO: 9.7 FL (ref 9.2–11.8)
POTASSIUM SERPL-SCNC: 3.7 MMOL/L (ref 3.5–5.5)
PROT SERPL-MCNC: 8.3 G/DL (ref 6.4–8.2)
RBC # BLD AUTO: 4.76 M/UL (ref 4.2–5.3)
SODIUM SERPL-SCNC: 141 MMOL/L (ref 136–145)
TROPONIN-HIGH SENSITIVITY: 10 NG/L (ref 0–54)
WBC # BLD AUTO: 9.2 K/UL (ref 4.6–13.2)

## 2022-07-06 PROCEDURE — 74011250636 HC RX REV CODE- 250/636: Performed by: STUDENT IN AN ORGANIZED HEALTH CARE EDUCATION/TRAINING PROGRAM

## 2022-07-06 PROCEDURE — 93005 ELECTROCARDIOGRAM TRACING: CPT

## 2022-07-06 PROCEDURE — 96374 THER/PROPH/DIAG INJ IV PUSH: CPT

## 2022-07-06 PROCEDURE — 85379 FIBRIN DEGRADATION QUANT: CPT

## 2022-07-06 PROCEDURE — 80053 COMPREHEN METABOLIC PANEL: CPT

## 2022-07-06 PROCEDURE — 83735 ASSAY OF MAGNESIUM: CPT

## 2022-07-06 PROCEDURE — 96375 TX/PRO/DX INJ NEW DRUG ADDON: CPT

## 2022-07-06 PROCEDURE — 71045 X-RAY EXAM CHEST 1 VIEW: CPT

## 2022-07-06 PROCEDURE — 85025 COMPLETE CBC W/AUTO DIFF WBC: CPT

## 2022-07-06 PROCEDURE — 84484 ASSAY OF TROPONIN QUANT: CPT

## 2022-07-06 PROCEDURE — 99285 EMERGENCY DEPT VISIT HI MDM: CPT

## 2022-07-06 RX ORDER — ONDANSETRON 2 MG/ML
4 INJECTION INTRAMUSCULAR; INTRAVENOUS ONCE
Status: COMPLETED | OUTPATIENT
Start: 2022-07-06 | End: 2022-07-06

## 2022-07-06 RX ORDER — MORPHINE SULFATE 4 MG/ML
4 INJECTION INTRAVENOUS ONCE
Status: COMPLETED | OUTPATIENT
Start: 2022-07-06 | End: 2022-07-06

## 2022-07-06 RX ADMIN — MORPHINE SULFATE 4 MG: 4 INJECTION, SOLUTION INTRAMUSCULAR; INTRAVENOUS at 22:58

## 2022-07-06 RX ADMIN — ONDANSETRON 4 MG: 2 INJECTION INTRAMUSCULAR; INTRAVENOUS at 22:57

## 2022-07-06 NOTE — Clinical Note
[address] [place] [address] [address] [facility] EMERGENCY DEPT  [address] [address] [phone]    Work/School Note    Date: 7/6/2022    To Whom It May concern:      Dannielle Whelan was seen and treated today in the emergency room by the following provider(s):  Attending Provider: Reymundo Christopher MD.      Dannielle Whelan is excused from work/school on 07/07/22. She is clear to return to work/school on 07/08/22.         Sincerely,          Jessica Campos MD

## 2022-07-07 LAB
ATRIAL RATE: 89 BPM
CALCULATED P AXIS, ECG09: 71 DEGREES
CALCULATED R AXIS, ECG10: 38 DEGREES
CALCULATED T AXIS, ECG11: -22 DEGREES
DIAGNOSIS, 93000: NORMAL
P-R INTERVAL, ECG05: 118 MS
Q-T INTERVAL, ECG07: 410 MS
QRS DURATION, ECG06: 70 MS
QTC CALCULATION (BEZET), ECG08: 498 MS
VENTRICULAR RATE, ECG03: 89 BPM

## 2022-07-07 RX ORDER — CYCLOBENZAPRINE HCL 10 MG
10 TABLET ORAL
Qty: 20 TABLET | Refills: 0 | Status: SHIPPED | OUTPATIENT
Start: 2022-07-07

## 2022-07-07 RX ORDER — LIDOCAINE 50 MG/G
PATCH TOPICAL
Qty: 12 EACH | Refills: 0 | Status: SHIPPED | OUTPATIENT
Start: 2022-07-07

## 2022-07-07 NOTE — ED PROVIDER NOTES
EMERGENCY DEPARTMENT HISTORY AND PHYSICAL EXAM    11:08 PM    Date: 7/6/2022  Patient Name: Hannah Amanda    History of Presenting Illness     Chief Complaint   Patient presents with    Chest Pain       History Provided By: Patient  Location/Duration/Severity/Modifying factors   HPI  Hannah Amanda is a 60 y.o. female with a past medical history of epilepsy on Tegretol presenting for evaluation of chest pain. She says that she woke up this morning with a intermittent stabbing pain on the right side of her chest wall, pressure-like in sensation. She says that its been persistent coming and going all day, not exertionally. She says that it feels tight in her chest wall and that it relieves itself for a few minutes and then comes back for a few minutes. No associated shortness of breath, nausea, vomiting, diaphoresis, cough or fever. She tried over-the-counter pain medicine but it has not helped. She says that the pain is worse when pressing on her chest or moving her right arm around. She has no history of any heart disease. No history of DVT or PE, leg swelling, long distance travel. She smokes tobacco, but denies recreational drug use. No other alleviating or aggravating factors. Overall symptoms are reported as severe. PCP: Luis Phillips NP    Current Outpatient Medications   Medication Sig Dispense Refill    cyclobenzaprine (FLEXERIL) 10 mg tablet Take 1 Tablet by mouth three (3) times daily as needed for Muscle Spasm(s). 20 Tablet 0    lidocaine (Lidoderm) 5 % Apply patch to the affected area for 12 hours a day and remove for 12 hours a day.  12 Each 0    cyclobenzaprine (FLEXERIL) 10 mg tablet take 1 tablet by mouth three times a day if needed for muscle spasm 30 Tablet 0    carBAMazepine (TEGRETOL) 200 mg tablet One tab in AM, one tab at lunch, and two tabs before bed 90 Tab 0       Past History     Past Medical History:  Past Medical History:   Diagnosis Date    Arthritis left shoulder    Bronchitis     Carpal tunnel syndrome on right     Chronic bronchitis (HCC)     De Quervain's syndrome (tenosynovitis)     Depression     resolved    Ectopic pregnancy     two times    Epilepsy (HCC)     Multinodular goiter     Seizure ([address]     Sinus infection     Stenosing tenosynovitis of thumb     Right side    Trigger thumb of left hand        Past Surgical History:  Past Surgical History:   Procedure Laterality Date    COLONOSCOPY N/A 10/21/2016    COLONOSCOPY w/ biopsy performed by Margarita Guido MD at [address] HX APPENDECTOMY  1/12/15    lysis of adhesions    HX CHOLECYSTECTOMY      HX HYSTERECTOMY      HX OTHER SURGICAL      scar tissue removed from intestines    HX PARTIAL HYSTERECTOMY  2009    HX TONSILLECTOMY         Family History:  Family History   Problem Relation Age of Onset    Heart Attack Brother          in 58s    Diabetes Father     Hypertension Father     Cancer Father         colon CA    Heart Disease Sister         stent placement    Hypertension Brother     Other Brother         Stomach problems/ulcers    Cancer Sister         Breast CA    Stroke Sister     Diabetes Mother     Hypertension Mother        Social History:  Social History     Tobacco Use    Smoking status: Current Every Day Smoker     Packs/day: 0.25     Years: 38.00     Pack years: 9.50     Types: Cigarettes    Smokeless tobacco: Never Used    Tobacco comment: second hand smoke from family   Vaping Use    Vaping Use: Never used   Substance Use Topics    Alcohol use: No    Drug use: No       Allergies: Allergies   Allergen Reactions    Etodolac Other (comments)     headache       I reviewed and confirmed the above information with patient and updated as necessary. Review of Systems     Review of Systems   Constitutional: Negative for diaphoresis and fever. HENT: Negative for ear pain and sore throat.     Eyes:        No acute change in vision Respiratory: Negative for cough and shortness of breath. Cardiovascular: Positive for chest pain. Negative for leg swelling. Gastrointestinal: Negative for abdominal pain and vomiting. Genitourinary: Negative for dysuria. Musculoskeletal: Negative for neck pain. Skin: Negative for wound. Neurological: Negative for weakness and headaches. Physical Exam     Visit Vitals  /79 (BP 1 Location: Right arm, BP Patient Position: At rest)   Pulse 75   Temp 99.4 °F (37.4 °C)   Resp 19   Ht 5' 4\" (1.626 m)   Wt 86.2 kg (190 lb)   SpO2 99%   BMI 32.61 kg/m²       Physical Exam  Vitals and nursing note reviewed. Constitutional:       Comments: Adult female, uncomfortable appearing   HENT:      Mouth/Throat:      Mouth: Mucous membranes are moist.   Eyes:      Pupils: Pupils are equal, round, and reactive to light. Cardiovascular:      Rate and Rhythm: Normal rate and regular rhythm. Pulses: Normal pulses. Pulmonary:      Effort: Pulmonary effort is normal.      Breath sounds: Normal breath sounds. Chest:      Chest wall: Tenderness (Exquisite right-sided chest wall tenderness, worse with palpation or range of motion of the right shoulder) present. Abdominal:      Palpations: Abdomen is soft. Tenderness: There is no abdominal tenderness. Musculoskeletal:         General: No signs of injury. Normal range of motion. Cervical back: Normal range of motion. Skin:     General: Skin is warm. Neurological:      General: No focal deficit present. Mental Status: She is alert and oriented to person, place, and time. Mental status is at baseline.          Diagnostic Study Results     Labs -  Recent Results (from the past 24 hour(s))   CBC WITH AUTOMATED DIFF    Collection Time: 07/06/22  9:55 PM   Result Value Ref Range    WBC 9.2 4.6 - 13.2 K/uL    RBC 4.76 4.20 - 5.30 M/uL    HGB 12.8 12.0 - 16.0 g/dL    HCT 39.5 35.0 - 45.0 %    MCV 83.0 78.0 - 100.0 FL    MCH 26.9 24.0 - 34.0 PG    MCHC 32.4 31.0 - 37.0 g/dL    RDW 15.1 (H) 11.6 - 14.5 %    PLATELET 112 101 - 033 K/uL    MPV 9.7 9.2 - 11.8 FL    NRBC 0.0 0  WBC    ABSOLUTE NRBC 0.00 0.00 - 0.01 K/uL    NEUTROPHILS 53 40 - 73 %    LYMPHOCYTES 41 21 - 52 %    MONOCYTES 4 3 - 10 %    EOSINOPHILS 2 0 - 5 %    BASOPHILS 0 0 - 2 %    IMMATURE GRANULOCYTES 0 0.0 - 0.5 %    ABS. NEUTROPHILS 4.9 1.8 - 8.0 K/UL    ABS. LYMPHOCYTES 3.8 (H) 0.9 - 3.6 K/UL    ABS. MONOCYTES 0.4 0.05 - 1.2 K/UL    ABS. EOSINOPHILS 0.1 0.0 - 0.4 K/UL    ABS. BASOPHILS 0.0 0.0 - 0.1 K/UL    ABS. IMM. GRANS. 0.0 0.00 - 0.04 K/UL    DF AUTOMATED     METABOLIC PANEL, COMPREHENSIVE    Collection Time: 07/06/22  9:55 PM   Result Value Ref Range    Sodium 141 136 - 145 mmol/L    Potassium 3.7 3.5 - 5.5 mmol/L    Chloride 106 100 - 111 mmol/L    CO2 25 21 - 32 mmol/L    Anion gap 10 3.0 - 18 mmol/L    Glucose 111 (H) 74 - 99 mg/dL    BUN 8 7.0 - 18 MG/DL    Creatinine 0.85 0.6 - 1.3 MG/DL    BUN/Creatinine ratio 9 (L) 12 - 20      GFR est AA >60 >60 ml/min/1.73m2    GFR est non-AA >60 >60 ml/min/1.73m2    Calcium 9.0 8.5 - 10.1 MG/DL    Bilirubin, total 0.1 (L) 0.2 - 1.0 MG/DL    ALT (SGPT) 19 13 - 56 U/L    AST (SGOT) 18 10 - 38 U/L    Alk. phosphatase 103 45 - 117 U/L    Protein, total 8.3 (H) 6.4 - 8.2 g/dL    Albumin 3.8 3.4 - 5.0 g/dL    Globulin 4.5 (H) 2.0 - 4.0 g/dL    A-G Ratio 0.8 0.8 - 1.7     MAGNESIUM    Collection Time: 07/06/22  9:55 PM   Result Value Ref Range    Magnesium 2.5 1.6 - 2.6 mg/dL   TROPONIN-HIGH SENSITIVITY    Collection Time: 07/06/22  9:55 PM   Result Value Ref Range    Troponin-High Sensitivity 10 0 - 54 ng/L   D DIMER    Collection Time: 07/06/22  9:55 PM   Result Value Ref Range    D DIMER 0.30 <0.46 ug/ml(FEU)         Radiologic Studies -   XR CHEST PORT   Final Result      NORMAL CHEST. Medical Decision Making   I am the first provider for this patient.     I reviewed the vital signs, available nursing notes, past medical history, past surgical history, family history and social history. Vital Signs-Reviewed the patient's vital signs. EKG: Nondiagnostic for ischemia or arrhythmia    Records Reviewed: Nursing Notes and Old Medical Records (Time of Review: 11:08 PM)    Provider Notes (Medical Decision Making):   MDM  25-year-old female here for right-sided chest pain, likely musculoskeletal based on exam, lower suspicion for ACS, also consider pneumonia, pneumothorax or PE.    ED Course: Progress Notes, Reevaluation, and Consults:  Patient has afebrile and hemodynamically normal  Exam discussed as above, very reproducible right-sided chest wall tenderness    We will screen labs, troponin, EKG chest x-ray and D-dimer. Will treat with morphine and Zofran. CBC and CMP are unremarkable  Troponin is negative  D-dimer negative  Chest x-ray negative    Patient feels much better after therapies here, symptoms ongoing for greater than 8 hours, low suspicion for ACS, likely musculoskeletal.  Patient was discharged home with strict return precautions. Procedures    Diagnosis     Clinical Impression:   1. Musculoskeletal chest pain        Disposition: Home    Follow-up Information    None          Discharge Medication List as of 7/7/2022  1:03 AM      START taking these medications    Details   !! cyclobenzaprine (FLEXERIL) 10 mg tablet Take 1 Tablet by mouth three (3) times daily as needed for Muscle Spasm(s). , Normal, Disp-20 Tablet, R-0      lidocaine (Lidoderm) 5 % Apply patch to the affected area for 12 hours a day and remove for 12 hours a day., Normal, Disp-12 Each, R-0       !! - Potential duplicate medications found. Please discuss with provider.       CONTINUE these medications which have NOT CHANGED    Details   !! cyclobenzaprine (FLEXERIL) 10 mg tablet take 1 tablet by mouth three times a day if needed for muscle spasm, Normal, Disp-30 Tablet, R-0      carBAMazepine (TEGRETOL) 200 mg tablet One tab in AM, one tab at lunch, and two tabs before bed, No Print, Disp-90 Tab, R-0       !! - Potential duplicate medications found. Please discuss with provider. Ingris Garcia MD   Emergency Medicine   July 7, 2022, 11:08 PM     This note is dictated utilizing Dragon voice recognition software. Unfortunately this leads to occasional typographical errors using the voice recognition. I apologize in advance if the situation occurs. If questions occur please do not hesitate to contact me directly.     Sofi Lainez MD

## 2022-07-07 NOTE — ED TRIAGE NOTES
Pt reports tightness on right side of the chest since this AM. Pain worse on moving right arm. Denies trauma or injury. Denies cough, fever or chills.

## 2022-07-07 NOTE — DISCHARGE INSTRUCTIONS
Please return to the ER with any new or worsening symptoms or any other concerns. Please return to the Emergency Department if you develop a fever, chills, cannot eat or drink due to nausea or vomiting, or if any of your symptoms worsen. Also, It is extremely important that you follow-up with a primary care physician and if you do not have one currently use the contact information provided to obtain an appointment. If none was provided please call the number on the back of your insurance card to locate a Primary care doctor. Many offices have \"cancellation lists\" that you can ask to be placed on; should a patient with an earlier appointment cancel you will be notified to take their place. Please return to the Emergency Room immediately if your symptoms worsen. Please carefully read all discharge instructions    [email or web address]    What are GoodRx coupons? GoodRx coupons will help you pay less than the cash price for your prescription. Maxim Mazzoni free to use and are accepted at virtually every [place] pharmacy. Your pharmacist will know how to enter the codes on the coupon to pull up the lowest discount available.

## 2022-07-08 ENCOUNTER — PATIENT OUTREACH (OUTPATIENT)
Dept: OTHER | Age: 57
End: 2022-07-08

## 2022-07-08 NOTE — PROGRESS NOTES
7/8/2022, Per chart review, S/P ED visit, 7/7/2022 at [facility](Emergency Dept) related to  [address],  history of epilepsy on Tegretol presenting for evaluation of chest pain/?musculoskeletal.  7/8, Patient on report as eligible for Case Management. Left discreet message on voicemail with this ACM contact information and request for return call. PLAN: Will attempt to contact again to offer [address] [place] [address] Management services.

## 2022-07-11 ENCOUNTER — PATIENT OUTREACH (OUTPATIENT)
Dept: OTHER | Age: 57
End: 2022-07-11

## 2022-07-11 NOTE — LETTER
7/11/2022 12:38 PM    Ms. Dilma Dewitt [place] [id number]         Dear Ms Mallorie Cox,     My name is Ross Hough RN, Associate Care Manager for [facility] and I have been trying to reach you. The Associate Care Management (ACM) program is a free-of-charge confidential service provided to our associates and their family members covered by the [facility]. The program will provide an associate and his/her family with the [address] expertise to assist in navigating the health care delivery system, provider services, and their overall care needsso as to assure and improve health care interactions and enhance the quality of life. This program is designed to provide you with the opportunity to have a [employer] partner with you for the following services:     1) when you come home from the hospital or emergency room   2) when help is needed to manage your disease   3) when you need assistance coordinating services or appointments  4) when you need additional education, resources or assistance reaching your Be Well Health Program goals/requirements such as Be Well With Diabetes      [address] is dedicated to empowering the good health of its community and improving the quality of care and care experiences for associates and their families. We are committed to safeguarding patient confidentiality and privacy, assuring that every associate has the respect he or she deserves in managing their health. The information shared with your care manager will not be shared with anyone else aside from those you identify as part of your care team, and will only be used to assist you with any identified care needs. Please contact me if you would like this service provided to you.      Sincerely,    Laurent Garcia RN, BSN, [address] [facility] [address]  [id number]  [facility] [phone], [email or web address]

## 2022-07-11 NOTE — PROGRESS NOTES
7/11/2022, S/P ED visit, 7/7/2022 at [facility](Emergency Dept) related to  [address] [place] [address],  history of epilepsy on Tegretol presenting for evaluation of chest pain/?musculoskeletal.  7/11, Patient identified as eligible for [address] [place] [address] Management services. Second telephone outreach attempted. Left discreet voicemail with this [facility] confidential contact information and request for return call. PLAN: Will send UTR letter. Will attempt to contact again, in next 2 weeks, to offer [address] [place] [address] Management services    See Previous Documentation:  7/8/2022, Per chart review, S/P ED visit, 7/7/2022 at [facility](Emergency Dept) related to  [facility],  history of epilepsy on Tegretol presenting for evaluation of chest pain/?musculoskeletal.  7/8, Patient on report as eligible for Case Management. Left discreet message on voicemail with this [facility] contact information and request for return call.     PLAN: Will attempt to contact again to offer [address] [place] [address] Management services

## 2022-07-18 ENCOUNTER — TELEPHONE (OUTPATIENT)
Dept: FAMILY MEDICINE CLINIC | Age: 57
End: 2022-07-18

## 2022-07-18 DIAGNOSIS — Z12.31 VISIT FOR SCREENING MAMMOGRAM: Primary | ICD-10-CM

## 2022-07-18 DIAGNOSIS — R92.8 ABNORMAL MAMMOGRAM OF LEFT BREAST: ICD-10-CM

## 2022-07-18 NOTE — TELEPHONE ENCOUNTER
----- Message from Jd Morel sent at 7/18/2022  8:17 AM EDT -----  Subject: Referral Request    Reason for referral request? right breast cysts pt is requesting a ( right   ) eugene gram with diagnostic testing ( ultrasound ) order be placed   Provider patient wants to be referred to(if known):     Provider Phone Number(if known):     Additional Information for Provider?   ---------------------------------------------------------------------------  --------------  [id number]    [phone]; OK to leave message on voicemail  ---------------------------------------------------------------------------  --------------

## 2022-07-18 NOTE — TELEPHONE ENCOUNTER
Informed patient that JARED Khan placed her orders in the system. Patient stated she will call to schedule an appointment.

## 2022-07-19 ENCOUNTER — HOSPITAL ENCOUNTER (OUTPATIENT)
Dept: ULTRASOUND IMAGING | Age: 57
Discharge: HOME | End: 2022-07-19
Attending: NURSE PRACTITIONER
Payer: COMMERCIAL

## 2022-07-19 ENCOUNTER — HOSPITAL ENCOUNTER (OUTPATIENT)
Dept: MAMMOGRAPHY | Age: 57
Discharge: HOME | End: 2022-07-19
Attending: NURSE PRACTITIONER
Payer: COMMERCIAL

## 2022-07-19 DIAGNOSIS — R92.8 ABNORMAL MAMMOGRAM OF LEFT BREAST: ICD-10-CM

## 2022-07-19 PROCEDURE — 76642 ULTRASOUND BREAST LIMITED: CPT

## 2022-07-19 PROCEDURE — 77062 BREAST TOMOSYNTHESIS BI: CPT

## 2022-07-25 ENCOUNTER — PATIENT OUTREACH (OUTPATIENT)
Dept: OTHER | Age: 57
End: 2022-07-25

## 2022-07-25 NOTE — LETTER
7/25/2022 2:37 PM    Ms. Dilma Zamudio  [facility] [place] [address]          Dear Ms Kebedek Luis M,     My name is Rao Yuen RN, Associate Care Manager for [facility] and I have been trying to reach you. The Associate Care Management (ACM) program is a free-of-charge confidential service provided to our associates and their family members covered by the [facility]. The program will provide an associate and his/her family with the [facility] expertise to assist in navigating the health care delivery system, provider services, and their overall care needs--so as to assure and improve health care interactions and enhance the quality of life. This program is designed to provide you with the opportunity to have a [employer] partner with you for the following services:     1) when you come home from the hospital or emergency room   2) when help is needed to manage your disease   3) when you need assistance coordinating services or appointments  4) when you need additional education, resources or assistance reaching your Be Well Health Program goals/requirements such as Be Well With Diabetes      [facility] is dedicated to empowering the good health of its community and improving the quality of care and care experiences for associates and their families. We are committed to safeguarding patient confidentiality and privacy, assuring that every associate has the respect he or she deserves in managing their health. The information shared with your care manager will not be shared with anyone else aside from those you identify as part of your care team, and will only be used to assist you with any identified care needs. Please contact me if you would like this service provided to you.      Sincerely,    Rayne Wiley RN, BSN, [facility] [facility] [address]  [id number]  Cell [phone], [email or web address]

## 2022-07-25 NOTE — PROGRESS NOTES
7/25/2022, S/P ED visit, 7/7/2022 at [facility](Emergency Dept) related to chest pains. History of epilepsy on Tegretol presenting for evaluation of chest pain/?musculoskeletal.  Per chart review, no further ED/Hospitalizations. Active with PCP.   7/25, Patient identified as eligible for [facility] Management services. Third telephone outreach attempted. Phone rings, however, message states mailbox is full, unable to leave voicemail message at this time. PLAN: UTR letter has been sent. Will attempt to contact again, in next 1-2 weeks, to offer [facility] [address] Management services. See Previous Documentation:  7/11/2022, S/P ED visit, 7/7/2022 at [facility](Emergency Dept) related to chest pains. History of epilepsy on Tegretol presenting for evaluation of chest pain/?musculoskeletal.  7/11, Patient identified as eligible for [facility] [address] Management services. Second telephone outreach attempted. Left discreet voicemail with this [facility] confidential contact information and request for return call. PLAN: Will send UTR letter. Will attempt to contact again, in next 2 weeks, to offer [facility] [address] Management services     See Previous Documentation:  7/8/2022, Per chart review, S/P ED visit, 7/7/2022 at [facility](Emergency Dept) related to chest pains. History of epilepsy on Tegretol presenting for evaluation of chest pain/?musculoskeletal.  7/8, Patient on report as eligible for Case Management. Left discreet message on voicemail with this [facility] contact information and request for return call.     PLAN: Will attempt to contact again to offer [facility] [address] Management services

## 2022-08-05 ENCOUNTER — PATIENT OUTREACH (OUTPATIENT)
Dept: OTHER | Age: 57
End: 2022-08-05

## 2022-08-05 NOTE — PROGRESS NOTES
8/5/22 CCSS Meseret assisting ACM, Ayo Nevarez RN with outreach/UTR letter. Letter generated, printed, and mailed.

## 2022-08-08 ENCOUNTER — PATIENT OUTREACH (OUTPATIENT)
Dept: OTHER | Age: 57
End: 2022-08-08

## 2022-08-08 NOTE — PROGRESS NOTES
8/8/2022, S/P ED visit, 7/7/2022 at [facility](Emergency Dept) related to chest pains. History of epilepsy on Tegretol presenting for evaluation of chest pain/?musculoskeletal.  Per chart review, no further ED/Hospitalizations. Active with PCP. No response to recent [facility] outreach attempts, LM's nor UTR Letter. 8/8, Patient identified as eligible for [facility] Management services. Final telephone outreach attempted. Left discreet voicemail with this AC confidential contact information and request for return call. Resolving current episode for case management due to patient unable to reach. Patient has not been reached after repeated calls and UTR letter. This writer's contact information and information regarding program services included in materials sent. PLAN: UTR letter has been sent. Will not make further telephone outreach attempts at this time. Will remain available should she return call or have further CM needs. See Previous Documentation:  7/25/2022, S/P ED visit, 7/7/2022 at [facility](Emergency Dept) related to chest pains. History of epilepsy on Tegretol presenting for evaluation of chest pain/?musculoskeletal.  Per chart review, no further ED/Hospitalizations. Active with PCP.   7/25, Patient identified as eligible for [address] Management services. Third telephone outreach attempted. Phone rings, however, message states mailbox is full, unable to leave voicemail message at this time. PLAN: UTR letter has been sent. Will attempt to contact again, in next 1-2 weeks, to offer [address] Management services. See Previous Documentation:  7/11/2022, S/P ED visit, 7/7/2022 at [facility](Emergency Dept) related to chest pains. History of epilepsy on Tegretol presenting for evaluation of chest pain/?musculoskeletal.  7/11, Patient identified as eligible for [address] Management services. Second telephone outreach attempted.   Left discreet voicemail with this [facility] confidential contact information and request for return call. PLAN: Will send UTR letter. Will attempt to contact again, in next 2 weeks, to offer [address] [place] [address] Management services     See Previous Documentation:  7/8/2022, Per chart review, S/P ED visit, 7/7/2022 at [facility](Emergency Dept) related to chest pains. History of epilepsy on Tegretol presenting for evaluation of chest pain/?musculoskeletal.  7/8, Patient on report as eligible for Case Management. Left discreet message on voicemail with this [facility] contact information and request for return call.     PLAN: Will attempt to contact again to offer [address] [place] [address] Management services

## 2022-08-10 DIAGNOSIS — Z13.89 SCREENING FOR CARDIOVASCULAR, RESPIRATORY, AND GENITOURINARY DISEASES: Primary | ICD-10-CM

## 2022-08-10 DIAGNOSIS — Z13.6 SCREENING FOR CARDIOVASCULAR, RESPIRATORY, AND GENITOURINARY DISEASES: Primary | ICD-10-CM

## 2022-08-10 DIAGNOSIS — Z13.83 SCREENING FOR CARDIOVASCULAR, RESPIRATORY, AND GENITOURINARY DISEASES: Primary | ICD-10-CM

## 2022-12-20 NOTE — TELEPHONE ENCOUNTER
Patient is scheduled for a left shoulder arthroscopic distal clavicle excision on 4/30/2021. She works as a CNA at [employer] and would like to know how long she will be out of work following her surgery. Please call patient to advise. [FreeTextEntry1] : 33 year old female presents for wwe.  Menarche was at age 10.  Menses are q 28 days, lasting 5-8 days.  She states the first 3 days are heavy.  She does get very bad cramping.  She states the cramping is so bad that she gets nauseous and occasionally vomits.  She takes NSAIDs and tylenol which take the edge off.  She denies dyspareunia.  She sometimes has dyschezia.  She has no history of ovarian cysts, fibroids, endometriosis, STD's or abnormal pap's.  She was supposed to have a work up done last year but had a change in her insurance and was unable to come back to the office.  She is interested in having the work up done now.  She has a history of breast cysts.  She is sexually active and using condoms.  She is considering trying to conceive in the near future.  She has not yet started pnv. \par \par PMH is significant for asthma.  PSH is significant for a CS and tonsillectomy.  She does not know her family history as she is adopted.  She does smoke a few cigarettes per day.  She socially drinks alcohol.  Denies illicit drugs.  Gyn history as above.  OB history:  (FT CS).  NKDA.  She does not take any medications.

## 2023-01-20 ENCOUNTER — HOSPITAL ENCOUNTER (EMERGENCY)
Age: 58
Discharge: HOME | End: 2023-01-20
Attending: EMERGENCY MEDICINE
Payer: COMMERCIAL

## 2023-01-20 ENCOUNTER — APPOINTMENT (OUTPATIENT)
Dept: GENERAL RADIOLOGY | Age: 58
End: 2023-01-20
Attending: EMERGENCY MEDICINE
Payer: COMMERCIAL

## 2023-01-20 VITALS
HEART RATE: 70 BPM | SYSTOLIC BLOOD PRESSURE: 132 MMHG | RESPIRATION RATE: 18 BRPM | TEMPERATURE: 98.3 F | OXYGEN SATURATION: 99 % | BODY MASS INDEX: 32.61 KG/M2 | DIASTOLIC BLOOD PRESSURE: 94 MMHG | WEIGHT: 190 LBS

## 2023-01-20 DIAGNOSIS — J42 CHRONIC BRONCHITIS, UNSPECIFIED CHRONIC BRONCHITIS TYPE (HCC): Primary | ICD-10-CM

## 2023-01-20 LAB
ALBUMIN SERPL-MCNC: 3.5 G/DL (ref 3.4–5)
ALBUMIN/GLOB SERPL: 0.9 (ref 0.8–1.7)
ALP SERPL-CCNC: 94 U/L (ref 45–117)
ALT SERPL-CCNC: 18 U/L (ref 13–56)
ANION GAP SERPL CALC-SCNC: 2 MMOL/L (ref 3–18)
AST SERPL-CCNC: 14 U/L (ref 10–38)
ATRIAL RATE: 60 BPM
BASOPHILS # BLD: 0 K/UL (ref 0–0.1)
BASOPHILS NFR BLD: 0 % (ref 0–2)
BILIRUB SERPL-MCNC: 0.1 MG/DL (ref 0.2–1)
BUN SERPL-MCNC: 16 MG/DL (ref 7–18)
BUN/CREAT SERPL: 25 (ref 12–20)
CALCIUM SERPL-MCNC: 8.7 MG/DL (ref 8.5–10.1)
CALCULATED P AXIS, ECG09: 59 DEGREES
CALCULATED R AXIS, ECG10: 31 DEGREES
CALCULATED T AXIS, ECG11: 61 DEGREES
CHLORIDE SERPL-SCNC: 110 MMOL/L (ref 100–111)
CO2 SERPL-SCNC: 26 MMOL/L (ref 21–32)
CREAT SERPL-MCNC: 0.63 MG/DL (ref 0.6–1.3)
DIAGNOSIS, 93000: NORMAL
DIFFERENTIAL METHOD BLD: ABNORMAL
EOSINOPHIL # BLD: 0.2 K/UL (ref 0–0.4)
EOSINOPHIL NFR BLD: 3 % (ref 0–5)
ERYTHROCYTE [DISTWIDTH] IN BLOOD BY AUTOMATED COUNT: 14 % (ref 11.6–14.5)
GLOBULIN SER CALC-MCNC: 4 G/DL (ref 2–4)
GLUCOSE SERPL-MCNC: 102 MG/DL (ref 74–99)
HCT VFR BLD AUTO: 35.6 % (ref 35–45)
HGB BLD-MCNC: 11.9 G/DL (ref 12–16)
IMM GRANULOCYTES # BLD AUTO: 0 K/UL (ref 0–0.04)
IMM GRANULOCYTES NFR BLD AUTO: 0 % (ref 0–0.5)
LYMPHOCYTES # BLD: 2.2 K/UL (ref 0.9–3.6)
LYMPHOCYTES NFR BLD: 31 % (ref 21–52)
MCH RBC QN AUTO: 27.4 PG (ref 24–34)
MCHC RBC AUTO-ENTMCNC: 33.4 G/DL (ref 31–37)
MCV RBC AUTO: 82 FL (ref 78–100)
MONOCYTES # BLD: 0.5 K/UL (ref 0.05–1.2)
MONOCYTES NFR BLD: 7 % (ref 3–10)
NEUTS SEG # BLD: 4 K/UL (ref 1.8–8)
NEUTS SEG NFR BLD: 58 % (ref 40–73)
NRBC # BLD: 0 K/UL (ref 0–0.01)
NRBC BLD-RTO: 0 PER 100 WBC
P-R INTERVAL, ECG05: 130 MS
PLATELET # BLD AUTO: 276 K/UL (ref 135–420)
PMV BLD AUTO: 10.1 FL (ref 9.2–11.8)
POTASSIUM SERPL-SCNC: 3.9 MMOL/L (ref 3.5–5.5)
PROT SERPL-MCNC: 7.5 G/DL (ref 6.4–8.2)
Q-T INTERVAL, ECG07: 436 MS
QRS DURATION, ECG06: 76 MS
QTC CALCULATION (BEZET), ECG08: 436 MS
RBC # BLD AUTO: 4.34 M/UL (ref 4.2–5.3)
SODIUM SERPL-SCNC: 138 MMOL/L (ref 136–145)
TROPONIN-HIGH SENSITIVITY: 11 NG/L (ref 0–54)
VENTRICULAR RATE, ECG03: 60 BPM
WBC # BLD AUTO: 6.9 K/UL (ref 4.6–13.2)

## 2023-01-20 PROCEDURE — 85025 COMPLETE CBC W/AUTO DIFF WBC: CPT

## 2023-01-20 PROCEDURE — 99285 EMERGENCY DEPT VISIT HI MDM: CPT

## 2023-01-20 PROCEDURE — 71046 X-RAY EXAM CHEST 2 VIEWS: CPT

## 2023-01-20 PROCEDURE — 80053 COMPREHEN METABOLIC PANEL: CPT

## 2023-01-20 PROCEDURE — 93005 ELECTROCARDIOGRAM TRACING: CPT

## 2023-01-20 PROCEDURE — 84484 ASSAY OF TROPONIN QUANT: CPT

## 2023-01-20 PROCEDURE — 74011250637 HC RX REV CODE- 250/637: Performed by: EMERGENCY MEDICINE

## 2023-01-20 RX ORDER — AMOXICILLIN 500 MG/1
500 TABLET, FILM COATED ORAL 3 TIMES DAILY
Qty: 30 TABLET | Refills: 0 | Status: SHIPPED | OUTPATIENT
Start: 2023-01-20 | End: 2023-01-20 | Stop reason: SDUPTHER

## 2023-01-20 RX ORDER — AMOXICILLIN 250 MG/1
500 CAPSULE ORAL
Status: COMPLETED | OUTPATIENT
Start: 2023-01-20 | End: 2023-01-20

## 2023-01-20 RX ORDER — GUAIFENESIN 100 MG/5ML
200 SOLUTION ORAL
Qty: 200 ML | Refills: 0 | Status: SHIPPED | OUTPATIENT
Start: 2023-01-20

## 2023-01-20 RX ORDER — AMOXICILLIN 500 MG/1
500 TABLET, FILM COATED ORAL 3 TIMES DAILY
Qty: 30 TABLET | Refills: 0 | Status: SHIPPED | OUTPATIENT
Start: 2023-01-20

## 2023-01-20 RX ORDER — GUAIFENESIN 100 MG/5ML
200 SOLUTION ORAL
Status: COMPLETED | OUTPATIENT
Start: 2023-01-20 | End: 2023-01-20

## 2023-01-20 RX ORDER — GUAIFENESIN 100 MG/5ML
200 SOLUTION ORAL
Status: DISCONTINUED | OUTPATIENT
Start: 2023-01-20 | End: 2023-01-20 | Stop reason: HOSPADM

## 2023-01-20 RX ADMIN — AMOXICILLIN 500 MG: 250 CAPSULE ORAL at 04:22

## 2023-01-20 RX ADMIN — GUAIFENESIN 200 MG: 200 SOLUTION ORAL at 04:22

## 2023-01-20 NOTE — DISCHARGE INSTRUCTIONS
Bronchitis: Care Instructions  Your Care Instructions     Bronchitis is inflammation of the bronchial tubes, which carry air to the lungs. The tubes swell and produce mucus, or phlegm. The mucus and inflamed bronchial tubes make you cough. You may have trouble breathing. Most cases of bronchitis are caused by viruses like those that cause colds. Antibiotics usually do not help and they may be harmful. Bronchitis usually develops rapidly and lasts about 2 to 3 weeks in otherwise healthy people. Follow-up care is a key part of your treatment and safety. Be sure to make and go to all appointments, and call your doctor if you are having problems. It's also a good idea to know your test results and keep a list of the medicines you take. How can you care for yourself at home? Take all medicines exactly as prescribed. Call your doctor if you think you are having a problem with your medicine. Get some extra rest.  Take an over-the-counter pain medicine, such as acetaminophen (Tylenol), ibuprofen (Advil, Motrin), or naproxen (Aleve) to reduce fever and relieve body aches. Read and follow all instructions on the label. Do not take two or more pain medicines at the same time unless the doctor told you to. Many pain medicines have acetaminophen, which is Tylenol. Too much acetaminophen (Tylenol) can be harmful. Take an over-the-counter cough medicine to help quiet a dry, hacking cough so that you can sleep. Avoid cough medicines that have more than one active ingredient. Read and follow all instructions on the label. Do not smoke. Smoking can make bronchitis worse. If you need help quitting, talk to your doctor about stop-smoking programs and medicines. These can increase your chances of quitting for good. When should you call for help? Call 911 anytime you think you may need emergency care. For example, call if:    You have severe trouble breathing.    Call your doctor now or seek immediate medical care if: You have new or worse trouble breathing. You cough up dark brown or bloody mucus (sputum). You have a new or higher fever. You have a new rash. Watch closely for changes in your health, and be sure to contact your doctor if:    You cough more deeply or more often, especially if you notice more mucus or a change in the color of your mucus. You are not getting better as expected. Where can you learn more? Go to http://www.gray.com/  Enter H333 in the search box to learn more about \"Bronchitis: Care Instructions. \"  Current as of: March 9, 2022               Content Version: 13.4  © 4651-5234 [employer]. Care instructions adapted under license by [employer] (which disclaims liability or warranty for this information). If you have questions about a medical condition or this instruction, always ask your healthcare professional. [address] any warranty or liability for your use of this information.

## 2023-01-20 NOTE — Clinical Note
[address] [facility] EMERGENCY DEPT  [address] [address] [phone]    Work/School Note    Date: 1/20/2023    To Whom It May concern:    Roxana Horton was seen and treated today in the emergency room by the following provider(s):  Attending Provider: Shaun Ibarra MD.      Roxana Horton is excused from work/school on 1/20/2023 through 1/22/2023. She is medically clear to return to work/school on 1/23/2023.          Sincerely,          Sylvia Weiss MD

## 2023-01-20 NOTE — Clinical Note
[address] [facility] EMERGENCY DEPT  [address] [address] [phone]    Work/School Note    Date: 1/20/2023    To Whom It May concern:    Issac Bosworth was seen and treated today in the emergency room by the following provider(s):  Attending Provider: Dinah France MD.      Issac Bosworth is excused from work/school on 1/20/2023 through 1/22/2023. She is medically clear to return to work/school on 1/23/2023.          Sincerely,          Iraj Orosco MD

## 2023-01-20 NOTE — Clinical Note
[address] [facility] EMERGENCY DEPT  [address] [address] [phone]    Work/School Note    Date: 1/20/2023    To Whom It May concern:    Alcira Hair was seen and treated today in the emergency room by the following provider(s):  Attending Provider: Liban Grover MD.      Alcira Hair is excused from work/school on 1/20/2023 through 1/22/2023. She is medically clear to return to work/school on 1/23/2023.          Sincerely,          Dru Conrad MD

## 2023-01-22 NOTE — ED PROVIDER NOTES
EMERGENCY DEPARTMENT HISTORY AND PHYSICAL EXAM      Date: 1/20/2023  Patient Name: Rosa Jackman      History of Presenting Illness     Chief Complaint   Patient presents with    Chest Pain    Chest Congestion    Cough       Location/Duration/Severity/Modifying factors   Chief Complaint   Patient presents with    Chest Pain    Chest Congestion    Cough       HPI:  Rosa Jackman is a 59 y.o. female with history as listed presents with a concern of     Patient c/o chest congestion, and dry cough. She states chest hurts when she coughs. Symptoms x 2 days. Location: chest Duration: cough   Quality: dry  Severity: mild  Modifying Factors: none    Additional History: none    There are no other complaints, changes, or physical findings at this time. PCP: Prabhakar Arciniega NP    Current Outpatient Medications   Medication Sig Dispense Refill    guaiFENesin (ROBITUSSIN) 100 mg/5 mL liquid Take 10 mL by mouth three (3) times daily as needed for Cough. 200 mL 0    amoxicillin (AMOXIL) 500 mg tablet Take 1 Tablet by mouth three (3) times daily. 30 Tablet 0    cyclobenzaprine (FLEXERIL) 10 mg tablet Take 1 Tablet by mouth three (3) times daily as needed for Muscle Spasm(s). 20 Tablet 0    lidocaine (Lidoderm) 5 % Apply patch to the affected area for 12 hours a day and remove for 12 hours a day.  12 Each 0    cyclobenzaprine (FLEXERIL) 10 mg tablet take 1 tablet by mouth three times a day if needed for muscle spasm 30 Tablet 0    carBAMazepine (TEGRETOL) 200 mg tablet One tab in AM, one tab at lunch, and two tabs before bed 90 Tab 0       Past History     Past Medical History:  Past Medical History:   Diagnosis Date    Arthritis     left shoulder    Bronchitis     Carpal tunnel syndrome on right     Chronic bronchitis (HCC)     De Quervain's syndrome (tenosynovitis)     Depression     resolved    Ectopic pregnancy     two times    Epilepsy ([address]     Multinodular goiter     Seizure ([facility] [address]     Sinus infection Stenosing tenosynovitis of thumb     Right side    Trigger thumb of left hand        Past Surgical History:  Past Surgical History:   Procedure Laterality Date    COLONOSCOPY N/A 10/21/2016    COLONOSCOPY w/ biopsy performed by Kalpana Chadwick MD at [address] [place] [address] [address]  1/12/15    lysis of adhesions    HX CHOLECYSTECTOMY      HX HYSTERECTOMY      HX OTHER SURGICAL      scar tissue removed from intestines    HX PARTIAL HYSTERECTOMY  2009    HX TONSILLECTOMY         Family History:  Family History   Problem Relation Age of Onset    Heart Attack Brother          in 62s    Diabetes Father     Hypertension Father     Cancer Father         colon CA    Heart Disease Sister         stent placement    Hypertension Brother     Other Brother         Stomach problems/ulcers    Cancer Sister         Breast CA    Stroke Sister     Diabetes Mother     Hypertension Mother        Social History:  Social History     Tobacco Use    Smoking status: Every Day     Packs/day: 0.25     Years: 38.00     Pack years: 9.50     Types: Cigarettes    Smokeless tobacco: Never    Tobacco comments:     second hand smoke from family   Vaping Use    Vaping Use: Never used   Substance Use Topics    Alcohol use: No    Drug use: No       Allergies: Allergies   Allergen Reactions    Etodolac Other (comments)     headache         Review of Systems     Review of Systems   Constitutional: Negative. HENT: Negative. Eyes: Negative. Respiratory:  Positive for cough. Negative for shortness of breath and wheezing. Cardiovascular: Negative. Gastrointestinal: Negative. Endocrine: Negative. Genitourinary: Negative. Musculoskeletal: Negative. Skin: Negative. Allergic/Immunologic: Negative. Neurological: Negative. Hematological: Negative. Psychiatric/Behavioral: Negative. All other systems reviewed and are negative. Physical Exam     Physical Exam  Vitals and nursing note reviewed.    Constitutional: General: She is not in acute distress. Appearance: She is well-developed. She is not diaphoretic. HENT:      Head: Normocephalic. Right Ear: External ear normal.      Left Ear: External ear normal.      Mouth/Throat:      Pharynx: No oropharyngeal exudate. Eyes:      General: No scleral icterus. Right eye: No discharge. Left eye: No discharge. Conjunctiva/sclera: Conjunctivae normal.      Pupils: Pupils are equal, round, and reactive to light. Neck:      Thyroid: No thyromegaly. Vascular: No JVD. Trachea: No tracheal deviation. Cardiovascular:      Rate and Rhythm: Normal rate and regular rhythm. Heart sounds: Normal heart sounds. No murmur heard. No friction rub. No gallop. Pulmonary:      Effort: Pulmonary effort is normal. No respiratory distress. Breath sounds: Normal breath sounds. No stridor. No wheezing or rales. Chest:      Chest wall: No tenderness. Abdominal:      General: Bowel sounds are normal. There is no distension. Palpations: Abdomen is soft. There is no mass. Tenderness: There is no abdominal tenderness. There is no guarding or rebound. Musculoskeletal:         General: No tenderness. Normal range of motion. Cervical back: Normal range of motion and neck supple. Lymphadenopathy:      Cervical: No cervical adenopathy. Skin:     General: Skin is warm and dry. Coloration: Skin is not pale. Findings: No erythema or rash. Neurological:      Mental Status: She is alert and oriented to person, place, and time. Cranial Nerves: No cranial nerve deficit. Motor: No abnormal muscle tone. Coordination: Coordination normal.      Deep Tendon Reflexes: Reflexes normal.       Lab and Diagnostic Study Results     Labs -  No results found for this or any previous visit (from the past 24 hour(s)).       Radiologic Studies -   XR CHEST PA LAT   Final Result   No radiographic evidence of acute cardiopulmonary process. X-ray: Interpreted by me    EKG: Interpreted by me    Lab test: Interpreted by me    Procedures and Critical Care       Performed by: Sylwia Alegre MD    Procedures         Sylwia Alegre MD    Medical Decision Making and ED Course   - I am the first and primary provider for this patient AND AM THE PRIMARY PROVIDER OF RECORD. - I reviewed the vital signs, available nursing notes, past medical history, past surgical history, family history and social history. - Initial assessment performed. The patients presenting problems have been discussed, and the staff are in agreement with the care plan formulated and outlined with them. I have encouraged them to ask questions as they arise throughout their visit. Vital Signs-Reviewed the patient's vital signs. No data found. Provider Notes (Medical Decision Making): Is, URI, pneumonia, allergies    MDM     ED Course: : Remained stable throughout ER evaluation.      ------------------------------------------------------------------------------------------------------------        Consultations:       Consultations:       Disposition   Discharge home:. Rx: amoxicillin. Follow-up PCP in 3 to 4 days. Return ER needed. Diagnosis     Clinical Impression:   1.  Chronic bronchitis, unspecified chronic bronchitis type ([facility]        Attestations:    Sylwia Alegre MD

## 2023-01-23 ENCOUNTER — PATIENT OUTREACH (OUTPATIENT)
Dept: OTHER | Age: 58
End: 2023-01-23

## 2023-01-23 NOTE — PROGRESS NOTES
Patient on report as eligible for Case Management. Pt was seen at [facility] ER 1/20/23. Diagnosis Comment   Chronic bronchitis, unspecified chronic bronchitis type ([address]      Left discreet message on voicemail with this CM contact information. Will attempt to contact again to offer [address] [place] [address] Management services.

## 2023-01-24 ENCOUNTER — PATIENT OUTREACH (OUTPATIENT)
Dept: OTHER | Age: 58
End: 2023-01-24

## 2023-01-24 NOTE — PROGRESS NOTES
Patient identified as eligible for [phone] services. Second telephone outreach attempted. Unable to leave a voicemail with this CM confidential contact information, VM full. Will send UTR letter via [address] [place] [address] [address].

## 2023-01-26 ENCOUNTER — NURSE TRIAGE (OUTPATIENT)
Dept: OTHER | Facility: CLINIC | Age: 58
End: 2023-01-26

## 2023-01-26 ENCOUNTER — VIRTUAL VISIT (OUTPATIENT)
Dept: FAMILY MEDICINE CLINIC | Age: 58
End: 2023-01-26
Payer: COMMERCIAL

## 2023-01-26 DIAGNOSIS — G40.909 SEIZURE DISORDER (HCC): ICD-10-CM

## 2023-01-26 DIAGNOSIS — J20.9 ACUTE BRONCHITIS, UNSPECIFIED ORGANISM: Primary | ICD-10-CM

## 2023-01-26 PROCEDURE — 99213 OFFICE O/P EST LOW 20 MIN: CPT | Performed by: NURSE PRACTITIONER

## 2023-01-26 RX ORDER — ALBUTEROL SULFATE 90 UG/1
1 AEROSOL, METERED RESPIRATORY (INHALATION)
Qty: 18 G | Refills: 0 | Status: SHIPPED | OUTPATIENT
Start: 2023-01-26

## 2023-01-26 NOTE — PROGRESS NOTES
Jaylan Goetz is a 59 y.o. female who was seen by synchronous (real-time) audio-video technology on 1/26/2023 for Cough, Chills, Headache, and ED Follow-up (Seen at [facility] on 1/20/22)    Assessment & Plan:   Diagnoses and all orders for this visit:    1. Acute bronchitis, unspecified organism    2. Seizure disorder ([address]    Other orders  -     albuterol (PROVENTIL HFA, VENTOLIN HFA, PROAIR HFA) 90 mcg/actuation inhaler; Take 1 Puff by inhalation every four (4) hours as needed for Shortness of Breath or Cough. -     inhalational spacing device; 1 Each by Does Not Apply route as needed (shortness of breath). Work note provided  alarm signs when to seek emergent care provided and reviewed     Follow-up and Dispositions    Return if symptoms worsen or fail to improve. Subjective:   Patient states she is experiencing chest tightness and shortness of breath seems to be worse with having to wear mask while at work. Patient reports she is taking guaifenesin for cough at night. Requesting an inhaler. Patient is currently at work and states she fatigued and experiencing chest tightness. [facility] ED 1/20/2023  Chief Complaint   Patient presents with    Chest Pain    Chest Congestion    Cough       HPI:  Jaylan Goetz is a 59 y.o. female with history as listed presents with a concern of      Patient c/o chest congestion, and dry cough. She states chest hurts when she coughs. Symptoms x 2 days. Location: chest Duration: cough   Quality: dry  Severity: mild  Modifying Factors: none     Additional History: none     There are no other complaints, changes, or physical findings at this time. Medical Decision Making and ED Course   - I am the first and primary provider for this patient AND AM THE PRIMARY PROVIDER OF RECORD. - I reviewed the vital signs, available nursing notes, past medical history, past surgical history, family history and social history.      - Initial assessment performed. The patients presenting problems have been discussed, and the staff are in agreement with the care plan formulated and outlined with them. I have encouraged them to ask questions as they arise throughout their visit. Disposition   Discharge home:. Rx: amoxicillin. Follow-up PCP in 3 to 4 days. Return ER needed. Diagnosis      Clinical Impression:   1. Chronic bronchitis, unspecified chronic bronchitis type ([address]       No visits with results within 5 Day(s) from this visit. Latest known visit with results is:   Admission on 01/20/2023, Discharged on 01/20/2023   Component Date Value Ref Range Status    Ventricular Rate 01/20/2023 60  BPM Final    Atrial Rate 01/20/2023 60  BPM Final    P-R Interval 01/20/2023 130  ms Final    QRS Duration 01/20/2023 76  ms Final    Q-T Interval 01/20/2023 436  ms Final    QTC Calculation (Bezet) 01/20/2023 436  ms Final    Calculated P Axis 01/20/2023 59  degrees Final    Calculated R Axis 01/20/2023 31  degrees Final    Calculated T Axis 01/20/2023 61  degrees Final    Diagnosis 01/20/2023    Final                    Value:Normal sinus rhythm  Normal ECG  Confirmed by Tiffanie Bboo MD, [facility] ([id number]) on 1/20/2023 10:41:05 AM      Troponin-High Sensitivity 01/20/2023 11  0 - 54 ng/L Final    Comment: A HS troponin value change of (+ or -) 50% or more below the 99th percentile, in a 1/2/3 hr interval represents a significant change. Clinical correlation is recommended. A HS troponin value change of (+ or -) 20% or above the 99th percentile, in a 1/2/3 hr interval represents a significant change. Clinical correlation is recommended.   99th Percentile:    Women:  0-54 ng/L                                                               Men:  0-78 ng/L      Sodium 01/20/2023 138  136 - 145 mmol/L Final    Potassium 01/20/2023 3.9  3.5 - 5.5 mmol/L Final    Chloride 01/20/2023 110  100 - 111 mmol/L Final    CO2 01/20/2023 26  21 - 32 mmol/L Final    Anion gap 01/20/2023 2 (A)  3.0 - 18 mmol/L Final    Glucose 01/20/2023 102 (A)  74 - 99 mg/dL Final    BUN 01/20/2023 16  7.0 - 18 MG/DL Final    Creatinine 01/20/2023 0.63  0.6 - 1.3 MG/DL Final    BUN/Creatinine ratio 01/20/2023 25 (A)  12 - 20   Final    eGFR 01/20/2023 >60  >60 ml/min/1.73m2 Final    Comment:      Pediatric calculator link: Yunior.at. org/professionals/kdoqi/gfr_calculatorped       These results are not intended for use in patients <25years of age. eGFR results are calculated without a race factor using  the 2021 CKD-EPI equation. Careful clinical correlation is recommended, particularly when comparing to results calculated using previous equations. The CKD-EPI equation is less accurate in patients with extremes of muscle mass, extra-renal metabolism of creatinine, excessive creatine ingestion, or following therapy that affects renal tubular secretion. Calcium 01/20/2023 8.7  8.5 - 10.1 MG/DL Final    Bilirubin, total 01/20/2023 0.1 (A)  0.2 - 1.0 MG/DL Final    ALT (SGPT) 01/20/2023 18  13 - 56 U/L Final    AST (SGOT) 01/20/2023 14  10 - 38 U/L Final    Alk.  phosphatase 01/20/2023 94  45 - 117 U/L Final    Protein, total 01/20/2023 7.5  6.4 - 8.2 g/dL Final    Albumin 01/20/2023 3.5  3.4 - 5.0 g/dL Final    Globulin 01/20/2023 4.0  2.0 - 4.0 g/dL Final    A-G Ratio 01/20/2023 0.9  0.8 - 1.7   Final    WBC 01/20/2023 6.9  4.6 - 13.2 K/uL Final    RBC 01/20/2023 4.34  4.20 - 5.30 M/uL Final    HGB 01/20/2023 11.9 (A)  12.0 - 16.0 g/dL Final    HCT 01/20/2023 35.6  35.0 - 45.0 % Final    MCV 01/20/2023 82.0  78.0 - 100.0 FL Final    MCH 01/20/2023 27.4  24.0 - 34.0 PG Final    MCHC 01/20/2023 33.4  31.0 - 37.0 g/dL Final    RDW 01/20/2023 14.0  11.6 - 14.5 % Final    PLATELET 49/80/1129 434  135 - 420 K/uL Final    MPV 01/20/2023 10.1  9.2 - 11.8 FL Final    NRBC 01/20/2023 0.0  0  WBC Final    ABSOLUTE NRBC 01/20/2023 0.00  0.00 - 0.01 K/uL Final    NEUTROPHILS 01/20/2023 58 40 - 73 % Final    LYMPHOCYTES 01/20/2023 31  21 - 52 % Final    MONOCYTES 01/20/2023 7  3 - 10 % Final    EOSINOPHILS 01/20/2023 3  0 - 5 % Final    BASOPHILS 01/20/2023 0  0 - 2 % Final    IMMATURE GRANULOCYTES 01/20/2023 0  0.0 - 0.5 % Final    ABS. NEUTROPHILS 01/20/2023 4.0  1.8 - 8.0 K/UL Final    ABS. LYMPHOCYTES 01/20/2023 2.2  0.9 - 3.6 K/UL Final    ABS. MONOCYTES 01/20/2023 0.5  0.05 - 1.2 K/UL Final    ABS. EOSINOPHILS 01/20/2023 0.2  0.0 - 0.4 K/UL Final    ABS. BASOPHILS 01/20/2023 0.0  0.0 - 0.1 K/UL Final    ABS. IMM. GRANS. 01/20/2023 0.0  0.00 - 0.04 K/UL Final    DF 01/20/2023 AUTOMATED    Final     XR Results (most recent):  Results from Hospital Encounter encounter on 01/20/23    XR CHEST PA LAT    Narrative  EXAM: XR CHEST PA LAT    CLINICAL INDICATION/HISTORY: 59 years Female. chest pain. Additional History: None    TECHNIQUE: Frontal and lateral views of the chest    COMPARISON: 7/6/2022    FINDINGS:    The cardiac silhouette appears within normal limits. Pulmonary vasculature  appears within normal limits. No confluent airspace opacity is appreciated. No evidence of pleural effusion or pneumothorax. No acute osseous abnormality  appreciated. Impression  No radiographic evidence of acute cardiopulmonary process. Prior to Admission medications    Medication Sig Start Date End Date Taking? Authorizing Provider   guaiFENesin (ROBITUSSIN) 100 mg/5 mL liquid Take 10 mL by mouth three (3) times daily as needed for Cough. 1/20/23  Yes Flex Buckner MD   amoxicillin (AMOXIL) 500 mg tablet Take 1 Tablet by mouth three (3) times daily. 1/20/23  Yes Flex Buckner MD   carBAMazepine (TEGRETOL) 200 mg tablet One tab in AM, one tab at lunch, and two tabs before bed 2/27/18  Yes Lorie Gonzalez MD   cyclobenzaprine (FLEXERIL) 10 mg tablet Take 1 Tablet by mouth three (3) times daily as needed for Muscle Spasm(s).   Patient not taking: Reported on 1/26/2023 7/7/22   Sydnee Mcgovern MD lidocaine (Lidoderm) 5 % Apply patch to the affected area for 12 hours a day and remove for 12 hours a day. Patient not taking: Reported on 1/26/2023 7/7/22   Bolivar Meyers MD   cyclobenzaprine (FLEXERIL) 10 mg tablet take 1 tablet by mouth three times a day if needed for muscle spasm  Patient not taking: Reported on 1/26/2023 4/13/22   Bill Blood, NP     Patient Active Problem List   Diagnosis Code    Chronic bronchitis ([address] J42    SBO (small bowel obstruction) ([address] K56.609    Intractable epilepsy without status epilepticus ([address] G40.919    Severe obesity ([address] E66.01    Depression F32. A     Patient Active Problem List    Diagnosis Date Noted    Depression     Severe obesity ([address] 01/09/2020    Intractable epilepsy without status epilepticus ([address] 04/13/2018    SBO (small bowel obstruction) ([address] 01/09/2015    Chronic bronchitis ([address] 01/08/2013     Current Outpatient Medications   Medication Sig Dispense Refill    guaiFENesin (ROBITUSSIN) 100 mg/5 mL liquid Take 10 mL by mouth three (3) times daily as needed for Cough. 200 mL 0    amoxicillin (AMOXIL) 500 mg tablet Take 1 Tablet by mouth three (3) times daily. 30 Tablet 0    carBAMazepine (TEGRETOL) 200 mg tablet One tab in AM, one tab at lunch, and two tabs before bed 90 Tab 0    cyclobenzaprine (FLEXERIL) 10 mg tablet Take 1 Tablet by mouth three (3) times daily as needed for Muscle Spasm(s). (Patient not taking: Reported on 1/26/2023) 20 Tablet 0    lidocaine (Lidoderm) 5 % Apply patch to the affected area for 12 hours a day and remove for 12 hours a day.  (Patient not taking: Reported on 1/26/2023) 12 Each 0    cyclobenzaprine (FLEXERIL) 10 mg tablet take 1 tablet by mouth three times a day if needed for muscle spasm (Patient not taking: Reported on 1/26/2023) 30 Tablet 0     Allergies   Allergen Reactions    Etodolac Other (comments)     headache     Past Medical History:   Diagnosis Date    Arthritis     left shoulder    Bronchitis     Carpal tunnel syndrome on right     Chronic bronchitis (HCC)     De Quervain's syndrome (tenosynovitis)     Depression     resolved    Ectopic pregnancy     two times    Epilepsy ([address]     Multinodular goiter     Seizure ([address]     Sinus infection     Stenosing tenosynovitis of thumb     Right side    Trigger thumb of left hand      Past Surgical History:   Procedure Laterality Date    COLONOSCOPY N/A 10/21/2016    COLONOSCOPY w/ biopsy performed by Katty Best MD at [facility]    HX APPENDECTOMY  1/12/15    lysis of adhesions    HX CHOLECYSTECTOMY      HX HYSTERECTOMY      HX OTHER SURGICAL      scar tissue removed from intestines    HX PARTIAL HYSTERECTOMY  2009    HX TONSILLECTOMY       Family History   Problem Relation Age of Onset    Heart Attack Brother          in 62s    Diabetes Father     Hypertension Father     Cancer Father         colon CA    Heart Disease Sister         stent placement    Hypertension Brother     Other Brother         Stomach problems/ulcers    Cancer Sister         Breast CA    Stroke Sister     Diabetes Mother     Hypertension Mother      Social History     Tobacco Use    Smoking status: Every Day     Packs/day: 0.25     Years: 38.00     Pack years: 9.50     Types: Cigarettes    Smokeless tobacco: Never    Tobacco comments:     second hand smoke from family   Substance Use Topics    Alcohol use: No       ROS    Objective:   No flowsheet data found. General: alert, cooperative, no distress   Mental  status: normal mood, behavior, speech, dress, motor activity, and thought processes, able to follow commands   HENT: NCAT   Neck: no visualized mass   Resp: no respiratory distress   Neuro: no gross deficits   Skin: no discoloration or lesions of concern on visible areas   Psychiatric: normal affect, consistent with stated mood, no evidence of hallucinations     Additional exam findings:        We discussed the expected course, resolution and complications of the diagnosis(es) in detail. Medication risks, benefits, costs, interactions, and alternatives were discussed as indicated. I advised her to contact the office if her condition worsens, changes or fails to improve as anticipated. She expressed understanding with the diagnosis(es) and plan. Dannielle Whelan, was evaluated through a synchronous (real-time) audio-video encounter. The patient (or guardian if applicable) is aware that this is a billable service, which includes applicable co-pays. This Virtual Visit was conducted with patient's (and/or legal guardian's) consent. The visit was conducted pursuant to the emergency declaration under the [address] [place] [address], [address] [place] [address] authority and the [employer] General Act. Patient identification was verified, and a caregiver was present when appropriate. The patient was located at: Home: [facility] [id number]  The provider was located at:  Facility (Appt Department): [address] [place] [address] JARED Mcnair

## 2023-01-26 NOTE — PROGRESS NOTES
Silvana Rm (: 1965) is a 59 y.o. female, established patient, here for evaluation of the following chief complaint(s):   Hospital Follow Up (Seen at [facility] on 22), Cough, Chills, and Headache           Silvana Rm, was evaluated through a synchronous (real-time) audio-video encounter. The patient (or guardian if applicable) is aware that this is a billable service, which includes applicable co-pays. This Virtual Visit was conducted with patient's (and/or legal guardian's) consent. The visit was conducted pursuant to the emergency declaration under the [address] [place] [address] authority and the [employer] General Act. Patient identification was verified, and a caregiver was present when appropriate. The patient was located at: Home: [facility] [id number]  The provider was located at: Facility (Appt Department): [address]  [address]        [address] [facility] was used to authenticate this note.   -- Niall Lu

## 2023-01-26 NOTE — PROGRESS NOTES
Called patient at 10:17 to begin virtual appointment with JARED Anton but patient did not answer. Unable to leave patient a voice message due to mailbox being full.

## 2023-01-26 NOTE — LETTER
NOTIFICATION RETURN TO WORK    1/26/2023 10:44 AM    Ms. Flores [facility]ill[facility] [id number]      To Whom It May Concern:    Carlos Ying [facility]. She will return to work on: 1/31/2023    If there are questions or concerns please have the patient contact our office.         Sincerely,    CATIE Garcia NP

## 2023-01-26 NOTE — TELEPHONE ENCOUNTER
Candy Benavidez called in. She saw her PCP today via virtual visit and diagnosed with bronchitis. She has a note from her PCP that she is not able to return to work until 1/31/23. Her supervisor is aware. She has sent the note to he supervisor as well. No new or worsening symptoms. Advised to call back with any new or worsening symptoms, questions or concerns.       Reason for Disposition   Caller has already spoken with the PCP (or office), and has no further questions    Protocols used: No Contact or Duplicate Contact Call-ADULT-[place]

## 2023-02-06 ENCOUNTER — PATIENT OUTREACH (OUTPATIENT)
Dept: OTHER | Age: 58
End: 2023-02-06

## 2023-04-03 ENCOUNTER — APPOINTMENT (OUTPATIENT)
Facility: HOSPITAL | Age: 58
End: 2023-04-03
Payer: COMMERCIAL

## 2023-04-03 ENCOUNTER — HOSPITAL ENCOUNTER (EMERGENCY)
Facility: HOSPITAL | Age: 58
Discharge: HOME | End: 2023-04-03
Attending: EMERGENCY MEDICINE
Payer: COMMERCIAL

## 2023-04-03 VITALS
HEART RATE: 61 BPM | DIASTOLIC BLOOD PRESSURE: 86 MMHG | HEIGHT: 63 IN | SYSTOLIC BLOOD PRESSURE: 130 MMHG | RESPIRATION RATE: 16 BRPM | BODY MASS INDEX: 33.66 KG/M2 | WEIGHT: 190 LBS | OXYGEN SATURATION: 99 % | TEMPERATURE: 96.9 F

## 2023-04-03 DIAGNOSIS — M16.0 OSTEOARTHRITIS OF BOTH HIPS, UNSPECIFIED OSTEOARTHRITIS TYPE: ICD-10-CM

## 2023-04-03 DIAGNOSIS — S70.01XA CONTUSION OF RIGHT HIP, INITIAL ENCOUNTER: Primary | ICD-10-CM

## 2023-04-03 PROCEDURE — 72100 X-RAY EXAM L-S SPINE 2/3 VWS: CPT

## 2023-04-03 PROCEDURE — 6370000000 HC RX 637 (ALT 250 FOR IP): Performed by: EMERGENCY MEDICINE

## 2023-04-03 PROCEDURE — 73502 X-RAY EXAM HIP UNI 2-3 VIEWS: CPT

## 2023-04-03 RX ORDER — CYCLOBENZAPRINE HCL 10 MG
10 TABLET ORAL
Status: COMPLETED | OUTPATIENT
Start: 2023-04-03 | End: 2023-04-03

## 2023-04-03 RX ORDER — IBUPROFEN 600 MG/1
600 TABLET ORAL EVERY 6 HOURS PRN
Qty: 20 TABLET | Refills: 0 | Status: SHIPPED | OUTPATIENT
Start: 2023-04-03

## 2023-04-03 RX ORDER — HYDROCODONE BITARTRATE AND ACETAMINOPHEN 5; 325 MG/1; MG/1
1 TABLET ORAL
Status: COMPLETED | OUTPATIENT
Start: 2023-04-03 | End: 2023-04-03

## 2023-04-03 RX ORDER — CYCLOBENZAPRINE HCL 10 MG
10 TABLET ORAL 3 TIMES DAILY PRN
Qty: 15 TABLET | Refills: 0 | Status: SHIPPED | OUTPATIENT
Start: 2023-04-03 | End: 2023-04-13

## 2023-04-03 RX ORDER — HYDROCODONE BITARTRATE AND ACETAMINOPHEN 5; 325 MG/1; MG/1
1 TABLET ORAL EVERY 6 HOURS PRN
Qty: 6 TABLET | Refills: 0 | Status: SHIPPED | OUTPATIENT
Start: 2023-04-03 | End: 2023-04-06

## 2023-04-03 RX ADMIN — HYDROCODONE BITARTRATE AND ACETAMINOPHEN 1 TABLET: 5; 325 TABLET ORAL at 10:20

## 2023-04-03 RX ADMIN — CYCLOBENZAPRINE 10 MG: 10 TABLET, FILM COATED ORAL at 10:20

## 2023-04-03 ASSESSMENT — PAIN SCALES - GENERAL
PAINLEVEL_OUTOF10: 10
PAINLEVEL_OUTOF10: 6

## 2023-04-03 ASSESSMENT — PAIN DESCRIPTION - LOCATION
LOCATION: HIP
LOCATION: HIP

## 2023-04-03 ASSESSMENT — ENCOUNTER SYMPTOMS
ABDOMINAL PAIN: 0
CHEST TIGHTNESS: 0
EYES NEGATIVE: 1
BACK PAIN: 1

## 2023-04-03 ASSESSMENT — LIFESTYLE VARIABLES: HOW OFTEN DO YOU HAVE A DRINK CONTAINING ALCOHOL: NEVER

## 2023-04-03 ASSESSMENT — PAIN - FUNCTIONAL ASSESSMENT: PAIN_FUNCTIONAL_ASSESSMENT: 0-10

## 2023-04-03 ASSESSMENT — PAIN DESCRIPTION - ORIENTATION
ORIENTATION: RIGHT
ORIENTATION: RIGHT

## 2023-04-03 NOTE — Clinical Note
Jesús Hardwick was seen and treated in our emergency department on 4/3/2023. She may return to work on 04/07/2023. If you have any questions or concerns, please don't hesitate to call.       Zoey Herron MD

## 2023-04-03 NOTE — ED NOTES
Discharge instructions reviewed with patient. Patient verbalized understanding. Patient advised to follow up as directed on discharge instructions. Patient denies questions, needs or concerns at this time. No s/sx of distress noted.         Eusebio Chisholm RN  04/03/23 [id number]

## 2023-04-03 NOTE — ED TRIAGE NOTES
Pt to ED for eval of right hip pain s/p GLF when her leg gave out Saturday while hugging her grandchild. Pt reports she fell onto carpeted floor. Pt reports hx of arthritis in back. Pain shoots from hip down leg. Denies saddle numbness, numbness/tingling.

## 2023-04-03 NOTE — ED PROVIDER NOTES
/86   Pulse 61   Temp 96.9 °F (36.1 °C)   Resp 16   Ht 5' 3\" (1.6 m)   Wt 190 lb (86.2 kg)   LMP  (LMP Unknown)   SpO2 99%   BMI 33.66 kg/m²       Physical Exam  Vitals and nursing note reviewed. Constitutional:       General: She is not in acute distress. Appearance: Normal appearance. HENT:      Head: Normocephalic and atraumatic. Right Ear: External ear normal.      Left Ear: External ear normal.      Nose: Nose normal.      Mouth/Throat:      Mouth: Mucous membranes are moist.   Eyes:      Extraocular Movements: Extraocular movements intact. Pupils: Pupils are equal, round, and reactive to light. Cardiovascular:      Rate and Rhythm: Normal rate. Pulmonary:      Effort: Pulmonary effort is normal.   Abdominal:      General: There is no distension. Palpations: Abdomen is soft. Musculoskeletal:         General: Tenderness present. Normal range of motion. Comments: Point tenderness to the right hip, pain with external rotation, less pain with internal rotation, distal pulses and sensation are equal  Poorly localized low back pain without step-off   Skin:     General: Skin is warm and dry. Capillary Refill: Capillary refill takes less than 2 seconds. Neurological:      General: No focal deficit present. Mental Status: She is alert and oriented to person, place, and time. Psychiatric:         Mood and Affect: Mood normal.         Behavior: Behavior normal.         Diagnostic Study Results     Labs -  No results found for this or any previous visit (from the past 12 hour(s)). Radiologic Studies -   XR LUMBAR SPINE (2-3 VIEWS)   Final Result   No acute fracture or traumatic malalignment by radiograph. Degenerative changes as above. XR HIP 2-3 VW W PELVIS RIGHT   Final Result   No acute fracture or dislocation. Moderate bilateral hip osteoarthritis            Medical Decision Making   I am the first provider for this patient.     I reviewed the

## 2023-04-03 NOTE — Clinical Note
Jesús Hardwick was seen and treated in our emergency department on 4/3/2023. She may return to work on . If you have any questions or concerns, please don't hesitate to call.       Zoey Herron MD

## 2023-04-04 ENCOUNTER — CARE COORDINATION (OUTPATIENT)
Dept: OTHER | Facility: CLINIC | Age: 58
End: 2023-04-04

## 2023-04-04 NOTE — CARE COORDINATION
Ambulatory Care Coordination Note    ACM attempted to reach patient for introduction to Associate Care Management related to ED visit. Unable to leave message as v/m full. Will attempt to outreach again.

## 2023-04-05 ENCOUNTER — CARE COORDINATION (OUTPATIENT)
Dept: OTHER | Facility: CLINIC | Age: 58
End: 2023-04-05

## 2023-04-05 NOTE — CARE COORDINATION
Ambulatory Care Coordination Note    ACM attempted 2nd outreach to patient for introduction to Associate Care Management related to ED visit. HIPAA compliant message left requesting a return phone call at patient convenience. Unable to Reach Letter sent to patient via [employer]. Will continue to outreach. No future appointments.

## 2023-04-20 ENCOUNTER — OFFICE VISIT (OUTPATIENT)
Age: 58
End: 2023-04-20

## 2023-04-20 VITALS — WEIGHT: 186.4 LBS | BODY MASS INDEX: 29.96 KG/M2 | HEIGHT: 66 IN | TEMPERATURE: 97.6 F

## 2023-04-20 DIAGNOSIS — S39.012A STRAIN OF LUMBAR REGION, INITIAL ENCOUNTER: Primary | ICD-10-CM

## 2023-04-20 DIAGNOSIS — M79.18 MYALGIA, OTHER SITE: ICD-10-CM

## 2023-04-20 RX ORDER — BETAMETHASONE SODIUM PHOSPHATE AND BETAMETHASONE ACETATE 3; 3 MG/ML; MG/ML
3 INJECTION, SUSPENSION INTRA-ARTICULAR; INTRALESIONAL; INTRAMUSCULAR; SOFT TISSUE ONCE
Status: COMPLETED | OUTPATIENT
Start: 2023-04-20 | End: 2023-04-20

## 2023-04-20 RX ADMIN — BETAMETHASONE SODIUM PHOSPHATE AND BETAMETHASONE ACETATE 3 MG: 3; 3 INJECTION, SUSPENSION INTRA-ARTICULAR; INTRALESIONAL; INTRAMUSCULAR; SOFT TISSUE at 11:40

## 2023-04-20 NOTE — PROGRESS NOTES
Types: Cigarettes    Smokeless tobacco: Never    Tobacco comments:     Quit smoking: second hand smoke from family   Substance Use Topics    Alcohol use: No    Drug use: No        Allergies   Allergen Reactions    Etodolac Other (See Comments)     headache        Current Outpatient Medications   Medication Sig    ibuprofen (IBU) 600 MG tablet Take 1 tablet by mouth every 6 hours as needed for Pain    carBAMazepine (TEGRETOL) 200 MG tablet One tab in AM, one tab at lunch, and two tabs before bed    lidocaine (LIDODERM) 5 % Apply patch to the affected area for 12 hours a day and remove for 12 hours a day. Current Facility-Administered Medications   Medication Dose Route Frequency    betamethasone acetate-betamethasone sodium phosphate (CELESTONE) injection 3 mg  3 mg IntraMUSCular Once        PHYSICAL EXAMINATION:  Temp 97.6 °F (36.4 °C) (Temporal)   Ht 5' 6\" (1.676 m)   Wt 186 lb 6.4 oz (84.6 kg)   LMP  (LMP Unknown)   BMI 30.09 kg/m²      ORTHO EXAMINATION:  Examination Right hip Left hip   Skin Intact Intact   External Rotation ROM 20 20   Internal Rotation ROM 10 10   Trochanteric tenderness - -   Hip flexion contracture - -   Antalgic gait - -   Trendelenberg sign - -   Lumbar tenderness - -   Straight leg raise - -   Calf tenderness - -   Neurovascular Intact Intact     Examination Lumbar Thoracic   Skin Intact Intact   Tenderness + R -   Tightness + -   Lordosis Normal N/A   Kyphosis N/A Normal   Scoliosis - -   Flexion Fingertips to mid shin N/A   Extension 10 N/A   Knee reflexes Normal N/A   Ankle reflexes Normal N/A   Straight leg raise - N/A   Calf tenderness - N/A         PROCEDURE: RIGHT HIP INJECTION  Right trochanteric bursa injected 4 cc 0.25% Marcaine and 0.5 cc Celestone.     Chart reviewed for the following:   Gertrudis Harrington MD, have reviewed the History, Physical and updated the Allergic reactions for [facility] performed immediately prior to start of

## 2023-04-26 ENCOUNTER — CARE COORDINATION (OUTPATIENT)
Dept: OTHER | Facility: CLINIC | Age: 58
End: 2023-04-26

## 2023-04-26 NOTE — CARE COORDINATION
Ambulatory Care Coordination Note    ACM attempted third and final call to patient for introduction to Associate Care Management. HIPAA compliant message left requesting a return phone call at patient convenience. Final Unable to Reach Letter sent to patient via [employer]. No further outreach scheduled with this ACM, patient has been provided with this ACM's contact information. ACM will sign off care team at this time. No future appointments.

## 2023-08-09 ENCOUNTER — OFFICE VISIT (OUTPATIENT)
Age: 58
End: 2023-08-09
Payer: COMMERCIAL

## 2023-08-09 ENCOUNTER — HOSPITAL ENCOUNTER (OUTPATIENT)
Facility: HOSPITAL | Age: 58
Discharge: HOME | End: 2023-08-12
Payer: COMMERCIAL

## 2023-08-09 VITALS
SYSTOLIC BLOOD PRESSURE: 138 MMHG | HEART RATE: 65 BPM | HEIGHT: 66 IN | TEMPERATURE: 97.5 F | DIASTOLIC BLOOD PRESSURE: 76 MMHG | WEIGHT: 187.8 LBS | RESPIRATION RATE: 16 BRPM | OXYGEN SATURATION: 99 % | BODY MASS INDEX: 30.18 KG/M2

## 2023-08-09 DIAGNOSIS — M25.561 LATERAL KNEE PAIN, RIGHT: Primary | ICD-10-CM

## 2023-08-09 DIAGNOSIS — M25.561 LATERAL KNEE PAIN, RIGHT: ICD-10-CM

## 2023-08-09 DIAGNOSIS — M17.11 ARTHRITIS OF RIGHT KNEE: ICD-10-CM

## 2023-08-09 PROCEDURE — 99213 OFFICE O/P EST LOW 20 MIN: CPT | Performed by: NURSE PRACTITIONER

## 2023-08-09 PROCEDURE — 73562 X-RAY EXAM OF KNEE 3: CPT

## 2023-08-09 RX ORDER — ALBUTEROL SULFATE 90 UG/1
1 AEROSOL, METERED RESPIRATORY (INHALATION) EVERY 4 HOURS PRN
COMMUNITY
Start: 2023-01-26

## 2023-08-09 RX ORDER — IBUPROFEN 600 MG/1
600 TABLET ORAL EVERY 8 HOURS PRN
Qty: 40 TABLET | Refills: 1 | Status: SHIPPED | OUTPATIENT
Start: 2023-08-09

## 2023-08-09 SDOH — ECONOMIC STABILITY: FOOD INSECURITY: WITHIN THE PAST 12 MONTHS, THE FOOD YOU BOUGHT JUST DIDN'T LAST AND YOU DIDN'T HAVE MONEY TO GET MORE.: NEVER TRUE

## 2023-08-09 SDOH — ECONOMIC STABILITY: FOOD INSECURITY: WITHIN THE PAST 12 MONTHS, YOU WORRIED THAT YOUR FOOD WOULD RUN OUT BEFORE YOU GOT MONEY TO BUY MORE.: NEVER TRUE

## 2023-08-09 SDOH — ECONOMIC STABILITY: INCOME INSECURITY: HOW HARD IS IT FOR YOU TO PAY FOR THE VERY BASICS LIKE FOOD, HOUSING, MEDICAL CARE, AND HEATING?: NOT HARD AT ALL

## 2023-08-09 SDOH — ECONOMIC STABILITY: HOUSING INSECURITY
IN THE LAST 12 MONTHS, WAS THERE A TIME WHEN YOU DID NOT HAVE A STEADY PLACE TO SLEEP OR SLEPT IN A SHELTER (INCLUDING NOW)?: NO

## 2023-08-09 ASSESSMENT — PATIENT HEALTH QUESTIONNAIRE - PHQ9: DEPRESSION UNABLE TO ASSESS: PT REFUSES

## 2023-08-09 NOTE — PROGRESS NOTES
Corinne Mckenzie is a 59 y.o. female who was seen in clinic today (8/9/2023) for Knee Pain (Right x's a few months )    Assessment & Plan:   Lu Gross was seen today for knee pain. Diagnoses and all orders for this visit:    Lateral knee pain, right  -     XR KNEE RIGHT (3 VIEWS); Future    Other orders  -     ibuprofen (IBU) 600 MG tablet; Take 1 tablet by mouth every 8 hours as needed for Pain      I have discussed the diagnosis with the patient and the intended plan as seen in the above orders. The patient has received an after-visit summary and questions were answered concerning future plans. I have discussed medication side effects and warnings with the patient as well. Patient agreeable with above plan and verbalizes understanding. Return in about 3 months (around 11/9/2023) for Well woman, fasting labs 1 week prior, in office. Subjective:   Reports she has been experiencing right knee pain for the last couple of months. States she has to walk down the stairs sideways d/t to the pain so she doesn't have to bend her knee. Reports she has increased pain with bending knee. Reports she tries not to sit down because if she does it's hard to get back up.   Has been taking tylenol arthritis and ibuprofen after work    Lab Results   Component Value Date/Time     01/20/2023 02:55 AM    K 3.9 01/20/2023 02:55 AM     01/20/2023 02:55 AM    CO2 26 01/20/2023 02:55 AM    BUN 16 01/20/2023 02:55 AM    GFRAA >60 07/06/2022 09:55 PM    GLOB 4.0 01/20/2023 02:55 AM    ALT 18 01/20/2023 02:55 AM    AST 14 01/20/2023 02:55 AM     No results found for: CHOL, CHOLPOCT, CHOLX, CHLST, CHOLV, HDL, HDLPOC, HDLC, LDL, LDLC, VLDLC, VLDL, TGLX, TRIGL  No results found for: HBA1C, [id number]  No results found for: Susanne Ferguson, [id number]    Lab Results   Component Value Date/Time    WBC 6.9 01/20/2023 02:55 AM    HGB 11.9 01/20/2023 02:55 AM    HCT 35.6 01/20/2023 02:55 AM     01/20/2023 02:55 AM    MCV

## 2023-08-09 NOTE — PROGRESS NOTES
1. \"Have you been to the ER, urgent care clinic since your last visit? Hospitalized since your last visit? \" No    2. \"Have you seen or consulted any other health care providers outside of the [address] [place] [address] since your last visit? \" No     3. For patients aged 43-73: Has the patient had a colonoscopy / FIT/ Cologuard? Yes - Care Gap present. Most recent result on file      If the patient is female:    4. For patients aged 43-66: Has the patient had a mammogram within the past 2 years? Yes - Care Gap present. Most recent result on file      5. For patients aged 21-65: Has the patient had a pap smear?  No

## 2023-08-11 NOTE — LETTER
NOTIFICATION RETURN TO WORK / SCHOOL 
 
7/8/2019 3:28 PM 
 
Ms. Mima Pickering [address] [facility] [address] To Whom It May Concern: 
 
Mima Pickering is currently under the care of [facility]. 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 She will be reassessed at a follow up appointment on 7/25/19. If there are questions or concerns please have the patient contact our office.  
 
 
 
Sincerely, 
 
 
Shameka Musa MD 
 
                                
 
 Adult

## 2023-08-14 ENCOUNTER — TELEPHONE (OUTPATIENT)
Age: 58
End: 2023-08-14

## 2023-08-21 NOTE — TELEPHONE ENCOUNTER
Tried calling patient in regards to her message. Left patient a message to contact the office. Per NP Gabby Mares please advise patient her xray reveals moderate arthritis. I have placed a referral to orthopedist for further evaluation/treatment.

## 2023-08-22 NOTE — TELEPHONE ENCOUNTER
Spoke with patient in regards to her message. Per NP Ashanti Cabrales please advise patient her xray reveals moderate arthritis. I have placed a referral to orthopedist for further evaluation/treatment. Patient verbalized understanding of results.

## 2023-09-20 ENCOUNTER — HOSPITAL ENCOUNTER (OUTPATIENT)
Facility: HOSPITAL | Age: 58
Discharge: HOME | End: 2023-09-23
Payer: COMMERCIAL

## 2023-09-20 VITALS — BODY MASS INDEX: 30.05 KG/M2 | WEIGHT: 187 LBS | HEIGHT: 66 IN

## 2023-09-20 DIAGNOSIS — Z12.31 VISIT FOR SCREENING MAMMOGRAM: ICD-10-CM

## 2023-09-20 PROCEDURE — 77063 BREAST TOMOSYNTHESIS BI: CPT

## 2023-10-12 ENCOUNTER — APPOINTMENT (OUTPATIENT)
Facility: HOSPITAL | Age: 58
End: 2023-10-12
Payer: COMMERCIAL

## 2023-10-12 ENCOUNTER — HOSPITAL ENCOUNTER (EMERGENCY)
Facility: HOSPITAL | Age: 58
Discharge: HOME | End: 2023-10-12
Attending: EMERGENCY MEDICINE
Payer: COMMERCIAL

## 2023-10-12 DIAGNOSIS — J40 BRONCHITIS: ICD-10-CM

## 2023-10-12 DIAGNOSIS — J01.00 ACUTE MAXILLARY SINUSITIS, RECURRENCE NOT SPECIFIED: ICD-10-CM

## 2023-10-12 DIAGNOSIS — R07.9 CHEST PAIN, UNSPECIFIED TYPE: Primary | ICD-10-CM

## 2023-10-12 DIAGNOSIS — R09.81 NASAL CONGESTION: ICD-10-CM

## 2023-10-12 LAB
ALBUMIN SERPL-MCNC: 3.6 G/DL (ref 3.4–5)
ALBUMIN/GLOB SERPL: 0.8 (ref 0.8–1.7)
ALP SERPL-CCNC: 95 U/L (ref 45–117)
ALT SERPL-CCNC: 20 U/L (ref 13–56)
ANION GAP SERPL CALC-SCNC: 6 MMOL/L (ref 3–18)
AST SERPL-CCNC: 27 U/L (ref 10–38)
BASOPHILS # BLD: 0 K/UL (ref 0–0.1)
BASOPHILS NFR BLD: 1 % (ref 0–2)
BILIRUB SERPL-MCNC: 0.4 MG/DL (ref 0.2–1)
BUN SERPL-MCNC: 18 MG/DL (ref 7–18)
BUN/CREAT SERPL: 21 (ref 12–20)
CALCIUM SERPL-MCNC: 8.7 MG/DL (ref 8.5–10.1)
CHLORIDE SERPL-SCNC: 106 MMOL/L (ref 100–111)
CO2 SERPL-SCNC: 26 MMOL/L (ref 21–32)
CREAT SERPL-MCNC: 0.84 MG/DL (ref 0.6–1.3)
DIFFERENTIAL METHOD BLD: NORMAL
EOSINOPHIL # BLD: 0.2 K/UL (ref 0–0.4)
EOSINOPHIL NFR BLD: 3 % (ref 0–5)
ERYTHROCYTE [DISTWIDTH] IN BLOOD BY AUTOMATED COUNT: 14.3 % (ref 11.6–14.5)
FLUAV AG NPH QL IA: NEGATIVE
FLUBV AG NOSE QL IA: NEGATIVE
GLOBULIN SER CALC-MCNC: 4.4 G/DL (ref 2–4)
GLUCOSE SERPL-MCNC: 124 MG/DL (ref 74–99)
HCT VFR BLD AUTO: 37.1 % (ref 35–45)
HGB BLD-MCNC: 12.2 G/DL (ref 12–16)
IMM GRANULOCYTES # BLD AUTO: 0 K/UL (ref 0–0.04)
IMM GRANULOCYTES NFR BLD AUTO: 0 % (ref 0–0.5)
LYMPHOCYTES # BLD: 2.7 K/UL (ref 0.9–3.6)
LYMPHOCYTES NFR BLD: 45 % (ref 21–52)
MAGNESIUM SERPL-MCNC: 2 MG/DL (ref 1.6–2.6)
MCH RBC QN AUTO: 27.4 PG (ref 24–34)
MCHC RBC AUTO-ENTMCNC: 32.9 G/DL (ref 31–37)
MCV RBC AUTO: 83.2 FL (ref 78–100)
MONOCYTES # BLD: 0.4 K/UL (ref 0.05–1.2)
MONOCYTES NFR BLD: 6 % (ref 3–10)
NEUTS SEG # BLD: 2.8 K/UL (ref 1.8–8)
NEUTS SEG NFR BLD: 45 % (ref 40–73)
NRBC # BLD: 0 K/UL (ref 0–0.01)
NRBC BLD-RTO: 0 PER 100 WBC
PLATELET # BLD AUTO: 301 K/UL (ref 135–420)
PMV BLD AUTO: 10 FL (ref 9.2–11.8)
POTASSIUM SERPL-SCNC: 4.5 MMOL/L (ref 3.5–5.5)
PROT SERPL-MCNC: 8 G/DL (ref 6.4–8.2)
RBC # BLD AUTO: 4.46 M/UL (ref 4.2–5.3)
SARS-COV-2 RDRP RESP QL NAA+PROBE: NOT DETECTED
SODIUM SERPL-SCNC: 138 MMOL/L (ref 136–145)
SOURCE: NORMAL
TROPONIN I SERPL HS-MCNC: 9 NG/L (ref 0–54)
WBC # BLD AUTO: 6.1 K/UL (ref 4.6–13.2)

## 2023-10-12 PROCEDURE — 87804 INFLUENZA ASSAY W/OPTIC: CPT

## 2023-10-12 PROCEDURE — 6370000000 HC RX 637 (ALT 250 FOR IP): Performed by: EMERGENCY MEDICINE

## 2023-10-12 PROCEDURE — 96374 THER/PROPH/DIAG INJ IV PUSH: CPT

## 2023-10-12 PROCEDURE — 87635 SARS-COV-2 COVID-19 AMP PRB: CPT

## 2023-10-12 PROCEDURE — 80053 COMPREHEN METABOLIC PANEL: CPT

## 2023-10-12 PROCEDURE — 2580000003 HC RX 258: Performed by: NURSE PRACTITIONER

## 2023-10-12 PROCEDURE — 71046 X-RAY EXAM CHEST 2 VIEWS: CPT

## 2023-10-12 PROCEDURE — 93005 ELECTROCARDIOGRAM TRACING: CPT | Performed by: EMERGENCY MEDICINE

## 2023-10-12 PROCEDURE — 6360000002 HC RX W HCPCS: Performed by: NURSE PRACTITIONER

## 2023-10-12 PROCEDURE — 84484 ASSAY OF TROPONIN QUANT: CPT

## 2023-10-12 PROCEDURE — 85025 COMPLETE CBC W/AUTO DIFF WBC: CPT

## 2023-10-12 PROCEDURE — 2500000003 HC RX 250 WO HCPCS: Performed by: NURSE PRACTITIONER

## 2023-10-12 PROCEDURE — 99285 EMERGENCY DEPT VISIT HI MDM: CPT

## 2023-10-12 PROCEDURE — 83735 ASSAY OF MAGNESIUM: CPT

## 2023-10-12 RX ORDER — 0.9 % SODIUM CHLORIDE 0.9 %
1000 INTRAVENOUS SOLUTION INTRAVENOUS ONCE
Status: COMPLETED | OUTPATIENT
Start: 2023-10-12 | End: 2023-10-12

## 2023-10-12 RX ORDER — GUAIFENESIN 200 MG/10ML
1200 LIQUID ORAL
Status: COMPLETED | OUTPATIENT
Start: 2023-10-12 | End: 2023-10-12

## 2023-10-12 RX ORDER — AMOXICILLIN 500 MG/1
500 CAPSULE ORAL 3 TIMES DAILY
Qty: 30 CAPSULE | Refills: 0 | Status: SHIPPED | OUTPATIENT
Start: 2023-10-12 | End: 2023-10-22

## 2023-10-12 RX ORDER — AMOXICILLIN 250 MG/1
500 CAPSULE ORAL
Status: COMPLETED | OUTPATIENT
Start: 2023-10-12 | End: 2023-10-12

## 2023-10-12 RX ORDER — DEXAMETHASONE SODIUM PHOSPHATE 4 MG/ML
8 INJECTION, SOLUTION INTRA-ARTICULAR; INTRALESIONAL; INTRAMUSCULAR; INTRAVENOUS; SOFT TISSUE
Status: COMPLETED | OUTPATIENT
Start: 2023-10-12 | End: 2023-10-12

## 2023-10-12 RX ADMIN — SODIUM CHLORIDE 1000 ML: 9 INJECTION, SOLUTION INTRAVENOUS at 19:53

## 2023-10-12 RX ADMIN — GUAIFENESIN 1200 MG: 200 SOLUTION ORAL at 20:09

## 2023-10-12 RX ADMIN — DEXAMETHASONE SODIUM PHOSPHATE 8 MG: 4 INJECTION, SOLUTION INTRAMUSCULAR; INTRAVENOUS at 19:54

## 2023-10-12 RX ADMIN — AMOXICILLIN 500 MG: 250 CAPSULE ORAL at 22:58

## 2023-10-12 ASSESSMENT — ENCOUNTER SYMPTOMS
SHORTNESS OF BREATH: 0
COUGH: 1
GASTROINTESTINAL NEGATIVE: 1
CHEST TIGHTNESS: 1
RHINORRHEA: 1

## 2023-10-12 ASSESSMENT — PAIN SCALES - GENERAL: PAINLEVEL_OUTOF10: 10

## 2023-10-12 ASSESSMENT — PAIN - FUNCTIONAL ASSESSMENT: PAIN_FUNCTIONAL_ASSESSMENT: 0-10

## 2023-10-12 ASSESSMENT — PAIN DESCRIPTION - LOCATION: LOCATION: CHEST

## 2023-10-12 NOTE — ED PROVIDER NOTES
EMERGENCY DEPARTMENT HISTORY AND PHYSICAL EXAM    7:52 PM      Date: 10/12/2023  Patient Name: Edward Nowak    History of Presenting Illness     Chief Complaint   Patient presents with    Cough    Chest Pain         History Provided By: Patient and medical chart review. Additional History (Context): Edward Nowak is a 59 y.o. female with   Past Medical History:   Diagnosis Date    Arthritis     left shoulder    Bronchitis     Carpal tunnel syndrome on right     Chronic bronchitis (HCC)     De Quervain's syndrome (tenosynovitis)     Depression     resolved    Ectopic pregnancy     two times    Epilepsy ([address])     Multinodular goiter     Seizure (HCC)     Sinus infection     Stenosing tenosynovitis of thumb     Right side    Trigger thumb of left hand    who presents with complaints of runny nose cough and congestion that started over the weekend. States he has been coughing up white sputum. States that past 2 days has been having chest tightness/chest pain worse with deep breaths. States chills started on Monday but does not know if she has had a fever. States she was to [facility] on Monday and just got back and presented straight to the ER for evaluation. States she has tried multiple medications to help alleviate her symptoms. States she tried Benadryl, did not like the way it made her feel. Tried Claritin and Robitussin with honey. Robitussin with honey and DayQuil has helped. Last had DayQuil earlier today. States she still feeling very fatigued and just drained. Patient reports she is a smoker but has not smoked in about 7 to 8 days. Denies any nausea vomiting diarrhea.   States this is all just upper respiratory and in her chest.      PCP: TERRENCE Palomino NP    Current Facility-Administered Medications   Medication Dose Route Frequency Provider Last Rate Last Admin    guaiFENesin (ROBITUSSIN) 100 MG/5ML liquid 1,200 mg  1,200 mg Oral NOW TERRENCE Joyce NP

## 2023-10-12 NOTE — ED TRIAGE NOTES
Patient has been sick with nasal congestion and cough since the weekend. Patient is also now c/o chest tightness when she takes a deep breath in x 2 days.

## 2023-10-13 ENCOUNTER — CARE COORDINATION (OUTPATIENT)
Dept: OTHER | Facility: CLINIC | Age: 58
End: 2023-10-13

## 2023-10-13 VITALS
OXYGEN SATURATION: 97 % | DIASTOLIC BLOOD PRESSURE: 77 MMHG | HEIGHT: 63 IN | SYSTOLIC BLOOD PRESSURE: 127 MMHG | TEMPERATURE: 98.5 F | RESPIRATION RATE: 18 BRPM | HEART RATE: 78 BPM | BODY MASS INDEX: 33.66 KG/M2 | WEIGHT: 190 LBS

## 2023-10-13 LAB
EKG ATRIAL RATE: 76 BPM
EKG DIAGNOSIS: NORMAL
EKG P AXIS: 62 DEGREES
EKG P-R INTERVAL: 118 MS
EKG Q-T INTERVAL: 380 MS
EKG QRS DURATION: 78 MS
EKG QTC CALCULATION (BAZETT): 427 MS
EKG R AXIS: 34 DEGREES
EKG T AXIS: 58 DEGREES
EKG VENTRICULAR RATE: 76 BPM

## 2023-10-13 PROCEDURE — 93010 ELECTROCARDIOGRAM REPORT: CPT | Performed by: INTERNAL MEDICINE

## 2023-10-13 NOTE — CARE COORDINATION
Ambulatory Care Coordination Note    ACM attempted to reach patient for introduction to Associate Care Management related to ED visit. HIPAA compliant message left requesting a return phone call at patient convenience.      Plan for follow-up call in 3-5 days      Future Appointments   Date Time Provider [address]   11/2/2023  9:00 AM [facility] BS AMB   11/9/2023 11:00 AM Lise Ricardo, APRN - NP [facility] BS AMB

## 2023-10-18 ENCOUNTER — CARE COORDINATION (OUTPATIENT)
Dept: OTHER | Facility: CLINIC | Age: 58
End: 2023-10-18

## 2023-10-18 NOTE — CARE COORDINATION
Ambulatory Care Coordination Note    ACM attempted 2nd outreach to patient for introduction to Associate Care Management related to ED visit. HIPAA compliant message left requesting a return phone call at patient convenience. Unable to Reach Letter sent to patient via [employer]. Will continue to outreach.       Future Appointments   Date Time Provider [address]   11/2/2023  9:00 AM [facility] BS AMB   11/9/2023 11:00 AM TERRENCE Bautista - NP [facility] BS AMB

## 2023-11-01 ENCOUNTER — CARE COORDINATION (OUTPATIENT)
Dept: OTHER | Facility: CLINIC | Age: 58
End: 2023-11-01

## 2023-11-01 NOTE — CARE COORDINATION
Ambulatory Care Coordination Note    ACM attempted third and final call to patient for introduction to Associate Care Management. HIPAA compliant message left requesting a return phone call at patient convenience. Final Unable to Reach Letter sent to patient via Mail (by [employer]). No further outreach scheduled with this ACM, patient has been provided with this ACM's contact information. ACM will sign off care team at this time.      Future Appointments   Date Time Provider [address]   11/2/2023  9:00 AM [facility] BS AMB   11/9/2023 11:00 AM TERRENCE Lindsay - NP [facility] BS AMB

## 2023-12-09 ENCOUNTER — APPOINTMENT (OUTPATIENT)
Facility: HOSPITAL | Age: 58
End: 2023-12-09
Payer: COMMERCIAL

## 2023-12-09 ENCOUNTER — HOSPITAL ENCOUNTER (EMERGENCY)
Facility: HOSPITAL | Age: 58
Discharge: HOME | End: 2023-12-09
Attending: EMERGENCY MEDICINE
Payer: COMMERCIAL

## 2023-12-09 VITALS
OXYGEN SATURATION: 100 % | SYSTOLIC BLOOD PRESSURE: 130 MMHG | BODY MASS INDEX: 32.78 KG/M2 | DIASTOLIC BLOOD PRESSURE: 79 MMHG | HEIGHT: 63 IN | TEMPERATURE: 97.6 F | HEART RATE: 74 BPM | WEIGHT: 185 LBS | RESPIRATION RATE: 18 BRPM

## 2023-12-09 DIAGNOSIS — R10.31 RIGHT LOWER QUADRANT ABDOMINAL PAIN: ICD-10-CM

## 2023-12-09 DIAGNOSIS — R30.0 DYSURIA: Primary | ICD-10-CM

## 2023-12-09 LAB
ALBUMIN SERPL-MCNC: 3.7 G/DL (ref 3.4–5)
ALBUMIN/GLOB SERPL: 0.8 (ref 0.8–1.7)
ALP SERPL-CCNC: 92 U/L (ref 45–117)
ALT SERPL-CCNC: 22 U/L (ref 13–56)
ANION GAP SERPL CALC-SCNC: 8 MMOL/L (ref 3–18)
APPEARANCE UR: CLEAR
AST SERPL-CCNC: 18 U/L (ref 10–38)
BASOPHILS # BLD: 0 K/UL (ref 0–0.1)
BASOPHILS NFR BLD: 0 % (ref 0–2)
BILIRUB SERPL-MCNC: 0.2 MG/DL (ref 0.2–1)
BILIRUB UR QL: NEGATIVE
BUN SERPL-MCNC: 14 MG/DL (ref 7–18)
BUN/CREAT SERPL: 19 (ref 12–20)
CALCIUM SERPL-MCNC: 8.4 MG/DL (ref 8.5–10.1)
CHLORIDE SERPL-SCNC: 108 MMOL/L (ref 100–111)
CO2 SERPL-SCNC: 26 MMOL/L (ref 21–32)
COLOR UR: YELLOW
CREAT SERPL-MCNC: 0.74 MG/DL (ref 0.6–1.3)
DIFFERENTIAL METHOD BLD: ABNORMAL
EOSINOPHIL # BLD: 0.1 K/UL (ref 0–0.4)
EOSINOPHIL NFR BLD: 1 % (ref 0–5)
ERYTHROCYTE [DISTWIDTH] IN BLOOD BY AUTOMATED COUNT: 14.9 % (ref 11.6–14.5)
GLOBULIN SER CALC-MCNC: 4.4 G/DL (ref 2–4)
GLUCOSE SERPL-MCNC: 78 MG/DL (ref 74–99)
GLUCOSE UR STRIP.AUTO-MCNC: NEGATIVE MG/DL
HCT VFR BLD AUTO: 37.8 % (ref 35–45)
HGB BLD-MCNC: 12.2 G/DL (ref 12–16)
HGB UR QL STRIP: NEGATIVE
IMM GRANULOCYTES # BLD AUTO: 0 K/UL (ref 0–0.04)
IMM GRANULOCYTES NFR BLD AUTO: 0 % (ref 0–0.5)
KETONES UR QL STRIP.AUTO: NEGATIVE MG/DL
LEUKOCYTE ESTERASE UR QL STRIP.AUTO: NEGATIVE
LIPASE SERPL-CCNC: 33 U/L (ref 13–75)
LYMPHOCYTES # BLD: 2.5 K/UL (ref 0.9–3.6)
LYMPHOCYTES NFR BLD: 44 % (ref 21–52)
MAGNESIUM SERPL-MCNC: 2.4 MG/DL (ref 1.6–2.6)
MCH RBC QN AUTO: 26.9 PG (ref 24–34)
MCHC RBC AUTO-ENTMCNC: 32.3 G/DL (ref 31–37)
MCV RBC AUTO: 83.3 FL (ref 78–100)
MONOCYTES # BLD: 0.2 K/UL (ref 0.05–1.2)
MONOCYTES NFR BLD: 4 % (ref 3–10)
NEUTS SEG # BLD: 2.9 K/UL (ref 1.8–8)
NEUTS SEG NFR BLD: 51 % (ref 40–73)
NITRITE UR QL STRIP.AUTO: NEGATIVE
NRBC # BLD: 0 K/UL (ref 0–0.01)
NRBC BLD-RTO: 0 PER 100 WBC
PH UR STRIP: 7 (ref 5–8)
PLATELET # BLD AUTO: 267 K/UL (ref 135–420)
PMV BLD AUTO: 9.5 FL (ref 9.2–11.8)
POTASSIUM SERPL-SCNC: 3.9 MMOL/L (ref 3.5–5.5)
PROT SERPL-MCNC: 8.1 G/DL (ref 6.4–8.2)
PROT UR STRIP-MCNC: NEGATIVE MG/DL
RBC # BLD AUTO: 4.54 M/UL (ref 4.2–5.3)
SERVICE CMNT-IMP: NORMAL
SODIUM SERPL-SCNC: 142 MMOL/L (ref 136–145)
SP GR UR REFRACTOMETRY: 1.02 (ref 1–1.03)
UROBILINOGEN UR QL STRIP.AUTO: 1 EU/DL (ref 0.2–1)
WBC # BLD AUTO: 5.7 K/UL (ref 4.6–13.2)
WET PREP GENITAL: NORMAL

## 2023-12-09 PROCEDURE — 87210 SMEAR WET MOUNT SALINE/INK: CPT

## 2023-12-09 PROCEDURE — 81003 URINALYSIS AUTO W/O SCOPE: CPT

## 2023-12-09 PROCEDURE — 87491 CHLMYD TRACH DNA AMP PROBE: CPT

## 2023-12-09 PROCEDURE — 74177 CT ABD & PELVIS W/CONTRAST: CPT

## 2023-12-09 PROCEDURE — 99285 EMERGENCY DEPT VISIT HI MDM: CPT

## 2023-12-09 PROCEDURE — 87661 TRICHOMONAS VAGINALIS AMPLIF: CPT

## 2023-12-09 PROCEDURE — 83690 ASSAY OF LIPASE: CPT

## 2023-12-09 PROCEDURE — 6360000004 HC RX CONTRAST MEDICATION: Performed by: EMERGENCY MEDICINE

## 2023-12-09 PROCEDURE — 83735 ASSAY OF MAGNESIUM: CPT

## 2023-12-09 PROCEDURE — 85025 COMPLETE CBC W/AUTO DIFF WBC: CPT

## 2023-12-09 PROCEDURE — 87591 N.GONORRHOEAE DNA AMP PROB: CPT

## 2023-12-09 PROCEDURE — 80053 COMPREHEN METABOLIC PANEL: CPT

## 2023-12-09 RX ADMIN — IOPAMIDOL 100 ML: 612 INJECTION, SOLUTION INTRAVENOUS at 15:06

## 2023-12-09 ASSESSMENT — PAIN SCALES - GENERAL: PAINLEVEL_OUTOF10: 10

## 2023-12-09 ASSESSMENT — PAIN - FUNCTIONAL ASSESSMENT: PAIN_FUNCTIONAL_ASSESSMENT: 0-10

## 2023-12-09 NOTE — ED NOTES
Discharge instructions reviewed with patient. Patient verbalized understanding. Patient advised to follow up as directed on discharge instructions. Patient denies questions, needs or concerns at this time. Patient verbalized understanding. No s/sx of distress noted.        Yumiko Berg RN  12/09/23 [id number]

## 2023-12-09 NOTE — ED TRIAGE NOTES
States urinary frequency and burning x 1 week, now has RLQ and right flank pain 10/10. Denies N/V/D.

## 2023-12-09 NOTE — ED PROVIDER NOTES
[facility] EMERGENCY DEPT  EMERGENCY DEPARTMENT ENCOUNTER    Patient Name: Fanny Reyes  MRN: [id number]  YOB: 1965  Provider: Sharon Summers MD  PCP: TERRENCE Saha NP   Time/Date of evaluation: 1:28 PM EST on 12/9/23    History of Presenting Illness     History Provided by: Patient  History is limited by: Nothing     HISTORY:   Fanny Reyes is a 59 y.o. female presenting due to concern of UTI. She has been having burning urgency and frequency with urination for the last 4 to 5 days. She has had frequent UTIs in the past and feels as though this is similar. She is also having right lower quadrant abdominal pain as well as some pain rating to the right flank and suprapubic area. She denies any abnormal vaginal discharge. She denies any fevers or chills. She has not had vomiting diarrhea or constipation. She has had multiple abdominal surgeries for C-sections as well as bowel obstruction due to adhesions. Nursing Notes were all reviewed and agreed with or any disagreements were addressed in the HPI.     Past History     PAST MEDICAL HISTORY:  Past Medical History:   Diagnosis Date    Arthritis     left shoulder    Bronchitis     Carpal tunnel syndrome on right     Chronic bronchitis (HCC)     De Quervain's syndrome (tenosynovitis)     Depression     resolved    Ectopic pregnancy     two times    Epilepsy ([address])     Multinodular goiter     Seizure ([address])     Sinus infection     Stenosing tenosynovitis of thumb     Right side    Trigger thumb of left hand        PAST SURGICAL HISTORY:  Past Surgical History:   Procedure Laterality Date    APPENDECTOMY  1/12/15    lysis of adhesions    CHOLECYSTECTOMY      COLONOSCOPY N/A 10/21/2016    COLONOSCOPY w/ biopsy performed by Jefry Maynard MD at [address] (CERVIX STATUS UNKNOWN)      OTHER SURGICAL HISTORY      scar tissue removed from intestines    PARTIAL HYSTERECTOMY (CERVIX NOT REMOVED)  11/2009 TONSILLECTOMY         FAMILY HISTORY:  Family History   Problem Relation Age of Onset    Hypertension Mother     Diabetes Mother     Diabetes Father     Hypertension Father     Colon Cancer Father         colon CA    Stroke Sister     Cancer Sister         Breast CA    Heart Disease Sister         stent placement    Other Brother         Stomach problems/ulcers    Heart Attack Brother          in 62s    Hypertension Brother        SOCIAL HISTORY:  Social History     Tobacco Use    Smoking status: Every Day     Packs/day: .25     Types: Cigarettes    Smokeless tobacco: Never    Tobacco comments:     Quit smoking: second hand smoke from family   Vaping Use    Vaping Use: Never used   Substance Use Topics    Alcohol use: No    Drug use: No       MEDICATIONS:  No current facility-administered medications for this encounter. Current Outpatient Medications   Medication Sig Dispense Refill    albuterol sulfate HFA (PROVENTIL;VENTOLIN;PROAIR) 108 (90 Base) MCG/ACT inhaler Inhale 1 puff into the lungs every 4 hours as needed      ibuprofen (IBU) 600 MG tablet Take 1 tablet by mouth every 8 hours as needed for Pain 40 tablet 1    carBAMazepine (TEGRETOL) 200 MG tablet One tab in AM, one tab at lunch, and two tabs before bed      lidocaine (LIDODERM) 5 % Apply patch to the affected area for 12 hours a day and remove for 12 hours a day.  (Patient not taking: Reported on 2023)         ALLERGIES:  Allergies   Allergen Reactions    Etodolac Other (See Comments)     headache       SOCIAL DETERMINANTS OF HEALTH:  Social Determinants of Health     Tobacco Use: High Risk (2023)    Patient History     Smoking Tobacco Use: Every Day     Smokeless Tobacco Use: Never     Passive Exposure: Not on file   Alcohol Use: Not At Risk (4/3/2023)    AUDIT-C     Frequency of Alcohol Consumption: Never     Average Number of Drinks: Not on file     Frequency of Binge Drinking: Not on file   Financial Resource Strain: Low Risk

## 2023-12-11 LAB
C TRACH RRNA SPEC QL NAA+PROBE: NEGATIVE
N GONORRHOEA RRNA SPEC QL NAA+PROBE: NEGATIVE
SPECIMEN SOURCE: NORMAL
T VAGINALIS RRNA SPEC QL NAA+PROBE: NEGATIVE

## 2024-05-26 ENCOUNTER — HOSPITAL ENCOUNTER (EMERGENCY)
Facility: HOSPITAL | Age: 59
Discharge: HOME | End: 2024-05-26
Payer: COMMERCIAL

## 2024-05-26 ENCOUNTER — APPOINTMENT (OUTPATIENT)
Facility: HOSPITAL | Age: 59
End: 2024-05-26

## 2024-05-26 VITALS
HEART RATE: 74 BPM | OXYGEN SATURATION: 97 % | SYSTOLIC BLOOD PRESSURE: 136 MMHG | RESPIRATION RATE: 16 BRPM | BODY MASS INDEX: 31.89 KG/M2 | DIASTOLIC BLOOD PRESSURE: 81 MMHG | WEIGHT: 180 LBS | TEMPERATURE: 98.3 F | HEIGHT: 63 IN

## 2024-05-26 DIAGNOSIS — R07.89 RIGHT-SIDED CHEST WALL PAIN: Primary | ICD-10-CM

## 2024-05-26 LAB
ALBUMIN SERPL-MCNC: 3.5 G/DL (ref 3.4–5)
ALBUMIN/GLOB SERPL: 0.9 (ref 0.8–1.7)
ALP SERPL-CCNC: 82 U/L (ref 45–117)
ALT SERPL-CCNC: 16 U/L (ref 13–56)
ANION GAP SERPL CALC-SCNC: 6 MMOL/L (ref 3–18)
AST SERPL-CCNC: 11 U/L (ref 10–38)
BASOPHILS # BLD: 0 K/UL (ref 0–0.1)
BASOPHILS NFR BLD: 0 % (ref 0–2)
BILIRUB SERPL-MCNC: 0.9 MG/DL (ref 0.2–1)
BUN SERPL-MCNC: 15 MG/DL (ref 7–18)
BUN/CREAT SERPL: 20 (ref 12–20)
CALCIUM SERPL-MCNC: 8.5 MG/DL (ref 8.5–10.1)
CHLORIDE SERPL-SCNC: 108 MMOL/L (ref 100–111)
CO2 SERPL-SCNC: 24 MMOL/L (ref 21–32)
CREAT SERPL-MCNC: 0.76 MG/DL (ref 0.6–1.3)
DIFFERENTIAL METHOD BLD: NORMAL
EKG ATRIAL RATE: 81 BPM
EKG DIAGNOSIS: NORMAL
EKG P AXIS: 65 DEGREES
EKG P-R INTERVAL: 118 MS
EKG Q-T INTERVAL: 382 MS
EKG QRS DURATION: 72 MS
EKG QTC CALCULATION (BAZETT): 443 MS
EKG R AXIS: 31 DEGREES
EKG T AXIS: 61 DEGREES
EKG VENTRICULAR RATE: 81 BPM
EOSINOPHIL # BLD: 0.1 K/UL (ref 0–0.4)
EOSINOPHIL NFR BLD: 2 % (ref 0–5)
ERYTHROCYTE [DISTWIDTH] IN BLOOD BY AUTOMATED COUNT: 14.3 % (ref 11.6–14.5)
GLOBULIN SER CALC-MCNC: 3.9 G/DL (ref 2–4)
GLUCOSE SERPL-MCNC: 111 MG/DL (ref 74–99)
HCT VFR BLD AUTO: 36.6 % (ref 35–45)
HGB BLD-MCNC: 12 G/DL (ref 12–16)
IMM GRANULOCYTES # BLD AUTO: 0 K/UL (ref 0–0.04)
IMM GRANULOCYTES NFR BLD AUTO: 0 % (ref 0–0.5)
LIPASE SERPL-CCNC: 68 U/L (ref 13–75)
LYMPHOCYTES # BLD: 3.3 K/UL (ref 0.9–3.6)
LYMPHOCYTES NFR BLD: 43 % (ref 21–52)
MCH RBC QN AUTO: 27.4 PG (ref 24–34)
MCHC RBC AUTO-ENTMCNC: 32.8 G/DL (ref 31–37)
MCV RBC AUTO: 83.6 FL (ref 78–100)
MONOCYTES # BLD: 0.4 K/UL (ref 0.05–1.2)
MONOCYTES NFR BLD: 5 % (ref 3–10)
NEUTS SEG # BLD: 3.7 K/UL (ref 1.8–8)
NEUTS SEG NFR BLD: 49 % (ref 40–73)
NRBC # BLD: 0 K/UL (ref 0–0.01)
NRBC BLD-RTO: 0 PER 100 WBC
PLATELET # BLD AUTO: 253 K/UL (ref 135–420)
PMV BLD AUTO: 10.2 FL (ref 9.2–11.8)
POTASSIUM SERPL-SCNC: 3.6 MMOL/L (ref 3.5–5.5)
PROT SERPL-MCNC: 7.4 G/DL (ref 6.4–8.2)
RBC # BLD AUTO: 4.38 M/UL (ref 4.2–5.3)
SODIUM SERPL-SCNC: 138 MMOL/L (ref 136–145)
TROPONIN I SERPL HS-MCNC: 13 NG/L (ref 0–54)
WBC # BLD AUTO: 7.5 K/UL (ref 4.6–13.2)

## 2024-05-26 PROCEDURE — 93005 ELECTROCARDIOGRAM TRACING: CPT | Performed by: EMERGENCY MEDICINE

## 2024-05-26 PROCEDURE — 6360000002 HC RX W HCPCS: Performed by: EMERGENCY MEDICINE

## 2024-05-26 PROCEDURE — 93010 ELECTROCARDIOGRAM REPORT: CPT | Performed by: INTERNAL MEDICINE

## 2024-05-26 PROCEDURE — 2500000003 HC RX 250 WO HCPCS: Performed by: PHYSICIAN ASSISTANT

## 2024-05-26 PROCEDURE — 85025 COMPLETE CBC W/AUTO DIFF WBC: CPT

## 2024-05-26 PROCEDURE — 99285 EMERGENCY DEPT VISIT HI MDM: CPT

## 2024-05-26 PROCEDURE — 83690 ASSAY OF LIPASE: CPT

## 2024-05-26 PROCEDURE — 6360000002 HC RX W HCPCS: Performed by: PHYSICIAN ASSISTANT

## 2024-05-26 PROCEDURE — 84484 ASSAY OF TROPONIN QUANT: CPT

## 2024-05-26 PROCEDURE — A4216 STERILE WATER/SALINE, 10 ML: HCPCS | Performed by: PHYSICIAN ASSISTANT

## 2024-05-26 PROCEDURE — 6370000000 HC RX 637 (ALT 250 FOR IP): Performed by: EMERGENCY MEDICINE

## 2024-05-26 PROCEDURE — 2580000003 HC RX 258: Performed by: PHYSICIAN ASSISTANT

## 2024-05-26 PROCEDURE — 71045 X-RAY EXAM CHEST 1 VIEW: CPT

## 2024-05-26 PROCEDURE — 96375 TX/PRO/DX INJ NEW DRUG ADDON: CPT

## 2024-05-26 PROCEDURE — 80053 COMPREHEN METABOLIC PANEL: CPT

## 2024-05-26 PROCEDURE — 96374 THER/PROPH/DIAG INJ IV PUSH: CPT

## 2024-05-26 RX ORDER — TIZANIDINE 4 MG/1
4 TABLET ORAL EVERY 8 HOURS PRN
Qty: 12 TABLET | Refills: 0 | Status: SHIPPED | OUTPATIENT
Start: 2024-05-26

## 2024-05-26 RX ORDER — MORPHINE SULFATE 4 MG/ML
4 INJECTION, SOLUTION INTRAMUSCULAR; INTRAVENOUS
Status: COMPLETED | OUTPATIENT
Start: 2024-05-26 | End: 2024-05-26

## 2024-05-26 RX ORDER — KETOROLAC TROMETHAMINE 15 MG/ML
15 INJECTION, SOLUTION INTRAMUSCULAR; INTRAVENOUS ONCE
Status: COMPLETED | OUTPATIENT
Start: 2024-05-26 | End: 2024-05-26

## 2024-05-26 RX ORDER — LIDOCAINE 4 G/G
1 PATCH TOPICAL
Status: DISCONTINUED | OUTPATIENT
Start: 2024-05-26 | End: 2024-05-26 | Stop reason: HOSPADM

## 2024-05-26 RX ORDER — LIDOCAINE 4 G/G
1 PATCH TOPICAL DAILY PRN
Qty: 15 PATCH | Refills: 1 | Status: SHIPPED | OUTPATIENT
Start: 2024-05-26

## 2024-05-26 RX ORDER — MELOXICAM 7.5 MG/1
7.5 TABLET ORAL DAILY PRN
Qty: 20 TABLET | Refills: 0 | Status: SHIPPED | OUTPATIENT
Start: 2024-05-26

## 2024-05-26 RX ADMIN — FAMOTIDINE 20 MG: 10 INJECTION, SOLUTION INTRAVENOUS at 02:42

## 2024-05-26 RX ADMIN — MORPHINE SULFATE 4 MG: 4 INJECTION, SOLUTION INTRAMUSCULAR; INTRAVENOUS at 02:42

## 2024-05-26 RX ADMIN — KETOROLAC TROMETHAMINE 15 MG: 15 INJECTION, SOLUTION INTRAMUSCULAR; INTRAVENOUS at 04:10

## 2024-05-26 ASSESSMENT — PAIN SCALES - GENERAL
PAINLEVEL_OUTOF10: 10
PAINLEVEL_OUTOF10: 10

## 2024-05-26 ASSESSMENT — PAIN DESCRIPTION - LOCATION
LOCATION: BREAST;CHEST
LOCATION: CHEST

## 2024-05-26 ASSESSMENT — PAIN DESCRIPTION - DESCRIPTORS: DESCRIPTORS: NAGGING;ACHING

## 2024-05-26 ASSESSMENT — PAIN - FUNCTIONAL ASSESSMENT: PAIN_FUNCTIONAL_ASSESSMENT: 0-10

## 2024-05-26 ASSESSMENT — PAIN DESCRIPTION - ORIENTATION: ORIENTATION: RIGHT

## 2024-05-26 NOTE — ED TRIAGE NOTES
Pt arrived via Triage ambulatory c/o chest pain since Tuesday that has gotten progressively worse and hurts more with movement and deep breaths.

## 2024-05-26 NOTE — DISCHARGE INSTRUCTIONS
Can apply heating pad, gentle massage, stretching of the sore muscle.  Try the oral anti-inflammatory prescribed.  Also prescribed short supply of oral muscle relaxant.  Can supplement with Tylenol 1 g up to 3 times per day as well as the Salonpas patches prescribed

## 2024-05-26 NOTE — PROGRESS NOTES
2:03 AM : Pt care transferred to me from April MELINA Amaya  ,ED provider. History of patient complaint(s), available diagnostic reports and current treatment plan has been discussed thoroughly.   Bedside rounding on patient occured : No .  Intended disposition of patient : TBD  Pending diagnostics reports and/or labs (please list):     Intermittent right-sided chest pain x 4 days, progressively worsening, worse with movement and deep breaths.  Screening labs ordered as well as chest x-ray.    ED Course as of 05/26/24 0619   Sun May 26, 2024   0204 Normal sinus rhythm, no ST segment elevations or depressions, T wave flattening in V2, no hyperacute T waves or Q waves noted, normal QTc interval [JM]      ED Course User Index  [JM] Flakito Espinosa DO     Chest pain improving on reassessment after IV morphine given.  Added Salonpas patch and Toradol for residual pain that is reproducible with palpation on the right anterior chest.  Negative troponin after 4 days of symptoms, nonischemic ECG suggest against acute coronary syndrome as well as location on the right side of her chest.  No known history of coronary disease.  Will treat for musculoskeletal chest wall pain at home with supportive care.  Remaining laboratory studies reassuring.    Flakito Espinosa DO  Emergency Physician  .S[facility]

## 2024-05-26 NOTE — ED PROVIDER NOTES
EMERGENCY DEPARTMENT HISTORY AND PHYSICAL EXAM    Date: 5/26/2024  Patient Name: Maggy Luna    History of Presenting Illness     Chief Complaint   Patient presents with    Chest Pain         History Provided By: patient     Chief Complaint: chest pain   Duration: 5 days  Timing:  acute   Location: R side of the chest   Quality: pressure like   Severity: moderate to severe  Modifying Factors: took pepcid with minimal relief   Associated Symptoms: none       Additional History (Context): Maggy Luna is a 58 y.o. female with PMH seizures and depression who presents with c/o 5 days of persistent R sided chest pain. Pt states she initially thought the pain was related to gas or heart burn. She took pepcid with little relief. Denies fever chills and any known sick exposures. No other complaints reported.     PCP: Kelly Haney APRN - NP    Current Facility-Administered Medications   Medication Dose Route Frequency Provider Last Rate Last Admin    famotidine (PEPCID) 20 mg in sodium chloride (PF) 0.9 % 10 mL injection  20 mg IntraVENous NOW Avril Amaya PA-C        morphine sulfate (PF) injection 4 mg  4 mg IntraVENous NOW Avril Amaya PA-C         Current Outpatient Medications   Medication Sig Dispense Refill    albuterol sulfate HFA (PROVENTIL;VENTOLIN;PROAIR) 108 (90 Base) MCG/ACT inhaler Inhale 1 puff into the lungs every 4 hours as needed      ibuprofen (IBU) 600 MG tablet Take 1 tablet by mouth every 8 hours as needed for Pain 40 tablet 1    carBAMazepine (TEGRETOL) 200 MG tablet One tab in AM, one tab at lunch, and two tabs before bed      lidocaine (LIDODERM) 5 % Apply patch to the affected area for 12 hours a day and remove for 12 hours a day. (Patient not taking: Reported on 8/9/2023)         Past History     Past Medical History:  Past Medical History:   Diagnosis Date    Arthritis     left shoulder    Bronchitis     Carpal tunnel syndrome on right     Chronic bronchitis (HCC)

## 2024-06-12 ENCOUNTER — APPOINTMENT (OUTPATIENT)
Facility: HOSPITAL | Age: 59
End: 2024-06-12
Payer: COMMERCIAL

## 2024-06-12 ENCOUNTER — HOSPITAL ENCOUNTER (EMERGENCY)
Facility: HOSPITAL | Age: 59
Discharge: HOME | End: 2024-06-12
Attending: STUDENT IN AN ORGANIZED HEALTH CARE EDUCATION/TRAINING PROGRAM
Payer: COMMERCIAL

## 2024-06-12 VITALS
TEMPERATURE: 98.7 F | OXYGEN SATURATION: 98 % | SYSTOLIC BLOOD PRESSURE: 127 MMHG | WEIGHT: 180 LBS | HEIGHT: 63 IN | HEART RATE: 59 BPM | DIASTOLIC BLOOD PRESSURE: 67 MMHG | RESPIRATION RATE: 18 BRPM | BODY MASS INDEX: 31.89 KG/M2

## 2024-06-12 DIAGNOSIS — R07.1 CHEST PAIN ON BREATHING: Primary | ICD-10-CM

## 2024-06-12 DIAGNOSIS — R09.1 PLEURISY: ICD-10-CM

## 2024-06-12 LAB
ALBUMIN SERPL-MCNC: 4.2 G/DL (ref 3.4–5)
ALBUMIN/GLOB SERPL: 1 (ref 0.8–1.7)
ALP SERPL-CCNC: 96 U/L (ref 45–117)
ALT SERPL-CCNC: 18 U/L (ref 13–56)
ANION GAP SERPL CALC-SCNC: 12 MMOL/L (ref 3–18)
AST SERPL-CCNC: 15 U/L (ref 10–38)
BASOPHILS # BLD: 0 K/UL (ref 0–0.1)
BASOPHILS NFR BLD: 0 % (ref 0–2)
BILIRUB SERPL-MCNC: 0.3 MG/DL (ref 0.2–1)
BUN SERPL-MCNC: 15 MG/DL (ref 7–18)
BUN/CREAT SERPL: 19 (ref 12–20)
CALCIUM SERPL-MCNC: 9.5 MG/DL (ref 8.5–10.1)
CO2 SERPL-SCNC: 23 MMOL/L (ref 21–32)
CREAT SERPL-MCNC: 0.78 MG/DL (ref 0.6–1.3)
D DIMER PPP FEU-MCNC: <0.27 UG/ML(FEU)
DIFFERENTIAL METHOD BLD: NORMAL
EKG ATRIAL RATE: 62 BPM
EKG DIAGNOSIS: NORMAL
EKG P AXIS: 54 DEGREES
EKG P-R INTERVAL: 126 MS
EKG Q-T INTERVAL: 414 MS
EKG QRS DURATION: 70 MS
EKG QTC CALCULATION (BAZETT): 420 MS
EKG R AXIS: 28 DEGREES
EKG T AXIS: 55 DEGREES
EKG VENTRICULAR RATE: 62 BPM
EOSINOPHIL # BLD: 0.1 K/UL (ref 0–0.4)
EOSINOPHIL NFR BLD: 1 % (ref 0–5)
ERYTHROCYTE [DISTWIDTH] IN BLOOD BY AUTOMATED COUNT: 14.1 % (ref 11.6–14.5)
GLOBULIN SER CALC-MCNC: 4.1 G/DL (ref 2–4)
GLUCOSE SERPL-MCNC: 91 MG/DL (ref 74–99)
HCT VFR BLD AUTO: 38.5 % (ref 35–45)
HGB BLD-MCNC: 12.9 G/DL (ref 12–16)
IMM GRANULOCYTES # BLD AUTO: 0 K/UL (ref 0–0.04)
IMM GRANULOCYTES NFR BLD AUTO: 0 % (ref 0–0.5)
LYMPHOCYTES # BLD: 1.9 K/UL (ref 0.9–3.6)
LYMPHOCYTES NFR BLD: 30 % (ref 21–52)
MAGNESIUM SERPL-MCNC: 2 MG/DL (ref 1.6–2.6)
MCH RBC QN AUTO: 27.2 PG (ref 24–34)
MCHC RBC AUTO-ENTMCNC: 33.5 G/DL (ref 31–37)
MCV RBC AUTO: 81.1 FL (ref 78–100)
MONOCYTES # BLD: 0.3 K/UL (ref 0.05–1.2)
MONOCYTES NFR BLD: 5 % (ref 3–10)
NEUTS SEG # BLD: 4.2 K/UL (ref 1.8–8)
NEUTS SEG NFR BLD: 64 % (ref 40–73)
NRBC # BLD: 0 K/UL (ref 0–0.01)
NRBC BLD-RTO: 0 PER 100 WBC
NT PRO BNP: 102 PG/ML (ref 0–900)
PLATELET # BLD AUTO: 278 K/UL (ref 135–420)
PMV BLD AUTO: 9.8 FL (ref 9.2–11.8)
POTASSIUM SERPL-SCNC: 4 MMOL/L (ref 3.5–5.5)
PROT SERPL-MCNC: 8.3 G/DL (ref 6.4–8.2)
RBC # BLD AUTO: 4.75 M/UL (ref 4.2–5.3)
SODIUM SERPL-SCNC: 138 MMOL/L (ref 136–145)
TROPONIN I SERPL HS-MCNC: 10 NG/L (ref 0–54)
TROPONIN I SERPL HS-MCNC: 10 NG/L (ref 0–54)
WBC # BLD AUTO: 6.5 K/UL (ref 4.6–13.2)

## 2024-06-12 PROCEDURE — 93005 ELECTROCARDIOGRAM TRACING: CPT | Performed by: STUDENT IN AN ORGANIZED HEALTH CARE EDUCATION/TRAINING PROGRAM

## 2024-06-12 PROCEDURE — 6360000002 HC RX W HCPCS: Performed by: STUDENT IN AN ORGANIZED HEALTH CARE EDUCATION/TRAINING PROGRAM

## 2024-06-12 PROCEDURE — 99285 EMERGENCY DEPT VISIT HI MDM: CPT

## 2024-06-12 PROCEDURE — 85025 COMPLETE CBC W/AUTO DIFF WBC: CPT

## 2024-06-12 PROCEDURE — 94762 N-INVAS EAR/PLS OXIMTRY CONT: CPT

## 2024-06-12 PROCEDURE — 84484 ASSAY OF TROPONIN QUANT: CPT

## 2024-06-12 PROCEDURE — 71045 X-RAY EXAM CHEST 1 VIEW: CPT

## 2024-06-12 PROCEDURE — 80053 COMPREHEN METABOLIC PANEL: CPT

## 2024-06-12 PROCEDURE — 83880 ASSAY OF NATRIURETIC PEPTIDE: CPT

## 2024-06-12 PROCEDURE — 85379 FIBRIN DEGRADATION QUANT: CPT

## 2024-06-12 PROCEDURE — 96374 THER/PROPH/DIAG INJ IV PUSH: CPT

## 2024-06-12 PROCEDURE — 83735 ASSAY OF MAGNESIUM: CPT

## 2024-06-12 PROCEDURE — 96375 TX/PRO/DX INJ NEW DRUG ADDON: CPT

## 2024-06-12 PROCEDURE — 6370000000 HC RX 637 (ALT 250 FOR IP): Performed by: STUDENT IN AN ORGANIZED HEALTH CARE EDUCATION/TRAINING PROGRAM

## 2024-06-12 RX ORDER — ACETAMINOPHEN 325 MG/1
650 TABLET ORAL
Status: COMPLETED | OUTPATIENT
Start: 2024-06-12 | End: 2024-06-12

## 2024-06-12 RX ORDER — IBUPROFEN 800 MG/1
800 TABLET ORAL 4 TIMES DAILY PRN
Qty: 56 TABLET | Refills: 0 | Status: SHIPPED | OUTPATIENT
Start: 2024-06-12 | End: 2024-06-26

## 2024-06-12 RX ORDER — KETOROLAC TROMETHAMINE 15 MG/ML
15 INJECTION, SOLUTION INTRAMUSCULAR; INTRAVENOUS
Status: COMPLETED | OUTPATIENT
Start: 2024-06-12 | End: 2024-06-12

## 2024-06-12 RX ORDER — ASPIRIN 325 MG
325 TABLET ORAL
Status: COMPLETED | OUTPATIENT
Start: 2024-06-12 | End: 2024-06-12

## 2024-06-12 RX ORDER — MORPHINE SULFATE 10 MG/ML
8 INJECTION, SOLUTION INTRAMUSCULAR; INTRAVENOUS
Status: COMPLETED | OUTPATIENT
Start: 2024-06-12 | End: 2024-06-12

## 2024-06-12 RX ADMIN — ACETAMINOPHEN 325MG 650 MG: 325 TABLET ORAL at 09:24

## 2024-06-12 RX ADMIN — KETOROLAC TROMETHAMINE 15 MG: 15 INJECTION, SOLUTION INTRAMUSCULAR; INTRAVENOUS at 09:25

## 2024-06-12 RX ADMIN — MORPHINE SULFATE 8 MG: 10 INJECTION, SOLUTION INTRAMUSCULAR; INTRAVENOUS at 09:26

## 2024-06-12 RX ADMIN — ASPIRIN 325 MG: 325 TABLET ORAL at 09:24

## 2024-06-12 ASSESSMENT — PAIN DESCRIPTION - FREQUENCY: FREQUENCY: CONTINUOUS

## 2024-06-12 ASSESSMENT — PAIN DESCRIPTION - ORIENTATION
ORIENTATION: RIGHT
ORIENTATION: RIGHT

## 2024-06-12 ASSESSMENT — PAIN DESCRIPTION - DESCRIPTORS
DESCRIPTORS: SORE
DESCRIPTORS: SHARP

## 2024-06-12 ASSESSMENT — PAIN DESCRIPTION - LOCATION
LOCATION: CHEST
LOCATION: CHEST

## 2024-06-12 ASSESSMENT — PAIN SCALES - GENERAL
PAINLEVEL_OUTOF10: 5
PAINLEVEL_OUTOF10: 10

## 2024-06-12 ASSESSMENT — PAIN - FUNCTIONAL ASSESSMENT: PAIN_FUNCTIONAL_ASSESSMENT: 0-10

## 2024-06-12 NOTE — ED PROVIDER NOTES
Electronically signed by Eun Valderrama            Medical Decision Making   I am the first provider for this patient.    I reviewed the vital signs, available nursing notes, past medical history, past surgical history, family history and social history.    Vital Signs-Reviewed the patient's vital signs.      EKG: ed course       ED Course: Progress Notes, Reevaluation, and Consults:    Provider Notes (Medical Decision Making):     MDM  58-year-old female presents with chest pain.  Will consider MSK strain versus costochondritis.  Will consider ACS due to patient's age however lack of comorbidities.  Will consider PE.  Will also consider CHF.  Low suspicion for aortic dissection.  Will obtain labs and imaging.  Patient with a troponin of 10.  Delta of 0.  Patient D-dimer under 0.27.  No other signs of metabolic derangement.  Patient chest x-ray shows no signs of acute cardiopulmonary changes.  Patient feeling better after multimodal pain regiment.  Discussed workup with patient.  She will follow-up with her PCP tomorrow.  Discussed workup with patient.  She agrees with plan.  Patient discharged.    ED Course as of 06/13/24 1240   Wed Jun 12, 2024   1029 EKG: Sinus rhythm, heart rate 62, PA QTc within normal limits, normal axis, no acute ischemic changes. [KS]      ED Course User Index  [KS] Kenrick Johnston MD       Patient was given the following medications:  Medications   acetaminophen (TYLENOL) tablet 650 mg (650 mg Oral Given 6/12/24 0924)   aspirin tablet 325 mg (325 mg Oral Given 6/12/24 0924)   morphine (PF) injection 8 mg (8 mg IntraVENous Given 6/12/24 0926)   ketorolac (TORADOL) injection 15 mg (15 mg IntraVENous Given 6/12/24 0925)       CONSULTS: (Who and What was discussed)  None    Chronic Conditions: none     Social Determinants affecting Dx or Tx: None    Records Reviewed (source and summary of external notes):  none     Procedures    Critical Care Time: none     SCREENING

## 2024-06-12 NOTE — DISCHARGE INSTRUCTIONS
You were evaluated for chest pain .  Based on your work-up it was deemed that she was stable for discharge.  Please  your medication of motrin which was prescribed to you.  Please follow-up with your primary care physician if you have any further concerns and go over your work-up.  If you experience any chest pain, shortness of breath, worsening abdominal pain, vomiting blood, worsening headache, seizures, or any worsening of your symptoms please return to the emergency department immediately.  If you have any pending results or any further questions please contact the emergency department at [phone].

## 2024-06-12 NOTE — ED TRIAGE NOTES
Pt came to ED as [facility] from [facility], pt states she's been having R sided chest pain x 1wk. Pt also reports SOB.    Hx of epilepsy.

## 2024-06-13 ENCOUNTER — CARE COORDINATION (OUTPATIENT)
Dept: OTHER | Facility: CLINIC | Age: 59
End: 2024-06-13

## 2024-06-13 ENCOUNTER — OFFICE VISIT (OUTPATIENT)
Facility: CLINIC | Age: 59
End: 2024-06-13
Payer: COMMERCIAL

## 2024-06-13 VITALS
HEIGHT: 63 IN | DIASTOLIC BLOOD PRESSURE: 59 MMHG | HEART RATE: 56 BPM | RESPIRATION RATE: 18 BRPM | BODY MASS INDEX: 33.84 KG/M2 | SYSTOLIC BLOOD PRESSURE: 129 MMHG | OXYGEN SATURATION: 98 % | WEIGHT: 191 LBS | TEMPERATURE: 97.5 F

## 2024-06-13 DIAGNOSIS — G40.909 NONINTRACTABLE EPILEPSY WITHOUT STATUS EPILEPTICUS, UNSPECIFIED EPILEPSY TYPE (HCC): ICD-10-CM

## 2024-06-13 DIAGNOSIS — R09.1 PLEURISY: Primary | ICD-10-CM

## 2024-06-13 DIAGNOSIS — J42 CHRONIC BRONCHITIS, UNSPECIFIED CHRONIC BRONCHITIS TYPE (HCC): ICD-10-CM

## 2024-06-13 PROCEDURE — 99214 OFFICE O/P EST MOD 30 MIN: CPT | Performed by: NURSE PRACTITIONER

## 2024-06-13 RX ORDER — METHYLPREDNISOLONE 4 MG/1
TABLET ORAL
Qty: 21 TABLET | Refills: 0 | Status: SHIPPED | OUTPATIENT
Start: 2024-06-13 | End: 2024-06-19

## 2024-06-13 RX ORDER — ACETAMINOPHEN AND CODEINE PHOSPHATE 300; 30 MG/1; MG/1
1 TABLET ORAL EVERY 4 HOURS PRN
Qty: 30 TABLET | Refills: 0 | Status: SHIPPED | OUTPATIENT
Start: 2024-06-13 | End: 2024-06-18

## 2024-06-13 ASSESSMENT — PATIENT HEALTH QUESTIONNAIRE - PHQ9
SUM OF ALL RESPONSES TO PHQ QUESTIONS 1-9: 6
SUM OF ALL RESPONSES TO PHQ9 QUESTIONS 1 & 2: 0
SUM OF ALL RESPONSES TO PHQ QUESTIONS 1-9: 6
8. MOVING OR SPEAKING SO SLOWLY THAT OTHER PEOPLE COULD HAVE NOTICED. OR THE OPPOSITE, BEING SO FIGETY OR RESTLESS THAT YOU HAVE BEEN MOVING AROUND A LOT MORE THAN USUAL: NOT AT ALL
SUM OF ALL RESPONSES TO PHQ QUESTIONS 1-9: 6
9. THOUGHTS THAT YOU WOULD BE BETTER OFF DEAD, OR OF HURTING YOURSELF: NOT AT ALL
10. IF YOU CHECKED OFF ANY PROBLEMS, HOW DIFFICULT HAVE THESE PROBLEMS MADE IT FOR YOU TO DO YOUR WORK, TAKE CARE OF THINGS AT HOME, OR GET ALONG WITH OTHER PEOPLE: VERY DIFFICULT
6. FEELING BAD ABOUT YOURSELF - OR THAT YOU ARE A FAILURE OR HAVE LET YOURSELF OR YOUR FAMILY DOWN: NOT AT ALL
3. TROUBLE FALLING OR STAYING ASLEEP: NEARLY EVERY DAY
7. TROUBLE CONCENTRATING ON THINGS, SUCH AS READING THE NEWSPAPER OR WATCHING TELEVISION: NOT AT ALL
SUM OF ALL RESPONSES TO PHQ QUESTIONS 1-9: 6
1. LITTLE INTEREST OR PLEASURE IN DOING THINGS: NOT AT ALL
2. FEELING DOWN, DEPRESSED OR HOPELESS: NOT AT ALL
4. FEELING TIRED OR HAVING LITTLE ENERGY: NEARLY EVERY DAY
5. POOR APPETITE OR OVEREATING: NOT AT ALL

## 2024-06-13 NOTE — PROGRESS NOTES
1. \"Have you been to the ER, urgent care clinic since your last visit?  Hospitalized since your last visit?\"  Yes, Yesenia    2. \"Have you seen or consulted any other health care providers outside of the [facility] since your last visit?\" No     3. For patients aged 45-75: Has the patient had a colonoscopy / FIT/ Cologuard? Yes - Care Gap present. Most recent result on file      If the patient is female:    4. For patients aged 40-74: Has the patient had a mammogram within the past 2 years? Yes - Care Gap present. Most recent result on file      5. For patients aged 21-65: Has the patient had a pap smear? No

## 2024-06-13 NOTE — PROGRESS NOTES
Maggy Luna (:  1965) is a 58 y.o. female, Established patient, here for evaluation of the following chief complaint(s):  Follow-up (Patient was seen at [facility] on 2024 for chest pain when breathing, coughing or moving. )       Assessment & Plan  1. Pleurisy.  The patient was counseled to consult with her Human Resources department regarding the application for Family and Medical Leave Act (FMLA). A prescription for Tylenol No. 3 with Codeine, to be taken every 4 hours as needed for pain for a duration of 5 days, was provided. Additionally, she was advised to use a heating pad to alleviate her discomfort.    Results  Imaging  X-ray of the chest was normal.  1. Pleurisy  -     methylPREDNISolone (MEDROL DOSEPACK) 4 MG tablet; Take by mouth as directed on dose pack, Disp-21 tablet, R-0Normal  -     acetaminophen-codeine (TYLENOL/CODEINE #3) 300-30 MG per tablet; Take 1 tablet by mouth every 4 hours as needed for Pain for up to 5 days. Intended supply: 5 days. Take lowest dose possible to manage pain Max Daily Amount: 6 tablets, Disp-30 tablet, R-0Normal  2. Chronic bronchitis, unspecified chronic bronchitis type (HCC)  Assessment & Plan:  Stable, based on history, physical exam, and review of pertinent labs, studies and medications; medications reconciled; continue current treatment plan.     3. Nonintractable epilepsy without status epilepticus, unspecified epilepsy type (HCC)  Assessment & Plan:   Monitored by specialist- no acute findings meriting change in the plan      orders and follow up as documented in [facility]    I have discussed the diagnosis with the patient and the intended plan as seen in the above orders.  The patient has received an after-visit summary and questions were answered concerning future plans.  I have discussed medication side effects and warnings with the patient as well. Patient agreeable with above plan and verbalizes understanding.    Return if symptoms

## 2024-06-13 NOTE — CARE COORDINATION
Ambulatory Care Coordination Note     6/13/2024 5:40 PM     patient outreach attempt by this ACM today to offer care management services. ACM was unable to reach the patient by telephone today; left voice message requesting a return phone call to this ACM.     ACM: Edna Chaney RN     Care Summary Note: Pt was seen at [facility] ER 6/12/24  Diagnosis:    1. Chest pain on breathing    2. Pleurisy        PCP/Specialist follow up:       Follow Up:   Plan for next ACM outreach in approximately 1-2 days  to complete:  - outreach attempt to offer care management services.

## 2024-06-14 ENCOUNTER — CARE COORDINATION (OUTPATIENT)
Dept: OTHER | Facility: CLINIC | Age: 59
End: 2024-06-14

## 2024-06-14 NOTE — CARE COORDINATION
Ambulatory Care Coordination Note     6/14/2024 4:48 PM     patient outreach attempt by this ACM today to offer care management services. ACM was unable to reach the patient by telephone today; left voice message requesting a return phone call to this ACM.  [employer] message sent requesting patient to contact this ACM.     ACM: Edna Chaney RN     Care Summary Note: Pt was seen at [facility] ER 6/12/24  Diagnosis:     1. Chest pain on breathing    2. Pleurisy        PCP/Specialist follow up:       Follow Up:   Plan for next ACM outreach in approximately 2 weeks to complete:  - outreach attempt to offer care management services.

## 2024-06-24 ENCOUNTER — CARE COORDINATION (OUTPATIENT)
Dept: OTHER | Facility: CLINIC | Age: 59
End: 2024-06-24

## 2024-06-24 NOTE — CARE COORDINATION
Ambulatory Care Coordination Note     6/24/2024 3:53 PM     Patient outreach attempt by this covering ACM today to offer care management services. ACM was unable to reach the patient by telephone today; left voice message requesting a return phone call to this ACM.    Patient admitted to hospital 6/17/24.  Patient noted on census assignment today.       ACM: Beatriz Joyner RN     Care Summary Note: Chest pain    PCP/Specialist follow up:   Future Appointments         Provider Specialty Dept Phone    6/27/2024 11:20 AM Kelly Haney APRN - NP Family Medicine [phone]            Follow Up:   Plan for next ACM outreach in approximately 1-2 days  to complete:  - outreach attempt to offer care management services.

## 2024-06-25 ENCOUNTER — CARE COORDINATION (OUTPATIENT)
Dept: OTHER | Facility: CLINIC | Age: 59
End: 2024-06-25

## 2024-06-25 NOTE — CARE COORDINATION
Ambulatory Care Coordination Note     6/25/2024 1:03 PM     Patient outreach attempt by covering ACM today to offer care management services. [facility] was unable to reach the patient by telephone today;   left voice message requesting a return phone call to this ACM.  letter mailed requesting patient to contact this AC.   mychart message sent requesting patient to contact this AC.     Patient closed (unable to reach patient) from the High Risk Care Management program on 6/25/2024.  Patient  program closed .  No further Ambulatory Care Manager follow up scheduled.

## 2024-06-27 ENCOUNTER — OFFICE VISIT (OUTPATIENT)
Facility: CLINIC | Age: 59
End: 2024-06-27
Payer: COMMERCIAL

## 2024-06-27 VITALS
DIASTOLIC BLOOD PRESSURE: 64 MMHG | RESPIRATION RATE: 16 BRPM | BODY MASS INDEX: 33.66 KG/M2 | SYSTOLIC BLOOD PRESSURE: 104 MMHG | OXYGEN SATURATION: 98 % | HEART RATE: 62 BPM | TEMPERATURE: 97.1 F | HEIGHT: 63 IN | WEIGHT: 190 LBS

## 2024-06-27 DIAGNOSIS — I25.10 CORONARY ARTERY CALCIFICATION: Primary | ICD-10-CM

## 2024-06-27 DIAGNOSIS — I25.84 CORONARY ARTERY CALCIFICATION: Primary | ICD-10-CM

## 2024-06-27 DIAGNOSIS — K21.9 GASTROESOPHAGEAL REFLUX DISEASE, UNSPECIFIED WHETHER ESOPHAGITIS PRESENT: ICD-10-CM

## 2024-06-27 DIAGNOSIS — Z82.49 FAMILY HISTORY OF HEART DISEASE IN FEMALE FAMILY MEMBER BEFORE AGE 65: ICD-10-CM

## 2024-06-27 PROCEDURE — 99214 OFFICE O/P EST MOD 30 MIN: CPT | Performed by: NURSE PRACTITIONER

## 2024-06-27 RX ORDER — OMEPRAZOLE 40 MG/1
40 CAPSULE, DELAYED RELEASE ORAL
COMMUNITY
Start: 2024-06-20

## 2024-06-27 RX ORDER — OXYCODONE HYDROCHLORIDE 5 MG/1
TABLET ORAL
COMMUNITY

## 2024-06-27 RX ORDER — OMEPRAZOLE 40 MG/1
40 CAPSULE, DELAYED RELEASE ORAL 2 TIMES DAILY
Qty: 60 CAPSULE | Refills: 0 | Status: SHIPPED | OUTPATIENT
Start: 2024-06-27

## 2024-06-27 RX ORDER — ATORVASTATIN CALCIUM 40 MG/1
40 TABLET, FILM COATED ORAL NIGHTLY
COMMUNITY

## 2024-06-27 NOTE — PROGRESS NOTES
Maggy Luna (:  1965) is a 58 y.o. female, Established patient, here for evaluation of the following chief complaint(s):  Follow-Up from Hospital    Assessment & Plan  1. Hospital follow-up.  A referral to Dr. Stewart, a female cardiologist, has been made. The patient has been advised to increase her omeprazole dosage to twice daily for a period of 4 weeks.    Follow-up  A virtual follow-up appointment is scheduled for 4 weeks from now.    Results  Laboratory Studies  A1c is 5.7.  Maggy was seen today for follow-up from hospital.    Diagnoses and all orders for this visit:    Coronary artery calcification  -     [facility] - Lisa Cast DO, Cardiology, [place] ([facility])    Family history of heart disease in female family member before age 65  -     [facility] - Lisa Cast DO, Cardiology, [place] ([facility])    Gastroesophageal reflux disease, unspecified whether esophagitis present  -     Amb External Referral To Gastroenterology    Other orders  -     omeprazole (PRILOSEC) 40 MG delayed release capsule; Take 1 capsule by mouth in the morning and at bedtime      orders and follow up as documented in [facility]    I have discussed the diagnosis with the patient and the intended plan as seen in the above orders.  The patient has received an after-visit summary and questions were answered concerning future plans.  I have discussed medication side effects and warnings with the patient as well. Patient agreeable with above plan and verbalizes understanding.    Return in about 4 weeks (around 2024) for GERD, telemedicine MyChart.         Subjective   History of Present Illness  The patient is a 58-year-old female who presents today for hospital follow-up.    The patient continues to experience chest pain, which has progressively worsened. She was taken to [facility] by her daughter on 2024. During her emergency room visit, she was admitted due to calcium deposits in her

## 2024-06-27 NOTE — PROGRESS NOTES
Chief Complaint   Patient presents with    Follow-Up from Hospital         1. \"Have you been to the ER, urgent care clinic since your last visit?  Hospitalized since your last visit?\" No    2. \"Have you seen or consulted any other health care providers outside of the [facility] since your last visit?\" No     3. For patients aged 45-75: Has the patient had a colonoscopy / FIT/ Cologuard? Yes - no Care Gap present      If the patient is female:    4. For patients aged 40-74: Has the patient had a mammogram within the past 2 years? Yes - no Care Gap present      5. For patients aged 21-65: Has the patient had a pap smear? Yes - no Care Gap present      Okay to discontinue medications no longer taking per verbal order Kelly QUINTERO

## 2024-07-29 ENCOUNTER — TELEMEDICINE (OUTPATIENT)
Facility: CLINIC | Age: 59
End: 2024-07-29
Payer: COMMERCIAL

## 2024-07-29 DIAGNOSIS — K21.9 GASTROESOPHAGEAL REFLUX DISEASE, UNSPECIFIED WHETHER ESOPHAGITIS PRESENT: Primary | ICD-10-CM

## 2024-07-29 PROCEDURE — 99214 OFFICE O/P EST MOD 30 MIN: CPT | Performed by: NURSE PRACTITIONER

## 2024-07-29 RX ORDER — OMEPRAZOLE 40 MG/1
40 CAPSULE, DELAYED RELEASE ORAL 2 TIMES DAILY
Qty: 60 CAPSULE | Refills: 0 | Status: SHIPPED | OUTPATIENT
Start: 2024-07-29

## 2024-07-29 NOTE — PROGRESS NOTES
Maggy Luna is a 58 y.o. (1965) female is a Established patient, who was seen by synchronous (real-time) audio-video technology on 7/29/2024 for evaluation of the following:   Chief Complaint   Patient presents with    Gastroesophageal Reflux      Assessment & Plan:     Assessment & Plan  1. Gastroesophageal Reflux Disease (GERD).  Symptoms have improved with the current regimen of omeprazole 40mg, taken twice daily. She reports that the reflux is less severe but still present, especially when consuming certain foods. Advised to avoid foods that can exacerbate reflux symptoms, such as those high in acid (tomatoes, oranges, josefa), spicy foods, chocolate, and caffeine. A prescription for omeprazole 40 mg was renewed, to be taken twice daily for another month, after which the dosage will be reduced to once daily.     2. Chest Pain.  The chest pain appears to be related to dietary choices and is less severe when avoiding foods that trigger reflux. Advised to continue monitoring dietary intake and avoid known triggers. If chest pain persists or worsens, further evaluation may be necessary.    3. Health Maintenance.  She has upcoming appointments with a gastroenterologist and a cardiologist in October. These appointments will help further evaluate and manage her symptoms.    Follow-up  The patient will follow up in 3 months.    Maggy was seen today for gastroesophageal reflux.    Diagnoses and all orders for this visit:    Gastroesophageal reflux disease, unspecified whether esophagitis present    Other orders  -     omeprazole (PRILOSEC) 40 MG delayed release capsule; Take 1 capsule by mouth in the morning and at bedtime      Continue omeprazole 40 mg BID x1 additional month and then decrease to once every morning.    Return in about 3 months (around 10/29/2024) for After seen by GI and cardio, telemedicine MyChart.    Subjective:     History of Present Illness  The patient presents via virtual visit

## 2024-07-29 NOTE — PROGRESS NOTES
Maggy Luna, was evaluated through a synchronous (real-time) audio-video encounter. The patient (or guardian if applicable) is aware that this is a billable service, which includes applicable co-pays. This Virtual Visit was conducted with patient's (and/or legal guardian's) consent. Patient identification was verified, and a caregiver was present when appropriate.   The patient was located at Home: [address]  [place] [address]  Provider was located at Home (Appt [place] State[place]  Confirm you are appropriately licensed, registered, or certified to deliver care in the state where the patient is located as indicated above. If you are not or unsure, please re-schedule the visit: Yes, I confirm.     Maggy Luna (:  1965) is a Established patient, presenting virtually for evaluation of the following:        --Hope Dumont          ----- Message from Yari Tejada sent at 8/8/2017 10:30 AM CDT -----  Contact: [phone]  Patient is requesting to see the  Today for back pain, new patient

## 2024-08-23 NOTE — TELEPHONE ENCOUNTER
Last Appointment- 6/27/24    Next Appointment- 11/4/24    Previous Refill Encounter(s): Date: 7/29/24 #60 Refills 0     Requested Prescriptions     Pending Prescriptions Disp Refills    omeprazole (PRILOSEC) 40 MG delayed release capsule [Pharmacy Med Name: OMEPRAZOLE DR 40 MG CAPSULE] 60 capsule 0     Sig: take 1 capsule by mouth every morning and at bedtime

## 2024-08-28 RX ORDER — OMEPRAZOLE 40 MG/1
40 CAPSULE, DELAYED RELEASE ORAL DAILY
Qty: 30 CAPSULE | Refills: 3 | Status: SHIPPED | OUTPATIENT
Start: 2024-08-28

## 2024-10-01 NOTE — DISCHARGE INSTRUCTIONS
Patient Education        Urinary Tract Infection in Women: Care Instructions  Your Care Instructions    A urinary tract infection, or UTI, is a general term for an infection anywhere between the kidneys and the urethra (where urine comes out). Most UTIs are bladder infections. They often cause pain or burning when you urinate. UTIs are caused by bacteria and can be cured with antibiotics. Be sure to complete your treatment so that the infection goes away. Follow-up care is a key part of your treatment and safety. Be sure to make and go to all appointments, and call your doctor if you are having problems. It's also a good idea to know your test results and keep a list of the medicines you take. How can you care for yourself at home? · Take your antibiotics as directed. Do not stop taking them just because you feel better. You need to take the full course of antibiotics. · Drink extra water and other fluids for the next day or two. This may help wash out the bacteria that are causing the infection. (If you have kidney, heart, or liver disease and have to limit fluids, talk with your doctor before you increase your fluid intake.)  · Avoid drinks that are carbonated or have caffeine. They can irritate the bladder. · Urinate often. Try to empty your bladder each time. · To relieve pain, take a hot bath or lay a heating pad set on low over your lower belly or genital area. Never go to sleep with a heating pad in place. To prevent UTIs  · Drink plenty of water each day. This helps you urinate often, which clears bacteria from your system. (If you have kidney, heart, or liver disease and have to limit fluids, talk with your doctor before you increase your fluid intake.)  · Urinate when you need to. · Urinate right after you have sex. · Change sanitary pads often. · Avoid douches, bubble baths, feminine hygiene sprays, and other feminine hygiene products that have deodorants.   · After going to the bathroom, wipe from front to back. When should you call for help? Call your doctor now or seek immediate medical care if:    · Symptoms such as fever, chills, nausea, or vomiting get worse or appear for the first time.     · You have new pain in your back just below your rib cage. This is called flank pain.     · There is new blood or pus in your urine.     · You have any problems with your antibiotic medicine.    Watch closely for changes in your health, and be sure to contact your doctor if:    · You are not getting better after taking an antibiotic for 2 days.     · Your symptoms go away but then come back. Where can you learn more? Go to http://sylvie-angela.info/. Enter H569 in the search box to learn more about \"Urinary Tract Infection in Women: Care Instructions. \"  Current as of: December 19, 2018  Content Version: 12.2  © 1630-0296 [employer]. Care instructions adapted under license by [employer] (which disclaims liability or warranty for this information). If you have questions about a medical condition or this instruction, always ask your healthcare professional. [address] any warranty or liability for your use of this information. CXOWARE Activation    Thank you for requesting access to [facility]. Please follow the instructions below to securely access and download your online medical record. CXOWARE allows you to send messages to your doctor, view your test results, renew your prescriptions, schedule appointments, and more. How Do I Sign Up? 1. In your internet browser, go to [email or web address]  2. Click on the First Time User? Click Here link in the Sign In box. You will be redirect to the New Member Sign Up page. 3. Enter your CXOWARE Access Code exactly as it appears below. You will not need to use this code after youve completed the sign-up process.  If you do not sign up before the expiration date, you must request a new code.    Five-Thirty Access Code: [email or web address]  Expires: 2020  1:48 PM (This is the date your Five-Thirty access code will )    4. Enter the last four digits of your Social Security Number (xxxx) and Date of Birth (mm/dd/yyyy) as indicated and click Submit. You will be taken to the next sign-up page. 5. Create a Five-Thirty ID. This will be your Five-Thirty login ID and cannot be changed, so think of one that is secure and easy to remember. 6. Create a Five-Thirty password. You can change your password at any time. 7. Enter your Password Reset Question and Answer. This can be used at a later time if you forget your password. 8. Enter your e-mail address. You will receive e-mail notification when new information is available in [address] 9. Click Sign Up. You can now view and download portions of your medical record. 10. Click the Download Summary menu link to download a portable copy of your medical information. Additional Information    If you have questions, please visit the Frequently Asked Questions section of the Five-Thirty website at [email or web address] Remember, Five-Thirty is NOT to be used for urgent needs. For medical emergencies, dial 911. Complete all medications as prescribed. Follow-up with primary care doctor in 1 week. Return to the ED immediately for any new or worsening symptoms. 01-Oct-2024 20:45

## 2024-10-10 ENCOUNTER — OFFICE VISIT (OUTPATIENT)
Age: 59
End: 2024-10-10
Payer: COMMERCIAL

## 2024-10-10 VITALS
SYSTOLIC BLOOD PRESSURE: 130 MMHG | WEIGHT: 196 LBS | BODY MASS INDEX: 34.73 KG/M2 | DIASTOLIC BLOOD PRESSURE: 88 MMHG | HEIGHT: 63 IN | OXYGEN SATURATION: 99 % | HEART RATE: 64 BPM

## 2024-10-10 DIAGNOSIS — R07.9 CHEST PAIN ON EXERTION: ICD-10-CM

## 2024-10-10 DIAGNOSIS — I25.10 CORONARY ARTERY CALCIFICATION: Primary | ICD-10-CM

## 2024-10-10 DIAGNOSIS — Z82.49 FAMILY HISTORY OF HEART DISEASE IN FEMALE FAMILY MEMBER BEFORE AGE 65: ICD-10-CM

## 2024-10-10 PROCEDURE — 93000 ELECTROCARDIOGRAM COMPLETE: CPT | Performed by: INTERNAL MEDICINE

## 2024-10-10 PROCEDURE — 99204 OFFICE O/P NEW MOD 45 MIN: CPT | Performed by: INTERNAL MEDICINE

## 2024-10-10 RX ORDER — ATORVASTATIN CALCIUM 40 MG/1
40 TABLET, FILM COATED ORAL NIGHTLY
Qty: 90 TABLET | Refills: 3 | Status: SHIPPED | OUTPATIENT
Start: 2024-10-10

## 2024-10-10 RX ORDER — METOPROLOL SUCCINATE 25 MG/1
25 TABLET, EXTENDED RELEASE ORAL DAILY
Qty: 30 TABLET | Refills: 5 | Status: SHIPPED | OUTPATIENT
Start: 2024-10-10

## 2024-10-10 ASSESSMENT — PATIENT HEALTH QUESTIONNAIRE - PHQ9
10. IF YOU CHECKED OFF ANY PROBLEMS, HOW DIFFICULT HAVE THESE PROBLEMS MADE IT FOR YOU TO DO YOUR WORK, TAKE CARE OF THINGS AT HOME, OR GET ALONG WITH OTHER PEOPLE: NOT DIFFICULT AT ALL
SUM OF ALL RESPONSES TO PHQ QUESTIONS 1-9: 0
9. THOUGHTS THAT YOU WOULD BE BETTER OFF DEAD, OR OF HURTING YOURSELF: NOT AT ALL
SUM OF ALL RESPONSES TO PHQ QUESTIONS 1-9: 0
3. TROUBLE FALLING OR STAYING ASLEEP: NOT AT ALL
8. MOVING OR SPEAKING SO SLOWLY THAT OTHER PEOPLE COULD HAVE NOTICED. OR THE OPPOSITE, BEING SO FIGETY OR RESTLESS THAT YOU HAVE BEEN MOVING AROUND A LOT MORE THAN USUAL: NOT AT ALL
1. LITTLE INTEREST OR PLEASURE IN DOING THINGS: NOT AT ALL
6. FEELING BAD ABOUT YOURSELF - OR THAT YOU ARE A FAILURE OR HAVE LET YOURSELF OR YOUR FAMILY DOWN: NOT AT ALL
5. POOR APPETITE OR OVEREATING: NOT AT ALL
SUM OF ALL RESPONSES TO PHQ9 QUESTIONS 1 & 2: 0
2. FEELING DOWN, DEPRESSED OR HOPELESS: NOT AT ALL
4. FEELING TIRED OR HAVING LITTLE ENERGY: NOT AT ALL
7. TROUBLE CONCENTRATING ON THINGS, SUCH AS READING THE NEWSPAPER OR WATCHING TELEVISION: NOT AT ALL

## 2024-10-10 NOTE — PROGRESS NOTES
Maggy Luna presents today for   Chief Complaint   Patient presents with    New Patient     Est care referred by pcp due to coronary artery calcification and fam hx of heart disease in female member before age 65     Chest Pain     Center chest pressure at times     Shortness of Breath     SOB at rest        Maggy Luna preferred language for health care discussion is english/other.    Is someone accompanying this pt? no    Is the patient using any DME equipment during OV? no    Depression Screening:  Depression: Not at risk (10/10/2024)    PHQ-2     PHQ-2 Score: 0        Learning Assessment:  No question data found.       Pt currently taking Anticoagulant therapy? no    Pt currently taking Antiplatelet therapy ? no      Coordination of Care:  1. Have you been to the ER, urgent care clinic since your last visit? Hospitalized since your last visit? no    2. Have you seen or consulted any other health care providers outside of the [facility] since your last visit? Include any pap smears or colon screening. no    
to person, place, and time.   Psychiatric:         Mood and Affect: Mood normal.         EKG NSR, normal EKG, but prior has some subtle t wave abn/ inversion    6/17/2024 cCTA:  IMPRESSION   Suboptimal study due to low contrast enhancement   · Extensive coronary artery calcification. Total Calcium Score: 682 . The observed calcium score of is at 93  percentile for subjects oral of the same age, gender, and race/ethnicity.   · Extensive coronary artery atherosclerotic plaque and within the limits of the study, there is  likely mild stenosis. However, not all segments of the coronary arteries were not well visualized. CAD RADS 3/P3. Consider other imaging modalities as clinically indicated.   · Cardiac morphology as noted above.   · Postprocessing Images sent to PACS.   · No prior study is available for comparison.   · Extracardiac findings reported below by radiologist.       Impression and Plan:  Maggy Luna is a 58 y.o. with:    Chronic CP, likely multifactorial but suspect component is microvascular angina  CAD by cCTA (see full report, but suspect mid LAD 50%, Cx dz, RCA), 6/2024  Negative nuclear stress, no reversible ischemia 6/2024  Normal echo 6/2024  Dyslipidemia  +FH premature ASCVD  Tobacco abuse    Start Toprol 25 XL to address CP that I suspect is angina  Continue HI statin- Lipitor Rx given  Start ASA 81 mg daily  Please reduce tobacco as much as possible  RTC close follow up and if CP has not improved with consider LHC vs escalating anti- anginals  Would also continue PPI as probably also has GERD confusing above clinical picture  Significant time spent,obtained and reviewed [facility] testing- with her, formal dx of CAD given to pt    Thank you for allowing me to participate in the care of your patient, please do not hesitate to call with questions or concerns.    Regards,    Lisa Cast, DO

## 2024-11-04 PROBLEM — I25.10 CORONARY ARTERY CALCIFICATION: Status: ACTIVE | Noted: 2024-11-04

## 2024-11-04 PROBLEM — Z82.49 FAMILY HISTORY OF HEART DISEASE IN FEMALE FAMILY MEMBER BEFORE AGE 65: Status: ACTIVE | Noted: 2024-11-04

## 2024-11-04 PROBLEM — Z87.19 HISTORY OF SMALL BOWEL OBSTRUCTION: Status: ACTIVE | Noted: 2024-11-04

## 2024-11-04 PROBLEM — Z87.19 HISTORY OF SMALL BOWEL OBSTRUCTION: Status: RESOLVED | Noted: 2024-11-04 | Resolved: 2024-11-04

## 2024-11-05 ENCOUNTER — TELEMEDICINE (OUTPATIENT)
Facility: CLINIC | Age: 59
End: 2024-11-05
Payer: COMMERCIAL

## 2024-11-05 ENCOUNTER — TELEPHONE (OUTPATIENT)
Facility: CLINIC | Age: 59
End: 2024-11-05

## 2024-11-05 DIAGNOSIS — K21.9 GASTROESOPHAGEAL REFLUX DISEASE, UNSPECIFIED WHETHER ESOPHAGITIS PRESENT: ICD-10-CM

## 2024-11-05 DIAGNOSIS — I25.10 CORONARY ARTERY CALCIFICATION: Primary | ICD-10-CM

## 2024-11-05 PROCEDURE — 99214 OFFICE O/P EST MOD 30 MIN: CPT | Performed by: NURSE PRACTITIONER

## 2024-11-05 RX ORDER — SERTRALINE HYDROCHLORIDE 25 MG/1
25 TABLET, FILM COATED ORAL DAILY
Qty: 30 TABLET | Refills: 3 | Status: SHIPPED | OUTPATIENT
Start: 2024-11-05

## 2024-11-05 NOTE — PROGRESS NOTES
Maggy Luna is a 59 y.o. (1965) female is a Established patient, presents alone, who was seen by synchronous (real-time) audio-video technology on 11/5/2024 for evaluation of the following:   Chief Complaint   Patient presents with    Follow-up     GERD/CP after being seen by specialist   History obtained from patient.    Assessment & Plan:     Assessment & Plan  1. Chest pain.  The patient reports experiencing chest pain that is exacerbated by stress and excitement. She has been prescribed Toprol 25 mg for suspected angina and is advised to continue taking aspirin. She is also instructed to continue on pantoprazole instead of omeprazole, as recommended by her gastroenterologist, to help distinguish between chest pain caused by acid reflux and angina. She is scheduled for an upper endoscopy on December 11, 2024, to further evaluate her acid reflux. A prescription for Zoloft 25 mg was provided, with instructions to take it in the morning to help manage anxiety and stress-related physical symptoms. If the chest pain intensifies, becomes more frequent, or feels different, she is advised to seek immediate medical attention at the emergency room.    2. Anxiety.  The patient experiences anxiety that seems to contribute to her chest pain. She has been prescribed Zoloft 25 mg to be taken in the morning to help manage her anxiety and stress-related physical symptoms. She is advised to monitor how the medication affects her and to report any side effects.    3. Gastroesophageal Reflux Disease (GERD).  The patient is advised to switch from omeprazole to pantoprazole, as recommended by her gastroenterologist, to better manage her acid reflux symptoms. She is scheduled for an upper endoscopy on December 11, 2024, to further evaluate her condition.    Follow-up  Patient is scheduled for a follow-up visit on December 17, 2024.  Coronary artery calcification  Gastroesophageal reflux disease, unspecified whether

## 2024-11-05 NOTE — TELEPHONE ENCOUNTER
Attempted to call patient to schedule follow up appointment not able to leave voicemail.       Return in about 6 weeks (around 12/17/2024) for stress/cp/gerd, telemedicine MyChart.

## 2024-11-08 NOTE — PROGRESS NOTES
Maggy Luna presents today for a 6 week follow-up of chest pain/CAD.  She was seen as a new patient by Dr Cast on 10/10/24 and was started on Toprol XL for angina.  She was also started on ASA.  The plan was to consider LHC vs escalating antianginals if chest pain continues.  She has tolerated the addition of Toprol.  She states that the chest pain is ongoing.  She reports that she has an endoscopy scheduled for 12/11/24.  She has chronic back pain which may be contributing to her chest pain but she states that she is constantly worried about the chest pain.  The pain is present most of the time and is aggravated by taking a deep breath.    She is concerned about the coronary calcium scoring test that was done in June 2024.  She reports that a brother has stents and her sister recently had a PE and also has a stent.    She is a 59 year old female with history of on/off chest pain occurring for several months.  She underwent cardiac testing in June 2024 at  and her nuclear stress test was normal, normal echo with EF 50%, but a CTA of the heart done showed a coronary calcium score of 682 with extensive coronary artery calcification.  She was started on Lipitor.    She has history of tobacco use, significant family history of premature ASCVD (affecting nearly all her siblings with PCI/ CABG and SCD), seizure disorder, and arthritis.     Maggy Luna is a 59 y.o. female presents today for :  Chief Complaint   Patient presents with    Follow-up     1 month f/u ll    Chest Pain     Left/Right chest sharp pains that stay    Palpitations     Racing palps     Shortness of Breath     SOB with exertion        PMH:  Past Medical History:   Diagnosis Date    Arthritis     left shoulder    Bronchitis     Carpal tunnel syndrome on right     Chronic bronchitis (HCC)     De Quervain's syndrome (tenosynovitis)     Depression     resolved    Ectopic pregnancy     two times    Epilepsy (HCC)     History of

## 2024-11-12 ENCOUNTER — TELEPHONE (OUTPATIENT)
Facility: CLINIC | Age: 59
End: 2024-11-12

## 2024-11-12 NOTE — TELEPHONE ENCOUNTER
Patient called to see how she can get her FMLA forms filled out, I advised that she can drop forms off and we can give to provider. I did also adv that there may be a charge starting at $20.00

## 2024-11-18 ENCOUNTER — OFFICE VISIT (OUTPATIENT)
Age: 59
End: 2024-11-18
Payer: COMMERCIAL

## 2024-11-18 VITALS
WEIGHT: 197 LBS | HEART RATE: 65 BPM | DIASTOLIC BLOOD PRESSURE: 84 MMHG | SYSTOLIC BLOOD PRESSURE: 142 MMHG | OXYGEN SATURATION: 95 % | HEIGHT: 63 IN | BODY MASS INDEX: 34.91 KG/M2

## 2024-11-18 DIAGNOSIS — R07.9 CHEST PAIN ON EXERTION: ICD-10-CM

## 2024-11-18 DIAGNOSIS — Z82.49 FAMILY HISTORY OF HEART DISEASE IN FEMALE FAMILY MEMBER BEFORE AGE 65: ICD-10-CM

## 2024-11-18 DIAGNOSIS — I25.10 CORONARY ARTERY CALCIFICATION: Primary | ICD-10-CM

## 2024-11-18 PROCEDURE — 99214 OFFICE O/P EST MOD 30 MIN: CPT | Performed by: NURSE PRACTITIONER

## 2024-11-18 PROCEDURE — 93000 ELECTROCARDIOGRAM COMPLETE: CPT | Performed by: NURSE PRACTITIONER

## 2024-11-18 ASSESSMENT — ENCOUNTER SYMPTOMS
BLOOD IN STOOL: 0
WHEEZING: 0
ABDOMINAL DISTENTION: 0
NAUSEA: 0
CONSTIPATION: 0
CHEST TIGHTNESS: 0
VOMITING: 0
DIARRHEA: 0
SHORTNESS OF BREATH: 0
COUGH: 0

## 2024-11-18 ASSESSMENT — PATIENT HEALTH QUESTIONNAIRE - PHQ9
SUM OF ALL RESPONSES TO PHQ9 QUESTIONS 1 & 2: 0
SUM OF ALL RESPONSES TO PHQ9 QUESTIONS 1 & 2: 0
1. LITTLE INTEREST OR PLEASURE IN DOING THINGS: NOT AT ALL
SUM OF ALL RESPONSES TO PHQ QUESTIONS 1-9: 0
1. LITTLE INTEREST OR PLEASURE IN DOING THINGS: NOT AT ALL
SUM OF ALL RESPONSES TO PHQ QUESTIONS 1-9: 0
4. FEELING TIRED OR HAVING LITTLE ENERGY: NOT AT ALL
7. TROUBLE CONCENTRATING ON THINGS, SUCH AS READING THE NEWSPAPER OR WATCHING TELEVISION: NOT AT ALL
SUM OF ALL RESPONSES TO PHQ QUESTIONS 1-9: 0
6. FEELING BAD ABOUT YOURSELF - OR THAT YOU ARE A FAILURE OR HAVE LET YOURSELF OR YOUR FAMILY DOWN: NOT AT ALL
3. TROUBLE FALLING OR STAYING ASLEEP: NOT AT ALL
5. POOR APPETITE OR OVEREATING: NOT AT ALL
8. MOVING OR SPEAKING SO SLOWLY THAT OTHER PEOPLE COULD HAVE NOTICED. OR THE OPPOSITE, BEING SO FIGETY OR RESTLESS THAT YOU HAVE BEEN MOVING AROUND A LOT MORE THAN USUAL: NOT AT ALL
9. THOUGHTS THAT YOU WOULD BE BETTER OFF DEAD, OR OF HURTING YOURSELF: NOT AT ALL
SUM OF ALL RESPONSES TO PHQ QUESTIONS 1-9: 0
2. FEELING DOWN, DEPRESSED OR HOPELESS: NOT AT ALL
10. IF YOU CHECKED OFF ANY PROBLEMS, HOW DIFFICULT HAVE THESE PROBLEMS MADE IT FOR YOU TO DO YOUR WORK, TAKE CARE OF THINGS AT HOME, OR GET ALONG WITH OTHER PEOPLE: NOT DIFFICULT AT ALL
2. FEELING DOWN, DEPRESSED OR HOPELESS: NOT AT ALL
SUM OF ALL RESPONSES TO PHQ QUESTIONS 1-9: 0

## 2024-11-18 NOTE — PROGRESS NOTES
Maggy Luna presents today for   Chief Complaint   Patient presents with    Follow-up     1 month f/u ll    Chest Pain     Left/Right chest sharp pains that stay    Palpitations     Racing palps     Shortness of Breath     SOB with exertion        Mgagy Luna preferred language for health care discussion is english/other.    Is someone accompanying this pt? yes    Is the patient using any DME equipment during OV? no    Depression Screening:  Depression: Not at risk (11/18/2024)    PHQ-2     PHQ-2 Score: 0        Learning Assessment:  Who is the primary learner? Patient    What is the preferred language for health care of the primary learner? ENGLISH    How does the primary learner prefer to learn new concepts? DEMONSTRATION    Answered By patient    Relationship to Learner SELF           Pt currently taking Anticoagulant therapy? no    Pt currently taking Antiplatelet therapy ? ASA 81 mg QD       Coordination of Care:  1. Have you been to the ER, urgent care clinic since your last visit? Hospitalized since your last visit? no    2. Have you seen or consulted any other health care providers outside of the [facility] since your last visit? Include any pap smears or colon screening. no

## 2024-11-18 NOTE — PATIENT INSTRUCTIONS
Catheterization Instructions       You are scheduled to have a left cardiac catheterization  on 12/9/2024.      Please go to [facility] and park in the outpatient parking lot that is located around to the back of the hospital and enter through the [facility] building.  Once you enter through the [facility] check in with the  there. The  will either give you directions or assist you in getting to the cath holding area. ([place] [address])     You are not to eat or drink anything after midnight the night before your procedure. Small sips of water to take your medications is ok.      If you are diabetic, do not take your insulin/sugar pill the morning of the procedure.    MEDICATION INSTRUCTIONS:   Please take your morning medications with the following special instructions:           [x]          Please make sure to take your Blood pressure medication : metoprolol succinate.          [x]          Take your Aspirin.       Please get your labs done 1 week prior to procedure.    We encourage families to wait in the waiting room on the first floor while the procedure is being done.  The Doctor will come out and talk with you as soon as the procedure is over. You will need someone to drive you home after you have been discharged from the hospital.     There is the possibility that you may spend the night in the hospital, depending on the results of the procedure.  This will be determined after the procedure is done.  If angioplasty or stent is planned, you will stay at least one day.     If you or your family have any questions, please call our office Monday -Friday, 8:30 a.m.-4:30 p.m., at [phone], and ask to speak to one of the nurses.

## 2024-11-19 ENCOUNTER — OFFICE VISIT (OUTPATIENT)
Facility: CLINIC | Age: 59
End: 2024-11-19

## 2024-11-19 VITALS
WEIGHT: 198 LBS | DIASTOLIC BLOOD PRESSURE: 74 MMHG | OXYGEN SATURATION: 98 % | BODY MASS INDEX: 35.08 KG/M2 | HEART RATE: 75 BPM | SYSTOLIC BLOOD PRESSURE: 136 MMHG | TEMPERATURE: 97.6 F | HEIGHT: 63 IN | RESPIRATION RATE: 16 BRPM

## 2024-11-19 DIAGNOSIS — K21.9 GASTROESOPHAGEAL REFLUX DISEASE, UNSPECIFIED WHETHER ESOPHAGITIS PRESENT: ICD-10-CM

## 2024-11-19 DIAGNOSIS — Z82.49 FAMILY HISTORY OF HEART DISEASE IN FEMALE FAMILY MEMBER BEFORE AGE 65: ICD-10-CM

## 2024-11-19 DIAGNOSIS — I25.10 CORONARY ARTERY CALCIFICATION: Primary | ICD-10-CM

## 2024-11-19 DIAGNOSIS — Z02.89 ENCOUNTER FOR COMPLETION OF FORM WITH PATIENT: ICD-10-CM

## 2024-11-19 NOTE — PROGRESS NOTES
Maggy Luna (:  1965) is a 59 y.o. female, Established patient, presents alone, here for evaluation of the following chief complaint(s):  FMLA (Form completion)    History obtained from patient.  Assessment & Plan  1. FMLA paperwork.  The necessary forms will be completed and signed, after which they will be faxed.    2. Chest pain.  The patient reports persistent chest pain and burning sensation, which may be related to acid reflux or cardiac issues. She has been prescribed Metoprolol 25 mg to be taken at night and Lipitor. She is advised to continue taking her medications as prescribed. Further tests are planned to assess the condition of her arteries and to determine if the blood flow is adequate, ordered by cardiologist.    3. Acid reflux.  The patient experiences daily burning sensations and burping, which are alleviated by medication. She is advised to continue taking her acid reflux medications daily. Further procedures are planned to confirm the diagnosis and manage the symptoms.    4. Stress.  The patient reports significant stress due to family issues and her own health concerns. She has been prescribed zoloft but has not started it yet. She is encouraged to begin taking the medication to help manage her symptoms.      Results    Maggy was seen today for fmla.    Diagnoses and all orders for this visit:    Coronary artery calcification    Family history of heart disease in female family member before age 65    Gastroesophageal reflux disease, unspecified whether esophagitis present    Encounter for completion of form with patient      orders and follow up as documented in [facility]    I have discussed the diagnosis with the patient and the intended plan as seen in the above orders.  The patient has received an after-visit summary and questions were answered concerning future plans.  I have discussed medication side effects and warnings with the patient as well. Patient agreeable

## 2024-11-27 ENCOUNTER — TELEPHONE (OUTPATIENT)
Facility: CLINIC | Age: 59
End: 2024-11-27

## 2024-11-27 NOTE — TELEPHONE ENCOUNTER
Pt called to see if her FMLA forms have been signed and sent off. She stated forms have to be sent by Dec.4 to disability. Stated the forms were discussed with provider at previous visit.

## 2024-12-02 ENCOUNTER — HOSPITAL ENCOUNTER (OUTPATIENT)
Facility: HOSPITAL | Age: 59
Discharge: HOME | End: 2024-12-05
Payer: COMMERCIAL

## 2024-12-02 DIAGNOSIS — I25.10 CORONARY ARTERY CALCIFICATION: ICD-10-CM

## 2024-12-02 DIAGNOSIS — R07.9 CHEST PAIN ON EXERTION: ICD-10-CM

## 2024-12-02 LAB
ALBUMIN SERPL-MCNC: 3.9 G/DL (ref 3.4–5)
ALBUMIN/GLOB SERPL: 0.9 (ref 0.8–1.7)
ALP SERPL-CCNC: 96 U/L (ref 45–117)
ALT SERPL-CCNC: 24 U/L (ref 13–56)
ANION GAP SERPL CALC-SCNC: 3 MMOL/L (ref 3–18)
AST SERPL-CCNC: 18 U/L (ref 10–38)
BILIRUB SERPL-MCNC: 0.3 MG/DL (ref 0.2–1)
BUN SERPL-MCNC: 17 MG/DL (ref 7–18)
BUN/CREAT SERPL: 23 (ref 12–20)
CALCIUM SERPL-MCNC: 9.1 MG/DL (ref 8.5–10.1)
CHLORIDE SERPL-SCNC: 107 MMOL/L (ref 100–111)
CO2 SERPL-SCNC: 27 MMOL/L (ref 21–32)
CREAT SERPL-MCNC: 0.75 MG/DL (ref 0.6–1.3)
ERYTHROCYTE [DISTWIDTH] IN BLOOD BY AUTOMATED COUNT: 15.3 % (ref 11.6–14.5)
GLOBULIN SER CALC-MCNC: 4.2 G/DL (ref 2–4)
GLUCOSE SERPL-MCNC: 89 MG/DL (ref 74–99)
HCT VFR BLD AUTO: 39.5 % (ref 35–45)
HGB BLD-MCNC: 12.5 G/DL (ref 12–16)
INR PPP: 1 (ref 0.9–1.1)
MCH RBC QN AUTO: 26.7 PG (ref 24–34)
MCHC RBC AUTO-ENTMCNC: 31.6 G/DL (ref 31–37)
MCV RBC AUTO: 84.4 FL (ref 78–100)
NRBC # BLD: 0 K/UL (ref 0–0.01)
NRBC BLD-RTO: 0 PER 100 WBC
PLATELET # BLD AUTO: 280 K/UL (ref 135–420)
PMV BLD AUTO: 9.8 FL (ref 9.2–11.8)
POTASSIUM SERPL-SCNC: 4.5 MMOL/L (ref 3.5–5.5)
PROT SERPL-MCNC: 8.1 G/DL (ref 6.4–8.2)
PROTHROMBIN TIME: 13.2 SEC (ref 11.9–14.9)
RBC # BLD AUTO: 4.68 M/UL (ref 4.2–5.3)
SODIUM SERPL-SCNC: 137 MMOL/L (ref 136–145)
WBC # BLD AUTO: 5.6 K/UL (ref 4.6–13.2)

## 2024-12-02 PROCEDURE — 80053 COMPREHEN METABOLIC PANEL: CPT

## 2024-12-02 PROCEDURE — 36415 COLL VENOUS BLD VENIPUNCTURE: CPT

## 2024-12-02 PROCEDURE — 85027 COMPLETE CBC AUTOMATED: CPT

## 2024-12-02 PROCEDURE — 85610 PROTHROMBIN TIME: CPT

## 2024-12-02 ASSESSMENT — ENCOUNTER SYMPTOMS
CHEST TIGHTNESS: 0
SHORTNESS OF BREATH: 0

## 2024-12-03 ENCOUNTER — TELEPHONE (OUTPATIENT)
Facility: CLINIC | Age: 59
End: 2024-12-03

## 2024-12-03 ENCOUNTER — TELEPHONE (OUTPATIENT)
Age: 59
End: 2024-12-03

## 2024-12-03 NOTE — TELEPHONE ENCOUNTER
Patient called in to find out if her FMLA forms that she brought in on 11/18/2024 have been completed. She stated she needs the forms completed by tomorrow 12/04/2024.

## 2024-12-03 NOTE — TELEPHONE ENCOUNTER
Received a fax from [facility] requesting cardiac clearance for endoscopy. Procedure date 12-.     Patient saw Christel on 11/18. At that time, C was ordered and scheduled for 12/9. So procedure will need to be postponed.    Letter faxed stating the above to [phone].

## 2024-12-03 NOTE — PROGRESS NOTES
12/2 1520 Attempted to contact pt w/ pre-procedure info/arrival time, no answer. VM left with instructions to call back and/or access information on [employer]    12/3 1340 Attempted to contact pt w/ pre-procedure info/arrival time, no answer. VM left with instructions to call back and/or access information on [employer]

## 2024-12-04 NOTE — TELEPHONE ENCOUNTER
Spoke with patient to inform her NP Lyndon faxed back her FMLA form on 12/2/24 and did receive a  confirmation that they went through on her employers end. Patient stated employer notified her that they did receive her paperwork.

## 2024-12-09 ENCOUNTER — HOSPITAL ENCOUNTER (OUTPATIENT)
Facility: HOSPITAL | Age: 59
Setting detail: OUTPATIENT SURGERY
Discharge: HOME | End: 2024-12-09
Attending: INTERNAL MEDICINE | Admitting: INTERNAL MEDICINE
Payer: COMMERCIAL

## 2024-12-09 VITALS
RESPIRATION RATE: 18 BRPM | TEMPERATURE: 98.2 F | SYSTOLIC BLOOD PRESSURE: 118 MMHG | BODY MASS INDEX: 35.08 KG/M2 | HEIGHT: 63 IN | WEIGHT: 198 LBS | DIASTOLIC BLOOD PRESSURE: 63 MMHG | OXYGEN SATURATION: 97 % | HEART RATE: 51 BPM

## 2024-12-09 DIAGNOSIS — I20.0 UNSTABLE ANGINA (HCC): ICD-10-CM

## 2024-12-09 DIAGNOSIS — I25.10 CORONARY ARTERY CALCIFICATION: ICD-10-CM

## 2024-12-09 LAB — ECHO BSA: 2 M2

## 2024-12-09 PROCEDURE — C1713 ANCHOR/SCREW BN/BN,TIS/BN: HCPCS | Performed by: INTERNAL MEDICINE

## 2024-12-09 PROCEDURE — 2500000003 HC RX 250 WO HCPCS: Performed by: INTERNAL MEDICINE

## 2024-12-09 PROCEDURE — 6360000004 HC RX CONTRAST MEDICATION: Performed by: INTERNAL MEDICINE

## 2024-12-09 PROCEDURE — C1769 GUIDE WIRE: HCPCS | Performed by: INTERNAL MEDICINE

## 2024-12-09 PROCEDURE — 99152 MOD SED SAME PHYS/QHP 5/>YRS: CPT | Performed by: INTERNAL MEDICINE

## 2024-12-09 PROCEDURE — 93458 L HRT ARTERY/VENTRICLE ANGIO: CPT | Performed by: INTERNAL MEDICINE

## 2024-12-09 PROCEDURE — 6360000002 HC RX W HCPCS: Performed by: INTERNAL MEDICINE

## 2024-12-09 PROCEDURE — 7100000010 HC PHASE II RECOVERY - FIRST 15 MIN: Performed by: INTERNAL MEDICINE

## 2024-12-09 PROCEDURE — 2580000003 HC RX 258: Performed by: INTERNAL MEDICINE

## 2024-12-09 PROCEDURE — 2709999900 HC NON-CHARGEABLE SUPPLY: Performed by: INTERNAL MEDICINE

## 2024-12-09 PROCEDURE — 7100000011 HC PHASE II RECOVERY - ADDTL 15 MIN: Performed by: INTERNAL MEDICINE

## 2024-12-09 PROCEDURE — 76000 FLUOROSCOPY <1 HR PHYS/QHP: CPT | Performed by: INTERNAL MEDICINE

## 2024-12-09 PROCEDURE — C1894 INTRO/SHEATH, NON-LASER: HCPCS | Performed by: INTERNAL MEDICINE

## 2024-12-09 PROCEDURE — 6370000000 HC RX 637 (ALT 250 FOR IP): Performed by: INTERNAL MEDICINE

## 2024-12-09 RX ORDER — IOPAMIDOL 612 MG/ML
INJECTION, SOLUTION INTRAVASCULAR PRN
Status: DISCONTINUED | OUTPATIENT
Start: 2024-12-09 | End: 2024-12-09 | Stop reason: HOSPADM

## 2024-12-09 RX ORDER — ASPIRIN 81 MG/1
81 TABLET, CHEWABLE ORAL ONCE
Status: COMPLETED | OUTPATIENT
Start: 2024-12-09 | End: 2024-12-09

## 2024-12-09 RX ORDER — HEPARIN SODIUM 1000 [USP'U]/ML
INJECTION, SOLUTION INTRAVENOUS; SUBCUTANEOUS PRN
Status: DISCONTINUED | OUTPATIENT
Start: 2024-12-09 | End: 2024-12-09 | Stop reason: HOSPADM

## 2024-12-09 RX ORDER — MIDAZOLAM HYDROCHLORIDE 1 MG/ML
INJECTION, SOLUTION INTRAMUSCULAR; INTRAVENOUS PRN
Status: DISCONTINUED | OUTPATIENT
Start: 2024-12-09 | End: 2024-12-09 | Stop reason: HOSPADM

## 2024-12-09 RX ORDER — VERAPAMIL HYDROCHLORIDE 2.5 MG/ML
INJECTION, SOLUTION INTRAVENOUS PRN
Status: DISCONTINUED | OUTPATIENT
Start: 2024-12-09 | End: 2024-12-09 | Stop reason: HOSPADM

## 2024-12-09 RX ORDER — FENTANYL CITRATE 50 UG/ML
INJECTION, SOLUTION INTRAMUSCULAR; INTRAVENOUS PRN
Status: DISCONTINUED | OUTPATIENT
Start: 2024-12-09 | End: 2024-12-09 | Stop reason: HOSPADM

## 2024-12-09 RX ORDER — ACETAMINOPHEN 500 MG
500 TABLET ORAL EVERY 6 HOURS PRN
COMMUNITY

## 2024-12-09 RX ORDER — SODIUM CHLORIDE 9 MG/ML
INJECTION, SOLUTION INTRAVENOUS PRN
Status: DISCONTINUED | OUTPATIENT
Start: 2024-12-09 | End: 2024-12-09 | Stop reason: HOSPADM

## 2024-12-09 RX ADMIN — ASPIRIN 81 MG CHEWABLE TABLET 81 MG: 81 TABLET CHEWABLE at 08:32

## 2024-12-09 RX ADMIN — SODIUM CHLORIDE 100 ML/HR: 9 INJECTION, SOLUTION INTRAVENOUS at 10:51

## 2024-12-09 NOTE — DISCHARGE INSTRUCTIONS
DISCHARGE SUMMARY from Nurse    PATIENT INSTRUCTIONS:    Please resume taking your home medications as prescribed.    After general anesthesia or intravenous sedation, for 24 hours or while taking prescription Narcotics:  Limit your activities  Do not drive and operate hazardous machinery  Do not make important personal or business decisions  Do  not drink alcoholic beverages  If you have not urinated within 8 hours after discharge, please contact your surgeon on call.    Report the following to your surgeon:  Excessive pain, swelling, redness or odor of or around the surgical area  Temperature over 100.5  Nausea and vomiting lasting longer than 4 hours or if unable to take medications  Any signs of decreased circulation or nerve impairment to extremity: change in color, persistent  numbness, tingling, coldness or increase pain  Any questions    What to do at Home:  Recommended activity: no lifting, Driving, or Strenuous exercise for 24 hours.    If you experience any of the following symptoms bleeding,swelling, acute pain or numbness, fever, please follow up with Dr. JANETH Patel MD.    *  Please give a list of your current medications to your Primary Care Provider.    *  Please update this list whenever your medications are discontinued, doses are      changed, or new medications (including over-the-counter products) are added.    *  Please carry medication information at all times in case of emergency situations.    These are general instructions for a healthy lifestyle:    No smoking/ No tobacco products/ Avoid exposure to second hand smoke  Surgeon General's Warning:  Quitting smoking now greatly reduces serious risk to your health.    Obesity, smoking, and sedentary lifestyle greatly increases your risk for illness    A healthy diet, regular physical exercise & weight monitoring are important for maintaining a healthy lifestyle    You may be retaining fluid if you have a history of heart failure or if you

## 2024-12-09 NOTE — H&P
Maggy Luna          Chief Complaint   Patient presents with    New Patient       Est care referred by pcp due to coronary artery calcification and fam hx of heart disease in female member before age 65     Chest Pain       Center chest pressure at times     Shortness of Breath       SOB at rest          HPI    Maggy Luna is a 58 y.o. AAM, referred by PCP for for chest pain and CAD to establish care.     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 Has been c/o chest pain off/ on for several months now. Was presumed to be due stress and heart burn. She has had repeat, extensive testing- negative / nonspecific EKG changes and trops, neg nuclear stress test, normal echo but her cCTA finally at [facility] showed CAD so she was started on Lipitor.     Her symptoms have improved some but she admits even had CP on the way here.     She smokes tobacco but just a few cigarettes, mostly when she's having a drink.  She has very significant family hx of premature ASCVD- affecting nearly all her siblings with PCI/ CABG/ and SCD.     She cares for her  who had stroke and has one daughter.     Past Medical History        Past Medical History:   Diagnosis Date    Arthritis       left shoulder    Bronchitis      Carpal tunnel syndrome on right      Chronic bronchitis (HCC)      De Quervain's syndrome (tenosynovitis)      Depression       resolved    Ectopic pregnancy       two times    Epilepsy (HCC)      Multinodular goiter      Seizure (HCC)      Sinus infection      Stenosing tenosynovitis of thumb       Right side    Trigger thumb of left hand              Past Surgical History         Past Surgical History:   Procedure Laterality Date    APPENDECTOMY   1/12/15     lysis of adhesions    CHOLECYSTECTOMY        COLONOSCOPY N/A 10/21/2016     COLONOSCOPY w/ biopsy performed by Bartolo Del Angel MD at [facility] ENDOSCOPY    HYSTERECTOMY (CERVIX STATUS UNKNOWN)        OTHER SURGICAL HISTORY         scar tissue removed from

## 2024-12-13 NOTE — TELEPHONE ENCOUNTER
Patient had LHC on 12/9.    Verbal order and read back per Lisa Cast, DO  Patient can proceed with endoscopy at moderate risk from a cardiac standpoint as long as her chest pain is better controlled. Aspirin should be continued perioperatively.     Called patient regarding this. She stated that her chest pain seems to be better.     Clearance letter faxed to [phone].

## 2024-12-17 ENCOUNTER — TELEMEDICINE (OUTPATIENT)
Facility: CLINIC | Age: 59
End: 2024-12-17
Payer: COMMERCIAL

## 2024-12-17 DIAGNOSIS — R45.89 ANXIETY ABOUT HEALTH: ICD-10-CM

## 2024-12-17 DIAGNOSIS — I25.10 CORONARY ARTERY CALCIFICATION: ICD-10-CM

## 2024-12-17 DIAGNOSIS — K21.9 GASTROESOPHAGEAL REFLUX DISEASE, UNSPECIFIED WHETHER ESOPHAGITIS PRESENT: Primary | ICD-10-CM

## 2024-12-17 PROCEDURE — 99213 OFFICE O/P EST LOW 20 MIN: CPT | Performed by: NURSE PRACTITIONER

## 2024-12-17 NOTE — PROGRESS NOTES
Instructions for your procedure at [facility]      Today's Date: 12/17/2024      Patient's Name: Maggy Luna      Procedure Date: 12/20/2024        Please enter the main entrance of the hospital and check-in at the  located in the lobby.      Do NOT eat or drink anything, including candy, gum, or ice chips after midnight prior to your procedure, unless it is part of your prep.  Brush your teeth before coming to the hospital.You may swish with water, but do not swallow.  No smoking/Vaping/E-Cigarettes 24 hours prior to the day of procedure.  No alcohol 24 hours prior to the day of procedure.  No recreational drugs for one week prior to the day of procedure.  Bring Photo ID, Insurance information, and Co-pay if required on day of procedure.  Bring in pertinent legal documents, such as, Medical Power of , DNR, Advance Directive, etc.  Leave all other valuables, including money/purse, at home.  Remove jewelry, including ALL body piercings, nail polish, acrylic nails, and makeup (including mascara); no lotions, powders, deodorant, and/or perfume/cologne/after shave on the skin.  Glasses and dentures may be worn to the hospital.  They must be removed prior to procedure. Please bring case/container for glasses or dentures.  11. Contacts should not be worn on day of procedure.   12. Call the office ([phone]) if you have symptoms of a cold or illness within 24-48 hours prior to your procedure.   13. AN ADULT (relative or friend 18 years or older) MUST DRIVE YOU HOME AFTER YOUR PROCEDURE.   14. Please make arrangements for a responsible adult (18 years or older) to be with you for 24 hours after your procedure.   15. TWO VISITORS will be allowed in the waiting area during your procedure.       Special Instructions:      Bring list of CURRENT medications.  Follow instructions from the office regarding Bowel Prep, Vitamins,Seizure, and Blood Pressure/Heart  medications.      If you have a history of recreational drug use, you may be required to submit a urine sample for drug testing the day of your procedure, as some recreational drugs can interact with anesthetics and increase your surgical risk.    Any questions regarding prep, please call the office at [phone].    For any questions or concerns on the day of procedure, please call the Endo Suite at [phone].    These surgical instructions were reviewed with Maggy Luna during the PAT phone call.

## 2024-12-17 NOTE — PROGRESS NOTES
Maggy Luna is a 59 y.o. (1965) female is a Established patient, presents alone, who was seen by synchronous (real-time) audio-video technology on 12/17/2024 for evaluation of the following:   Chief Complaint   Patient presents with    Stress    Gastroesophageal Reflux    Chest Pain   .  History obtained from patient.    Assessment & Plan:     Assessment & Plan  1. Stress.  She reports that Zoloft has been effective in managing her stress without any side effects. She is advised to continue taking Zoloft.    2. Chest pain.  She continues to experience chest pain. An updated FMLA form will be completed to cover the extended leave from 12/18/2024 through 12/22/2024. She is scheduled to return to work on 12/24/2024.    3. Gastroesophageal reflux disease (GERD).  She is doing well on pantoprazole for her reflux symptoms. She is scheduled for an endoscopy on 12/20/2024. The procedure and sedation process were discussed, and she was reassured about the safety of the procedure.    Follow-up  The patient will follow up in 3 months.  Gastroesophageal reflux disease, unspecified whether esophagitis present  Coronary artery calcification  Anxiety about health      Return in about 3 months (around 3/17/2025) for anxiety, telemedicine MyChart.    Subjective:     History of Present Illness  The patient is a 59-year-old female who presents for a 6-week follow-up for stress, chest pain, and GERD.    She has been experiencing persistent chest pain, which is exacerbated by physical activity. She has been on FMLA leave and is seeking clearance to return to work on 12/24/2024.    She reports that her stress levels have been effectively managed with Zoloft, without any adverse effects.    She continues to take pantoprazole for her reflux symptoms, which she reports as being effective. She is scheduled for an endoscopy on 12/20/2024.    Prior to Admission medications    Medication Sig Start Date End Date Taking?

## 2024-12-17 NOTE — PROGRESS NOTES
Maggy Luna, was evaluated through a synchronous (real-time) audio-video encounter. The patient (or guardian if applicable) is aware that this is a billable service, which includes applicable co-pays. This Virtual Visit was conducted with patient's (and/or legal guardian's) consent. Patient identification was verified, and a caregiver was present when appropriate.   The patient was located at Home: [address]  [place] [address]  Provider was located at Facility (Appt Dept): [address]  [place] [address]  Confirm you are appropriately licensed, registered, or certified to deliver care in the [facility] where the patient is located as indicated above. If you are not or unsure, please re-schedule the visit: Yes, I confirm.     Maggy Luna (:  1965) is a Established patient, presenting virtually for evaluation of the following:      Below is the assessment and plan developed based on review of pertinent history, physical exam, labs, studies, and medications.       --Hope Dumont

## 2024-12-19 ENCOUNTER — ANESTHESIA EVENT (OUTPATIENT)
Facility: HOSPITAL | Age: 59
End: 2024-12-19
Payer: COMMERCIAL

## 2024-12-20 ENCOUNTER — HOSPITAL ENCOUNTER (OUTPATIENT)
Facility: HOSPITAL | Age: 59
Setting detail: OUTPATIENT SURGERY
Discharge: HOME | End: 2024-12-20
Attending: STUDENT IN AN ORGANIZED HEALTH CARE EDUCATION/TRAINING PROGRAM | Admitting: STUDENT IN AN ORGANIZED HEALTH CARE EDUCATION/TRAINING PROGRAM
Payer: COMMERCIAL

## 2024-12-20 ENCOUNTER — ANESTHESIA (OUTPATIENT)
Facility: HOSPITAL | Age: 59
End: 2024-12-20
Payer: COMMERCIAL

## 2024-12-20 VITALS
OXYGEN SATURATION: 98 % | HEIGHT: 63 IN | RESPIRATION RATE: 15 BRPM | TEMPERATURE: 97.3 F | BODY MASS INDEX: 34.73 KG/M2 | DIASTOLIC BLOOD PRESSURE: 65 MMHG | HEART RATE: 51 BPM | SYSTOLIC BLOOD PRESSURE: 101 MMHG | WEIGHT: 196 LBS

## 2024-12-20 PROCEDURE — 7100000000 HC PACU RECOVERY - FIRST 15 MIN: Performed by: STUDENT IN AN ORGANIZED HEALTH CARE EDUCATION/TRAINING PROGRAM

## 2024-12-20 PROCEDURE — 2580000003 HC RX 258: Performed by: ANESTHESIOLOGY

## 2024-12-20 PROCEDURE — 2500000003 HC RX 250 WO HCPCS: Performed by: ANESTHESIOLOGY

## 2024-12-20 PROCEDURE — 6360000002 HC RX W HCPCS: Performed by: ANESTHESIOLOGY

## 2024-12-20 PROCEDURE — 7100000010 HC PHASE II RECOVERY - FIRST 15 MIN: Performed by: STUDENT IN AN ORGANIZED HEALTH CARE EDUCATION/TRAINING PROGRAM

## 2024-12-20 PROCEDURE — 2580000003 HC RX 258: Performed by: NURSE ANESTHETIST, CERTIFIED REGISTERED

## 2024-12-20 PROCEDURE — 3600007502: Performed by: STUDENT IN AN ORGANIZED HEALTH CARE EDUCATION/TRAINING PROGRAM

## 2024-12-20 PROCEDURE — 2709999900 HC NON-CHARGEABLE SUPPLY: Performed by: STUDENT IN AN ORGANIZED HEALTH CARE EDUCATION/TRAINING PROGRAM

## 2024-12-20 PROCEDURE — 3700000000 HC ANESTHESIA ATTENDED CARE: Performed by: STUDENT IN AN ORGANIZED HEALTH CARE EDUCATION/TRAINING PROGRAM

## 2024-12-20 RX ORDER — SODIUM CHLORIDE 0.9 % (FLUSH) 0.9 %
5-40 SYRINGE (ML) INJECTION PRN
Status: DISCONTINUED | OUTPATIENT
Start: 2024-12-20 | End: 2024-12-20 | Stop reason: HOSPADM

## 2024-12-20 RX ORDER — SODIUM CHLORIDE, SODIUM LACTATE, POTASSIUM CHLORIDE, CALCIUM CHLORIDE 600; 310; 30; 20 MG/100ML; MG/100ML; MG/100ML; MG/100ML
INJECTION, SOLUTION INTRAVENOUS CONTINUOUS
Status: DISCONTINUED | OUTPATIENT
Start: 2024-12-20 | End: 2024-12-20 | Stop reason: HOSPADM

## 2024-12-20 RX ORDER — PROPOFOL 10 MG/ML
INJECTION, EMULSION INTRAVENOUS
Status: DISCONTINUED | OUTPATIENT
Start: 2024-12-20 | End: 2024-12-20 | Stop reason: SDUPTHER

## 2024-12-20 RX ORDER — LIDOCAINE HYDROCHLORIDE 10 MG/ML
1 INJECTION, SOLUTION EPIDURAL; INFILTRATION; INTRACAUDAL; PERINEURAL
Status: DISCONTINUED | OUTPATIENT
Start: 2024-12-20 | End: 2024-12-20 | Stop reason: HOSPADM

## 2024-12-20 RX ORDER — LIDOCAINE HYDROCHLORIDE 20 MG/ML
INJECTION, SOLUTION EPIDURAL; INFILTRATION; INTRACAUDAL; PERINEURAL
Status: DISCONTINUED | OUTPATIENT
Start: 2024-12-20 | End: 2024-12-20 | Stop reason: SDUPTHER

## 2024-12-20 RX ADMIN — PROPOFOL 25 MG: 10 INJECTION, EMULSION INTRAVENOUS at 11:27

## 2024-12-20 RX ADMIN — LIDOCAINE HYDROCHLORIDE 100 MG: 20 INJECTION, SOLUTION EPIDURAL; INFILTRATION; INTRACAUDAL; PERINEURAL at 11:24

## 2024-12-20 RX ADMIN — DEXMEDETOMIDINE 8 MCG: 200 INJECTION, SOLUTION INTRAVENOUS at 11:24

## 2024-12-20 RX ADMIN — PROPOFOL 100 MG: 10 INJECTION, EMULSION INTRAVENOUS at 11:24

## 2024-12-20 RX ADMIN — SODIUM CHLORIDE, SODIUM LACTATE, POTASSIUM CHLORIDE, AND CALCIUM CHLORIDE: 600; 310; 30; 20 INJECTION, SOLUTION INTRAVENOUS at 11:22

## 2024-12-20 RX ADMIN — PROPOFOL 25 MG: 10 INJECTION, EMULSION INTRAVENOUS at 11:29

## 2024-12-20 ASSESSMENT — PAIN - FUNCTIONAL ASSESSMENT
PAIN_FUNCTIONAL_ASSESSMENT: NONE - DENIES PAIN

## 2024-12-20 NOTE — H&P
[email or web address]  [phone]    Chief Complaint: GERD, atypical chest discomfort, early satiety    PMH:   Past Medical History:   Diagnosis Date    Arthritis     left shoulder    Bronchitis     Carpal tunnel syndrome on right     Chronic bronchitis (HCC)     De Quervain's syndrome (tenosynovitis)     Depression     resolved    Ectopic pregnancy     two times    Epilepsy (HCC)     History of small bowel obstruction 2024    Multinodular goiter     Seizure (HCC)     Sinus infection     Stenosing tenosynovitis of thumb     Right side    Trigger thumb of left hand      Allergies:   Allergies   Allergen Reactions    Etodolac Other (See Comments)     headache     Medications:   Current Facility-Administered Medications:     lidocaine PF 1 % injection 1 mL, 1 mL, IntraDERmal, Once PRN, Tab Lan, APRN - CRNA    lactated ringers infusion, , IntraVENous, Continuous, Zhanna Lanan A, APRN - CRNA    sodium chloride flush 0.9 % injection 5-40 mL, 5-40 mL, IntraVENous, PRN, CarZhanna hallan A, APRN - CRNA    famotidine (PEPCID) 20 mg in sodium chloride (PF) 0.9 % 10 mL injection, 20 mg, IntraVENous, Once, CarTab hall A, APRN - CRNA  FH:   Family History   Problem Relation Age of Onset    Hypertension Mother     Diabetes Mother     Diabetes Father     Hypertension Father     Colon Cancer Father         colon CA    Stroke Sister     Cancer Sister         Breast CA    Heart Disease Sister         stent placement    Other Brother         Stomach problems/ulcers    Heart Attack Brother          in 60s    Hypertension Brother      Social:   Social History     Socioeconomic History    Marital status: Legally    Tobacco Use    Smoking status: Every Day     Current packs/day: 0.00     Types: Cigarettes     Last attempt to quit: 2024     Years since quittin.6    Smokeless tobacco: Never   Vaping Use    Vaping status: Never Used   Substance and Sexual Activity    Alcohol use: Yes     Comment: rarely

## 2024-12-20 NOTE — ANESTHESIA POSTPROCEDURE EVALUATION
Department of Anesthesiology  Postprocedure Note    Patient: Maggy Luna  MRN: [id number]  YOB: 1965  Date of evaluation: 12/20/2024    Procedure Summary       Date: 12/20/24 Room / Location: [facility] ENDO 02 / [facility] ENDOSCOPY    Anesthesia Start: 1122 Anesthesia Stop: 1136    Procedure: ESOPHAGOGASTRODUODENOSCOPY with cold forcep bx's (Upper GI Region) Diagnosis:       Gastroesophageal reflux disease, unspecified whether esophagitis present      Early satiety      Chest discomfort      Personal history of colon polyps, unspecified      Family history of colon cancer      Obesity, unspecified class, unspecified obesity type, unspecified whether serious comorbidity present      (Gastroesophageal reflux disease, unspecified whether esophagitis present [K21.9])      (Early satiety [R68.81])      (Chest discomfort [R07.89])      (Personal history of colon polyps, unspecified [Z86.0100])      (Family history of colon cancer [Z80.0])      (Obesity, unspecified class, unspecified obesity type, unspecified whether serious comorbidity present [E66.9])    Surgeons: Amauri Tompkins MD Responsible Provider: Micky Miranda MD    Anesthesia Type: MAC ASA Status: 3            Anesthesia Type: MAC    Sherri Phase I: Sherri Score: 10    Sherri Phase II:      Anesthesia Post Evaluation    Patient location during evaluation: PACU  Patient participation: complete - patient participated  Level of consciousness: awake  Airway patency: patent  Nausea & Vomiting: no nausea  Cardiovascular status: hemodynamically stable  Respiratory status: acceptable  Hydration status: euvolemic  Pain management: adequate    No notable events documented.

## 2024-12-20 NOTE — ANESTHESIA PRE PROCEDURE
Answered      Vital Signs (Current):   Vitals:    12/17/24 0904 12/20/24 0933   BP:  (!) 131/51   Pulse:  62   Resp:  16   Temp:  97.6 °F (36.4 °C)   TempSrc:  Oral   SpO2:  99%   Weight: 89.8 kg (198 lb) 88.9 kg (196 lb)   Height: 1.6 m (5' 3\")                                               BP Readings from Last 3 Encounters:   12/20/24 (!) 131/51   12/09/24 118/63   11/19/24 136/74       NPO Status: Time of last liquid consumption: 1900                        Time of last solid consumption: 2000                        Date of last liquid consumption: 12/19/24                        Date of last solid food consumption: 12/19/24    BMI:   Wt Readings from Last 3 Encounters:   12/20/24 88.9 kg (196 lb)   12/09/24 89.8 kg (198 lb)   11/19/24 89.8 kg (198 lb)     Body mass index is 34.72 kg/m².    CBC:   Lab Results   Component Value Date/Time    WBC 5.6 12/02/2024 09:38 AM    RBC 4.68 12/02/2024 09:38 AM    HGB 12.5 12/02/2024 09:38 AM    HCT 39.5 12/02/2024 09:38 AM    MCV 84.4 12/02/2024 09:38 AM    RDW 15.3 12/02/2024 09:38 AM     12/02/2024 09:38 AM       CMP:   Lab Results   Component Value Date/Time     12/02/2024 09:38 AM    K 4.5 12/02/2024 09:38 AM     12/02/2024 09:38 AM    CO2 27 12/02/2024 09:38 AM    BUN 17 12/02/2024 09:38 AM    CREATININE 0.75 12/02/2024 09:38 AM    GFRAA >60 07/06/2022 09:55 PM    AGRATIO 0.9 01/20/2023 02:55 AM    LABGLOM >90 12/02/2024 09:38 AM    LABGLOM >60 12/09/2023 02:20 PM    LABGLOM >60 01/20/2023 02:55 AM    GLUCOSE 89 12/02/2024 09:38 AM    CALCIUM 9.1 12/02/2024 09:38 AM    BILITOT 0.3 12/02/2024 09:38 AM    ALKPHOS 96 12/02/2024 09:38 AM    ALKPHOS 94 01/20/2023 02:55 AM    AST 18 12/02/2024 09:38 AM    ALT 24 12/02/2024 09:38 AM       POC Tests: No results for input(s): \"POCGLU\", \"POCNA\", \"POCK\", \"POCCL\", \"POCBUN\", \"POCHEMO\", \"POCHCT\" in the last 72 hours.    Coags:   Lab Results   Component Value Date/Time    PROTIME 13.2 12/02/2024 09:38 AM    INR 1.0

## 2025-02-21 ENCOUNTER — TELEPHONE (OUTPATIENT)
Age: 60
End: 2025-02-21

## 2025-03-12 ENCOUNTER — OFFICE VISIT (OUTPATIENT)
Facility: CLINIC | Age: 60
End: 2025-03-12

## 2025-03-12 VITALS
SYSTOLIC BLOOD PRESSURE: 150 MMHG | BODY MASS INDEX: 35.19 KG/M2 | HEART RATE: 71 BPM | WEIGHT: 198.6 LBS | OXYGEN SATURATION: 98 % | HEIGHT: 63 IN | TEMPERATURE: 98.1 F | DIASTOLIC BLOOD PRESSURE: 90 MMHG | RESPIRATION RATE: 18 BRPM

## 2025-03-12 DIAGNOSIS — R45.89 ANXIETY ABOUT HEALTH: ICD-10-CM

## 2025-03-12 DIAGNOSIS — G40.909 NONINTRACTABLE EPILEPSY WITHOUT STATUS EPILEPTICUS, UNSPECIFIED EPILEPSY TYPE (HCC): ICD-10-CM

## 2025-03-12 DIAGNOSIS — Z12.31 SCREENING MAMMOGRAM FOR BREAST CANCER: ICD-10-CM

## 2025-03-12 DIAGNOSIS — J42 CHRONIC BRONCHITIS, UNSPECIFIED CHRONIC BRONCHITIS TYPE (HCC): ICD-10-CM

## 2025-03-12 DIAGNOSIS — I50.20 SYSTOLIC CONGESTIVE HEART FAILURE, UNSPECIFIED HF CHRONICITY (HCC): ICD-10-CM

## 2025-03-12 DIAGNOSIS — F41.9 ANXIETY: Primary | ICD-10-CM

## 2025-03-12 PROCEDURE — 99214 OFFICE O/P EST MOD 30 MIN: CPT | Performed by: NURSE PRACTITIONER

## 2025-03-12 RX ORDER — BUSPIRONE HYDROCHLORIDE 7.5 MG/1
7.5 TABLET ORAL 2 TIMES DAILY PRN
Qty: 60 TABLET | Refills: 0 | Status: SHIPPED | OUTPATIENT
Start: 2025-03-12 | End: 2025-04-11

## 2025-03-12 SDOH — ECONOMIC STABILITY: FOOD INSECURITY: WITHIN THE PAST 12 MONTHS, THE FOOD YOU BOUGHT JUST DIDN'T LAST AND YOU DIDN'T HAVE MONEY TO GET MORE.: NEVER TRUE

## 2025-03-12 SDOH — ECONOMIC STABILITY: FOOD INSECURITY: WITHIN THE PAST 12 MONTHS, YOU WORRIED THAT YOUR FOOD WOULD RUN OUT BEFORE YOU GOT MONEY TO BUY MORE.: NEVER TRUE

## 2025-03-12 NOTE — PROGRESS NOTES
Maggy Luna (:  1965) is a 59 y.o. female, Established patient, presents alone, here for evaluation of the following chief complaint(s):  Anxiety and Depression  History obtained from patient.  Assessment & Plan  1. Anxiety.  She reports increased irritability and stress, particularly related to caregiving for her . Counseling or support groups for spouses of stroke survivors are recommended. A prescription for BuSpar (buspirone) will be provided, to be taken twice daily as needed. She is encouraged to utilize the Employee Assistance Program (EAP) while seeking a therapist.    2. Depression.  She has stopped taking sertraline (Zoloft) due to increased anger and irritability. Counseling and support groups are recommended to help manage her symptoms.    3. Chest pain.  She reports a burning sensation in her chest. She is advised to follow up with her gastroenterologist to check for acid reflux.    4. Breast cysts.  A mammogram will be scheduled to monitor for any recurrence of breast cysts.    5. Thumb pain.  She has an upcoming appointment with Dr. Sanchez on 2025 for issues with her arms, including difficulty opening bottles and holding a pen, and a locked thumb. She can feel the bone pop when she uses her thumb.    Results    Maggy was seen today for anxiety and depression.    Diagnoses and all orders for this visit:    Anxiety  -     busPIRone (BUSPAR) 7.5 MG tablet; Take 1 tablet by mouth 2 times daily as needed (anxiety)    Anxiety about health  -     busPIRone (BUSPAR) 7.5 MG tablet; Take 1 tablet by mouth 2 times daily as needed (anxiety)    Systolic congestive heart failure, unspecified HF chronicity (HCC)    Chronic bronchitis, unspecified chronic bronchitis type (HCC)    Nonintractable epilepsy without status epilepticus, unspecified epilepsy type (HCC)    Screening mammogram for breast cancer  -     LEONARD DIGITAL SCREEN W OR WO CAD BILATERAL; Future      orders and follow

## 2025-03-17 ENCOUNTER — OFFICE VISIT (OUTPATIENT)
Age: 60
End: 2025-03-17

## 2025-03-17 VITALS — WEIGHT: 197 LBS | HEIGHT: 64 IN | BODY MASS INDEX: 33.63 KG/M2

## 2025-03-17 DIAGNOSIS — G56.03 BILATERAL CARPAL TUNNEL SYNDROME: ICD-10-CM

## 2025-03-17 DIAGNOSIS — M65.311 TRIGGER THUMB OF RIGHT HAND: Primary | ICD-10-CM

## 2025-03-17 DIAGNOSIS — M79.641 RIGHT HAND PAIN: ICD-10-CM

## 2025-03-17 RX ORDER — LIDOCAINE HYDROCHLORIDE 10 MG/ML
0.5 INJECTION, SOLUTION INFILTRATION; PERINEURAL ONCE
Status: COMPLETED | OUTPATIENT
Start: 2025-03-17 | End: 2025-03-17

## 2025-03-17 RX ADMIN — LIDOCAINE HYDROCHLORIDE 0.5 ML: 10 INJECTION, SOLUTION INFILTRATION; PERINEURAL at 10:09

## 2025-03-17 NOTE — PROGRESS NOTES
Maggy Luna is a 59 y.o. female right handed employed.  Worker's Compensation and legal considerations: none    Chief Complaint   Patient presents with    Hand Pain     Right       Pain Score:   5    Subjective:     Initial HPI: Patient presents today with complaints of right thumb pain and previous locking.  She reports inability to flex the thumb now.  She also complains of bilateral hand numbness and tingling worse on the right than the left.    Date of onset: Early 2025  Injury: No  Prior Treatment:  No  Contributory history: None    ROS: Review of Systems - General ROS: negative except HPI    Past Medical History:   Diagnosis Date    Arthritis     left shoulder    Bronchitis     Carpal tunnel syndrome on right     Chronic bronchitis (HCC)     De Quervain's syndrome (tenosynovitis)     Depression     resolved    Ectopic pregnancy     two times    Epilepsy ([facility])     History of small bowel obstruction 11/04/2024    Multinodular goiter     Seizure ([facility])     Sinus infection     Stenosing tenosynovitis of thumb     Right side    Trigger thumb of left hand        Past Surgical History:   Procedure Laterality Date    APPENDECTOMY  1/12/15    lysis of adhesions    CARDIAC PROCEDURE N/A 12/9/2024    Left heart cath / coronary angiography performed by Andrew Patel MD at [facility] CARDIAC CATH LAB    CARDIAC PROCEDURE N/A 12/9/2024    Left ventriculography performed by Andrew Patel MD at [facility] CARDIAC CATH LAB    CHOLECYSTECTOMY      COLONOSCOPY N/A 10/21/2016    COLONOSCOPY w/ biopsy performed by Bartolo Del Angel MD at [facility] ENDOSCOPY    HYSTERECTOMY (CERVIX STATUS UNKNOWN)      OTHER SURGICAL HISTORY      scar tissue removed from intestines    PARTIAL HYSTERECTOMY (CERVIX NOT REMOVED)  11/2009    TONSILLECTOMY      UPPER GASTROINTESTINAL ENDOSCOPY N/A 12/20/2024    ESOPHAGOGASTRODUODENOSCOPY with cold forcep bx's performed by Amauri Tompkins MD at [facility] ENDOSCOPY        Current Outpatient Medications   Medication Sig

## 2025-03-19 ASSESSMENT — PATIENT HEALTH QUESTIONNAIRE - PHQ9
SUM OF ALL RESPONSES TO PHQ QUESTIONS 1-9: 5
5. POOR APPETITE OR OVEREATING: SEVERAL DAYS
10. IF YOU CHECKED OFF ANY PROBLEMS, HOW DIFFICULT HAVE THESE PROBLEMS MADE IT FOR YOU TO DO YOUR WORK, TAKE CARE OF THINGS AT HOME, OR GET ALONG WITH OTHER PEOPLE: SOMEWHAT DIFFICULT
SUM OF ALL RESPONSES TO PHQ QUESTIONS 1-9: 5
1. LITTLE INTEREST OR PLEASURE IN DOING THINGS: NOT AT ALL
8. MOVING OR SPEAKING SO SLOWLY THAT OTHER PEOPLE COULD HAVE NOTICED. OR THE OPPOSITE, BEING SO FIGETY OR RESTLESS THAT YOU HAVE BEEN MOVING AROUND A LOT MORE THAN USUAL: SEVERAL DAYS
2. FEELING DOWN, DEPRESSED OR HOPELESS: NOT AT ALL
4. FEELING TIRED OR HAVING LITTLE ENERGY: SEVERAL DAYS
9. THOUGHTS THAT YOU WOULD BE BETTER OFF DEAD, OR OF HURTING YOURSELF: NOT AT ALL
SUM OF ALL RESPONSES TO PHQ QUESTIONS 1-9: 5
6. FEELING BAD ABOUT YOURSELF - OR THAT YOU ARE A FAILURE OR HAVE LET YOURSELF OR YOUR FAMILY DOWN: SEVERAL DAYS
3. TROUBLE FALLING OR STAYING ASLEEP: SEVERAL DAYS
7. TROUBLE CONCENTRATING ON THINGS, SUCH AS READING THE NEWSPAPER OR WATCHING TELEVISION: NOT AT ALL
SUM OF ALL RESPONSES TO PHQ QUESTIONS 1-9: 5

## 2025-03-19 ASSESSMENT — ANXIETY QUESTIONNAIRES
7. FEELING AFRAID AS IF SOMETHING AWFUL MIGHT HAPPEN: NOT AT ALL
2. NOT BEING ABLE TO STOP OR CONTROL WORRYING: SEVERAL DAYS
5. BEING SO RESTLESS THAT IT IS HARD TO SIT STILL: NOT AT ALL
GAD7 TOTAL SCORE: 4
4. TROUBLE RELAXING: SEVERAL DAYS
3. WORRYING TOO MUCH ABOUT DIFFERENT THINGS: SEVERAL DAYS
1. FEELING NERVOUS, ANXIOUS, OR ON EDGE: NOT AT ALL
6. BECOMING EASILY ANNOYED OR IRRITABLE: SEVERAL DAYS
IF YOU CHECKED OFF ANY PROBLEMS ON THIS QUESTIONNAIRE, HOW DIFFICULT HAVE THESE PROBLEMS MADE IT FOR YOU TO DO YOUR WORK, TAKE CARE OF THINGS AT HOME, OR GET ALONG WITH OTHER PEOPLE: SOMEWHAT DIFFICULT

## 2025-04-14 ENCOUNTER — HOSPITAL ENCOUNTER (OUTPATIENT)
Facility: HOSPITAL | Age: 60
Discharge: HOME | End: 2025-04-17
Payer: COMMERCIAL

## 2025-04-14 VITALS — WEIGHT: 199 LBS | HEIGHT: 63 IN | BODY MASS INDEX: 35.26 KG/M2

## 2025-04-14 DIAGNOSIS — Z12.31 SCREENING MAMMOGRAM FOR BREAST CANCER: ICD-10-CM

## 2025-04-14 PROCEDURE — 77063 BREAST TOMOSYNTHESIS BI: CPT

## 2025-04-26 ENCOUNTER — APPOINTMENT (OUTPATIENT)
Facility: HOSPITAL | Age: 60
End: 2025-04-26
Payer: COMMERCIAL

## 2025-04-26 ENCOUNTER — HOSPITAL ENCOUNTER (EMERGENCY)
Facility: HOSPITAL | Age: 60
Discharge: HOME | End: 2025-04-26
Attending: EMERGENCY MEDICINE
Payer: COMMERCIAL

## 2025-04-26 VITALS
SYSTOLIC BLOOD PRESSURE: 172 MMHG | WEIGHT: 190 LBS | HEIGHT: 63 IN | RESPIRATION RATE: 16 BRPM | DIASTOLIC BLOOD PRESSURE: 91 MMHG | TEMPERATURE: 98.2 F | BODY MASS INDEX: 33.66 KG/M2 | OXYGEN SATURATION: 100 % | HEART RATE: 79 BPM

## 2025-04-26 DIAGNOSIS — M17.11 ARTHRITIS OF RIGHT KNEE: Primary | ICD-10-CM

## 2025-04-26 PROCEDURE — 6370000000 HC RX 637 (ALT 250 FOR IP): Performed by: EMERGENCY MEDICINE

## 2025-04-26 PROCEDURE — 73562 X-RAY EXAM OF KNEE 3: CPT

## 2025-04-26 PROCEDURE — 99283 EMERGENCY DEPT VISIT LOW MDM: CPT

## 2025-04-26 RX ORDER — OXYCODONE AND ACETAMINOPHEN 5; 325 MG/1; MG/1
1 TABLET ORAL
Refills: 0 | Status: COMPLETED | OUTPATIENT
Start: 2025-04-26 | End: 2025-04-26

## 2025-04-26 RX ORDER — LIDOCAINE 4 G/G
1 PATCH TOPICAL DAILY
Qty: 10 PATCH | Refills: 0 | Status: SHIPPED | OUTPATIENT
Start: 2025-04-26 | End: 2025-05-06

## 2025-04-26 RX ORDER — LIDOCAINE 4 G/G
1 PATCH TOPICAL
Status: DISCONTINUED | OUTPATIENT
Start: 2025-04-26 | End: 2025-04-27 | Stop reason: HOSPADM

## 2025-04-26 RX ORDER — OXYCODONE AND ACETAMINOPHEN 5; 325 MG/1; MG/1
1 TABLET ORAL EVERY 6 HOURS PRN
Qty: 12 TABLET | Refills: 0 | Status: SHIPPED | OUTPATIENT
Start: 2025-04-26 | End: 2025-04-29

## 2025-04-26 RX ADMIN — OXYCODONE HYDROCHLORIDE AND ACETAMINOPHEN 1 TABLET: 5; 325 TABLET ORAL at 23:22

## 2025-04-26 ASSESSMENT — PAIN DESCRIPTION - LOCATION: LOCATION: KNEE

## 2025-04-26 ASSESSMENT — PAIN SCALES - GENERAL
PAINLEVEL_OUTOF10: 10
PAINLEVEL_OUTOF10: 10

## 2025-04-26 ASSESSMENT — LIFESTYLE VARIABLES
HOW OFTEN DO YOU HAVE A DRINK CONTAINING ALCOHOL: NEVER
HOW MANY STANDARD DRINKS CONTAINING ALCOHOL DO YOU HAVE ON A TYPICAL DAY: PATIENT DOES NOT DRINK

## 2025-04-26 ASSESSMENT — PAIN DESCRIPTION - DESCRIPTORS: DESCRIPTORS: ACHING

## 2025-04-26 ASSESSMENT — PAIN - FUNCTIONAL ASSESSMENT
PAIN_FUNCTIONAL_ASSESSMENT: 0-10
PAIN_FUNCTIONAL_ASSESSMENT: ACTIVITIES ARE NOT PREVENTED

## 2025-04-26 ASSESSMENT — PAIN DESCRIPTION - ORIENTATION: ORIENTATION: RIGHT

## 2025-04-27 NOTE — ED PROVIDER NOTES
[facility] EMERGENCY DEPARTMENT  eMERGENCY dEPARTMENT eNCOUnter      Pt Name: Maggy Luna  MRN: [id number]  Birthdate 1965 of evaluation: 4/26/2025  Provider:Gaetano Guajardo MD    CHIEF COMPLAINT       HPI  Maggy Luna is a 59 y.o. female  c/o having right knee pain x 5 days.  Patient states her symptoms started after being on antibiotic for some infection.  She complained of a popping sensation when she gets up from a chair from sitting.    ROS    Review of Systems    Except as noted above the remainder of the review of systems was reviewed and negative.       PAST MEDICAL HISTORY     Past Medical History:   Diagnosis Date    Arthritis     left shoulder    Bronchitis     Carpal tunnel syndrome on right     Chronic bronchitis (HCC)     De Quervain's syndrome (tenosynovitis)     Depression     resolved    Ectopic pregnancy     two times    Epilepsy ([facility])     History of small bowel obstruction 11/04/2024    Multinodular goiter     Seizure ([facility])     Sinus infection     Stenosing tenosynovitis of thumb     Right side    Trigger thumb of left hand          SURGICAL HISTORY       Past Surgical History:   Procedure Laterality Date    APPENDECTOMY  1/12/15    lysis of adhesions    CARDIAC PROCEDURE N/A 12/9/2024    Left heart cath / coronary angiography performed by Andrew Patel MD at [facility] CARDIAC CATH LAB    CARDIAC PROCEDURE N/A 12/9/2024    Left ventriculography performed by Andrew Patel MD at [facility] CARDIAC CATH LAB    CHOLECYSTECTOMY      COLONOSCOPY N/A 10/21/2016    COLONOSCOPY w/ biopsy performed by Bartolo Del Angel MD at [facility] ENDOSCOPY    HYSTERECTOMY (CERVIX STATUS UNKNOWN)      OTHER SURGICAL HISTORY      scar tissue removed from intestines    PARTIAL HYSTERECTOMY (CERVIX NOT REMOVED)  11/2009    TONSILLECTOMY      UPPER GASTROINTESTINAL ENDOSCOPY N/A 12/20/2024    ESOPHAGOGASTRODUODENOSCOPY with cold forcep bx's performed by Amauri Tompkins MD at [facility] ENDOSCOPY

## 2025-04-27 NOTE — ED TRIAGE NOTES
Pt states that she has R knee pain for 5 days. She was recently treated with antibiotics for a stomach infection. She feels her knee popping when getting up

## 2025-04-27 NOTE — ED NOTES
Patient received discharge instructions, left ED in stable in stable condition with spouse, no acute distress noted.

## 2025-05-09 ENCOUNTER — PROCEDURE VISIT (OUTPATIENT)
Age: 60
End: 2025-05-09
Payer: COMMERCIAL

## 2025-05-09 VITALS
BODY MASS INDEX: 35.08 KG/M2 | WEIGHT: 198 LBS | HEART RATE: 68 BPM | HEIGHT: 63 IN | SYSTOLIC BLOOD PRESSURE: 137 MMHG | OXYGEN SATURATION: 97 % | DIASTOLIC BLOOD PRESSURE: 72 MMHG | TEMPERATURE: 97.7 F

## 2025-05-09 DIAGNOSIS — R94.131 ABNORMAL EMG: ICD-10-CM

## 2025-05-09 DIAGNOSIS — G56.03 BILATERAL CARPAL TUNNEL SYNDROME: ICD-10-CM

## 2025-05-09 DIAGNOSIS — R20.0 NUMBNESS AND TINGLING IN BOTH HANDS: Primary | ICD-10-CM

## 2025-05-09 DIAGNOSIS — R20.2 NUMBNESS AND TINGLING IN BOTH HANDS: Primary | ICD-10-CM

## 2025-05-09 PROCEDURE — 95911 NRV CNDJ TEST 9-10 STUDIES: CPT | Performed by: PHYSICAL MEDICINE & REHABILITATION

## 2025-05-09 PROCEDURE — 95886 MUSC TEST DONE W/N TEST COMP: CPT | Performed by: PHYSICAL MEDICINE & REHABILITATION

## 2025-05-09 NOTE — PROGRESS NOTES
Wrist-Dig V 14 52 -    Right Ulnar Sensory   Wrist-Dig V 2.7  < 3.1 3.6  < 4.0 28  > 7 Wrist-Dig V 14 52 -      EMG+     Side Muscle Nerve Root Ins Act Fibs Psw Fascics Other Amp Dur Poly Recrt Activation Comment Misc   Right Biceps Musculocut C5-C6 Nml Nml Nml Nml 0 Nml Nml 0 Nml Nml     Right Triceps Radial C6-C8 Nml Nml Nml Nml 0 Nml Nml 0 Nml Nml     Right Pronator Teres Median C6-C7 Nml Nml Nml Nml 0 Nml Nml 0 Nml Nml     Right FDI Median,  Ulnar C8-T1 Nml Nml Nml Nml 0 Nml Nml 0 Nml Nml     Right APB Median C8-T1 Nml Nml Nml Nml 0 Nml Nml 0 Nml Nml     Left Biceps Musculocut C5-C6 Nml Nml Nml Nml 0 Nml Nml 0 Nml Nml     Left Triceps Radial C6-C8 Nml Nml Nml Nml 0 Nml Nml 0 Nml Nml     Left Pronator Teres Median C6-C7 Nml Nml Nml Nml 0 Nml Nml 0 Nml Nml     Left FDI Median,  Ulnar C8-T1 Nml Nml Nml Nml 0 Nml Nml 0 Nml Nml     Left APB Median C8-T1 Nml Nml Nml Nml 0 Nml Nml 0 Nml Nml             Waveforms:    Motor                Sensory                      [facility]  OFFICE PROCEDURE PROGRESS NOTE        Chart reviewed for the following:   Yves FELICIANO, have reviewed the History, Physical and updated the Allergic reactions for Maggy Luna     TIME OUT performed immediately prior to start of procedure:   Yves FELICIANO, have performed the following reviews on Maggy Luna prior to the start of the procedure:            * Patient was identified by name and date of birth   * Agreement on procedure being performed was verified  * Risks and Benefits explained to the patient  * Procedure site verified and marked as necessary  * Patient was positioned for comfort  * Consent was signed and verified     Time: 10:45 AM EDT     Date of procedure: 5/9/25     Procedure performed by:  Yves Quiroga MD    Provider accompanied by: Radhika.    Patient accompanied by: Self    How tolerated by patient: tolerated the procedure well with no complications    Post

## 2025-08-19 ENCOUNTER — HOSPITAL ENCOUNTER (OUTPATIENT)
Facility: HOSPITAL | Age: 60
Setting detail: SPECIMEN
Discharge: HOME | End: 2025-08-22

## 2025-08-19 LAB — LABCORP SPECIMEN COLLECTION: NORMAL

## 2025-08-19 PROCEDURE — 99001 SPECIMEN HANDLING PT-LAB: CPT

## (undated) DEVICE — PRESSURE MONITORING SET: Brand: TRUWAVE

## (undated) DEVICE — RADIFOCUS OPTITORQUE ANGIOGRAPHIC CATHETER: Brand: OPTITORQUE

## (undated) DEVICE — SET FLD ADMIN 3 W STPCOCK FIX FEM L BOR 1IN

## (undated) DEVICE — Device

## (undated) DEVICE — SYRINGE MED 50ML LUERSLIP TIP

## (undated) DEVICE — STERILE POLYISOPRENE POWDER-FREE SURGICAL GLOVES: Brand: PROTEXIS

## (undated) DEVICE — GUIDEWIRE VASC L150CM DIA0.035IN FLX END L7CM J 3MM PTFE

## (undated) DEVICE — YANKAUER,SMOOTH HANDLE,HIGH CAPACITY: Brand: MEDLINE INDUSTRIES, INC.

## (undated) DEVICE — BASIN EMSIS 16OZ GRAPHITE PLAS KID SHP MOLD GRAD FOR ORAL

## (undated) DEVICE — FORCEPS BX L240CM JAW DIA2.4MM ORNG L CAP W/ NDL DISP RAD

## (undated) DEVICE — PROCEDURE KIT FLUID MGMT 10 FR CUST MAINFOLD

## (undated) DEVICE — BITE BLOCK ENDOSCP UNIV AD 6 TO 9.4 MM

## (undated) DEVICE — UNDERPAD INCONT W23XL36IN STD BLU POLYPR BK FLUF SFT

## (undated) DEVICE — GAUZE,SPONGE,4"X4",16PLY,STRL,LF,10/TRAY: Brand: MEDLINE

## (undated) DEVICE — SOLUTION IRRIG 1000ML H2O STRL BLT

## (undated) DEVICE — CATHETER SUCT TR FL TIP 14FR W/ O CTRL

## (undated) DEVICE — SYRINGE MED 25GA 3ML L5/8IN SUBQ PLAS W/ DETACH NDL SFTY

## (undated) DEVICE — MEDI-VAC NON-CONDUCTIVE SUCTION TUBING: Brand: CARDINAL HEALTH

## (undated) DEVICE — ENDOSCOPY PUMP TUBING/ CAP SET: Brand: ERBE

## (undated) DEVICE — LINER SUCT CANSTR 3000CC PLAS SFT PRE ASSEMB W/OUT TBNG W/

## (undated) DEVICE — CANNULA NSL AD TBNG L14FT STD PVC O2 CRV CONN NONFLARED NSL

## (undated) DEVICE — GLIDESHEATH SLENDER STAINLESS STEEL KIT: Brand: GLIDESHEATH SLENDER